# Patient Record
Sex: FEMALE | Race: BLACK OR AFRICAN AMERICAN | NOT HISPANIC OR LATINO | Employment: OTHER | URBAN - METROPOLITAN AREA
[De-identification: names, ages, dates, MRNs, and addresses within clinical notes are randomized per-mention and may not be internally consistent; named-entity substitution may affect disease eponyms.]

---

## 2017-01-11 ENCOUNTER — HISTORICAL (OUTPATIENT)
Dept: INFUSION THERAPY | Facility: HOSPITAL | Age: 77
End: 2017-01-11

## 2017-03-09 ENCOUNTER — HISTORICAL (OUTPATIENT)
Dept: INFUSION THERAPY | Facility: HOSPITAL | Age: 77
End: 2017-03-09

## 2017-05-04 ENCOUNTER — HISTORICAL (OUTPATIENT)
Dept: INFUSION THERAPY | Facility: HOSPITAL | Age: 77
End: 2017-05-04

## 2017-06-29 ENCOUNTER — HISTORICAL (OUTPATIENT)
Dept: INFUSION THERAPY | Facility: HOSPITAL | Age: 77
End: 2017-06-29

## 2017-08-23 ENCOUNTER — HISTORICAL (OUTPATIENT)
Dept: INFUSION THERAPY | Facility: HOSPITAL | Age: 77
End: 2017-08-23

## 2017-10-17 ENCOUNTER — HISTORICAL (OUTPATIENT)
Dept: INFUSION THERAPY | Facility: HOSPITAL | Age: 77
End: 2017-10-17

## 2017-12-13 ENCOUNTER — HISTORICAL (OUTPATIENT)
Dept: INFUSION THERAPY | Facility: HOSPITAL | Age: 77
End: 2017-12-13

## 2018-02-07 ENCOUNTER — HISTORICAL (OUTPATIENT)
Dept: INFUSION THERAPY | Facility: HOSPITAL | Age: 78
End: 2018-02-07

## 2018-03-29 ENCOUNTER — HISTORICAL (OUTPATIENT)
Dept: INFUSION THERAPY | Facility: HOSPITAL | Age: 78
End: 2018-03-29

## 2018-05-24 ENCOUNTER — HISTORICAL (OUTPATIENT)
Dept: INFUSION THERAPY | Facility: HOSPITAL | Age: 78
End: 2018-05-24

## 2018-07-18 ENCOUNTER — HISTORICAL (OUTPATIENT)
Dept: INFUSION THERAPY | Facility: HOSPITAL | Age: 78
End: 2018-07-18

## 2018-09-20 ENCOUNTER — HISTORICAL (OUTPATIENT)
Dept: INFUSION THERAPY | Facility: HOSPITAL | Age: 78
End: 2018-09-20

## 2018-11-20 ENCOUNTER — HISTORICAL (OUTPATIENT)
Dept: INFUSION THERAPY | Facility: HOSPITAL | Age: 78
End: 2018-11-20

## 2019-01-16 ENCOUNTER — HISTORICAL (OUTPATIENT)
Dept: INFUSION THERAPY | Facility: HOSPITAL | Age: 79
End: 2019-01-16

## 2019-03-21 ENCOUNTER — HISTORICAL (OUTPATIENT)
Dept: INFUSION THERAPY | Facility: HOSPITAL | Age: 79
End: 2019-03-21

## 2019-05-15 ENCOUNTER — HISTORICAL (OUTPATIENT)
Dept: LAB | Facility: HOSPITAL | Age: 79
End: 2019-05-15

## 2019-05-15 ENCOUNTER — HISTORICAL (OUTPATIENT)
Dept: INFUSION THERAPY | Facility: HOSPITAL | Age: 79
End: 2019-05-15

## 2019-05-15 LAB
ABS NEUT (OLG): 3.72 X10(3)/MCL (ref 2.1–9.2)
ALBUMIN SERPL-MCNC: 3.5 GM/DL (ref 3.4–5)
ALBUMIN/GLOB SERPL: 0.9 {RATIO}
ALP SERPL-CCNC: 94 UNIT/L (ref 38–126)
ALT SERPL-CCNC: 29 UNIT/L (ref 12–78)
AST SERPL-CCNC: 24 UNIT/L (ref 15–37)
BASOPHILS # BLD AUTO: 0 X10(3)/MCL (ref 0–0.2)
BASOPHILS NFR BLD AUTO: 0 %
BILIRUB SERPL-MCNC: 0.7 MG/DL (ref 0.2–1)
BILIRUBIN DIRECT+TOT PNL SERPL-MCNC: 0.1 MG/DL (ref 0–0.2)
BILIRUBIN DIRECT+TOT PNL SERPL-MCNC: 0.6 MG/DL (ref 0–0.8)
BUN SERPL-MCNC: 15 MG/DL (ref 7–18)
CALCIUM SERPL-MCNC: 8.8 MG/DL (ref 8.5–10.1)
CHLORIDE SERPL-SCNC: 108 MMOL/L (ref 98–107)
CO2 SERPL-SCNC: 27 MMOL/L (ref 21–32)
CREAT SERPL-MCNC: 0.74 MG/DL (ref 0.55–1.02)
DEPRECATED CALCIDIOL+CALCIFEROL SERPL-MC: 13.78 NG/ML (ref 30–80)
EOSINOPHIL # BLD AUTO: 0 X10(3)/MCL (ref 0–0.9)
EOSINOPHIL NFR BLD AUTO: 1 %
ERYTHROCYTE [DISTWIDTH] IN BLOOD BY AUTOMATED COUNT: 13.9 % (ref 11.5–17)
ERYTHROCYTE [SEDIMENTATION RATE] IN BLOOD: 28 MM/HR (ref 0–20)
GLOBULIN SER-MCNC: 3.9 GM/DL (ref 2.4–3.5)
GLUCOSE SERPL-MCNC: 83 MG/DL (ref 74–106)
HCT VFR BLD AUTO: 40.3 % (ref 37–47)
HGB BLD-MCNC: 13.2 GM/DL (ref 12–16)
LYMPHOCYTES # BLD AUTO: 1.5 X10(3)/MCL (ref 0.6–4.6)
LYMPHOCYTES NFR BLD AUTO: 25 %
MCH RBC QN AUTO: 30.7 PG (ref 27–31)
MCHC RBC AUTO-ENTMCNC: 32.8 GM/DL (ref 33–36)
MCV RBC AUTO: 93.7 FL (ref 80–94)
MONOCYTES # BLD AUTO: 0.6 X10(3)/MCL (ref 0.1–1.3)
MONOCYTES NFR BLD AUTO: 10 %
NEUTROPHILS # BLD AUTO: 3.72 X10(3)/MCL (ref 2.1–9.2)
NEUTROPHILS NFR BLD AUTO: 63 %
PLATELET # BLD AUTO: 243 X10(3)/MCL (ref 130–400)
PMV BLD AUTO: 10.3 FL (ref 9.4–12.4)
POTASSIUM SERPL-SCNC: 3.5 MMOL/L (ref 3.5–5.1)
PROT SERPL-MCNC: 7.4 GM/DL (ref 6.4–8.2)
RBC # BLD AUTO: 4.3 X10(6)/MCL (ref 4.2–5.4)
SODIUM SERPL-SCNC: 141 MMOL/L (ref 136–145)
VIT B12 SERPL-MCNC: 276 PG/ML (ref 193–986)
WBC # SPEC AUTO: 5.9 X10(3)/MCL (ref 4.5–11.5)

## 2019-07-19 ENCOUNTER — HISTORICAL (OUTPATIENT)
Dept: INFUSION THERAPY | Facility: HOSPITAL | Age: 79
End: 2019-07-19

## 2019-09-13 ENCOUNTER — HISTORICAL (OUTPATIENT)
Dept: INFUSION THERAPY | Facility: HOSPITAL | Age: 79
End: 2019-09-13

## 2019-10-23 ENCOUNTER — HISTORICAL (OUTPATIENT)
Dept: INFUSION THERAPY | Facility: HOSPITAL | Age: 79
End: 2019-10-23

## 2019-11-13 ENCOUNTER — HISTORICAL (OUTPATIENT)
Dept: INFUSION THERAPY | Facility: HOSPITAL | Age: 79
End: 2019-11-13

## 2019-12-11 ENCOUNTER — HISTORICAL (OUTPATIENT)
Dept: INFUSION THERAPY | Facility: HOSPITAL | Age: 79
End: 2019-12-11

## 2020-02-06 ENCOUNTER — HISTORICAL (OUTPATIENT)
Dept: INFUSION THERAPY | Facility: HOSPITAL | Age: 80
End: 2020-02-06

## 2020-04-02 ENCOUNTER — HISTORICAL (OUTPATIENT)
Dept: INFUSION THERAPY | Facility: HOSPITAL | Age: 80
End: 2020-04-02

## 2020-05-28 ENCOUNTER — HISTORICAL (OUTPATIENT)
Dept: INFUSION THERAPY | Facility: HOSPITAL | Age: 80
End: 2020-05-28

## 2020-07-23 ENCOUNTER — HISTORICAL (OUTPATIENT)
Dept: INFUSION THERAPY | Facility: HOSPITAL | Age: 80
End: 2020-07-23

## 2020-08-05 ENCOUNTER — HISTORICAL (OUTPATIENT)
Dept: ADMINISTRATIVE | Facility: HOSPITAL | Age: 80
End: 2020-08-05

## 2020-08-05 LAB
ABS NEUT (OLG): 4.45 X10(3)/MCL (ref 2.1–9.2)
ALBUMIN SERPL-MCNC: 3.8 GM/DL (ref 3.4–5)
ALBUMIN/GLOB SERPL: 1.1 RATIO (ref 1.1–2)
ALP SERPL-CCNC: 112 UNIT/L (ref 40–150)
ALT SERPL-CCNC: 80 UNIT/L (ref 0–55)
APPEARANCE, UA: CLEAR
AST SERPL-CCNC: 36 UNIT/L (ref 5–34)
BACTERIA SPEC CULT: ABNORMAL /HPF
BASOPHILS # BLD AUTO: 0 X10(3)/MCL (ref 0–0.2)
BASOPHILS NFR BLD AUTO: 0 %
BILIRUB SERPL-MCNC: 0.9 MG/DL
BILIRUB UR QL STRIP: NEGATIVE
BILIRUBIN DIRECT+TOT PNL SERPL-MCNC: 0.3 MG/DL (ref 0–0.5)
BILIRUBIN DIRECT+TOT PNL SERPL-MCNC: 0.6 MG/DL (ref 0–0.8)
BUN SERPL-MCNC: 13.3 MG/DL (ref 9.8–20.1)
CALCIUM SERPL-MCNC: 9 MG/DL (ref 8.4–10.2)
CHLORIDE SERPL-SCNC: 113 MMOL/L (ref 98–107)
CHOLEST SERPL-MCNC: 253 MG/DL
CHOLEST/HDLC SERPL: 5 {RATIO} (ref 0–5)
CK SERPL-CCNC: 54 U/L (ref 29–168)
CO2 SERPL-SCNC: 18 MMOL/L (ref 23–31)
COLOR UR: ABNORMAL
CREAT SERPL-MCNC: 0.85 MG/DL (ref 0.55–1.02)
DEPRECATED CALCIDIOL+CALCIFEROL SERPL-MC: 25.3 NG/ML (ref 6.6–49.9)
EOSINOPHIL # BLD AUTO: 0.1 X10(3)/MCL (ref 0–0.9)
EOSINOPHIL NFR BLD AUTO: 1 %
ERYTHROCYTE [DISTWIDTH] IN BLOOD BY AUTOMATED COUNT: 13.8 % (ref 11.5–17)
EST. AVERAGE GLUCOSE BLD GHB EST-MCNC: 108.3 MG/DL
GLOBULIN SER-MCNC: 3.5 GM/DL (ref 2.4–3.5)
GLUCOSE (UA): NEGATIVE
GLUCOSE SERPL-MCNC: 92 MG/DL (ref 82–115)
HBA1C MFR BLD: 5.4 %
HCT VFR BLD AUTO: 43.2 % (ref 37–47)
HDLC SERPL-MCNC: 51 MG/DL (ref 35–60)
HGB BLD-MCNC: 13.8 GM/DL (ref 12–16)
HGB UR QL STRIP: NEGATIVE
KETONES UR QL STRIP: NEGATIVE
LDLC SERPL CALC-MCNC: 156 MG/DL (ref 50–140)
LEUKOCYTE ESTERASE UR QL STRIP: ABNORMAL
LYMPHOCYTES # BLD AUTO: 1.7 X10(3)/MCL (ref 0.6–4.6)
LYMPHOCYTES NFR BLD AUTO: 25 %
MCH RBC QN AUTO: 31 PG (ref 27–31)
MCHC RBC AUTO-ENTMCNC: 31.9 GM/DL (ref 33–36)
MCV RBC AUTO: 97.1 FL (ref 80–94)
MONOCYTES # BLD AUTO: 0.5 X10(3)/MCL (ref 0.1–1.3)
MONOCYTES NFR BLD AUTO: 8 %
MUCOUS THREADS URNS QL MICRO: ABNORMAL
NEUTROPHILS # BLD AUTO: 4.45 X10(3)/MCL (ref 2.1–9.2)
NEUTROPHILS NFR BLD AUTO: 65 %
NITRITE UR QL STRIP: NEGATIVE
PH UR STRIP: 5.5 [PH] (ref 5–9)
PLATELET # BLD AUTO: 225 X10(3)/MCL (ref 130–400)
PMV BLD AUTO: 10.5 FL (ref 9.4–12.4)
POTASSIUM SERPL-SCNC: 3.8 MMOL/L (ref 3.5–5.1)
PROT SERPL-MCNC: 7.3 GM/DL (ref 5.8–7.6)
PROT UR QL STRIP: ABNORMAL
RBC # BLD AUTO: 4.45 X10(6)/MCL (ref 4.2–5.4)
RBC #/AREA URNS HPF: ABNORMAL /[HPF]
SODIUM SERPL-SCNC: 142 MMOL/L (ref 136–145)
SP GR UR STRIP: 1.02 (ref 1–1.03)
SQUAMOUS EPITHELIAL, UA: ABNORMAL
TRIGL SERPL-MCNC: 229 MG/DL (ref 37–140)
TSH SERPL-ACNC: 0.87 UIU/ML (ref 0.35–4.94)
UROBILINOGEN UR STRIP-ACNC: 0.2
VLDLC SERPL CALC-MCNC: 46 MG/DL
WBC # SPEC AUTO: 6.8 X10(3)/MCL (ref 4.5–11.5)
WBC #/AREA URNS HPF: 11 /HPF (ref 0–3)

## 2020-08-08 LAB — FINAL CULTURE: NORMAL

## 2020-09-23 ENCOUNTER — HISTORICAL (OUTPATIENT)
Dept: INFUSION THERAPY | Facility: HOSPITAL | Age: 80
End: 2020-09-23

## 2020-11-18 ENCOUNTER — HISTORICAL (OUTPATIENT)
Dept: INFUSION THERAPY | Facility: HOSPITAL | Age: 80
End: 2020-11-18

## 2021-01-15 ENCOUNTER — HISTORICAL (OUTPATIENT)
Dept: INFUSION THERAPY | Facility: HOSPITAL | Age: 81
End: 2021-01-15

## 2021-03-12 ENCOUNTER — HISTORICAL (OUTPATIENT)
Dept: INFUSION THERAPY | Facility: HOSPITAL | Age: 81
End: 2021-03-12

## 2021-03-24 ENCOUNTER — HISTORICAL (OUTPATIENT)
Dept: ADMINISTRATIVE | Facility: HOSPITAL | Age: 81
End: 2021-03-24

## 2021-04-29 ENCOUNTER — HISTORICAL (OUTPATIENT)
Dept: ADMINISTRATIVE | Facility: HOSPITAL | Age: 81
End: 2021-04-29

## 2021-05-07 ENCOUNTER — HISTORICAL (OUTPATIENT)
Dept: INFUSION THERAPY | Facility: HOSPITAL | Age: 81
End: 2021-05-07

## 2021-06-17 ENCOUNTER — HISTORICAL (OUTPATIENT)
Dept: INFUSION THERAPY | Facility: HOSPITAL | Age: 81
End: 2021-06-17

## 2021-07-29 ENCOUNTER — HISTORICAL (OUTPATIENT)
Dept: INFUSION THERAPY | Facility: HOSPITAL | Age: 81
End: 2021-07-29

## 2021-09-09 ENCOUNTER — HISTORICAL (OUTPATIENT)
Dept: INFUSION THERAPY | Facility: HOSPITAL | Age: 81
End: 2021-09-09

## 2021-09-29 ENCOUNTER — HISTORICAL (OUTPATIENT)
Dept: ADMINISTRATIVE | Facility: HOSPITAL | Age: 81
End: 2021-09-29

## 2021-09-29 LAB
ABS NEUT (OLG): 4.06 X10(3)/MCL (ref 2.1–9.2)
BASOPHILS # BLD AUTO: 0 X10(3)/MCL (ref 0–0.2)
BASOPHILS NFR BLD AUTO: 0 %
BUN SERPL-MCNC: 14.9 MG/DL (ref 9.8–20.1)
CALCIUM SERPL-MCNC: 10 MG/DL (ref 8.4–10.2)
CHLORIDE SERPL-SCNC: 107 MMOL/L (ref 98–107)
CO2 SERPL-SCNC: 22 MMOL/L (ref 23–31)
CREAT SERPL-MCNC: 0.82 MG/DL (ref 0.55–1.02)
CREAT/UREA NIT SERPL: 18
EOSINOPHIL # BLD AUTO: 0.1 X10(3)/MCL (ref 0–0.9)
EOSINOPHIL NFR BLD AUTO: 2 %
ERYTHROCYTE [DISTWIDTH] IN BLOOD BY AUTOMATED COUNT: 14.1 % (ref 11.5–17)
GLUCOSE SERPL-MCNC: 108 MG/DL (ref 82–115)
HCT VFR BLD AUTO: 39.1 % (ref 37–47)
HGB BLD-MCNC: 12.5 GM/DL (ref 12–16)
LYMPHOCYTES # BLD AUTO: 1.7 X10(3)/MCL (ref 0.6–4.6)
LYMPHOCYTES NFR BLD AUTO: 27 %
MCH RBC QN AUTO: 31.1 PG (ref 27–31)
MCHC RBC AUTO-ENTMCNC: 32 GM/DL (ref 33–36)
MCV RBC AUTO: 97.3 FL (ref 80–94)
MONOCYTES # BLD AUTO: 0.6 X10(3)/MCL (ref 0.1–1.3)
MONOCYTES NFR BLD AUTO: 9 %
NEUTROPHILS # BLD AUTO: 4.06 X10(3)/MCL (ref 2.1–9.2)
NEUTROPHILS NFR BLD AUTO: 62 %
PLATELET # BLD AUTO: 264 X10(3)/MCL (ref 130–400)
PMV BLD AUTO: 10 FL (ref 9.4–12.4)
POTASSIUM SERPL-SCNC: 4.1 MMOL/L (ref 3.5–5.1)
RBC # BLD AUTO: 4.02 X10(6)/MCL (ref 4.2–5.4)
SARS-COV-2 RNA RESP QL NAA+PROBE: NOT DETECTED
SODIUM SERPL-SCNC: 141 MMOL/L (ref 136–145)
WBC # SPEC AUTO: 6.5 X10(3)/MCL (ref 4.5–11.5)

## 2021-10-01 ENCOUNTER — HOSPITAL ENCOUNTER (OUTPATIENT)
Dept: ONCOLOGY | Facility: HOSPITAL | Age: 81
End: 2021-10-03
Attending: NEUROLOGICAL SURGERY | Admitting: NEUROLOGICAL SURGERY

## 2021-10-01 LAB — GROUP & RH: NORMAL

## 2021-11-04 ENCOUNTER — HISTORICAL (OUTPATIENT)
Dept: INFUSION THERAPY | Facility: HOSPITAL | Age: 81
End: 2021-11-04

## 2021-12-15 ENCOUNTER — HISTORICAL (OUTPATIENT)
Dept: INFUSION THERAPY | Facility: HOSPITAL | Age: 81
End: 2021-12-15

## 2022-02-09 ENCOUNTER — HISTORICAL (OUTPATIENT)
Dept: INFUSION THERAPY | Facility: HOSPITAL | Age: 82
End: 2022-02-09

## 2022-04-06 ENCOUNTER — HISTORICAL (OUTPATIENT)
Dept: INFUSION THERAPY | Facility: HOSPITAL | Age: 82
End: 2022-04-06

## 2022-04-11 ENCOUNTER — HISTORICAL (OUTPATIENT)
Dept: ADMINISTRATIVE | Facility: HOSPITAL | Age: 82
End: 2022-04-11
Payer: MEDICARE

## 2022-04-24 VITALS
DIASTOLIC BLOOD PRESSURE: 80 MMHG | OXYGEN SATURATION: 98 % | WEIGHT: 167 LBS | HEIGHT: 68 IN | BODY MASS INDEX: 25.31 KG/M2 | SYSTOLIC BLOOD PRESSURE: 134 MMHG

## 2022-04-30 NOTE — OP NOTE
Patient:   Evelyn Talavera             MRN: [id number]            FIN: [id number]               Age:   80 years     Sex:  Female     :  1940   Associated Diagnoses:   None   Author:   Romario Flores MD       Phacoemulsification of Cataract with Intraocular Implant   Preoperative Diagnosis: Cataract left eye   Postoperative Diagnosis : Cataract left eye  Surgeon: Romario Flores MD  Assistant: NOMI Camarena  Anestheisa: MAC  Complications: None    After the patient underwent topical anesthesia along with IV sedation in the holding area, the patient was brought to the operating suite. The patient prepped and draped in a sterile fashion. A pediatric tegaderm and a lid speculum were used to retract the upper and lower lashes and lids.  A 1.0mm paracentesis was then made at the 5 oclock and 11 oclock position. Intraocular non-preserved 1% Xylocaine (4% diluted down to 1%) was irrigated into the anterior chamber. Trypan blue dye was used to stain the anterior capsule then Endocoat was injected into the eye. A clear corneal incision was made with a 2.4 Keratome blade. A 4.75mm circular capsulotomy was then made with a pre bent 30 gauge needle and BSS was used to hydro dissect the nucleus from the capsule. The nucleus was then phacoemulsified with the Abbott machine for a EFX of (23_). The cortex was then removed with the I/A hand piece and Helon was placed into the posterior bag of the eye. An posterior chamber implant (ZCB00_) of power (21.5_) was placed in the capsular bag. The Helon was then removed from the eye with the I/A hand piece . The anterior chamber was inflated with BSS and the wound was checked for leaks. The lid speculum was removed and a drop of Besivance was placed in the operative eye. The patient was brought to the recovery suite in stable condition.

## 2022-04-30 NOTE — DISCHARGE SUMMARY
Patient:   Evelyn Talavera             MRN: [id number]            FIN: [id number]               Age:   81 years     Sex:  Female     :  1940   Associated Diagnoses:   None   Author:   Parul ARRIAGA, Misty Rehman      Discharge Summary  Admitting physician: Jesse Marin MD  Admit Date: 10/1/2021  Discharge Date: 10/3/2021      Admitting diagnosis:   1.  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  Bilateral L4 hemilaminotomies and bilateral L4-5 medial facetectomies and foraminotomies  2.  Bilateral L5 hemilaminotomies and bilateral L5-S1 medial facetectomies and foraminotomies  3.  Microdissection for spinal procedure    History:   Evelyn Talavera is an 81-year-old woman with intractable lower extremity pain, numbness and weakness with symptoms of neurogenic claudication.  Imaging study showed severe central and lateral recess stenosis at L4-5 more so than L5-S1.. Options were discussed and, after conservative management failed, surgery was elected.  The patient and their family understood and accepted the nature of this surgery as well as its attendant risks. She was admitted for this on 10/1/2021.     Hospital Course:  The patient was taken to the OR on 10/1/2021 for the aforementioned procedure.  She tolerated the procedure well and was admitted to the neuro floor afterwards.  On POD #1 she was experiencing some incisional pain which was controlled with PO medications.  Her leg symptoms were improved.  She was able to work with therapy and increase her activity over the next few days.  She was tolerating PO intake and voiding without issues.  She was able to be discharged home in an improved state with a good prognosis on 10/3/2021.      Discharge Medications:  aspirin (Aspirin Enteric Coated 81 mg oral delayed release tablet)  1 Tablet(s) Oral Daily. do not crush or chew. Refills: 0.  Patient Instructions:_ Ok to resume on 10/7/2021  cholecalciferol (cholecalciferol 50,000 intl units (1250 mcg) oral capsule) 1 Cap Oral every week., Recorded by Shar  cyanocobalamin (Cyanocobalamin Injection -CCA) 1000 Microgram Intramuscular once a month., Recorded by DrFirst  gabapentin (gabapentin 300 mg oral capsule) 1 Cap Oral 3 times a day., Recorded by DrFirst  ondansetron (ondansetron 8 mg oral tablet, disintegrating) 1 Tablet(s) Oral 3 times a day., Recorded by Yenst  simvastatin (simvastatin 20 mg oral tablet) 1 Tablet(s) Oral once a day (in the evening)., Recorded by Yenst  traMADol (traMADol 50 mg oral tablet) 1 Tablet(s) Oral every 4 hours as needed as needed for pain. -Medically necessary for > 7 day Rx; will discharge over weekend. Refills: 0., Recorded by Shar     Discharge Instructions:  Her incision was closed with glue.  She is allowed to shower, getting the incision wet.  She will avoid soaking the incision under water.  She will be discharged home on her regular home diet.  She will follow-up with Dr. Marin at 2 weeks post-op.

## 2022-04-30 NOTE — H&P
Patient:   Evelyn Talavera             MRN: [id number]            FIN: [id number]               Age:   81 years     Sex:  Female     :  1940   Associated Diagnoses:   None   Author:   Parul ARRIAGA, Misty Rehman      Health Status   The H&P was reviewed, the patient was examined, and there are no changes to the patient's condition..

## 2022-04-30 NOTE — OP NOTE
Patient:   Evelyn Talavera             MRN: [id number]            FIN: [id number]               Age:   80 years     Sex:  Female     :  1940   Associated Diagnoses:   None   Author:   Romario Flores MD       Phacoemulsification of Cataract with Intraocular Implant   Preoperative Diagnosis: Cataract right eye   Postoperative Diagnosis : Cataract right eye  Surgeon: Romario Flores MD  Assistant: NOMI Camarena  Anestheisa: MAC  Complications: None  After the patient underwent topical anesthesia along with IV sedation in the holding area, the patient was brought to the operating suite. The patient was prepped and draped in a sterile fashion. A pediatric tegaderm and a lid speculum were used to retract the upper and lower lashes and lids.  A 1.0mm paracentesis was then made at the 11 oclock position. Intraocular non-preserved 1% Xylocaine (4% diluted down to 1%) was irrigated into the anterior chamber. Trypan blue dye was used to stain the anterior capsule then Endocoat was injected into the eye. A clear corneal incision was made with a 2.4 Keratome blade. A 4.75mm circular capsulotomy was then made with a pre bent 30 gauge needle and BSS was used to hydro dissect the nucleus from the capsule. The nucleus was then phacoemulsified with the Abbott machine for a EFX of (48_). The cortex was then removed with the I/A hand piece and Helon was placed into the posterior bag of the eye. An posterior chamber implant (_ZCB00) of power (_21.0) was placed in the capsular bag. The Helon was then removed from the eye with the I/A hand piece . The anterior chamber was inflated with BSS and the wound was checked for leaks. The lid speculum was removed and a drop of Besivance was placed in the operative eye. The patient was brought to the recovery suite in stable condition.

## 2022-04-30 NOTE — OP NOTE
Patient:   Evelyn Talavera             MRN: [id number]            FIN: [id number]               Age:   81 years     Sex:  Female     :  1940   Associated Diagnoses:   None   Author:   Jesse Marin MD      Operative Note   DATE OF OPERATION:  2021    PREOPERATIVE DIAGNOSIS:  1.  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   ASSISTANT: JOE Savage    PROCEDURE:  1.  Bilateral L4 hemilaminotomies and bilateral L4-5 medial facetectomies and foraminotomies  2.  Bilateral L5 hemilaminotomies and bilateral L5-S1 medial facetectomies and foraminotomies  3.  Microdissection for spinal procedure    ANESTHESIA:  General endotracheal    BLOOD LOSS:  50 cc    SPECIMEN(s):  None    DRAINS:  SHANTEL drain over laminotomy defects    COMPLICATIONS:  Small dural tear created by destini on tip suction tube.  Repaired primarily with 5-0 Prolene and covered with dura matrix onlay plus and Adherus dural sealant    HISTORY:  Evelyn Talavera is an 81-year-old woman with intractable lower extremity pain, numbness and weakness with symptoms of neurogenic claudication.  Imaging study showed severe central and lateral recess stenosis at L4-5 more so than L5-S1.. Options were discussed and, after conservative management failed, surgery was elected.  The patient and their family understood and accepted the nature of this surgery as well as its attendant risks.    FINDINGS:  There were no untoward findings.  As expected, there was severe central and lateral recess stenosis on a multifactorial basis.  There was severe facet arthropathy, ligamentous buckling and disc bulging.  There is excellent nerve root and thecal sac decompression at the completion of the procedure.  There was a small dural tear created by a bur on the suction  tube and repaired primarily with 5-0 Prolene, DuraMatrix onlay plus and Adherus dural sealant.    PROCEDURE IN DETAIL:  After endotracheal intubation and induction of general anesthesia, the patient was placed in the prone position on the spinal frame with pressure points appropriately padded.  The frame was cranked up and the back was prepped and draped in the usual fashion.  The patient received intravenous antibiotics prior to the start of the procedure.  The C-arm was used to guide the placement of the initial incision.    A 2 cm incision was made through the skin, subcutaneous tissues and fascia on the appropriate side after infiltrating the skin and muscle with 1/4% Marcaine containing 1/200,000 epinephrine.  Then progressive dilators were inserted down to the lamina and then a 16-18 mm guide tube was put into place and positioned appropriately according to the C-arm.  Then the operating microscope was brought into place.  Muscle was cleared off of the lateral inferior portion of the lamina and then the high speed drill, curette and Kerrison rongeurs were used to create a hemilaminotomy on the ipsilateral side.  The superior articulating facet of the inferior level was then undercut and then the ligamentum flavum was excised.  Once complete decompression of the lateral recess and proximal foramen was achieved, then the guide tube was angled medially, the base of the spinolaminar junction was removed with the high-speed drill and a combination of curettes and Kerrison rongeurs were then used to decompress the contralateral lateral recess and foramen.  A small dural tear at the L5-S1 level was created by a bur on the suction tube.  This was repaired as described above.  Meticulous hemostasis was achieved with a combination of bipolar cautery and hemostatic agent which was removed at completion. The wound was irrigated copiously with antibiotic irrigating solution.  The same procedure was carried out at each  additional level.  A drain was placed over the laminotomy defects and brought out through a separate stab incision.    The guide tube was removed slowly in a rotating fashion with bipolar cautery of the soft tissues and then the fascia was closed with 0 Vicryl and the subcutaneous tissue was closed with 2-0 Vicryl in separate layers.  Dermabond skin glue was used for the skin edges and the patient was returned to the supine position.  The patient was then taken to the post anesthetic care unit in satisfactory condition with correct sponge and needle counts.

## 2022-06-01 ENCOUNTER — INFUSION (OUTPATIENT)
Dept: INFUSION THERAPY | Facility: HOSPITAL | Age: 82
End: 2022-06-01
Attending: INTERNAL MEDICINE
Payer: MEDICARE

## 2022-06-01 VITALS
RESPIRATION RATE: 16 BRPM | SYSTOLIC BLOOD PRESSURE: 136 MMHG | HEIGHT: 63 IN | WEIGHT: 154 LBS | DIASTOLIC BLOOD PRESSURE: 73 MMHG | HEART RATE: 74 BPM | BODY MASS INDEX: 27.29 KG/M2 | TEMPERATURE: 99 F

## 2022-06-01 DIAGNOSIS — K50.819 CROHN'S DISEASE OF SMALL AND LARGE INTESTINES WITH COMPLICATION: Primary | ICD-10-CM

## 2022-06-01 PROBLEM — K50.80 CROHN'S DISEASE OF SMALL AND LARGE INTESTINES: Status: ACTIVE | Noted: 2022-06-01

## 2022-06-01 PROCEDURE — 96365 THER/PROPH/DIAG IV INF INIT: CPT

## 2022-06-01 PROCEDURE — 96375 TX/PRO/DX INJ NEW DRUG ADDON: CPT

## 2022-06-01 PROCEDURE — 63600175 PHARM REV CODE 636 W HCPCS: Performed by: INTERNAL MEDICINE

## 2022-06-01 PROCEDURE — 25000003 PHARM REV CODE 250: Performed by: INTERNAL MEDICINE

## 2022-06-01 RX ORDER — IPRATROPIUM BROMIDE AND ALBUTEROL SULFATE 2.5; .5 MG/3ML; MG/3ML
3 SOLUTION RESPIRATORY (INHALATION)
Status: CANCELLED | OUTPATIENT
Start: 2022-06-01

## 2022-06-01 RX ORDER — ACETAMINOPHEN 325 MG/1
650 TABLET ORAL
Status: ACTIVE | OUTPATIENT
Start: 2022-06-01 | End: 2022-06-01

## 2022-06-01 RX ORDER — IPRATROPIUM BROMIDE AND ALBUTEROL SULFATE 2.5; .5 MG/3ML; MG/3ML
3 SOLUTION RESPIRATORY (INHALATION)
Status: DISCONTINUED | OUTPATIENT
Start: 2022-06-01 | End: 2022-06-01

## 2022-06-01 RX ORDER — DIPHENHYDRAMINE HYDROCHLORIDE 50 MG/ML
25 INJECTION INTRAMUSCULAR; INTRAVENOUS
Status: ACTIVE | OUTPATIENT
Start: 2022-06-01 | End: 2022-06-01

## 2022-06-01 RX ORDER — ACETAMINOPHEN 500 MG
1000 TABLET ORAL ONCE
Status: CANCELLED | OUTPATIENT
Start: 2022-06-01 | End: 2022-06-01

## 2022-06-01 RX ORDER — SODIUM CHLORIDE 0.9 % (FLUSH) 0.9 %
10 SYRINGE (ML) INJECTION
Status: DISCONTINUED | OUTPATIENT
Start: 2022-06-01 | End: 2022-06-01 | Stop reason: HOSPADM

## 2022-06-01 RX ORDER — DIPHENHYDRAMINE HYDROCHLORIDE 50 MG/ML
25 INJECTION INTRAMUSCULAR; INTRAVENOUS
Status: CANCELLED | OUTPATIENT
Start: 2022-06-01

## 2022-06-01 RX ORDER — METHYLPREDNISOLONE SOD SUCC 125 MG
40 VIAL (EA) INJECTION
Status: CANCELLED | OUTPATIENT
Start: 2022-06-01

## 2022-06-01 RX ORDER — ACETAMINOPHEN 325 MG/1
650 TABLET ORAL
Status: CANCELLED | OUTPATIENT
Start: 2022-06-01

## 2022-06-01 RX ORDER — HEPARIN 100 UNIT/ML
500 SYRINGE INTRAVENOUS
Status: CANCELLED | OUTPATIENT
Start: 2022-06-01

## 2022-06-01 RX ORDER — SODIUM CHLORIDE 0.9 % (FLUSH) 0.9 %
10 SYRINGE (ML) INJECTION
Status: CANCELLED | OUTPATIENT
Start: 2022-06-01

## 2022-06-01 RX ORDER — EPINEPHRINE 1 MG/ML
0.3 INJECTION, SOLUTION, CONCENTRATE INTRAVENOUS
Status: CANCELLED | OUTPATIENT
Start: 2022-06-01

## 2022-06-01 RX ORDER — HEPARIN 100 UNIT/ML
500 SYRINGE INTRAVENOUS
Status: DISCONTINUED | OUTPATIENT
Start: 2022-06-01 | End: 2022-06-01 | Stop reason: HOSPADM

## 2022-06-01 RX ORDER — CETIRIZINE HYDROCHLORIDE 10 MG/1
10 TABLET ORAL ONCE
Status: DISCONTINUED | OUTPATIENT
Start: 2022-06-01 | End: 2022-06-01 | Stop reason: HOSPADM

## 2022-06-01 RX ORDER — ACETAMINOPHEN 500 MG
1000 TABLET ORAL ONCE
Status: DISCONTINUED | OUTPATIENT
Start: 2022-06-01 | End: 2022-06-01 | Stop reason: HOSPADM

## 2022-06-01 RX ORDER — EPINEPHRINE 0.3 MG/.3ML
0.3 INJECTION SUBCUTANEOUS
Status: ACTIVE | OUTPATIENT
Start: 2022-06-01 | End: 2022-06-01

## 2022-06-01 RX ORDER — CETIRIZINE HYDROCHLORIDE 10 MG/1
10 TABLET ORAL ONCE
Status: CANCELLED | OUTPATIENT
Start: 2022-06-01 | End: 2022-06-01

## 2022-06-01 RX ADMIN — HYDROCORTISONE SODIUM SUCCINATE 200 MG: 100 INJECTION, POWDER, FOR SOLUTION INTRAMUSCULAR; INTRAVENOUS at 11:06

## 2022-06-01 RX ADMIN — HEPARIN 500 UNITS: 100 SYRINGE at 11:06

## 2022-06-01 RX ADMIN — VEDOLIZUMAB 300 MG: 300 INJECTION, POWDER, LYOPHILIZED, FOR SOLUTION INTRAVENOUS at 11:06

## 2022-06-17 RX ORDER — ACETAMINOPHEN 500 MG
1000 TABLET ORAL ONCE
Status: CANCELLED | OUTPATIENT
Start: 2022-07-13 | End: 2022-07-13

## 2022-06-17 RX ORDER — METHYLPREDNISOLONE SOD SUCC 125 MG
40 VIAL (EA) INJECTION
Status: CANCELLED | OUTPATIENT
Start: 2022-07-13

## 2022-06-17 RX ORDER — HEPARIN 100 UNIT/ML
500 SYRINGE INTRAVENOUS
Status: CANCELLED | OUTPATIENT
Start: 2022-07-13

## 2022-06-17 RX ORDER — CETIRIZINE HYDROCHLORIDE 10 MG/1
10 TABLET ORAL ONCE
Status: CANCELLED | OUTPATIENT
Start: 2022-07-13 | End: 2022-07-13

## 2022-06-17 RX ORDER — EPINEPHRINE 1 MG/ML
0.3 INJECTION, SOLUTION, CONCENTRATE INTRAVENOUS
Status: CANCELLED | OUTPATIENT
Start: 2022-07-13

## 2022-06-17 RX ORDER — SODIUM CHLORIDE 0.9 % (FLUSH) 0.9 %
10 SYRINGE (ML) INJECTION
Status: CANCELLED | OUTPATIENT
Start: 2022-07-13

## 2022-06-17 RX ORDER — DIPHENHYDRAMINE HYDROCHLORIDE 50 MG/ML
25 INJECTION INTRAMUSCULAR; INTRAVENOUS
Status: CANCELLED | OUTPATIENT
Start: 2022-07-13

## 2022-06-17 RX ORDER — ACETAMINOPHEN 325 MG/1
650 TABLET ORAL
Status: CANCELLED | OUTPATIENT
Start: 2022-07-13

## 2022-07-14 ENCOUNTER — TELEPHONE (OUTPATIENT)
Dept: INFUSION THERAPY | Facility: HOSPITAL | Age: 82
End: 2022-07-14
Payer: MEDICARE

## 2022-08-23 ENCOUNTER — TELEPHONE (OUTPATIENT)
Dept: NEUROSURGERY | Facility: CLINIC | Age: 82
End: 2022-08-23
Payer: MEDICARE

## 2022-09-16 NOTE — PROGRESS NOTES
[facility]  Office Visit  Neurosurgery  Evelyn Talavera  [id number]  1940    HPI:  The patient presents today for pre-operative appointment.  Patient is known to Dr. Marin following right carpal tunnel release on 9/6/2019.  She underwent bilateral L4-5, L5-S1 decompression on 10/1/2021 also with Dr. Marin.  There were plans to proceed with left carpal tunnel release once she recovered from her lumbar decompression.  She was last seen in the office in January.  At this time, she was not quite ready to proceed with surgery.  She wanted to recover more from her lumbar surgery and her  had recently undergone surgery for an aneurysm.  She is now ready to proceed with surgery as previously planned.  She presents today for her preoperative appointment.    The patient complains of numbness and tingling in all of the left fingers.  She is been wearing wrist splint at night which helps some with the symptoms.  Throughout the day with activities especially driving, she is very bothered by her left hand symptoms.  She does not feel the left hand is weak.  She reports the symptoms are the same as what she was experiencing in her right hand prior to her carpal tunnel release in 2019.  She reports her right-handed symptoms are resolved postoperatively.  She is doing very well in terms of her back and leg symptoms as well since surgery.  She reports she is able to stand up straighter.  She is ambulating better than she was prior to surgery, but still has to stop and take breaks.  She has had some difficulty getting back into her exercises at the gym due to her left hand complaints.    Review of patient's allergies indicates:  No Known Allergies  Current Outpatient Medications   Medication Sig Dispense Refill    cholecalciferol, vitamin D3, 1,250 mcg (50,000 unit) capsule Take 1,250 mcg by mouth once a week.      simvastatin (ZOCOR) 20 MG tablet once daily.      traMADoL (ULTRAM) 50 mg tablet TAKE 1-2  "TABLETS BY MOUTH THREE TIMES A DAY AS NEEDED FOR PAIN 40 tablet 2     No current facility-administered medications for this visit.     Patient Active Problem List    Diagnosis Date Noted    Carpal tunnel syndrome of left wrist 09/19/2022    Crohn's disease of small and large intestines 06/01/2022     Past Medical History:   Diagnosis Date    Carpal tunnel syndrome, bilateral     Cervical radiculopathy     Heart attack     HLD (hyperlipidemia)     Low back pain     Spinal stenosis of lumbar region with neurogenic claudication      Past Surgical History:   Procedure Laterality Date    bilateral L4-5, L5-S1 decompression, repair L5-S1 dural tear  10/01/2021    Dr. Marin    CARPAL TUNNEL RELEASE Right 09/06/2019    Dr. Marin    CHOLECYSTECTOMY      COLECTOMY  07/2011    partial x3    HYSTERECTOMY      total     Social History     Tobacco Use    Smoking status: Former     Types: Cigarettes    Smokeless tobacco: Never   Substance Use Topics    Alcohol use: Yes     Comment: rarely     Family History   Problem Relation Age of Onset    Diabetes Mother     Hypertension Father     Hyperlipidemia Father     Cancer Father     Hyperlipidemia Sister     Hyperlipidemia Brother     Hyperlipidemia Daughter        Vital Signs  Pulse: 71  Resp: 16  BP: 114/62  BP Location: Other (Comment)  Patient Position: Sitting  Pain Score: 0-No pain  Height and Weight  Height: 5' 2" (157.5 cm)  Weight: 71.7 kg (158 lb)  BSA (Calculated - sq m): 1.77 sq meters  BMI (Calculated): 28.9  Weight in (lb) to have BMI = 25: 136.4]    ROS:  Review of Systems   Neurological:  Positive for numbness (tingling).   All other systems reviewed and are negative.    Vitals within last 24hrs:  Vitals:    09/19/22 1000   BP: 114/62   Pulse: 71   Resp: 16       Physical Exam:  General: well developed, well nourished, no distress.   Head: normocephalic, atraumatic  Respiratory: respiration non-labored; clear to auscultation  Cardiac: RRR, No murmur  GI: abd soft, " non-tender, non-distended  Pulses: 2+ and symmetric radial and dorsalis pedis. No lower extremity edema  Neurologic: Alert and oriented. Thought content appropriate.  GCS: Motor: 6/Verbal: 5/Eyes: 4 GCS Total: 15  Mental Status: Awake, Alert, Oriented x3  Cranial nerves: face symmetric, tongue midline, CN II-XII grossly intact.   Eyes: pupils equal, round, reactive to light with accomodation, EOMI.   Sensory: intact to light touch throughout  Motor Strength:Moves all extremities spontaneously with good tone.  Full strength upper and lower extremities. No abnormal movements seen.     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 Marin. The nature of the procedure, as well as its attendant risks, were discussed in detail with the patient.  All questions were answered.  They are agreement with proceeding with surgery as planned, and are tentatively scheduled for left CTR on 9/29/2022.          PATRIC Savage-JOE

## 2022-09-19 ENCOUNTER — OFFICE VISIT (OUTPATIENT)
Dept: NEUROSURGERY | Facility: CLINIC | Age: 82
End: 2022-09-19
Payer: MEDICARE

## 2022-09-19 VITALS
SYSTOLIC BLOOD PRESSURE: 114 MMHG | WEIGHT: 158 LBS | RESPIRATION RATE: 16 BRPM | DIASTOLIC BLOOD PRESSURE: 62 MMHG | BODY MASS INDEX: 29.08 KG/M2 | HEIGHT: 62 IN | HEART RATE: 71 BPM

## 2022-09-19 DIAGNOSIS — I21.9 MYOCARDIAL INFARCTION, UNSPECIFIED MI TYPE, UNSPECIFIED ARTERY: ICD-10-CM

## 2022-09-19 DIAGNOSIS — G56.02 CARPAL TUNNEL SYNDROME OF LEFT WRIST: Primary | ICD-10-CM

## 2022-09-19 PROCEDURE — 99214 PR OFFICE/OUTPT VISIT, EST, LEVL IV, 30-39 MIN: ICD-10-PCS | Mod: ,,, | Performed by: NURSE PRACTITIONER

## 2022-09-19 PROCEDURE — 99214 OFFICE O/P EST MOD 30 MIN: CPT | Mod: ,,, | Performed by: NURSE PRACTITIONER

## 2022-09-19 RX ORDER — SIMVASTATIN 20 MG/1
20 TABLET, FILM COATED ORAL NIGHTLY
Status: ON HOLD | COMMUNITY
Start: 2022-04-28 | End: 2023-07-25 | Stop reason: HOSPADM

## 2022-09-19 RX ORDER — ASPIRIN 325 MG
1250 TABLET, DELAYED RELEASE (ENTERIC COATED) ORAL WEEKLY
Status: ON HOLD | COMMUNITY
Start: 2021-09-30 | End: 2023-07-25 | Stop reason: HOSPADM

## 2022-09-21 ENCOUNTER — TELEPHONE (OUTPATIENT)
Dept: ADMINISTRATIVE | Facility: HOSPITAL | Age: 82
End: 2022-09-21
Payer: MEDICARE

## 2022-09-22 ENCOUNTER — INFUSION (OUTPATIENT)
Dept: INFUSION THERAPY | Facility: HOSPITAL | Age: 82
End: 2022-09-22
Attending: INTERNAL MEDICINE
Payer: MEDICARE

## 2022-09-22 VITALS
RESPIRATION RATE: 16 BRPM | OXYGEN SATURATION: 98 % | DIASTOLIC BLOOD PRESSURE: 69 MMHG | SYSTOLIC BLOOD PRESSURE: 136 MMHG | TEMPERATURE: 99 F | HEART RATE: 70 BPM

## 2022-09-22 DIAGNOSIS — K50.819 CROHN'S DISEASE OF SMALL AND LARGE INTESTINES WITH COMPLICATION: Primary | ICD-10-CM

## 2022-09-22 PROCEDURE — 96375 TX/PRO/DX INJ NEW DRUG ADDON: CPT

## 2022-09-22 PROCEDURE — 25000003 PHARM REV CODE 250: Performed by: INTERNAL MEDICINE

## 2022-09-22 PROCEDURE — 96365 THER/PROPH/DIAG IV INF INIT: CPT

## 2022-09-22 PROCEDURE — 63600175 PHARM REV CODE 636 W HCPCS: Performed by: INTERNAL MEDICINE

## 2022-09-22 RX ORDER — ACETAMINOPHEN 325 MG/1
650 TABLET ORAL
Status: CANCELLED | OUTPATIENT
Start: 2022-11-11

## 2022-09-22 RX ORDER — DIPHENHYDRAMINE HYDROCHLORIDE 50 MG/ML
25 INJECTION INTRAMUSCULAR; INTRAVENOUS
Status: CANCELLED | OUTPATIENT
Start: 2022-11-11

## 2022-09-22 RX ORDER — HEPARIN 100 UNIT/ML
500 SYRINGE INTRAVENOUS
Status: DISCONTINUED | OUTPATIENT
Start: 2022-09-22 | End: 2022-09-22 | Stop reason: HOSPADM

## 2022-09-22 RX ORDER — METHYLPREDNISOLONE SOD SUCC 125 MG
40 VIAL (EA) INJECTION
Status: CANCELLED | OUTPATIENT
Start: 2022-11-11

## 2022-09-22 RX ORDER — HEPARIN 100 UNIT/ML
500 SYRINGE INTRAVENOUS
Status: CANCELLED | OUTPATIENT
Start: 2022-11-11

## 2022-09-22 RX ORDER — CETIRIZINE HYDROCHLORIDE 10 MG/1
10 TABLET ORAL ONCE
Status: DISCONTINUED | OUTPATIENT
Start: 2022-09-22 | End: 2022-09-22 | Stop reason: HOSPADM

## 2022-09-22 RX ORDER — ACETAMINOPHEN 500 MG
1000 TABLET ORAL ONCE
Status: CANCELLED | OUTPATIENT
Start: 2022-11-11 | End: 2022-11-11

## 2022-09-22 RX ORDER — EPINEPHRINE 1 MG/ML
0.3 INJECTION, SOLUTION, CONCENTRATE INTRAVENOUS
Status: CANCELLED | OUTPATIENT
Start: 2022-11-11

## 2022-09-22 RX ORDER — SODIUM CHLORIDE 0.9 % (FLUSH) 0.9 %
10 SYRINGE (ML) INJECTION
Status: DISCONTINUED | OUTPATIENT
Start: 2022-09-22 | End: 2022-09-22 | Stop reason: HOSPADM

## 2022-09-22 RX ORDER — ACETAMINOPHEN 500 MG
1000 TABLET ORAL ONCE
Status: DISCONTINUED | OUTPATIENT
Start: 2022-09-22 | End: 2022-09-22 | Stop reason: HOSPADM

## 2022-09-22 RX ORDER — SODIUM CHLORIDE 0.9 % (FLUSH) 0.9 %
10 SYRINGE (ML) INJECTION
Status: CANCELLED | OUTPATIENT
Start: 2022-11-11

## 2022-09-22 RX ORDER — CETIRIZINE HYDROCHLORIDE 10 MG/1
10 TABLET ORAL ONCE
Status: CANCELLED | OUTPATIENT
Start: 2022-11-11 | End: 2022-11-11

## 2022-09-22 RX ADMIN — VEDOLIZUMAB 300 MG: 300 INJECTION, POWDER, LYOPHILIZED, FOR SOLUTION INTRAVENOUS at 03:09

## 2022-09-22 RX ADMIN — HEPARIN 500 UNITS: 100 SYRINGE at 04:09

## 2022-09-22 RX ADMIN — HYDROCORTISONE SODIUM SUCCINATE 200 MG: 100 INJECTION, POWDER, FOR SOLUTION INTRAMUSCULAR; INTRAVENOUS at 04:09

## 2022-09-26 ENCOUNTER — TELEPHONE (OUTPATIENT)
Dept: NEUROSURGERY | Facility: CLINIC | Age: 82
End: 2022-09-26
Payer: MEDICARE

## 2022-09-26 NOTE — TELEPHONE ENCOUNTER
Patient is calling to cx sx due to having to care for her spouse and stated she will not be able to care for him with one hand; please CB to r/s.

## 2022-09-26 NOTE — TELEPHONE ENCOUNTER
Spoke with patient. She would like to cancel for now and will call when ready to reschedule after her  is settled.

## 2022-10-05 ENCOUNTER — TELEPHONE (OUTPATIENT)
Dept: NEUROSURGERY | Facility: CLINIC | Age: 82
End: 2022-10-05
Payer: MEDICARE

## 2022-11-15 ENCOUNTER — HOSPITAL ENCOUNTER (OUTPATIENT)
Dept: RADIOLOGY | Facility: HOSPITAL | Age: 82
Discharge: HOME | End: 2022-11-15
Attending: OTOLARYNGOLOGY
Payer: MEDICARE

## 2022-11-15 DIAGNOSIS — Z01.818 OTHER SPECIFIED PRE-OPERATIVE EXAMINATION: Primary | ICD-10-CM

## 2022-11-15 DIAGNOSIS — Z01.818 OTHER SPECIFIED PRE-OPERATIVE EXAMINATION: ICD-10-CM

## 2022-11-15 PROCEDURE — 71046 X-RAY EXAM CHEST 2 VIEWS: CPT | Mod: TC

## 2022-11-16 ENCOUNTER — OFFICE VISIT (OUTPATIENT)
Dept: NEUROSURGERY | Facility: CLINIC | Age: 82
End: 2022-11-16
Payer: MEDICARE

## 2022-11-16 DIAGNOSIS — G56.02 CARPAL TUNNEL SYNDROME OF LEFT WRIST: Primary | ICD-10-CM

## 2022-11-16 PROCEDURE — 99212 OFFICE O/P EST SF 10 MIN: CPT | Mod: ,,, | Performed by: NURSE PRACTITIONER

## 2022-11-16 PROCEDURE — 99212 PR OFFICE/OUTPT VISIT, EST, LEVL II, 10-19 MIN: ICD-10-PCS | Mod: ,,, | Performed by: NURSE PRACTITIONER

## 2022-11-16 RX ORDER — CEFDINIR 300 MG/1
CAPSULE ORAL
Status: ON HOLD | COMMUNITY
Start: 2022-11-14 | End: 2023-04-20 | Stop reason: HOSPADM

## 2022-11-16 NOTE — PROGRESS NOTES
[facility]  Office Visit  Neurosurgery  Evelyn Proctor  [id number]  1940    HPI:  The patient presents today for pre-operative appointment.  Patient is known to Dr. Marin following right carpal tunnel release on 9/6/2019.  She underwent bilateral L4-5, L5-S1 decompression on 10/1/2021 also with Dr. Marin.  There were plans to proceed with left carpal tunnel release once she recovered from her lumbar decompression.  She was last seen in the office in January.  At this time, she was not quite ready to proceed with surgery.  She wanted to recover more from her lumbar surgery and her  had recently undergone surgery for an aneurysm.  She is now ready to proceed with surgery as previously planned.  She presents today for her preoperative appointment.    The patient complains of numbness and tingling in all of the left fingers.  She has been wearing wrist splint at night which helps some with the symptoms.  The tingling in the left hand is worst at night. Throughout the day with activities especially driving, she is very bothered by her left hand symptoms.  She does not feel the left hand is weak.  She reports the symptoms are the same as what she was experiencing in her right hand prior to her carpal tunnel release in 2019.  She reports her right-handed symptoms are resolved postoperatively.      She is doing very well in terms of her back and leg symptoms as well since surgery.  She reports she is able to stand up straighter.  She is ambulating better than she was prior to surgery, but still has to stop and take breaks.  She has had some difficulty getting back into her exercises at the gym due to her left hand complaints.    Review of patient's allergies indicates:  No Known Allergies  Current Outpatient Medications   Medication Sig Dispense Refill    cholecalciferol, vitamin D3, 1,250 mcg (50,000 unit) capsule Take 1,250 mcg by mouth once a week.      simvastatin (ZOCOR) 20 MG tablet once  "daily.      traMADoL (ULTRAM) 50 mg tablet TAKE 1-2 TABLETS BY MOUTH THREE TIMES A DAY AS NEEDED FOR PAIN 40 tablet 2    cefdinir (OMNICEF) 300 MG capsule        No current facility-administered medications for this visit.     Patient Active Problem List    Diagnosis Date Noted    Carpal tunnel syndrome of left wrist 09/19/2022    Crohn's disease of small and large intestines 06/01/2022     Past Medical History:   Diagnosis Date    Arthritis     Carpal tunnel syndrome, bilateral     Cervical cancer     Cervical radiculopathy     Crohn disease     Heart attack     HLD (hyperlipidemia)     Low back pain     Sleep apnea, unspecified     Spinal stenosis of lumbar region with neurogenic claudication      Past Surgical History:   Procedure Laterality Date    bilateral L4-5, L5-S1 decompression, repair L5-S1 dural tear  10/01/2021    Dr. Marin    CARPAL TUNNEL RELEASE Right 09/06/2019    Dr. Marin    CHOLECYSTECTOMY      COLECTOMY  07/2011    partial x3    HYSTERECTOMY      total    REPAIR OF EYELID Bilateral 11/21/2022    Procedure: BILATERAL ECTROPION REPAIR;  Surgeon: Justin Flores MD;  Location: [facility];  Service: ENT;  Laterality: Bilateral;     Social History     Tobacco Use    Smoking status: Former     Types: Cigarettes    Smokeless tobacco: Never   Substance Use Topics    Alcohol use: Yes     Comment: rarely     Family History   Problem Relation Age of Onset    Diabetes Mother     Hypertension Father     Hyperlipidemia Father     Cancer Father     Hyperlipidemia Sister     Hyperlipidemia Brother     Hyperlipidemia Daughter        Vital Signs  Pulse: (P) 70  Resp: (P) 16  BP: (P) 132/72  BP Location: (P) Other (Comment)  Patient Position: (P) Sitting  Pain Score: (P)   2  Height and Weight  Height: (P) 5' 2" (157.5 cm)  Weight: (P) 68.5 kg (151 lb)  BSA (Calculated - sq m): (P) 1.73 sq meters  BMI (Calculated): (P) 27.6  Weight in (lb) to have BMI = 25: (P) 136.4]    ROS:  Review of Systems   Neurological:  " Positive for numbness (tingling).   All other systems reviewed and are negative.    Vitals within last 24hrs:  Vitals:    11/16/22 0956   BP: (P) 132/72   Pulse: (P) 70   Resp: (P) 16       Physical Exam:  General: well developed, well nourished, no distress.   Head: normocephalic, atraumatic  Respiratory: respiration non-labored; clear to auscultation  Cardiac: RRR, No murmur  GI: abd soft, non-tender, non-distended  Pulses: 2+ and symmetric radial and dorsalis pedis. No lower extremity edema  Neurologic: Alert and oriented. Thought content appropriate.  GCS: Motor: 6/Verbal: 5/Eyes: 4 GCS Total: 15  Mental Status: Awake, Alert, Oriented x3  Cranial nerves: face symmetric, tongue midline, CN II-XII grossly intact.   Eyes: pupils equal, round, reactive to light with accomodation, EOMI.   Sensory: intact to light touch throughout  Motor Strength:Moves all extremities spontaneously with good tone.  Full strength upper and lower extremities. No abnormal movements seen.     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 Marin. The nature of the procedure, as well as its attendant risks, were discussed in detail with the patient.  All questions were answered.  They are agreement with proceeding with surgery as planned, and are tentatively scheduled for left CTR on 12/2/2022.          PATRIC Savage-JOE

## 2022-11-16 NOTE — PATIENT INSTRUCTIONS
-Nothing to eat or drink after midnight the night before surgery  -Clean left wrist with hibiclens the night before and morning of surgery.

## 2022-11-30 ENCOUNTER — ANESTHESIA EVENT (OUTPATIENT)
Dept: SURGERY | Facility: HOSPITAL | Age: 82
End: 2022-11-30
Payer: MEDICARE

## 2022-12-01 ENCOUNTER — TELEPHONE (OUTPATIENT)
Dept: NEUROSURGERY | Facility: CLINIC | Age: 82
End: 2022-12-01
Payer: MEDICARE

## 2022-12-01 DIAGNOSIS — G56.02 CARPAL TUNNEL SYNDROME ON LEFT: ICD-10-CM

## 2022-12-01 DIAGNOSIS — G56.02 CARPAL TUNNEL SYNDROME OF LEFT WRIST: Primary | ICD-10-CM

## 2022-12-01 RX ORDER — MUPIROCIN 20 MG/G
1 OINTMENT TOPICAL 2 TIMES DAILY
Status: CANCELLED | OUTPATIENT
Start: 2022-12-01 | End: 2022-12-02

## 2022-12-01 NOTE — TELEPHONE ENCOUNTER
----- Message from Dyan Wood LPN sent at 11/16/2022 11:27 AM CST -----  Regarding: CARDIAC CLEARANCE  Faxed clearance request to Dr. Vidal.

## 2022-12-02 ENCOUNTER — ANESTHESIA (OUTPATIENT)
Dept: SURGERY | Facility: HOSPITAL | Age: 82
End: 2022-12-02
Payer: MEDICARE

## 2022-12-02 ENCOUNTER — HOSPITAL ENCOUNTER (OUTPATIENT)
Facility: HOSPITAL | Age: 82
Discharge: HOME | End: 2022-12-02
Attending: NEUROLOGICAL SURGERY | Admitting: NEUROLOGICAL SURGERY
Payer: MEDICARE

## 2022-12-02 DIAGNOSIS — G56.02 CARPAL TUNNEL SYNDROME OF LEFT WRIST: ICD-10-CM

## 2022-12-02 DIAGNOSIS — G56.02 CARPAL TUNNEL SYNDROME ON LEFT: ICD-10-CM

## 2022-12-02 PROCEDURE — 37000008 HC ANESTHESIA 1ST 15 MINUTES: Performed by: NEUROLOGICAL SURGERY

## 2022-12-02 PROCEDURE — 25000003 PHARM REV CODE 250: Performed by: NEUROLOGICAL SURGERY

## 2022-12-02 PROCEDURE — 37000009 HC ANESTHESIA EA ADD 15 MINS: Performed by: NEUROLOGICAL SURGERY

## 2022-12-02 PROCEDURE — 36000707: Performed by: NEUROLOGICAL SURGERY

## 2022-12-02 PROCEDURE — 25000003 PHARM REV CODE 250: Performed by: NURSE ANESTHETIST, CERTIFIED REGISTERED

## 2022-12-02 PROCEDURE — 71000015 HC POSTOP RECOV 1ST HR: Performed by: NEUROLOGICAL SURGERY

## 2022-12-02 PROCEDURE — 36000706: Performed by: NEUROLOGICAL SURGERY

## 2022-12-02 PROCEDURE — 64721 CARPAL TUNNEL SURGERY: CPT | Mod: LT,,, | Performed by: NEUROLOGICAL SURGERY

## 2022-12-02 PROCEDURE — 63600175 PHARM REV CODE 636 W HCPCS: Performed by: NURSE ANESTHETIST, CERTIFIED REGISTERED

## 2022-12-02 PROCEDURE — 71000016 HC POSTOP RECOV ADDL HR: Performed by: NEUROLOGICAL SURGERY

## 2022-12-02 PROCEDURE — 63600175 PHARM REV CODE 636 W HCPCS: Performed by: NEUROLOGICAL SURGERY

## 2022-12-02 PROCEDURE — 63600175 PHARM REV CODE 636 W HCPCS: Performed by: NURSE PRACTITIONER

## 2022-12-02 PROCEDURE — 64721 PR REVISE MEDIAN N/CARPAL TUNNEL SURG: ICD-10-PCS | Mod: LT,,, | Performed by: NEUROLOGICAL SURGERY

## 2022-12-02 RX ORDER — CEFAZOLIN SODIUM 2 G/50ML
2 SOLUTION INTRAVENOUS
Status: COMPLETED | OUTPATIENT
Start: 2022-12-02 | End: 2022-12-02

## 2022-12-02 RX ORDER — MUPIROCIN 20 MG/G
1 OINTMENT TOPICAL 2 TIMES DAILY
Status: DISCONTINUED | OUTPATIENT
Start: 2022-12-02 | End: 2022-12-02 | Stop reason: HOSPADM

## 2022-12-02 RX ORDER — MUPIROCIN 20 MG/G
OINTMENT TOPICAL
Status: DISCONTINUED
Start: 2022-12-02 | End: 2022-12-02 | Stop reason: HOSPADM

## 2022-12-02 RX ORDER — MEPERIDINE HYDROCHLORIDE 25 MG/ML
12.5 INJECTION INTRAMUSCULAR; INTRAVENOUS; SUBCUTANEOUS ONCE
Status: CANCELLED | OUTPATIENT
Start: 2022-12-02 | End: 2022-12-02

## 2022-12-02 RX ORDER — HYDROCODONE BITARTRATE AND ACETAMINOPHEN 5; 325 MG/1; MG/1
1 TABLET ORAL EVERY 4 HOURS PRN
Status: DISCONTINUED | OUTPATIENT
Start: 2022-12-02 | End: 2022-12-02 | Stop reason: HOSPADM

## 2022-12-02 RX ORDER — SODIUM CHLORIDE, SODIUM GLUCONATE, SODIUM ACETATE, POTASSIUM CHLORIDE AND MAGNESIUM CHLORIDE 30; 37; 368; 526; 502 MG/100ML; MG/100ML; MG/100ML; MG/100ML; MG/100ML
INJECTION, SOLUTION INTRAVENOUS CONTINUOUS
Status: DISCONTINUED | OUTPATIENT
Start: 2022-12-02 | End: 2022-12-02 | Stop reason: HOSPADM

## 2022-12-02 RX ORDER — SODIUM CHLORIDE, SODIUM LACTATE, POTASSIUM CHLORIDE, CALCIUM CHLORIDE 600; 310; 30; 20 MG/100ML; MG/100ML; MG/100ML; MG/100ML
INJECTION, SOLUTION INTRAVENOUS CONTINUOUS
Status: DISCONTINUED | OUTPATIENT
Start: 2022-12-02 | End: 2022-12-02 | Stop reason: HOSPADM

## 2022-12-02 RX ORDER — ONDANSETRON 2 MG/ML
4 INJECTION INTRAMUSCULAR; INTRAVENOUS ONCE
Status: CANCELLED | OUTPATIENT
Start: 2022-12-02 | End: 2022-12-02

## 2022-12-02 RX ORDER — ONDANSETRON 2 MG/ML
INJECTION INTRAMUSCULAR; INTRAVENOUS
Status: DISCONTINUED | OUTPATIENT
Start: 2022-12-02 | End: 2022-12-02

## 2022-12-02 RX ORDER — BUPIVACAINE HYDROCHLORIDE AND EPINEPHRINE 5; 5 MG/ML; UG/ML
INJECTION, SOLUTION EPIDURAL; INTRACAUDAL; PERINEURAL
Status: DISCONTINUED | OUTPATIENT
Start: 2022-12-02 | End: 2022-12-02 | Stop reason: HOSPADM

## 2022-12-02 RX ORDER — BACITRACIN 500 [USP'U]/G
OINTMENT TOPICAL
Status: DISCONTINUED | OUTPATIENT
Start: 2022-12-02 | End: 2022-12-02 | Stop reason: HOSPADM

## 2022-12-02 RX ORDER — SODIUM CHLORIDE 9 MG/ML
INJECTION, SOLUTION INTRAVENOUS CONTINUOUS
Status: DISCONTINUED | OUTPATIENT
Start: 2022-12-02 | End: 2022-12-02 | Stop reason: HOSPADM

## 2022-12-02 RX ORDER — HYDROMORPHONE HYDROCHLORIDE 2 MG/ML
0.4 INJECTION, SOLUTION INTRAMUSCULAR; INTRAVENOUS; SUBCUTANEOUS EVERY 5 MIN PRN
Status: CANCELLED | OUTPATIENT
Start: 2022-12-02

## 2022-12-02 RX ORDER — PROPOFOL 10 MG/ML
INJECTION, EMULSION INTRAVENOUS
Status: DISCONTINUED | OUTPATIENT
Start: 2022-12-02 | End: 2022-12-02

## 2022-12-02 RX ORDER — LIDOCAINE HCL/PF 100 MG/5ML
SYRINGE (ML) INTRAVENOUS
Status: DISCONTINUED | OUTPATIENT
Start: 2022-12-02 | End: 2022-12-02

## 2022-12-02 RX ORDER — MUPIROCIN 20 MG/G
OINTMENT TOPICAL 2 TIMES DAILY
Status: DISCONTINUED | OUTPATIENT
Start: 2022-12-02 | End: 2022-12-02 | Stop reason: HOSPADM

## 2022-12-02 RX ORDER — ONDANSETRON 2 MG/ML
4 INJECTION INTRAMUSCULAR; INTRAVENOUS EVERY 4 HOURS PRN
Status: DISCONTINUED | OUTPATIENT
Start: 2022-12-02 | End: 2022-12-02 | Stop reason: HOSPADM

## 2022-12-02 RX ORDER — MIDAZOLAM HYDROCHLORIDE 1 MG/ML
2 INJECTION INTRAMUSCULAR; INTRAVENOUS ONCE AS NEEDED
Status: DISCONTINUED | OUTPATIENT
Start: 2022-12-02 | End: 2022-12-02 | Stop reason: HOSPADM

## 2022-12-02 RX ORDER — FENTANYL CITRATE 50 UG/ML
INJECTION, SOLUTION INTRAMUSCULAR; INTRAVENOUS
Status: DISCONTINUED | OUTPATIENT
Start: 2022-12-02 | End: 2022-12-02

## 2022-12-02 RX ORDER — HYDROCODONE BITARTRATE AND ACETAMINOPHEN 5; 325 MG/1; MG/1
1 TABLET ORAL EVERY 4 HOURS PRN
Qty: 40 TABLET | Refills: 0 | Status: SHIPPED | OUTPATIENT
Start: 2022-12-02 | End: 2022-12-16

## 2022-12-02 RX ADMIN — FENTANYL CITRATE 50 MCG: 50 INJECTION, SOLUTION INTRAMUSCULAR; INTRAVENOUS at 10:12

## 2022-12-02 RX ADMIN — ONDANSETRON 4 MG: 2 INJECTION INTRAMUSCULAR; INTRAVENOUS at 09:12

## 2022-12-02 RX ADMIN — LIDOCAINE HYDROCHLORIDE 40 MG: 20 INJECTION, SOLUTION INTRAVENOUS at 09:12

## 2022-12-02 RX ADMIN — FENTANYL CITRATE 50 MCG: 50 INJECTION, SOLUTION INTRAMUSCULAR; INTRAVENOUS at 09:12

## 2022-12-02 RX ADMIN — PROPOFOL 20 MG: 10 INJECTION, EMULSION INTRAVENOUS at 09:12

## 2022-12-02 RX ADMIN — CEFAZOLIN SODIUM 2 G: 2 SOLUTION INTRAVENOUS at 09:12

## 2022-12-02 RX ADMIN — SODIUM CHLORIDE, SODIUM GLUCONATE, SODIUM ACETATE, POTASSIUM CHLORIDE AND MAGNESIUM CHLORIDE: 526; 502; 368; 37; 30 INJECTION, SOLUTION INTRAVENOUS at 09:12

## 2022-12-02 NOTE — TRANSFER OF CARE
"Anesthesia Transfer of Care Note    Patient: Evelyn Proctor    Procedure(s) Performed: Procedure(s) (LRB):  RELEASE, CARPAL TUNNEL (Left)    Patient location: OPS    Anesthesia Type: MAC    Transport from OR: Transported from OR on room air with adequate spontaneous ventilation    Post pain: adequate analgesia    Post assessment: no apparent anesthetic complications    Post vital signs: stable    Level of consciousness: awake    Nausea/Vomiting: no nausea/vomiting    Complications: none    Transfer of care protocol was followed      Last vitals:   Visit Vitals  BP (!) 147/74   Pulse 69   Temp 36.9 °C (98.4 °F) (Oral)   Resp 17   Ht 5' 3" (1.6 m)   Wt 66.2 kg (146 lb)   Breastfeeding No   BMI 25.86 kg/m²     "

## 2022-12-02 NOTE — DISCHARGE INSTRUCTIONS
Keep wrist incision dressed until POD #3. Then, OK to remove dressing and begin cleaning daily with mild soap and water. Place band-aids over suture ties and continue with wrist splint at all times x 4 weeks.  Patient to wear wrist splint at all times.  No pushing/pulling with left hand.  Keep LUE elevated with pillow/sling at all times.

## 2022-12-02 NOTE — ANESTHESIA PREPROCEDURE EVALUATION
"                                                                                                             12/02/2022  Evelyn Proctor is a 82 y.o., female   Pre-operative evaluation for Procedure(s) (LRB):  RELEASE, CARPAL TUNNEL (Left)    BP (!) 147/74   Pulse 69   Temp 36.9 °C (98.4 °F) (Oral)   Resp 17   Ht 5' 3" (1.6 m)   Wt 66.2 kg (146 lb)   Breastfeeding No   BMI 25.86 kg/m²     Past Medical History:   Diagnosis Date    Arthritis     Carpal tunnel syndrome, bilateral     Cervical cancer     Cervical radiculopathy     Crohn disease     Heart attack     HLD (hyperlipidemia)     Low back pain     Sleep apnea, unspecified     Spinal stenosis of lumbar region with neurogenic claudication        Patient Active Problem List   Diagnosis    Crohn's disease of small and large intestines    Carpal tunnel syndrome of left wrist       Review of patient's allergies indicates:  No Known Allergies    Current Outpatient Medications   Medication Instructions    cefdinir (OMNICEF) 300 MG capsule For postop eyelid surgery    cholecalciferol (vitamin D3) 1,250 mcg, Oral, Weekly    simvastatin (ZOCOR) 20 mg, Oral, Nightly    traMADoL (ULTRAM) 50 mg tablet TAKE 1-2 TABLETS BY MOUTH THREE TIMES A DAY AS NEEDED FOR PAIN       Past Surgical History:   Procedure Laterality Date    bilateral L4-5, L5-S1 decompression, repair L5-S1 dural tear  10/01/2021    Dr. Marin    CARPAL TUNNEL RELEASE Right 09/06/2019    Dr. Marin    CHOLECYSTECTOMY      COLECTOMY  07/2011    partial x3    HYSTERECTOMY      total    REPAIR OF EYELID Bilateral 11/21/2022    Procedure: BILATERAL ECTROPION REPAIR;  Surgeon: Justin Flores MD;  Location: [facility];  Service: ENT;  Laterality: Bilateral;       Social History     Socioeconomic History    Marital status: Single   Tobacco Use    Smoking status: Former     Types: Cigarettes    Smokeless tobacco: Never   Substance and Sexual Activity    Alcohol use: Yes     Comment: " rarely    Drug use: Never    Sexual activity: Not Currently       Lab Results   Component Value Date    WBC 7.9 11/15/2022    HGB 12.9 11/15/2022    HCT 38.7 11/15/2022    MCV 93.3 11/15/2022     11/15/2022          BMP  Lab Results   Component Value Date     11/15/2022    K 3.3 (L) 11/15/2022    CHLORIDE 111 (H) 11/15/2022    CO2 20 (L) 11/15/2022    GLUCOSE 57 (L) 11/15/2022    BUN 14.2 11/15/2022    CREATININE 1.00 11/15/2022    CALCIUM 10.0 11/15/2022    EGFRNONAA >60 09/29/2021        INR  No results for input(s): PT, INR, PROTIME, APTT in the last 72 hours.        Diagnostic Studies:      EKG:  Results for orders placed or performed in visit on 11/15/22   EKG 12-lead    Collection Time: 11/15/22 10:42 AM    Narrative    Test Reason : Z01.818,    Vent. Rate : 074 BPM     Atrial Rate : 074 BPM     P-R Int : 174 ms          QRS Dur : 092 ms      QT Int : 368 ms       P-R-T Axes : 075 004 050 degrees     QTc Int : 408 ms    Normal sinus rhythm  Normal ECG  No previous ECGs available  Confirmed by Bay Little MD ([id number]) on 11/17/2022 12:29:29 PM    Referred By: DEBRA LOWE           Confirmed By:Bay Little MD       .Heart Cath 2019 Mild Disease w/ Normal EF      Pre-op Assessment    I have reviewed the Patient Summary Reports.    I have reviewed the NPO Status.   I have reviewed the Medications.     Review of Systems  Anesthesia Hx:  No problems with previous Anesthesia  Denies Family Hx of Anesthesia complications.    Cardiovascular:  Cardiovascular Normal  No Cardiac Complaints   Pulmonary:  Pulmonary Normal No Pulmonary Complaints   Hepatic/GI:   No Current GERD Sx       Physical Exam  General: Alert and Oriented    Airway:  Mallampati: III   Mouth Opening: Normal  TM Distance: Normal  Tongue: Normal  Neck ROM: Normal ROM    Dental:  Intact    Chest/Lungs:  Clear to auscultation, Normal Respiratory Rate    Heart:  Rate: Normal  Rhythm: Regular Rhythm        Anesthesia Plan  Type of Anesthesia,  risks & benefits discussed:    Anesthesia Type: MAC  Intra-op Monitoring Plan: Standard ASA Monitors  Post Op Pain Control Plan: multimodal analgesia  Induction:  IV  Airway Plan: Direct  Informed Consent: Informed consent signed with the Patient and all parties understand the risks and agree with anesthesia plan.  All questions answered.   ASA Score: 3  Day of Surgery Review of History & Physical: H&P Update referred to the surgeon/provider.  Anesthesia Plan Notes: Disc possible conversion to LMA/GETA. Questions entertained. Accepted  Supplemental O2 by NC/Face Mask/Super Nova    Ready For Surgery From Anesthesia Perspective.     .

## 2022-12-02 NOTE — PATIENT INSTRUCTIONS
-Keep incision dressed until 12/5/2022; then OK to remove dressing and begin cleaning daily with mild soap and water. Place band-aid over sutures. Do not apply additional ointment once dressing removed.   -Continue wrist splint at all times x 4 weeks   -Keep left arm/hand elevated on pillow/with sling  -Call office with any weakness in hand or any incisional issues.   -No pushing/pulling with left hand

## 2022-12-02 NOTE — OP NOTE
DATE OF OPERATION:   December 2, 2022     PREOPERATIVE DIAGNOSIS:   1. Left carpal tunnel syndrome     POSTOPERATIVE DIAGNOSIS:   1. Left carpal tunnel syndrome     SURGEON:  Jesse Marin M.D.    ASSISTANT: JOE Savage     PROCEDURE:   1. Left carpal tunnel release     ANESTHESIA:   Local MAC    BLOOD LOSS:   minimal     SPECIMEN(s):   none     COMPLICATIONS:   None     HISTORY:   The patient is an 82-year-old woman with intractable hand numbness and forearm discomfort. Electrodiagnostic testing confirmed carpal tunnel syndrome.  In September of 2019 she underwent right carpal tunnel release.  She recently underwent lumbar decompression and did well from that standpoint.  Options were discussed and, after conservative management failed, surgery was elected. The patient understood and accepted the nature of the surgery as well as its attendant risks.     FINDINGS:   As expected there was thickening of the transverse carpal ligament. There was reactive hyperemia of the nerve after decompression. The nerve was well decompressed proximally and distally at the completion of the procedure     PROCEDURE IN DETAIL:   Under monitored anesthesia care patient's left arm was prepped and draped in the usual fashion. Small linear incision was marked out just to the ulnar side of the palmar crease and infiltrated with local anesthetic containing epinephrine. Incision was carried down through the skin and subcutaneous tissues with a knife and then self-retaining retractor was put into place. He is tissue and then the transverse carpal ligament was divided sharply and then scissors were used to complete the decompression proximally and distally. The wound was irrigated copiously with antibiotic irrigation. Bleeding points were controlled bipolar cautery. The wound was closed with 3-0 nylon in an interrupted vertical mattress fashion. The wound was dressed locally. The patient was taken to the postanesthetic care unit in  satisfactory condition with correct sponge and needle counts.

## 2022-12-02 NOTE — ANESTHESIA POSTPROCEDURE EVALUATION
Anesthesia Post Evaluation    Patient: Evelyn Protcor    Procedure(s) Performed: Procedure(s) (LRB):  RELEASE, CARPAL TUNNEL (Left)    Final Anesthesia Type: MAC      Patient location during evaluation: OPS  Patient participation: Yes- Able to Participate  Level of consciousness: awake and alert, awake and oriented  Post-procedure vital signs: reviewed and stable  Pain management: adequate  Airway patency: patent    PONV status at discharge: No PONV  Anesthetic complications: no      Cardiovascular status: blood pressure returned to baseline, hemodynamically stable and stable  Respiratory status: unassisted, spontaneous ventilation and room air  Hydration status: euvolemic  Follow-up not needed.          Vitals Value Taken Time   /80 12/02/22 1240   Temp 98.2 12/02/22 1240   Pulse 80 12/02/22 1240   Resp 17 12/02/22 1240   SpO2 94% 12/02/22 1240         No case tracking events are documented in the log.      Pain/Natalia Score: Natalia Score: 10 (12/2/2022 12:54 PM)  Modified Natalia Score: 20 (12/2/2022 12:54 PM)

## 2022-12-02 NOTE — BRIEF OP NOTE
[facility] - Periop Services  Brief Operative Note    Surgery Date: 12/2/2022     Surgeon(s) and Role:     * Jesse Marin MD - Primary    Assisting Surgeon: None    Pre-op Diagnosis:  Carpal tunnel syndrome on left [G56.02]    Post-op Diagnosis:  Post-Op Diagnosis Codes:     * Carpal tunnel syndrome on left [G56.02]    Procedure(s) (LRB):  RELEASE, CARPAL TUNNEL (Left)    Anesthesia: Monitor Anesthesia Care    Operative Findings: Patient tolerated procedure well and was transferred to PACU.       Estimated Blood Loss: none         Specimens:   Specimen (24h ago, onward)      None

## 2022-12-02 NOTE — DISCHARGE SUMMARY
[facility] Periop Services  Discharge Note  Short Stay    Procedure(s) (LRB):  RELEASE, CARPAL TUNNEL (Left)      OUTCOME: Patient tolerated treatment/procedure well without complication and is now ready for discharge.    DISPOSITION: Home or Self Care    FINAL DIAGNOSIS:  left carpal tunnel syndrome    FOLLOWUP: In clinic    DISCHARGE INSTRUCTIONS:    -Keep incision dressed until 12/5/2022; then OK to remove dressing and begin cleaning daily with mild soap and water. Place band-aid over sutures. Do not apply additional ointment once dressing removed.   -Continue wrist splint at all times x 4 weeks   -Keep left arm/hand elevated on pillow/with sling  -Call office with any weakness in hand or any incisional issues.      Clinical Reference Documents Added to Patient Instructions         Document    CARPAL TUNNEL RELEASE (ENGLISH)            TIME SPENT ON DISCHARGE: 10 minutes

## 2022-12-06 VITALS
RESPIRATION RATE: 17 BRPM | OXYGEN SATURATION: 94 % | HEART RATE: 80 BPM | WEIGHT: 146 LBS | HEIGHT: 63 IN | TEMPERATURE: 98 F | SYSTOLIC BLOOD PRESSURE: 150 MMHG | DIASTOLIC BLOOD PRESSURE: 80 MMHG | BODY MASS INDEX: 25.87 KG/M2

## 2022-12-16 ENCOUNTER — CLINICAL SUPPORT (OUTPATIENT)
Dept: NEUROSURGERY | Facility: CLINIC | Age: 82
End: 2022-12-16
Payer: MEDICARE

## 2022-12-16 DIAGNOSIS — G56.02 CARPAL TUNNEL SYNDROME OF LEFT WRIST: Primary | ICD-10-CM

## 2022-12-16 RX ORDER — TRAMADOL HYDROCHLORIDE 50 MG/1
50-100 TABLET ORAL EVERY 8 HOURS PRN
Qty: 30 TABLET | Refills: 2 | Status: SHIPPED | OUTPATIENT
Start: 2022-12-16 | End: 2022-12-19

## 2022-12-19 ENCOUNTER — TELEPHONE (OUTPATIENT)
Dept: NEUROSURGERY | Facility: CLINIC | Age: 82
End: 2022-12-19
Payer: MEDICARE

## 2022-12-19 NOTE — TELEPHONE ENCOUNTER
Spoke with patient. R/S to 1/4, as nothing was available at the 3 wk blayne. She reports to be doing well

## 2023-01-03 NOTE — PROGRESS NOTES
[facility]  Neurosurgery    CHIEF COMPLAINT:    Post-operative wound check/staple removal    HPI:    Evelyn Proctor is a 82 y.o.-year-old female who presents today for staple removal.  She is s/p left CTR that was done on 12/2/2022.  She denies fevers, chills, night sweats or N/V.      Review of patient's allergies indicates:  No Known Allergies    Current Outpatient Medications   Medication Sig Dispense Refill    cefdinir (OMNICEF) 300 MG capsule For postop eyelid surgery      cholecalciferol, vitamin D3, 1,250 mcg (50,000 unit) capsule Take 1,250 mcg by mouth once a week.      simvastatin (ZOCOR) 20 MG tablet Take 20 mg by mouth every evening.      traMADoL (ULTRAM) 50 mg tablet TAKE ONE TO TWO TABLETS BY MOUTH THREE TIMES A DAY AS NEEDED FOR PAIN 40 tablet 2     No current facility-administered medications for this visit.       Past Medical History:   Diagnosis Date    Arthritis     Carpal tunnel syndrome, bilateral     Cervical cancer     Cervical radiculopathy     Crohn disease     Heart attack     HLD (hyperlipidemia)     Low back pain     Sleep apnea, unspecified     Spinal stenosis of lumbar region with neurogenic claudication      Past Surgical History:   Procedure Laterality Date    bilateral L4-5, L5-S1 decompression, repair L5-S1 dural tear  10/01/2021    Dr. Marin    CARPAL TUNNEL RELEASE Right 09/06/2019    Dr. Marin    CARPAL TUNNEL RELEASE Left 12/2/2022    Procedure: RELEASE, CARPAL TUNNEL;  Surgeon: Jesse Marin MD;  Location: [facility] OR;  Service: Neurosurgery;  Laterality: Left;    CHOLECYSTECTOMY      COLECTOMY  07/2011    partial x3    HYSTERECTOMY      total    REPAIR OF EYELID Bilateral 11/21/2022    Procedure: BILATERAL ECTROPION REPAIR;  Surgeon: Justin Flores MD;  Location: [facility] OR;  Service: ENT;  Laterality: Bilateral;     Family History       Problem Relation (Age of Onset)    Cancer Father    Diabetes Mother    Hyperlipidemia Father, Sister, Brother, Daughter     Hypertension Father          Social History     Socioeconomic History    Marital status: Single   Tobacco Use    Smoking status: Former     Types: Cigarettes    Smokeless tobacco: Never   Substance and Sexual Activity    Alcohol use: Yes     Comment: rarely    Drug use: Never    Sexual activity: Not Currently         Physical Exam:    General: well developed, well nourished, no distress  Neurologic: Alert and oriented. Thought content appropriate.   Cranial nerves: face symmetric, pupils equal, round, reactive to light with accomodation, EOMI.   Motor Strength: moves all extremities with good strength and tone      Left hand  incision:  Wound edges well-approximated  Staples intact, removed without difficulty    Gait:  normal        Assessment/Plan:     -Keep incision open to air   -Can shower and get incision wet, just pat dry and no vigorous scrubbing. Do not submerge incision for another 2 weeks.   -Follow up with Dr. Marin as scheduled on 1/4/2023.  -Encouraged patient to call if they have any questions or concerns prior to next follow up appt      Dyan Wood LPN

## 2023-01-12 RX ORDER — DIPHENHYDRAMINE HYDROCHLORIDE 50 MG/ML
25 INJECTION INTRAMUSCULAR; INTRAVENOUS
Status: CANCELLED | OUTPATIENT
Start: 2023-01-12

## 2023-01-12 RX ORDER — EPINEPHRINE 1 MG/ML
0.3 INJECTION, SOLUTION, CONCENTRATE INTRAVENOUS
Status: CANCELLED | OUTPATIENT
Start: 2023-01-12

## 2023-01-12 RX ORDER — ACETAMINOPHEN 500 MG
1000 TABLET ORAL ONCE
Status: CANCELLED | OUTPATIENT
Start: 2023-01-12 | End: 2023-01-12

## 2023-01-12 RX ORDER — SODIUM CHLORIDE 0.9 % (FLUSH) 0.9 %
10 SYRINGE (ML) INJECTION
Status: CANCELLED | OUTPATIENT
Start: 2023-01-12

## 2023-01-12 RX ORDER — CETIRIZINE HYDROCHLORIDE 10 MG/1
10 TABLET ORAL ONCE
Status: CANCELLED | OUTPATIENT
Start: 2023-01-12 | End: 2023-01-12

## 2023-01-12 RX ORDER — ACETAMINOPHEN 325 MG/1
650 TABLET ORAL
Status: CANCELLED | OUTPATIENT
Start: 2023-01-12

## 2023-01-12 RX ORDER — METHYLPREDNISOLONE SOD SUCC 125 MG
40 VIAL (EA) INJECTION
Status: CANCELLED | OUTPATIENT
Start: 2023-01-12

## 2023-01-12 RX ORDER — HEPARIN 100 UNIT/ML
500 SYRINGE INTRAVENOUS
Status: CANCELLED | OUTPATIENT
Start: 2023-01-12

## 2023-01-18 ENCOUNTER — TELEPHONE (OUTPATIENT)
Dept: INFUSION THERAPY | Facility: HOSPITAL | Age: 83
End: 2023-01-18

## 2023-03-01 ENCOUNTER — INFUSION (OUTPATIENT)
Dept: INFUSION THERAPY | Facility: HOSPITAL | Age: 83
End: 2023-03-01
Attending: INTERNAL MEDICINE
Payer: MEDICARE

## 2023-03-01 VITALS
HEART RATE: 80 BPM | HEIGHT: 63 IN | RESPIRATION RATE: 16 BRPM | TEMPERATURE: 99 F | BODY MASS INDEX: 25.87 KG/M2 | SYSTOLIC BLOOD PRESSURE: 140 MMHG | WEIGHT: 146 LBS | DIASTOLIC BLOOD PRESSURE: 75 MMHG

## 2023-03-01 DIAGNOSIS — K50.819 CROHN'S DISEASE OF SMALL AND LARGE INTESTINES WITH COMPLICATION: Primary | ICD-10-CM

## 2023-03-01 PROCEDURE — 63600175 PHARM REV CODE 636 W HCPCS: Performed by: INTERNAL MEDICINE

## 2023-03-01 PROCEDURE — A4216 STERILE WATER/SALINE, 10 ML: HCPCS | Performed by: INTERNAL MEDICINE

## 2023-03-01 PROCEDURE — 25000003 PHARM REV CODE 250: Performed by: INTERNAL MEDICINE

## 2023-03-01 PROCEDURE — 96365 THER/PROPH/DIAG IV INF INIT: CPT

## 2023-03-01 RX ORDER — ACETAMINOPHEN 500 MG
1000 TABLET ORAL ONCE
Status: DISCONTINUED | OUTPATIENT
Start: 2023-03-01 | End: 2023-03-01 | Stop reason: HOSPADM

## 2023-03-01 RX ORDER — DIPHENHYDRAMINE HYDROCHLORIDE 50 MG/ML
25 INJECTION INTRAMUSCULAR; INTRAVENOUS
Status: ACTIVE | OUTPATIENT
Start: 2023-03-01 | End: 2023-03-01

## 2023-03-01 RX ORDER — EPINEPHRINE 1 MG/ML
0.3 INJECTION, SOLUTION, CONCENTRATE INTRAVENOUS
Status: CANCELLED | OUTPATIENT
Start: 2023-04-05

## 2023-03-01 RX ORDER — METHYLPREDNISOLONE SOD SUCC 125 MG
40 VIAL (EA) INJECTION
Status: CANCELLED | OUTPATIENT
Start: 2023-04-05

## 2023-03-01 RX ORDER — ACETAMINOPHEN 325 MG/1
650 TABLET ORAL
Status: CANCELLED | OUTPATIENT
Start: 2023-04-05

## 2023-03-01 RX ORDER — SODIUM CHLORIDE 0.9 % (FLUSH) 0.9 %
10 SYRINGE (ML) INJECTION
Status: CANCELLED | OUTPATIENT
Start: 2023-04-05

## 2023-03-01 RX ORDER — EPINEPHRINE 1 MG/ML
0.3 INJECTION, SOLUTION, CONCENTRATE INTRAVENOUS
Status: ACTIVE | OUTPATIENT
Start: 2023-03-01 | End: 2023-03-01

## 2023-03-01 RX ORDER — SODIUM CHLORIDE 0.9 % (FLUSH) 0.9 %
10 SYRINGE (ML) INJECTION
Status: DISCONTINUED | OUTPATIENT
Start: 2023-03-01 | End: 2023-03-01 | Stop reason: HOSPADM

## 2023-03-01 RX ORDER — ACETAMINOPHEN 500 MG
1000 TABLET ORAL ONCE
Status: CANCELLED | OUTPATIENT
Start: 2023-04-05 | End: 2023-04-05

## 2023-03-01 RX ORDER — ACETAMINOPHEN 325 MG/1
650 TABLET ORAL
Status: ACTIVE | OUTPATIENT
Start: 2023-03-01 | End: 2023-03-01

## 2023-03-01 RX ORDER — METHYLPREDNISOLONE SOD SUCC 125 MG
40 VIAL (EA) INJECTION
Status: ACTIVE | OUTPATIENT
Start: 2023-03-01 | End: 2023-03-01

## 2023-03-01 RX ORDER — DIPHENHYDRAMINE HYDROCHLORIDE 50 MG/ML
25 INJECTION INTRAMUSCULAR; INTRAVENOUS
Status: CANCELLED | OUTPATIENT
Start: 2023-04-05

## 2023-03-01 RX ORDER — HEPARIN 100 UNIT/ML
500 SYRINGE INTRAVENOUS
Status: DISCONTINUED | OUTPATIENT
Start: 2023-03-01 | End: 2023-03-01 | Stop reason: HOSPADM

## 2023-03-01 RX ORDER — CETIRIZINE HYDROCHLORIDE 10 MG/1
10 TABLET ORAL ONCE
Status: CANCELLED | OUTPATIENT
Start: 2023-04-05 | End: 2023-04-05

## 2023-03-01 RX ORDER — CETIRIZINE HYDROCHLORIDE 10 MG/1
10 TABLET ORAL ONCE
Status: DISCONTINUED | OUTPATIENT
Start: 2023-03-01 | End: 2023-03-01 | Stop reason: HOSPADM

## 2023-03-01 RX ORDER — HEPARIN 100 UNIT/ML
500 SYRINGE INTRAVENOUS
Status: CANCELLED | OUTPATIENT
Start: 2023-04-05

## 2023-03-01 RX ADMIN — Medication 10 ML: at 10:03

## 2023-03-01 RX ADMIN — HYDROCORTISONE SODIUM SUCCINATE 200 MG: 100 INJECTION, POWDER, FOR SOLUTION INTRAMUSCULAR; INTRAVENOUS at 08:03

## 2023-03-01 RX ADMIN — HEPARIN 500 UNITS: 100 SYRINGE at 10:03

## 2023-03-01 RX ADMIN — VEDOLIZUMAB 300 MG: 300 INJECTION, POWDER, LYOPHILIZED, FOR SOLUTION INTRAVENOUS at 09:03

## 2023-03-01 NOTE — PLAN OF CARE
Entyvio given.  Tolerated well.  She will return 04- . She is aware of plan of care.  Discharged to home.

## 2023-04-12 ENCOUNTER — INFUSION (OUTPATIENT)
Dept: INFUSION THERAPY | Facility: HOSPITAL | Age: 83
End: 2023-04-12
Attending: INTERNAL MEDICINE
Payer: MEDICARE

## 2023-04-12 VITALS
TEMPERATURE: 98 F | HEART RATE: 66 BPM | OXYGEN SATURATION: 96 % | DIASTOLIC BLOOD PRESSURE: 64 MMHG | SYSTOLIC BLOOD PRESSURE: 132 MMHG | RESPIRATION RATE: 18 BRPM

## 2023-04-12 DIAGNOSIS — K50.819 CROHN'S DISEASE OF SMALL AND LARGE INTESTINES WITH COMPLICATION: Primary | ICD-10-CM

## 2023-04-12 PROCEDURE — 63600175 PHARM REV CODE 636 W HCPCS: Mod: JZ,JG | Performed by: INTERNAL MEDICINE

## 2023-04-12 PROCEDURE — 25000003 PHARM REV CODE 250: Performed by: INTERNAL MEDICINE

## 2023-04-12 PROCEDURE — 96375 TX/PRO/DX INJ NEW DRUG ADDON: CPT

## 2023-04-12 PROCEDURE — 96365 THER/PROPH/DIAG IV INF INIT: CPT

## 2023-04-12 RX ORDER — SODIUM CHLORIDE 0.9 % (FLUSH) 0.9 %
10 SYRINGE (ML) INJECTION
Status: DISCONTINUED | OUTPATIENT
Start: 2023-04-12 | End: 2023-04-12 | Stop reason: HOSPADM

## 2023-04-12 RX ORDER — ACETAMINOPHEN 325 MG/1
650 TABLET ORAL
Status: CANCELLED | OUTPATIENT
Start: 2023-05-24

## 2023-04-12 RX ORDER — METHYLPREDNISOLONE SOD SUCC 125 MG
40 VIAL (EA) INJECTION
Status: CANCELLED | OUTPATIENT
Start: 2023-05-24

## 2023-04-12 RX ORDER — ACETAMINOPHEN 500 MG
1000 TABLET ORAL ONCE
Status: COMPLETED | OUTPATIENT
Start: 2023-04-12 | End: 2023-04-12

## 2023-04-12 RX ORDER — DIPHENHYDRAMINE HYDROCHLORIDE 50 MG/ML
25 INJECTION INTRAMUSCULAR; INTRAVENOUS
Status: CANCELLED | OUTPATIENT
Start: 2023-05-24

## 2023-04-12 RX ORDER — EPINEPHRINE 1 MG/ML
0.3 INJECTION, SOLUTION, CONCENTRATE INTRAVENOUS
Status: CANCELLED | OUTPATIENT
Start: 2023-05-24

## 2023-04-12 RX ORDER — HEPARIN 100 UNIT/ML
500 SYRINGE INTRAVENOUS
Status: CANCELLED | OUTPATIENT
Start: 2023-05-24

## 2023-04-12 RX ORDER — ACETAMINOPHEN 325 MG/1
650 TABLET ORAL
Status: ACTIVE | OUTPATIENT
Start: 2023-04-12 | End: 2023-04-12

## 2023-04-12 RX ORDER — CETIRIZINE HYDROCHLORIDE 10 MG/1
10 TABLET ORAL ONCE
Status: CANCELLED | OUTPATIENT
Start: 2023-05-24 | End: 2023-05-24

## 2023-04-12 RX ORDER — SODIUM CHLORIDE 0.9 % (FLUSH) 0.9 %
10 SYRINGE (ML) INJECTION
Status: CANCELLED | OUTPATIENT
Start: 2023-05-24

## 2023-04-12 RX ORDER — CETIRIZINE HYDROCHLORIDE 10 MG/1
10 TABLET ORAL ONCE
Status: COMPLETED | OUTPATIENT
Start: 2023-04-12 | End: 2023-04-12

## 2023-04-12 RX ORDER — HEPARIN 100 UNIT/ML
500 SYRINGE INTRAVENOUS
Status: DISCONTINUED | OUTPATIENT
Start: 2023-04-12 | End: 2023-04-12 | Stop reason: HOSPADM

## 2023-04-12 RX ORDER — ACETAMINOPHEN 500 MG
1000 TABLET ORAL ONCE
Status: CANCELLED | OUTPATIENT
Start: 2023-05-24 | End: 2023-05-24

## 2023-04-12 RX ORDER — EPINEPHRINE 1 MG/ML
0.3 INJECTION, SOLUTION, CONCENTRATE INTRAVENOUS
Status: ACTIVE | OUTPATIENT
Start: 2023-04-12 | End: 2023-04-12

## 2023-04-12 RX ORDER — DIPHENHYDRAMINE HYDROCHLORIDE 50 MG/ML
25 INJECTION INTRAMUSCULAR; INTRAVENOUS
Status: ACTIVE | OUTPATIENT
Start: 2023-04-12 | End: 2023-04-12

## 2023-04-12 RX ADMIN — HEPARIN 500 UNITS: 100 SYRINGE at 03:04

## 2023-04-12 RX ADMIN — CETIRIZINE HYDROCHLORIDE 10 MG: 10 TABLET, FILM COATED ORAL at 02:04

## 2023-04-12 RX ADMIN — HYDROCORTISONE SODIUM SUCCINATE 200 MG: 100 INJECTION, POWDER, FOR SOLUTION INTRAMUSCULAR; INTRAVENOUS at 02:04

## 2023-04-12 RX ADMIN — ACETAMINOPHEN 1000 MG: 500 TABLET ORAL at 02:04

## 2023-04-12 RX ADMIN — VEDOLIZUMAB 300 MG: 300 INJECTION, POWDER, LYOPHILIZED, FOR SOLUTION INTRAVENOUS at 02:04

## 2023-04-12 NOTE — PLAN OF CARE
Problem: Adult Inpatient Plan of Care  Goal: Plan of Care Review  Outcome: Met  Flowsheets (Taken 4/12/2023 1520)  Plan of Care Reviewed With: patient  Goal: Absence of Hospital-Acquired Illness or Injury  Outcome: Met  Intervention: Identify and Manage Fall Risk  Flowsheets (Taken 4/12/2023 1520)  Safety Promotion/Fall Prevention:   assistive device/personal item within reach   Fall Risk reviewed with patient/family   in recliner, wheels locked     Pt tolerated Entyvio infusion well. Next appt reviewed; pt denied questions or further needs at the time of discharge.

## 2023-04-16 ENCOUNTER — HOSPITAL ENCOUNTER (INPATIENT)
Facility: HOSPITAL | Age: 83
LOS: 4 days | Discharge: HOME | DRG: 439 | End: 2023-04-20
Attending: EMERGENCY MEDICINE | Admitting: INTERNAL MEDICINE
Payer: MEDICARE

## 2023-04-16 DIAGNOSIS — E83.42 HYPOMAGNESEMIA: ICD-10-CM

## 2023-04-16 DIAGNOSIS — E87.6 HYPOKALEMIA: ICD-10-CM

## 2023-04-16 DIAGNOSIS — R07.9 CHEST PAIN: ICD-10-CM

## 2023-04-16 DIAGNOSIS — R11.2 NAUSEA AND VOMITING, UNSPECIFIED VOMITING TYPE: ICD-10-CM

## 2023-04-16 DIAGNOSIS — K85.90 ACUTE PANCREATITIS, UNSPECIFIED COMPLICATION STATUS, UNSPECIFIED PANCREATITIS TYPE: Primary | ICD-10-CM

## 2023-04-16 DIAGNOSIS — G56.02 CARPAL TUNNEL SYNDROME OF LEFT WRIST: ICD-10-CM

## 2023-04-16 LAB
ALBUMIN SERPL-MCNC: 4.4 G/DL (ref 3.4–4.8)
ALBUMIN/GLOB SERPL: 1.3 RATIO (ref 1.1–2)
ALP SERPL-CCNC: 103 UNIT/L (ref 40–150)
ALT SERPL-CCNC: 20 UNIT/L (ref 0–55)
APPEARANCE UR: CLEAR
AST SERPL-CCNC: 21 UNIT/L (ref 5–34)
BACTERIA #/AREA URNS AUTO: NORMAL /HPF
BASOPHILS # BLD AUTO: 0.01 X10(3)/MCL (ref 0–0.2)
BASOPHILS NFR BLD AUTO: 0.1 %
BILIRUB UR QL STRIP.AUTO: NEGATIVE MG/DL
BILIRUBIN DIRECT+TOT PNL SERPL-MCNC: 0.7 MG/DL
BUN SERPL-MCNC: 10 MG/DL (ref 9.8–20.1)
CALCIUM SERPL-MCNC: 9.7 MG/DL (ref 8.4–10.2)
CHLORIDE SERPL-SCNC: 98 MMOL/L (ref 98–107)
CO2 SERPL-SCNC: 24 MMOL/L (ref 23–31)
COLOR UR AUTO: YELLOW
CREAT SERPL-MCNC: 0.71 MG/DL (ref 0.55–1.02)
EOSINOPHIL # BLD AUTO: 0 X10(3)/MCL (ref 0–0.9)
EOSINOPHIL NFR BLD AUTO: 0 %
ERYTHROCYTE [DISTWIDTH] IN BLOOD BY AUTOMATED COUNT: 13.3 % (ref 11.5–17)
GFR SERPLBLD CREATININE-BSD FMLA CKD-EPI: >60 MLS/MIN/1.73/M2
GLOBULIN SER-MCNC: 3.5 GM/DL (ref 2.4–3.5)
GLUCOSE SERPL-MCNC: 154 MG/DL (ref 82–115)
GLUCOSE UR QL STRIP.AUTO: NEGATIVE MG/DL
HCT VFR BLD AUTO: 39.1 % (ref 37–47)
HGB BLD-MCNC: 13.6 G/DL (ref 12–16)
IMM GRANULOCYTES # BLD AUTO: 0.02 X10(3)/MCL (ref 0–0.04)
IMM GRANULOCYTES NFR BLD AUTO: 0.2 %
KETONES UR QL STRIP.AUTO: NEGATIVE MG/DL
LEUKOCYTE ESTERASE UR QL STRIP.AUTO: ABNORMAL UNIT/L
LIPASE SERPL-CCNC: 316 U/L
LYMPHOCYTES # BLD AUTO: 0.78 X10(3)/MCL (ref 0.6–4.6)
LYMPHOCYTES NFR BLD AUTO: 8.6 %
MAGNESIUM SERPL-MCNC: 1.4 MG/DL (ref 1.6–2.6)
MCH RBC QN AUTO: 31.2 PG (ref 27–31)
MCHC RBC AUTO-ENTMCNC: 34.8 G/DL (ref 33–36)
MCV RBC AUTO: 89.7 FL (ref 80–94)
MONOCYTES # BLD AUTO: 0.32 X10(3)/MCL (ref 0.1–1.3)
MONOCYTES NFR BLD AUTO: 3.5 %
NEUTROPHILS # BLD AUTO: 7.89 X10(3)/MCL (ref 2.1–9.2)
NEUTROPHILS NFR BLD AUTO: 87.6 %
NITRITE UR QL STRIP.AUTO: NEGATIVE
NRBC BLD AUTO-RTO: 0 %
PH UR STRIP.AUTO: 7 [PH]
PLATELET # BLD AUTO: 255 X10(3)/MCL (ref 130–400)
PMV BLD AUTO: 9.5 FL (ref 7.4–10.4)
POTASSIUM SERPL-SCNC: 3.2 MMOL/L (ref 3.5–5.1)
PROT SERPL-MCNC: 7.9 GM/DL (ref 5.8–7.6)
PROT UR QL STRIP.AUTO: ABNORMAL MG/DL
RBC # BLD AUTO: 4.36 X10(6)/MCL (ref 4.2–5.4)
RBC #/AREA URNS AUTO: <5 /HPF
RBC UR QL AUTO: NEGATIVE UNIT/L
SODIUM SERPL-SCNC: 137 MMOL/L (ref 136–145)
SP GR UR STRIP.AUTO: 1.03 (ref 1–1.03)
SQUAMOUS #/AREA URNS AUTO: <5 /HPF
UROBILINOGEN UR STRIP-ACNC: 0.2 MG/DL
WBC # SPEC AUTO: 9 X10(3)/MCL (ref 4.5–11.5)
WBC #/AREA URNS AUTO: <5 /HPF

## 2023-04-16 PROCEDURE — 83735 ASSAY OF MAGNESIUM: CPT | Performed by: NURSE PRACTITIONER

## 2023-04-16 PROCEDURE — 63600175 PHARM REV CODE 636 W HCPCS: Performed by: NURSE PRACTITIONER

## 2023-04-16 PROCEDURE — 63600175 PHARM REV CODE 636 W HCPCS: Performed by: INTERNAL MEDICINE

## 2023-04-16 PROCEDURE — 96372 THER/PROPH/DIAG INJ SC/IM: CPT | Performed by: NURSE PRACTITIONER

## 2023-04-16 PROCEDURE — 11000001 HC ACUTE MED/SURG PRIVATE ROOM

## 2023-04-16 PROCEDURE — 99285 EMERGENCY DEPT VISIT HI MDM: CPT | Mod: 25

## 2023-04-16 PROCEDURE — 96361 HYDRATE IV INFUSION ADD-ON: CPT

## 2023-04-16 PROCEDURE — 63600175 PHARM REV CODE 636 W HCPCS: Performed by: EMERGENCY MEDICINE

## 2023-04-16 PROCEDURE — 83690 ASSAY OF LIPASE: CPT | Performed by: NURSE PRACTITIONER

## 2023-04-16 PROCEDURE — 25000003 PHARM REV CODE 250: Performed by: NURSE PRACTITIONER

## 2023-04-16 PROCEDURE — 81001 URINALYSIS AUTO W/SCOPE: CPT | Performed by: NURSE PRACTITIONER

## 2023-04-16 PROCEDURE — 96375 TX/PRO/DX INJ NEW DRUG ADDON: CPT

## 2023-04-16 PROCEDURE — 85025 COMPLETE CBC W/AUTO DIFF WBC: CPT | Performed by: NURSE PRACTITIONER

## 2023-04-16 PROCEDURE — 25500020 PHARM REV CODE 255: Performed by: EMERGENCY MEDICINE

## 2023-04-16 PROCEDURE — 96374 THER/PROPH/DIAG INJ IV PUSH: CPT

## 2023-04-16 PROCEDURE — 80053 COMPREHEN METABOLIC PANEL: CPT | Performed by: NURSE PRACTITIONER

## 2023-04-16 RX ORDER — ACETAMINOPHEN 325 MG/1
650 TABLET ORAL EVERY 4 HOURS PRN
Status: DISCONTINUED | OUTPATIENT
Start: 2023-04-16 | End: 2023-04-20 | Stop reason: HOSPADM

## 2023-04-16 RX ORDER — SODIUM CHLORIDE, SODIUM LACTATE, POTASSIUM CHLORIDE, CALCIUM CHLORIDE 600; 310; 30; 20 MG/100ML; MG/100ML; MG/100ML; MG/100ML
INJECTION, SOLUTION INTRAVENOUS CONTINUOUS
Status: DISCONTINUED | OUTPATIENT
Start: 2023-04-16 | End: 2023-04-20 | Stop reason: HOSPADM

## 2023-04-16 RX ORDER — SODIUM CHLORIDE 0.9 % (FLUSH) 0.9 %
10 SYRINGE (ML) INJECTION EVERY 12 HOURS PRN
Status: DISCONTINUED | OUTPATIENT
Start: 2023-04-16 | End: 2023-04-20 | Stop reason: HOSPADM

## 2023-04-16 RX ORDER — POTASSIUM CHLORIDE 14.9 MG/ML
20 INJECTION INTRAVENOUS
Status: COMPLETED | OUTPATIENT
Start: 2023-04-16 | End: 2023-04-16

## 2023-04-16 RX ORDER — SIMETHICONE 80 MG
1 TABLET,CHEWABLE ORAL 4 TIMES DAILY PRN
Status: DISCONTINUED | OUTPATIENT
Start: 2023-04-16 | End: 2023-04-20 | Stop reason: HOSPADM

## 2023-04-16 RX ORDER — HYDRALAZINE HYDROCHLORIDE 20 MG/ML
10 INJECTION INTRAMUSCULAR; INTRAVENOUS EVERY 4 HOURS PRN
Status: DISCONTINUED | OUTPATIENT
Start: 2023-04-16 | End: 2023-04-20 | Stop reason: HOSPADM

## 2023-04-16 RX ORDER — MAGNESIUM SULFATE HEPTAHYDRATE 40 MG/ML
2 INJECTION, SOLUTION INTRAVENOUS
Status: COMPLETED | OUTPATIENT
Start: 2023-04-16 | End: 2023-04-16

## 2023-04-16 RX ORDER — IBUPROFEN 200 MG
24 TABLET ORAL
Status: DISCONTINUED | OUTPATIENT
Start: 2023-04-16 | End: 2023-04-20 | Stop reason: HOSPADM

## 2023-04-16 RX ORDER — METOCLOPRAMIDE HYDROCHLORIDE 5 MG/ML
5 INJECTION INTRAMUSCULAR; INTRAVENOUS
Status: COMPLETED | OUTPATIENT
Start: 2023-04-16 | End: 2023-04-16

## 2023-04-16 RX ORDER — MORPHINE SULFATE 4 MG/ML
4 INJECTION, SOLUTION INTRAMUSCULAR; INTRAVENOUS
Status: COMPLETED | OUTPATIENT
Start: 2023-04-16 | End: 2023-04-16

## 2023-04-16 RX ORDER — ENOXAPARIN SODIUM 100 MG/ML
40 INJECTION SUBCUTANEOUS EVERY 24 HOURS
Status: DISCONTINUED | OUTPATIENT
Start: 2023-04-16 | End: 2023-04-20 | Stop reason: HOSPADM

## 2023-04-16 RX ORDER — ONDANSETRON 2 MG/ML
4 INJECTION INTRAMUSCULAR; INTRAVENOUS
Status: COMPLETED | OUTPATIENT
Start: 2023-04-16 | End: 2023-04-16

## 2023-04-16 RX ORDER — GLUCAGON 1 MG
1 KIT INJECTION
Status: DISCONTINUED | OUTPATIENT
Start: 2023-04-16 | End: 2023-04-20 | Stop reason: HOSPADM

## 2023-04-16 RX ORDER — HYDROCODONE BITARTRATE AND ACETAMINOPHEN 5; 325 MG/1; MG/1
1 TABLET ORAL EVERY 6 HOURS PRN
Status: DISCONTINUED | OUTPATIENT
Start: 2023-04-16 | End: 2023-04-16

## 2023-04-16 RX ORDER — NALOXONE HCL 0.4 MG/ML
0.02 VIAL (ML) INJECTION
Status: DISCONTINUED | OUTPATIENT
Start: 2023-04-16 | End: 2023-04-20 | Stop reason: HOSPADM

## 2023-04-16 RX ORDER — DICYCLOMINE HYDROCHLORIDE 10 MG/ML
20 INJECTION INTRAMUSCULAR
Status: COMPLETED | OUTPATIENT
Start: 2023-04-16 | End: 2023-04-16

## 2023-04-16 RX ORDER — TALC
6 POWDER (GRAM) TOPICAL NIGHTLY PRN
Status: DISCONTINUED | OUTPATIENT
Start: 2023-04-16 | End: 2023-04-16

## 2023-04-16 RX ORDER — IBUPROFEN 200 MG
16 TABLET ORAL
Status: DISCONTINUED | OUTPATIENT
Start: 2023-04-16 | End: 2023-04-20 | Stop reason: HOSPADM

## 2023-04-16 RX ORDER — MORPHINE SULFATE 4 MG/ML
2 INJECTION, SOLUTION INTRAMUSCULAR; INTRAVENOUS EVERY 4 HOURS PRN
Status: DISCONTINUED | OUTPATIENT
Start: 2023-04-16 | End: 2023-04-19

## 2023-04-16 RX ORDER — ONDANSETRON 2 MG/ML
4 INJECTION INTRAMUSCULAR; INTRAVENOUS EVERY 6 HOURS PRN
Status: DISCONTINUED | OUTPATIENT
Start: 2023-04-16 | End: 2023-04-17

## 2023-04-16 RX ADMIN — METOCLOPRAMIDE 5 MG: 5 INJECTION, SOLUTION INTRAMUSCULAR; INTRAVENOUS at 10:04

## 2023-04-16 RX ADMIN — MAGNESIUM SULFATE HEPTAHYDRATE 2 G: 40 INJECTION, SOLUTION INTRAVENOUS at 01:04

## 2023-04-16 RX ADMIN — ONDANSETRON 4 MG: 2 INJECTION INTRAMUSCULAR; INTRAVENOUS at 10:04

## 2023-04-16 RX ADMIN — POTASSIUM CHLORIDE 20 MEQ: 14.9 INJECTION, SOLUTION INTRAVENOUS at 03:04

## 2023-04-16 RX ADMIN — SODIUM CHLORIDE, POTASSIUM CHLORIDE, SODIUM LACTATE AND CALCIUM CHLORIDE: 600; 310; 30; 20 INJECTION, SOLUTION INTRAVENOUS at 07:04

## 2023-04-16 RX ADMIN — DICYCLOMINE HYDROCHLORIDE 20 MG: 20 INJECTION, SOLUTION INTRAMUSCULAR at 10:04

## 2023-04-16 RX ADMIN — SODIUM CHLORIDE 1000 ML: 9 INJECTION, SOLUTION INTRAVENOUS at 10:04

## 2023-04-16 RX ADMIN — ONDANSETRON 4 MG: 2 INJECTION INTRAMUSCULAR; INTRAVENOUS at 01:04

## 2023-04-16 RX ADMIN — MORPHINE SULFATE 2 MG: 4 INJECTION INTRAVENOUS at 09:04

## 2023-04-16 RX ADMIN — ENOXAPARIN SODIUM 40 MG: 40 INJECTION SUBCUTANEOUS at 05:04

## 2023-04-16 RX ADMIN — ONDANSETRON 4 MG: 2 INJECTION INTRAMUSCULAR; INTRAVENOUS at 05:04

## 2023-04-16 RX ADMIN — IOPAMIDOL 100 ML: 755 INJECTION, SOLUTION INTRAVENOUS at 11:04

## 2023-04-16 RX ADMIN — ONDANSETRON 4 MG: 2 INJECTION INTRAMUSCULAR; INTRAVENOUS at 11:04

## 2023-04-16 RX ADMIN — MORPHINE SULFATE 4 MG: 4 INJECTION INTRAVENOUS at 10:04

## 2023-04-16 RX ADMIN — MORPHINE SULFATE 4 MG: 4 INJECTION INTRAVENOUS at 01:04

## 2023-04-16 RX ADMIN — MORPHINE SULFATE 2 MG: 4 INJECTION INTRAVENOUS at 05:04

## 2023-04-16 NOTE — ED PROVIDER NOTES
Encounter Date: 4/16/2023    SCRIBE #1 NOTE: I, Agapito Adair, am scribing for, and in the presence of,  Dr. Farris. I have scribed the following portions of the note - Other sections scribed: HPI, ROS, Physical Exam, MDM, Attending.     History     Chief Complaint   Patient presents with    Nausea    Emesis    Abdominal Pain     Patient reports N/v/d since yesterday, along with abdominal pain, hx of crohns, compliant with medications, denies any fevers     81 y/o female with history of HLD, Crohn's and MI presents to ED for nausea, vomiting, diarrhea and generalized abdominal pain onset yesterday.  Pt says it feels like her Crohn's flare ups.  She has not had a flare up in over 11 years per family member.  Pt is on Entyvio infusions and follows with Dr. Pierson.  She denies fever.    The history is provided by the patient and a relative.   Nausea  This is a new problem. The current episode started yesterday. The problem occurs constantly. The problem has not changed since onset.Associated symptoms include abdominal pain.   Emesis   This is a new problem. The current episode started yesterday. The problem has been unchanged. Associated symptoms include abdominal pain and diarrhea. Pertinent negatives include no fever.   Abdominal Pain  The current episode started yesterday. The problem has not changed since onset.The abdominal pain is generalized. The other symptoms of the illness include nausea, vomiting and diarrhea. The other symptoms of the illness do not include fever.   Risk factors for an acute abdominal problem include being elderly and a history of abdominal surgery.   Review of patient's allergies indicates:  No Known Allergies  Past Medical History:   Diagnosis Date    Arthritis     Carpal tunnel syndrome, bilateral     Cervical cancer     Cervical radiculopathy     Crohn disease     Heart attack     HLD (hyperlipidemia)     Low back pain     Sleep apnea, unspecified     Spinal stenosis of lumbar region  with neurogenic claudication      Past Surgical History:   Procedure Laterality Date    bilateral L4-5, L5-S1 decompression, repair L5-S1 dural tear  10/01/2021    Dr. Marin    CARPAL TUNNEL RELEASE Right 09/06/2019    Dr. Marin    CARPAL TUNNEL RELEASE Left 12/2/2022    Procedure: RELEASE, CARPAL TUNNEL;  Surgeon: Jesse Marin MD;  Location: [facility] OR;  Service: Neurosurgery;  Laterality: Left;    CHOLECYSTECTOMY      COLECTOMY  07/2011    partial x3    HYSTERECTOMY      total    REPAIR OF EYELID Bilateral 11/21/2022    Procedure: BILATERAL ECTROPION REPAIR;  Surgeon: Justin Flores MD;  Location: [facility] OR;  Service: ENT;  Laterality: Bilateral;     Family History   Problem Relation Age of Onset    Diabetes Mother     Hypertension Father     Hyperlipidemia Father     Cancer Father     Hyperlipidemia Sister     Hyperlipidemia Brother     Hyperlipidemia Daughter      Social History     Tobacco Use    Smoking status: Former     Types: Cigarettes    Smokeless tobacco: Never   Substance Use Topics    Alcohol use: Yes     Comment: rarely    Drug use: Never     Review of Systems   Constitutional:  Negative for fever.   Gastrointestinal:  Positive for abdominal pain, diarrhea, nausea and vomiting.     Physical Exam     Initial Vitals [04/16/23 0944]   BP Pulse Resp Temp SpO2   (!) 178/118 87 (!) 24 98.4 °F (36.9 °C) 98 %      MAP       --         Physical Exam    Nursing note and vitals reviewed.  Constitutional: She appears well-developed and well-nourished. She is not diaphoretic.   Appears uncomfortable    HENT:   Head: Normocephalic and atraumatic.   Right Ear: External ear normal.   Left Ear: External ear normal.   Nose: Nose normal.   Eyes: Conjunctivae are normal.   Neck: Neck supple.   Cardiovascular:  Normal rate.           Pulmonary/Chest: No respiratory distress.   Abdominal: She exhibits no distension. There is abdominal tenderness (generalized).   Diminished bowel sounds   Musculoskeletal:      Cervical  back: Neck supple.     Neurological: She is alert and oriented to person, place, and time. GCS eye subscore is 4. GCS verbal subscore is 5. GCS motor subscore is 6.   Skin: Skin is warm.   Psychiatric: She has a normal mood and affect.       ED Course   Procedures  Labs Reviewed   CBC WITH DIFFERENTIAL - Abnormal; Notable for the following components:       Result Value    MCH 31.2 (*)     All other components within normal limits   COMPREHENSIVE METABOLIC PANEL - Abnormal; Notable for the following components:    Potassium Level 3.2 (*)     Glucose Level 154 (*)     Protein Total 7.9 (*)     All other components within normal limits   URINALYSIS, REFLEX TO URINE CULTURE - Abnormal; Notable for the following components:    Specific Gravity, UA 1.035 (*)     Protein, UA Trace (*)     Leukocyte Esterase, UA Trace (*)     All other components within normal limits   MAGNESIUM - Abnormal; Notable for the following components:    Magnesium Level 1.40 (*)     All other components within normal limits   LIPASE - Abnormal; Notable for the following components:    Lipase Level 316 (*)     All other components within normal limits   URINALYSIS, MICROSCOPIC - Normal          Imaging Results              CT Abdomen Pelvis With Contrast (Final result)  Result time 04/16/23 11:47:06      Final result by Des Irizarry MD (04/16/23 11:47:06)                   Impression:      1. Unchanged dilatation of the intrahepatic and the extrahepatic ducts post cholecystic.    2. New dilatation of main pancreatic duct raises the possibility of stricturing pathology about the ampullary region.  No dominant pancreatic head mass lesion identified.  This may be the sequela of previous pancreatitis.  Please correlate clinically.  This can be further assessed with MRI abdomen on non emergent basis.    3. Status post right hemicolectomy.      Electronically signed by: Des Irizarry  Date:    04/16/2023  Time:    11:47               Narrative:     EXAMINATION:  CT ABDOMEN PELVIS WITH CONTRAST    CLINICAL HISTORY:  Abdominal pain, acute, nonlocalized;    TECHNIQUE:  Multidetector axial images were obtained of the abdomen and pelvis following the administration of IV contrast. Oral contrast was not administered.    Dose length product of 336 mGycm. Automated exposure control was utilized to minimize radiation dose.    COMPARISON:  CT abdomen pelvis July 5, 2018.    FINDINGS:  Included portion of the lungs are without suspicious nodularity, acute air space infiltrates or fluid within the pleural spaces.    Hepatic volume and attenuation is unremarkable.  Post cholecystectomy, there is unchanged dilatation of the intrahepatic and the extrahepatic ducts.  There is new generalized dilatation of the main pancreatic duct.  This does raise the possibility of stricturing pathology about the ampullary region though no dominant mass lesion about the pancreatic head on this exam identified.  There are no acute peripancreatic phlegmons.  Spleen is unremarkable in size and attenuation.    The adrenal glands appear within normal limits. The kidneys are unremarkable in size and contour. No solid or cystic renal lesion identified. There is no hydronephrosis. No perinephric fluid strandings or collections identified.    There is small hiatal hernia and mild distal esophageal mural thickening.  The distal gastric body and antrum is decompressed accentuating wall thickness though overall similar appearance compared to the previous exam.  There is no abnormal dilatation of loops of small bowel and there is no focal or generalized mural thickening.  Patient is status post right hemicolectomy.  There is mild colonic fecal loading without abnormal dilatation or pericolonic acute strandings.  There is supraumbilical abdominal wall weakness which allows slight outward protrusion of portion of colon.  No bowel obstruction no free fluid.    Urinary bladder appears within normal limits.  There is no pelvic free fluid.    There are degenerative changes of the lumbar spine.  No acute or otherwise osseous abnormality identified.                                       Medications   sodium chloride 0.9% flush 10 mL (has no administration in time range)   naloxone 0.4 mg/mL injection 0.02 mg (has no administration in time range)   glucose chewable tablet 16 g (has no administration in time range)   glucose chewable tablet 24 g (has no administration in time range)   glucagon (human recombinant) injection 1 mg (has no administration in time range)   dextrose 10% bolus 125 mL 125 mL (has no administration in time range)   dextrose 10% bolus 250 mL 250 mL (has no administration in time range)   enoxaparin injection 40 mg (40 mg Subcutaneous Given 4/16/23 1703)   acetaminophen tablet 650 mg (has no administration in time range)   simethicone chewable tablet 80 mg (has no administration in time range)   hydrALAZINE injection 10 mg (has no administration in time range)   morphine injection 2 mg (2 mg Intravenous Given 4/17/23 0420)   lactated ringers infusion ( Intravenous New Bag 4/17/23 0420)   ondansetron injection 4 mg (has no administration in time range)   ondansetron disintegrating tablet 4 mg (has no administration in time range)   sodium chloride 0.9% bolus 1,000 mL 1,000 mL (0 mLs Intravenous Stopped 4/16/23 1136)   ondansetron injection 4 mg (4 mg Intravenous Given 4/16/23 1034)   dicyclomine injection 20 mg (20 mg Intramuscular Given 4/16/23 1017)   morphine injection 4 mg (4 mg Intravenous Given 4/16/23 1048)   metoclopramide HCl injection 5 mg (5 mg Intravenous Given 4/16/23 1048)   iopamidoL (ISOVUE-370) injection 100 mL (100 mLs Intravenous Given 4/16/23 1137)   magnesium sulfate 2g in water 50mL IVPB (premix) (0 g Intravenous Stopped 4/16/23 1509)   potassium chloride 20 mEq in 100 mL IVPB (FOR CENTRAL LINE ADMINISTRATION ONLY) (20 mEq Intravenous New Bag 4/16/23 1508)   morphine injection 4 mg  (4 mg Intravenous Given 4/16/23 1309)   ondansetron injection 4 mg (4 mg Intravenous Given 4/16/23 1309)              Scribe Attestation:   Scribe #1: I performed the above scribed service and the documentation accurately describes the services I performed. I attest to the accuracy of the note.    Attending Attestation:           Physician Attestation for Scribe:  Physician Attestation Statement for Scribe #1: I, reviewed documentation, as scribed by Agapito Adair in my presence, and it is both accurate and complete.         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 use but has not had it since being taken off   Consult: hospitalist for admission       Problems Addressed:  Acute pancreatitis, unspecified complication status, unspecified pancreatitis type: acute illness or injury that poses a threat to life or bodily functions  Hypokalemia: acute illness or injury that poses a threat to life or bodily functions  Hypomagnesemia: acute illness or injury that poses a threat to life or bodily functions  Nausea and vomiting, unspecified vomiting type: acute illness or injury that poses a threat to life or bodily functions    Amount and/or Complexity of Data Reviewed  Labs: ordered. Decision-making details documented in ED Course.  Radiology: ordered. Decision-making details documented in ED Course.    Risk  Prescription drug management.  Parenteral controlled substances.  Decision regarding  hospitalization.                         Clinical Impression:   Final diagnoses:  [K85.90] Acute pancreatitis, unspecified complication status, unspecified pancreatitis type (Primary)  [R11.2] Nausea and vomiting, unspecified vomiting type  [E83.42] Hypomagnesemia  [E87.6] Hypokalemia        ED Disposition Condition    Admit Stable                Ana Farris MD  04/17/23 1035

## 2023-04-16 NOTE — FIRST PROVIDER EVALUATION
Medical screening examination initiated.  I have conducted a focused provider triage encounter, findings are as follows:    Brief history of present illness:  83 y/o female who presents with n/v/d since yesterday. States left lower abdominal pain. Hx crohns.    There were no vitals filed for this visit.    Pertinent physical exam:  alert, appears uncomfortable and holding emesis bag, sitting in wheelchair    Brief workup plan:  labs, urine, meds    Preliminary workup initiated; this workup will be continued and followed by the physician or advanced practice provider that is assigned to the patient when roomed.

## 2023-04-16 NOTE — H&P
[facility] Hospital Medicine History & Physical Examination       Patient Name: Evelyn Proctor  MRN: [id number]  Patient Class: IP- Inpatient   Admission Date: 4/16/2023   Admitting Physician: MEMO Service   Length of Stay: 0  Attending Physician: Dr. Christ Clemens  Primary Care Provider: Dr. Zackery Dale  GI: Dr. Pierson  Face-to-Face encounter date: 04/16/2023  Code Status: Full code  Chief Complaint: Nausea, Emesis, and Abdominal Pain (Patient reports N/v/d since yesterday, along with abdominal pain, hx of crohns, compliant with medications, denies any fevers)        Patient information was obtained from patient, patient's family, past medical records and ER records.     HISTORY OF PRESENT ILLNESS:   Evelyn Proctor is a 82 y.o. female who past medical history includes rheumatoid arthritis, carpal tunnel syndrome, HTN, HLD, CAD/MI, lumbar stenosis with chronic back pain, DAQUAN,  cervical cancer; presents to the ED at [facility] on 4/16/2023 with a primary complaint of nausea, vomiting, and associated abdominal pain with diarrhea. Her daughter Campbell who is a nurse is at the bedside providing some of the history.  Patient reports her symptoms started yesterday which progressively got worse prompting ED visit today.  Patient does admit history of Crohn's disease which she is on Entyvio infusions.  Patient reports compliance with her medications.  She reported similar abdominal pain with nausea and vomiting in the past when she had pancreatitis secondary to methotrexate use in the past for her Crohn's disease, she also has been on [employer] in the past as well. .  Patient denies any chest pain, shortness a breath, fever, chills, cough, congestion, or any sick contact.  Lab work done demonstrated potassium 3.2, glucose 154, magnesium 1.40; lipase 316; other indices unremarkable.  CT of the abdomen and pelvis with contrast demonstrated new dilation of the main pancreatic duct, sequela of previous  pancreatitis, unchanged dilatation of the intrahepatic and extrahepatic ducts, status post right hemicolectomy.  Initial vital signs /118 pulse 87 respirations 24 temperature 98.4° F O2 saturation 98% on room air.  Patient received Reglan, morphine, Zofran, Benadryl, and IV bolus in the ED which her blood pressure did trend down to 182/85 with respiratory rate 16.  Patient is admitted to hospital medicine services for further management.    PAST MEDICAL HISTORY:     Past Medical History:   Diagnosis Date    Arthritis     Carpal tunnel syndrome, bilateral     Cervical cancer     Cervical radiculopathy     Crohn disease     Heart attack     HLD (hyperlipidemia)     Low back pain     Sleep apnea, unspecified     Spinal stenosis of lumbar region with neurogenic claudication        PAST SURGICAL HISTORY:     Past Surgical History:   Procedure Laterality Date    bilateral L4-5, L5-S1 decompression, repair L5-S1 dural tear  10/01/2021    Dr. Marin    CARPAL TUNNEL RELEASE Right 09/06/2019    Dr. Marin    CARPAL TUNNEL RELEASE Left 12/2/2022    Procedure: RELEASE, CARPAL TUNNEL;  Surgeon: Jesse Marin MD;  Location: [facility] OR;  Service: Neurosurgery;  Laterality: Left;    CHOLECYSTECTOMY      COLECTOMY  07/2011    partial x3    HYSTERECTOMY      total    REPAIR OF EYELID Bilateral 11/21/2022    Procedure: BILATERAL ECTROPION REPAIR;  Surgeon: Justin Flores MD;  Location: [facility] OR;  Service: ENT;  Laterality: Bilateral;       ALLERGIES:   Patient has no known allergies.    FAMILY HISTORY:   Reviewed and negative    SOCIAL HISTORY:   Denies any recent tobacco use   Denies any illicit drug use   Denies any alcohol use   Lives with family       HOME MEDICATIONS:   As documented  Prior to Admission medications    Medication Sig Start Date End Date Taking? Authorizing Provider   cefdinir (OMNICEF) 300 MG capsule For postop eyelid surgery 11/14/22   Historical Provider   cholecalciferol, vitamin D3, 1,250 mcg (50,000  unit) capsule Take 1,250 mcg by mouth once a week. 9/30/21   Historical Provider   simvastatin (ZOCOR) 20 MG tablet Take 20 mg by mouth every evening. 4/28/22   Historical Provider   traMADoL (ULTRAM) 50 mg tablet TAKE ONE TO TWO TABLETS BY MOUTH THREE TIMES A DAY AS NEEDED FOR PAIN 12/19/22   Misty Teran, [facility]       REVIEW OF SYSTEMS:   Except as documented, all other systems reviewed and negative     PHYSICAL EXAM:     VITAL SIGNS: 24 HRS MIN & MAX LAST   Temp  Min: 98.4 °F (36.9 °C)  Max: 98.4 °F (36.9 °C) 98.4 °F (36.9 °C)   BP  Min: 177/78  Max: 183/85 (!) 182/85     Pulse  Min: 64  Max: 87  65   Resp  Min: 16  Max: 24 16   SpO2  Min: 98 %  Max: 100 % 100 %       General appearance:   elderly female; nontoxic, fatigued; mild abdominal pain complaints, daughter at bedside   HENT: Atraumatic head. Moist mucous membranes of oral cavity.  Eyes: PERRL  Neck: Supple.   Lungs: Clear to auscultation bilaterally. No wheezing present.   Heart: Regular rate and rhythm. S1 and S2 present , capillary refill brisk, peripheral pulses palpable    Abdomen: Soft, non-distended, generalized tenderness, no rebound tenderness/guarding. Bowel sounds are normal.   Extremities: No cyanosis, clubbing,ROJAS,   Skin: warm and dry  Neuro: AAOx3, no focal deficits  Psych/mental status: Appropriate mood and affect. Responds appropriately to questions.     LABS AND IMAGING:     Recent Labs   Lab 04/16/23  1020   WBC 9.0   RBC 4.36   HGB 13.6   HCT 39.1   MCV 89.7   MCH 31.2*   MCHC 34.8   RDW 13.3      MPV 9.5       Recent Labs   Lab 04/16/23  1020      K 3.2*   CO2 24   BUN 10.0   CREATININE 0.71   CALCIUM 9.7   MG 1.40*   ALBUMIN 4.4   ALKPHOS 103   ALT 20   AST 21   BILITOT 0.7       Microbiology Results (last 7 days)       ** No results found for the last 168 hours. **             CT Abdomen Pelvis With Contrast  Narrative: EXAMINATION:  CT ABDOMEN PELVIS WITH CONTRAST    CLINICAL HISTORY:  Abdominal pain, acute,  nonlocalized;    TECHNIQUE:  Multidetector axial images were obtained of the abdomen and pelvis following the administration of IV contrast. Oral contrast was not administered.    Dose length product of 336 mGycm. Automated exposure control was utilized to minimize radiation dose.    COMPARISON:  CT abdomen pelvis July 5, 2018.    FINDINGS:  Included portion of the lungs are without suspicious nodularity, acute air space infiltrates or fluid within the pleural spaces.    Hepatic volume and attenuation is unremarkable.  Post cholecystectomy, there is unchanged dilatation of the intrahepatic and the extrahepatic ducts.  There is new generalized dilatation of the main pancreatic duct.  This does raise the possibility of stricturing pathology about the ampullary region though no dominant mass lesion about the pancreatic head on this exam identified.  There are no acute peripancreatic phlegmons.  Spleen is unremarkable in size and attenuation.    The adrenal glands appear within normal limits. The kidneys are unremarkable in size and contour. No solid or cystic renal lesion identified. There is no hydronephrosis. No perinephric fluid strandings or collections identified.    There is small hiatal hernia and mild distal esophageal mural thickening.  The distal gastric body and antrum is decompressed accentuating wall thickness though overall similar appearance compared to the previous exam.  There is no abnormal dilatation of loops of small bowel and there is no focal or generalized mural thickening.  Patient is status post right hemicolectomy.  There is mild colonic fecal loading without abnormal dilatation or pericolonic acute strandings.  There is supraumbilical abdominal wall weakness which allows slight outward protrusion of portion of colon.  No bowel obstruction no free fluid.    Urinary bladder appears within normal limits. There is no pelvic free fluid.    There are degenerative changes of the lumbar spine.  No  acute or otherwise osseous abnormality identified.  Impression: 1. Unchanged dilatation of the intrahepatic and the extrahepatic ducts post cholecystic.    2. New dilatation of main pancreatic duct raises the possibility of stricturing pathology about the ampullary region.  No dominant pancreatic head mass lesion identified.  This may be the sequela of previous pancreatitis.  Please correlate clinically.  This can be further assessed with MRI abdomen on non emergent basis.    3. Status post right hemicolectomy.    Electronically signed by: Des Irizarry  Date:    04/16/2023  Time:    11:47        ASSESSMENT & PLAN:   ASSESSMENT:  Acute pancreatitis- per imaging, with elevated lipase, POA  Nausea with vomiting- non-intractable, POA  Abdominal pain - generalized- secondary to pancreatitis, POA  Crohns disease- chronic w/suspected exacerbation 2/2 pancreatitis- POA  HTN- urgency, POA  Electrolyte imbalance- POA  Rheumatoid arthritis- chronic   CAD- s/p MI  Weakness    PLAN:  IV hydration  Bowel rest   Consult Dr. Pierson per patient request, appreciate assistance and recommendations  PRN antiemetic therapy  PRN pain management   Resume home medication history   PRN anti-hypertensive per parameters  Labs in the am  PT OT eval and treat    VTE Prophylaxis: Lovenox for DVT prophylaxis and will be advised to be as mobile as possible and sit in a chair as tolerated    Patient condition:  Stable  __________________________________________________________________________  INPATIENT LIST OF MEDICATIONS     Scheduled Meds:   enoxaparin  40 mg Subcutaneous Daily    magnesium sulfate IVPB  2 g Intravenous ED 1 Time    potassium chloride in water  20 mEq Intravenous ED 1 Time     Continuous Infusions:  PRN Meds:.acetaminophen, dextrose 10%, dextrose 10%, glucagon (human recombinant), glucose, glucose, HYDROcodone-acetaminophen, melatonin, naloxone, ondansetron, simethicone, sodium chloride 0.9%      Cliff SUH FNP have  reviewed and discussed the case with Dr. Christ Clemens. Please see the following addendum for further assessment and plan from there attending MD.  Cliff Lanza, SHEILA   04/16/2023

## 2023-04-17 LAB
ALBUMIN SERPL-MCNC: 3.6 G/DL (ref 3.4–4.8)
ALBUMIN/GLOB SERPL: 1.1 RATIO (ref 1.1–2)
ALP SERPL-CCNC: 87 UNIT/L (ref 40–150)
ALT SERPL-CCNC: 18 UNIT/L (ref 0–55)
AST SERPL-CCNC: 23 UNIT/L (ref 5–34)
BASOPHILS # BLD AUTO: 0.01 X10(3)/MCL (ref 0–0.2)
BASOPHILS NFR BLD AUTO: 0.1 %
BILIRUBIN DIRECT+TOT PNL SERPL-MCNC: 0.8 MG/DL
BUN SERPL-MCNC: 10.8 MG/DL (ref 9.8–20.1)
CALCIUM SERPL-MCNC: 9 MG/DL (ref 8.4–10.2)
CHLORIDE SERPL-SCNC: 104 MMOL/L (ref 98–107)
CO2 SERPL-SCNC: 25 MMOL/L (ref 23–31)
CREAT SERPL-MCNC: 0.87 MG/DL (ref 0.55–1.02)
CRP SERPL-MCNC: 2.7 MG/L
EOSINOPHIL # BLD AUTO: 0.07 X10(3)/MCL (ref 0–0.9)
EOSINOPHIL NFR BLD AUTO: 1 %
ERYTHROCYTE [DISTWIDTH] IN BLOOD BY AUTOMATED COUNT: 13.7 % (ref 11.5–17)
ERYTHROCYTE [SEDIMENTATION RATE] IN BLOOD: 28 MM/HR (ref 0–20)
GFR SERPLBLD CREATININE-BSD FMLA CKD-EPI: >60 MLS/MIN/1.73/M2
GLOBULIN SER-MCNC: 3.3 GM/DL (ref 2.4–3.5)
GLUCOSE SERPL-MCNC: 96 MG/DL (ref 82–115)
HCT VFR BLD AUTO: 35.8 % (ref 37–47)
HGB BLD-MCNC: 12.2 G/DL (ref 12–16)
IMM GRANULOCYTES # BLD AUTO: 0.01 X10(3)/MCL (ref 0–0.04)
IMM GRANULOCYTES NFR BLD AUTO: 0.1 %
LYMPHOCYTES # BLD AUTO: 1.9 X10(3)/MCL (ref 0.6–4.6)
LYMPHOCYTES NFR BLD AUTO: 27.8 %
MCH RBC QN AUTO: 31.2 PG (ref 27–31)
MCHC RBC AUTO-ENTMCNC: 34.1 G/DL (ref 33–36)
MCV RBC AUTO: 91.6 FL (ref 80–94)
MONOCYTES # BLD AUTO: 0.62 X10(3)/MCL (ref 0.1–1.3)
MONOCYTES NFR BLD AUTO: 9.1 %
NEUTROPHILS # BLD AUTO: 4.23 X10(3)/MCL (ref 2.1–9.2)
NEUTROPHILS NFR BLD AUTO: 61.9 %
NRBC BLD AUTO-RTO: 0 %
PLATELET # BLD AUTO: 225 X10(3)/MCL (ref 130–400)
PMV BLD AUTO: 9.5 FL (ref 7.4–10.4)
POTASSIUM SERPL-SCNC: 3.4 MMOL/L (ref 3.5–5.1)
PROT SERPL-MCNC: 6.9 GM/DL (ref 5.8–7.6)
RBC # BLD AUTO: 3.91 X10(6)/MCL (ref 4.2–5.4)
SODIUM SERPL-SCNC: 138 MMOL/L (ref 136–145)
WBC # SPEC AUTO: 6.8 X10(3)/MCL (ref 4.5–11.5)

## 2023-04-17 PROCEDURE — 85025 COMPLETE CBC W/AUTO DIFF WBC: CPT | Performed by: NURSE PRACTITIONER

## 2023-04-17 PROCEDURE — 11000001 HC ACUTE MED/SURG PRIVATE ROOM

## 2023-04-17 PROCEDURE — 87040 BLOOD CULTURE FOR BACTERIA: CPT | Performed by: INTERNAL MEDICINE

## 2023-04-17 PROCEDURE — 83993 ASSAY FOR CALPROTECTIN FECAL: CPT | Mod: 90 | Performed by: PHYSICIAN ASSISTANT

## 2023-04-17 PROCEDURE — 80053 COMPREHEN METABOLIC PANEL: CPT | Performed by: NURSE PRACTITIONER

## 2023-04-17 PROCEDURE — 84478 ASSAY OF TRIGLYCERIDES: CPT | Performed by: PHYSICIAN ASSISTANT

## 2023-04-17 PROCEDURE — 97165 OT EVAL LOW COMPLEX 30 MIN: CPT

## 2023-04-17 PROCEDURE — 82787 IGG 1 2 3 OR 4 EACH: CPT | Mod: 90 | Performed by: PHYSICIAN ASSISTANT

## 2023-04-17 PROCEDURE — 63600175 PHARM REV CODE 636 W HCPCS: Performed by: INTERNAL MEDICINE

## 2023-04-17 PROCEDURE — 63600175 PHARM REV CODE 636 W HCPCS: Performed by: NURSE PRACTITIONER

## 2023-04-17 PROCEDURE — 85651 RBC SED RATE NONAUTOMATED: CPT | Performed by: PHYSICIAN ASSISTANT

## 2023-04-17 PROCEDURE — 97162 PT EVAL MOD COMPLEX 30 MIN: CPT

## 2023-04-17 PROCEDURE — 86140 C-REACTIVE PROTEIN: CPT | Performed by: PHYSICIAN ASSISTANT

## 2023-04-17 RX ORDER — ONDANSETRON 2 MG/ML
4 INJECTION INTRAMUSCULAR; INTRAVENOUS EVERY 4 HOURS PRN
Status: DISCONTINUED | OUTPATIENT
Start: 2023-04-17 | End: 2023-04-19

## 2023-04-17 RX ORDER — ONDANSETRON 4 MG/1
4 TABLET, ORALLY DISINTEGRATING ORAL EVERY 4 HOURS PRN
Status: DISCONTINUED | OUTPATIENT
Start: 2023-04-17 | End: 2023-04-20 | Stop reason: HOSPADM

## 2023-04-17 RX ADMIN — ONDANSETRON 4 MG: 2 INJECTION INTRAMUSCULAR; INTRAVENOUS at 08:04

## 2023-04-17 RX ADMIN — MORPHINE SULFATE 2 MG: 4 INJECTION INTRAVENOUS at 08:04

## 2023-04-17 RX ADMIN — MORPHINE SULFATE 2 MG: 4 INJECTION INTRAVENOUS at 04:04

## 2023-04-17 RX ADMIN — ONDANSETRON 4 MG: 2 INJECTION INTRAMUSCULAR; INTRAVENOUS at 11:04

## 2023-04-17 RX ADMIN — MORPHINE SULFATE 2 MG: 4 INJECTION INTRAVENOUS at 11:04

## 2023-04-17 RX ADMIN — ONDANSETRON 4 MG: 2 INJECTION INTRAMUSCULAR; INTRAVENOUS at 04:04

## 2023-04-17 RX ADMIN — ONDANSETRON 4 MG: 2 INJECTION INTRAMUSCULAR; INTRAVENOUS at 05:04

## 2023-04-17 RX ADMIN — SODIUM CHLORIDE, POTASSIUM CHLORIDE, SODIUM LACTATE AND CALCIUM CHLORIDE: 600; 310; 30; 20 INJECTION, SOLUTION INTRAVENOUS at 04:04

## 2023-04-17 RX ADMIN — ENOXAPARIN SODIUM 40 MG: 40 INJECTION SUBCUTANEOUS at 04:04

## 2023-04-17 NOTE — CONSULTS
Consult Note    Reason for Consult:      We were consulted by Dr. Fairbanks to evaluate this patient for pancreatitis and h/o crohns disease.    HPI:     82-year-old  female known to Dr. Pierson with a PMH of Crohn's colitis/ileitis on Entyvio q.6 weeks, hepatic steatosis, osteopenia, acute pancreatitis in 201, carpal tunnel syndrome, HTN, HLD, CAD/MI, lumbar stenosis with chronic back pain, DAQUAN, cervical cancer.    Patient presented to the ED with complaints of acute onset epigastric pain, nausea, and vomiting.  Symptoms started on Friday.  She had had her Entyvio infusion on Wednesday.  Symptoms progressed.  She was unable to keep down p.o. intake.  Eventually, she came to the ED for further evaluation.  There were no associated changes in bowel habits.  She is chronic diarrhea which she describes as 4-6 liquid bowel movements per day.  On presentation, patient hypertensive 178/118 otherwise afebrile and VSS.  Laboratory notable for lipase 316 in and potassium 3.2, otherwise CBC and CMP unremarkable.  UA negative for infection.  CT abdomen/pelvis with IV contrast: Post cholecystectomy, unchanged dilatation of intrahepatic and extrahepatic ducts, new generalized dilatation of main pancreatic duct raises possibility of stricturing pathology about the ampullary region though no dominant mass lesion about pancreatic head, no acute peripancreatic phlegmons, mild colonic fecal loading without abnormal dilatation or pericolonic acute strandings.  Patient was admitted and GI was consulted.  Today, patient states she is improving some but still requiring morphine and zofran.     Patient was initially diagnosed with Crohn's disease in 1976.  She was on Remicade for over 40 years.  Her Remicade level was low despite increasing the dose to 10 mg per kg and Ab levels were high so patient was changed to Entyvio 9/2019.  Her levels were low on every 8 weeks, and she was switched to every 6 weeks around 9/2020.   Patient has had 3 prior surgical resections with the last being in February 2012 by Dr. Vlad Bowen the ileocolonic anastomosis had fibrostenotic changes.  Most recent colonoscopy was on 02/14/2023 by Dr. Pierson which found ileocolonic anastomosis in the transverse colon, benign appearing intrinsic stenosis in the anal canal that the scope was able to traverse, ulceration at the anastomosis compatible with Crohn's disease, otherwise normal exam with no polyps.  Random biopsies in the neoterminal ileum, transverse, descending, sigmoid, and rectum with no diagnostic abnormality.  Colonic anastomosis biopsies with patchy active inflammation, reparative changes, an ulcer with no dysplasia - Differential diagnosis includes benign trauma/prolapse at the anastomotic site or recrudescence of the patient's underlying Crohn's disease.  MRE was ordered, but patient has not yet have this performed.  Of note, this ulcer was seen on previous colonoscopy as well 7/2021 (bx: active colitis/enteritis with ulcer with ddx including moderately active CD) and colonoscopy 7/2018 (bx: focal acute enteritis and erosion favor trauma prolase changes rather than active CD).  Last ESR/CRP wnl 8/2022.  Last fecal jarrett wnl 4/2022 and at that time entyvio abs neg and entyvio levels high 75.7.  She was last seen 4/10/23 in the clinic and was doing well.     In 2010, pancreatitis was suspected secondary to MTX - LFTs were normal; MRI abdomen with/without contrast extrahepatic biliary ductal dilation 11 mm, minimal intrahepatic ductal dilation, normal pancreatic duct; ERCP was negative for choledocholithasis and sump syndrome was suspected.    Patient states that since starting Entyvio, she has been queasy and nauseated intermittently.  Gini ARRIAGA in our clinic prescribed Zofran and this has helped.  Other home medication notable for simvastatin, however patient states that she only takes it intermittently and feels the prescription about twice a  year.  Her last dose was last week.  No other changes in medications or recent antibiotic use.  Nonsmoker, nondrinker.  She denies a history of alcohol abuse in the past and is a reformed smoker.  No family history of pancreatic issues.  When asked about weight loss, she states that she has noticed that some of the size 12 baggy but she does not quantify actual weight loss.  Noted documented 10 lbs weight loss since last weight in our clinic 9/2022.    Previous records reviewed.     PCP:  Primary Doctor No    Review of patient's allergies indicates:  No Known Allergies     Current Facility-Administered Medications   Medication Dose Route Frequency Provider Last Rate Last Admin    acetaminophen tablet 650 mg  650 mg Oral Q4H PRN Cliff Lanza, FNP        dextrose 10% bolus 125 mL 125 mL  12.5 g Intravenous PRN Cliff Lanza, FNP        dextrose 10% bolus 250 mL 250 mL  25 g Intravenous PRN Cliff Lanza, FNP        enoxaparin injection 40 mg  40 mg Subcutaneous Daily Cliff Lanza, FNP   40 mg at 04/16/23 1703    glucagon (human recombinant) injection 1 mg  1 mg Intramuscular PRN Cliff Lanza, FNP        glucose chewable tablet 16 g  16 g Oral PRN Cliff Lanza, FNP        glucose chewable tablet 24 g  24 g Oral PRN Cliff Lanza, FNP        hydrALAZINE injection 10 mg  10 mg Intravenous Q4H PRN Cliff Lanza, FNP        lactated ringers infusion   Intravenous Continuous Cliff Lanza, FNP 75 mL/hr at 04/17/23 0420 New Bag at 04/17/23 0420    morphine injection 2 mg  2 mg Intravenous Q4H PRN Christ Clemens MD   2 mg at 04/17/23 0420    naloxone 0.4 mg/mL injection 0.02 mg  0.02 mg Intravenous PRN Cliff Lanza, FNP        ondansetron disintegrating tablet 4 mg  4 mg Oral Q4H PRN Sacha Fairbanks MD        ondansetron injection 4 mg  4 mg Intravenous Q4H PRN Christ lCemens MD        simethicone chewable tablet 80 mg  1 tablet Oral QID PRN  SHEILA Arellano        sodium chloride 0.9% flush 10 mL  10 mL Intravenous Q12H PRN SHEILA Arellano         Medications Prior to Admission   Medication Sig Dispense Refill Last Dose    cefdinir (OMNICEF) 300 MG capsule For postop eyelid surgery   Unknown    cholecalciferol, vitamin D3, 1,250 mcg (50,000 unit) capsule Take 1,250 mcg by mouth once a week.   Unknown    simvastatin (ZOCOR) 20 MG tablet Take 20 mg by mouth every evening.   Unknown    traMADoL (ULTRAM) 50 mg tablet TAKE ONE TO TWO TABLETS BY MOUTH THREE TIMES A DAY AS NEEDED FOR PAIN 40 tablet 2 Unknown       Past Medical History:  Past Medical History:   Diagnosis Date    Arthritis     Carpal tunnel syndrome, bilateral     Cervical cancer     Cervical radiculopathy     Crohn disease     Heart attack     HLD (hyperlipidemia)     Low back pain     Sleep apnea, unspecified     Spinal stenosis of lumbar region with neurogenic claudication       Past Surgical History:  Past Surgical History:   Procedure Laterality Date    bilateral L4-5, L5-S1 decompression, repair L5-S1 dural tear  10/01/2021    Dr. Marin    CARPAL TUNNEL RELEASE Right 09/06/2019    Dr. Marin    CARPAL TUNNEL RELEASE Left 12/2/2022    Procedure: RELEASE, CARPAL TUNNEL;  Surgeon: Jesse Marin MD;  Location: [facility] OR;  Service: Neurosurgery;  Laterality: Left;    CHOLECYSTECTOMY      COLECTOMY  07/2011    partial x3    HYSTERECTOMY      total    REPAIR OF EYELID Bilateral 11/21/2022    Procedure: BILATERAL ECTROPION REPAIR;  Surgeon: Justin Flores MD;  Location: [facility] OR;  Service: ENT;  Laterality: Bilateral;      Family History:  Family History   Problem Relation Age of Onset    Diabetes Mother     Hypertension Father     Hyperlipidemia Father     Cancer Father     Hyperlipidemia Sister     Hyperlipidemia Brother     Hyperlipidemia Daughter      Social History:  Social History     Tobacco Use    Smoking status: Former     Types: Cigarettes    Smokeless tobacco:  Never   Substance Use Topics    Alcohol use: Yes     Comment: rarely       Review of Systems:     Review of Systems   Constitutional:  Positive for appetite change and unexpected weight change. Negative for chills, diaphoresis, fatigue and fever.   HENT:  Negative for trouble swallowing.    Respiratory:  Negative for cough, chest tightness and shortness of breath.    Cardiovascular:  Negative for chest pain, palpitations and leg swelling.   Gastrointestinal:  Positive for abdominal pain, diarrhea (chronic, unchanged), nausea and vomiting. Negative for abdominal distention, blood in stool, constipation and rectal pain.   Skin:  Negative for color change and pallor.   Neurological:  Negative for dizziness, weakness and light-headedness.   Psychiatric/Behavioral:  Negative for confusion.      Objective:     VITAL SIGNS: 24 HR MIN & MAX LAST    Temp  Min: 98.1 °F (36.7 °C)  Max: 98.5 °F (36.9 °C)  98.5 °F (36.9 °C)        BP  Min: 109/67  Max: 207/88  (!) 174/71     Pulse  Min: 58  Max: 69  60     Resp  Min: 10  Max: 20  20    SpO2  Min: 93 %  Max: 100 %  96 %        Intake/Output Summary (Last 24 hours) at 4/17/2023 1057  Last data filed at 4/17/2023 0429  Gross per 24 hour   Intake 0 ml   Output --   Net 0 ml       Physical Exam  Constitutional:       General: She is not in acute distress.     Appearance: She is not ill-appearing.   HENT:      Head: Normocephalic and atraumatic.   Eyes:      General: No scleral icterus.     Extraocular Movements: Extraocular movements intact.   Cardiovascular:      Rate and Rhythm: Normal rate and regular rhythm.   Pulmonary:      Effort: Pulmonary effort is normal. No respiratory distress.   Abdominal:      General: Bowel sounds are normal. There is no distension.      Palpations: Abdomen is soft. There is no mass.      Tenderness: There is abdominal tenderness (mild in epgiastric). There is no guarding or rebound.      Comments: Lap scars noted.    Musculoskeletal:         General:  Normal range of motion.      Right lower leg: No edema.      Left lower leg: No edema.   Skin:     General: Skin is warm and dry.   Neurological:      Mental Status: She is alert and oriented to person, place, and time.   Psychiatric:         Mood and Affect: Mood and affect normal.         Recent Results (from the past 48 hour(s))   CBC with Differential    Collection Time: 04/16/23 10:20 AM   Result Value Ref Range    WBC 9.0 4.5 - 11.5 x10(3)/mcL    RBC 4.36 4.20 - 5.40 x10(6)/mcL    Hgb 13.6 12.0 - 16.0 g/dL    Hct 39.1 37.0 - 47.0 %    MCV 89.7 80.0 - 94.0 fL    MCH 31.2 (H) 27.0 - 31.0 pg    MCHC 34.8 33.0 - 36.0 g/dL    RDW 13.3 11.5 - 17.0 %    Platelet 255 130 - 400 x10(3)/mcL    MPV 9.5 7.4 - 10.4 fL    Neut % 87.6 %    Lymph % 8.6 %    Mono % 3.5 %    Eos % 0.0 %    Basophil % 0.1 %    Lymph # 0.78 0.6 - 4.6 x10(3)/mcL    Neut # 7.89 2.1 - 9.2 x10(3)/mcL    Mono # 0.32 0.1 - 1.3 x10(3)/mcL    Eos # 0.00 0 - 0.9 x10(3)/mcL    Baso # 0.01 0 - 0.2 x10(3)/mcL    IG# 0.02 0 - 0.04 x10(3)/mcL    IG% 0.2 %    NRBC% 0.0 %   Comprehensive Metabolic Panel    Collection Time: 04/16/23 10:20 AM   Result Value Ref Range    Sodium Level 137 136 - 145 mmol/L    Potassium Level 3.2 (L) 3.5 - 5.1 mmol/L    Chloride 98 98 - 107 mmol/L    Carbon Dioxide 24 23 - 31 mmol/L    Glucose Level 154 (H) 82 - 115 mg/dL    Blood Urea Nitrogen 10.0 9.8 - 20.1 mg/dL    Creatinine 0.71 0.55 - 1.02 mg/dL    Calcium Level Total 9.7 8.4 - 10.2 mg/dL    Protein Total 7.9 (H) 5.8 - 7.6 gm/dL    Albumin Level 4.4 3.4 - 4.8 g/dL    Globulin 3.5 2.4 - 3.5 gm/dL    Albumin/Globulin Ratio 1.3 1.1 - 2.0 ratio    Bilirubin Total 0.7 <=1.5 mg/dL    Alkaline Phosphatase 103 40 - 150 unit/L    Alanine Aminotransferase 20 0 - 55 unit/L    Aspartate Aminotransferase 21 5 - 34 unit/L    eGFR >60 mls/min/1.73/m2   Magnesium    Collection Time: 04/16/23 10:20 AM   Result Value Ref Range    Magnesium Level 1.40 (L) 1.60 - 2.60 mg/dL   Lipase    Collection  Time: 04/16/23 10:20 AM   Result Value Ref Range    Lipase Level 316 (H) <=60 U/L   Urinalysis, Reflex to Urine Culture    Collection Time: 04/16/23 12:43 PM    Specimen: Urine, Clean Catch   Result Value Ref Range    Color, UA Yellow Yellow, Light-Yellow, Dark Yellow, Misty, Straw    Appearance, UA Clear Clear    Specific Gravity, UA 1.035 (H) 1.005 - 1.030    pH, UA 7.0 5.0 - 8.5    Protein, UA Trace (A) Negative mg/dL    Glucose, UA Negative Negative, Normal mg/dL    Ketones, UA Negative Negative mg/dL    Blood, UA Negative Negative unit/L    Bilirubin, UA Negative Negative mg/dL    Urobilinogen, UA 0.2 0.2, 1.0, Normal mg/dL    Nitrites, UA Negative Negative    Leukocyte Esterase, UA Trace (A) Negative unit/L   Urinalysis, Microscopic    Collection Time: 04/16/23 12:43 PM   Result Value Ref Range    RBC, UA <5 <=5 /HPF    WBC, UA <5 <=5 /HPF    Squamous Epithelial Cells, UA <5 <=5 /HPF    Bacteria, UA None Seen None Seen, Rare, Occasional /HPF   Comprehensive Metabolic Panel (CMP)    Collection Time: 04/17/23  4:20 AM   Result Value Ref Range    Sodium Level 138 136 - 145 mmol/L    Potassium Level 3.4 (L) 3.5 - 5.1 mmol/L    Chloride 104 98 - 107 mmol/L    Carbon Dioxide 25 23 - 31 mmol/L    Glucose Level 96 82 - 115 mg/dL    Blood Urea Nitrogen 10.8 9.8 - 20.1 mg/dL    Creatinine 0.87 0.55 - 1.02 mg/dL    Calcium Level Total 9.0 8.4 - 10.2 mg/dL    Protein Total 6.9 5.8 - 7.6 gm/dL    Albumin Level 3.6 3.4 - 4.8 g/dL    Globulin 3.3 2.4 - 3.5 gm/dL    Albumin/Globulin Ratio 1.1 1.1 - 2.0 ratio    Bilirubin Total 0.8 <=1.5 mg/dL    Alkaline Phosphatase 87 40 - 150 unit/L    Alanine Aminotransferase 18 0 - 55 unit/L    Aspartate Aminotransferase 23 5 - 34 unit/L    eGFR >60 mls/min/1.73/m2   CBC with Differential    Collection Time: 04/17/23  5:24 AM   Result Value Ref Range    WBC 6.8 4.5 - 11.5 x10(3)/mcL    RBC 3.91 (L) 4.20 - 5.40 x10(6)/mcL    Hgb 12.2 12.0 - 16.0 g/dL    Hct 35.8 (L) 37.0 - 47.0 %    MCV  91.6 80.0 - 94.0 fL    MCH 31.2 (H) 27.0 - 31.0 pg    MCHC 34.1 33.0 - 36.0 g/dL    RDW 13.7 11.5 - 17.0 %    Platelet 225 130 - 400 x10(3)/mcL    MPV 9.5 7.4 - 10.4 fL    Neut % 61.9 %    Lymph % 27.8 %    Mono % 9.1 %    Eos % 1.0 %    Basophil % 0.1 %    Lymph # 1.90 0.6 - 4.6 x10(3)/mcL    Neut # 4.23 2.1 - 9.2 x10(3)/mcL    Mono # 0.62 0.1 - 1.3 x10(3)/mcL    Eos # 0.07 0 - 0.9 x10(3)/mcL    Baso # 0.01 0 - 0.2 x10(3)/mcL    IG# 0.01 0 - 0.04 x10(3)/mcL    IG% 0.1 %    NRBC% 0.0 %       CT Abdomen Pelvis With Contrast    Result Date: 4/16/2023  EXAMINATION: CT ABDOMEN PELVIS WITH CONTRAST CLINICAL HISTORY: Abdominal pain, acute, nonlocalized; TECHNIQUE: Multidetector axial images were obtained of the abdomen and pelvis following the administration of IV contrast. Oral contrast was not administered. Dose length product of 336 mGycm. Automated exposure control was utilized to minimize radiation dose. COMPARISON: CT abdomen pelvis July 5, 2018. FINDINGS: Included portion of the lungs are without suspicious nodularity, acute air space infiltrates or fluid within the pleural spaces. Hepatic volume and attenuation is unremarkable.  Post cholecystectomy, there is unchanged dilatation of the intrahepatic and the extrahepatic ducts.  There is new generalized dilatation of the main pancreatic duct.  This does raise the possibility of stricturing pathology about the ampullary region though no dominant mass lesion about the pancreatic head on this exam identified.  There are no acute peripancreatic phlegmons.  Spleen is unremarkable in size and attenuation. The adrenal glands appear within normal limits. The kidneys are unremarkable in size and contour. No solid or cystic renal lesion identified. There is no hydronephrosis. No perinephric fluid strandings or collections identified. There is small hiatal hernia and mild distal esophageal mural thickening.  The distal gastric body and antrum is decompressed accentuating wall  thickness though overall similar appearance compared to the previous exam.  There is no abnormal dilatation of loops of small bowel and there is no focal or generalized mural thickening.  Patient is status post right hemicolectomy.  There is mild colonic fecal loading without abnormal dilatation or pericolonic acute strandings.  There is supraumbilical abdominal wall weakness which allows slight outward protrusion of portion of colon.  No bowel obstruction no free fluid. Urinary bladder appears within normal limits. There is no pelvic free fluid. There are degenerative changes of the lumbar spine.  No acute or otherwise osseous abnormality identified.     1. Unchanged dilatation of the intrahepatic and the extrahepatic ducts post cholecystic. 2. New dilatation of main pancreatic duct raises the possibility of stricturing pathology about the ampullary region.  No dominant pancreatic head mass lesion identified.  This may be the sequela of previous pancreatitis.  Please correlate clinically.  This can be further assessed with MRI abdomen on non emergent basis. 3. Status post right hemicolectomy. Electronically signed by: Des Irizarry Date:    04/16/2023 Time:    11:47    Assessment / Plan:     82-year-old  female known to Dr. Pierson with a PMH of Crohn's colitis/ileitis on Entyvio q.6 weeks, hepatic steatosis, osteopenia, acute pancreatitis in 201, carpal tunnel syndrome, HTN, HLD, CAD/MI, lumbar stenosis with chronic back pain, DAQUAN, cervical cancer.  Here with acute onset epigastric pain, nausea, and vomiting with no associated changes in bowel habits.    Acute pancreatitis   By way of lipase 3xULN and clinical symptoms.  CT without acute pancreatitis changes.   Potential etiology: medication induced from entyvio/simvastatin vs other see #2.  Check Tgs and IgG4.    2. Pancreatic ductal dilation  H/O prior pancreatitis in 2010 suspected from MTX?  LFTs normal.     -keep NPO for MRI/MRCP for further  evaluation.     3. Crohn's disease  Exacerbation not expected.    - Check ESR/CRP/fecal jarrett for completeness.  No steroids now. Will need to recheck entyvio levels and abs prior to next scheduled infusion.  Also needs to do MRE as previous planned outpatient.       Thank you for allowing us to participate in this patient's care.

## 2023-04-17 NOTE — NURSING
Nurses Note -- 4 Eyes      4/17/2023   7:04 AM      Skin assessed during: Admit      [x] No Pressure Injuries Present    []Prevention Measures Documented      [] Yes- Altered Skin Integrity Present or Discovered   [] LDA Added if Not in Epic (Describe Wound)   [] New Altered Skin Integrity was Present on Admit and Documented in LDA   [] Wound Image Taken    Wound Care Consulted? No    Attending Nurse:  Kelsey Altman RN     Second RN/Staff Member:  Jag MIRANDA

## 2023-04-17 NOTE — PT/OT/SLP EVAL
Physical Therapy Evaluation and Discharge Note    Patient Name:  Evelyn Proctor   MRN:  [id number]    Recommendations:     Discharge Recommendations: home  Discharge Equipment Recommendations: none   Barriers to discharge: None    Assessment:     Evelyn Proctor is a 82 y.o. female admitted with a medical diagnosis of acute pancreatitis, nausea and vomiting. Pt is able to ambulate in room safely with no AD, and is safe to discharge home with . At this time, patient is functioning at their prior level of function and does not require further acute PT services.     Recent Surgery: * No surgery found *      Plan:     During this hospitalization, patient does not require further acute PT services.  Please re-consult if situation changes.      Subjective     Chief Complaint: none  Patient/Family Comments/goals: return home  Pain/Comfort:       Patients cultural, spiritual, Church conflicts given the current situation:      Living Environment:  Home with , SL home, no steps to enter.   Prior to admission, patients level of function was independent.  Equipment used at home: none.  DME owned (not currently used): rolling walker.  Upon discharge, patient will have assistance from .    Objective:     Communicated with NSG prior to session.  Patient found supine with   upon PT entry to room.    General Precautions: Standard,      Orthopedic Precautions:N/A   Braces: N/A  Respiratory Status: Room air    Exams:  RLE ROM: WFL  RLE Strength: WFL  LLE ROM: WFL  LLE Strength: WFL    Functional Mobility:  Bed Mobility:     Scooting: modified independence  Supine to Sit: modified independence  Sit to Supine: modified independence  Transfers:     Sit to Stand:  modified independence with no AD  Gait: pt ambulates x 30 feet, mod I and no AD.      Patient left supine with all lines intact, call button in reach, and NSG notified.    GOALS:   Multidisciplinary Problems       Physical Therapy Goals       Not on file                     History:     Past Medical History:   Diagnosis Date    Arthritis     Carpal tunnel syndrome, bilateral     Cervical cancer     Cervical radiculopathy     Crohn disease     Heart attack     HLD (hyperlipidemia)     Low back pain     Sleep apnea, unspecified     Spinal stenosis of lumbar region with neurogenic claudication        Past Surgical History:   Procedure Laterality Date    bilateral L4-5, L5-S1 decompression, repair L5-S1 dural tear  10/01/2021    Dr. Marin    CARPAL TUNNEL RELEASE Right 09/06/2019    Dr. Marin    CARPAL TUNNEL RELEASE Left 12/2/2022    Procedure: RELEASE, CARPAL TUNNEL;  Surgeon: Jesse Marin MD;  Location: [facility];  Service: Neurosurgery;  Laterality: Left;    CHOLECYSTECTOMY      COLECTOMY  07/2011    partial x3    HYSTERECTOMY      total    REPAIR OF EYELID Bilateral 11/21/2022    Procedure: BILATERAL ECTROPION REPAIR;  Surgeon: Justin Flores MD;  Location: [facility];  Service: ENT;  Laterality: Bilateral;       Time Tracking:     PT Received On: 04/17/23  PT Start Time: 1005     PT Stop Time: 1015  PT Total Time (min): 10 min     Billable Minutes: Evaluation 10      04/17/2023

## 2023-04-17 NOTE — PT/OT/SLP EVAL
"Occupational Therapy   Evaluation and Discharge Note    Name: Evelyn Proctor  MRN: [id number]  Admitting Diagnosis: <principal problem not specified>  Recent Surgery: * No surgery found *      Recommendations:     Discharge Recommendations: home  Discharge Equipment Recommendations: none  Barriers to discharge:  None    Assessment:     Evelyn Proctor is a 82 y.o. female with a medical diagnosis of hypokalemia, acute pancreatitis.  At this time, patient is functioning at their prior level of function and does not require further acute OT services.     Plan:     During this hospitalization, patient does not require further acute OT services.  Please re-consult if situation changes.    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  Patient found supine with peripheral IV upon OT entry to room.    General Precautions: Standard,    Orthopedic Precautions:    Braces:    Respiratory Status: Room air   Vital Signs:      Occupational Performance:    Bed Mobility:    Patient completed Supine to Sit with independence    Functional Mobility/Transfers:  Patient completed Sit <> Stand Transfer with independence  with  no assistive device   Functional Mobility: ambulated to bathroom and back with mod I, no AD    Activities of Daily Living:  Stood to brush teeth and hair at sink with independence  Performed toilet transfer and toileting " with mod I, no AD    Cognitive/Visual Perceptual:  intact    Physical Exam:  Wfl BUE's      AMPAC 6 Click ADL:  AMPAC Total Score:      Additional Treatment:  As above     Patient Education:  Patient provided with verbal education regarding tx plan and importance of continuing with OOB and mobility.  Understanding was verbalized.     Patient left up in chair with call button in reach and nsg notified    GOALS:   Multidisciplinary Problems       Occupational Therapy Goals       Not on file                    History:     Past Medical History:   Diagnosis Date    Arthritis     Carpal tunnel syndrome, bilateral     Cervical cancer     Cervical radiculopathy     Crohn disease     Heart attack     HLD (hyperlipidemia)     Low back pain     Sleep apnea, unspecified     Spinal stenosis of lumbar region with neurogenic claudication          Past Surgical History:   Procedure Laterality Date    bilateral L4-5, L5-S1 decompression, repair L5-S1 dural tear  10/01/2021    Dr. Marin    CARPAL TUNNEL RELEASE Right 09/06/2019    Dr. Marin    CARPAL TUNNEL RELEASE Left 12/2/2022    Procedure: RELEASE, CARPAL TUNNEL;  Surgeon: Jesse Marin MD;  Location: [facility] OR;  Service: Neurosurgery;  Laterality: Left;    CHOLECYSTECTOMY      COLECTOMY  07/2011    partial x3    HYSTERECTOMY      total    REPAIR OF EYELID Bilateral 11/21/2022    Procedure: BILATERAL ECTROPION REPAIR;  Surgeon: Justin Flores MD;  Location: [facility] OR;  Service: ENT;  Laterality: Bilateral;       Time Tracking:     OT Date of Treatment: 04/17/23  OT Start Time: 1007  OT Stop Time: 1019  OT Total Time (min): 12 min    Billable Minutes:Evaluation low complexity 12 min    4/17/2023

## 2023-04-17 NOTE — PLAN OF CARE
04/17/23 1216   Discharge Assessment   Assessment Type Discharge Planning Assessment   Confirmed/corrected address, phone number and insurance Yes   Confirmed Demographics Correct on Facesheet   Source of Information patient   When was your last doctors appointment?   (Pt states she does not have a PCP)   Reason For Admission hypokalemia   People in Home spouse   Do you expect to return to your current living situation? Yes   Do you have help at home or someone to help you manage your care at home? No   Current cognitive status: Alert/Oriented;No Deficits   Do you currently have service(s) that help you manage your care at home? No   Who is going to help you get home at discharge? Pt states daughter or granddaughter will drive pt home   How do you get to doctors appointments? car, drives self   Are you on dialysis? No   Do you take coumadin? No   Discharge Plan A Home with family   Discharge Plan B Home with family   Discharge Plan discussed with: Patient   Discharge Barriers Identified None     Pt states she lives with  Roberta in a single level home with 1 step to enter. She states her daughter is her contact Campbell [phone]. Pt states she is independent with ADL's prior to admission. She is able to drive. She does not have DME and is not active with a home health agency. Will follow for discharge needs.

## 2023-04-17 NOTE — PROGRESS NOTES
[facility] MEDICINE  PROGRESS NOTE        CHIEF COMPLAINT   Hospital follow up    HOSPITAL COURSE   Evelyn Proctor is a 82 y.o. female who past medical history includes rheumatoid arthritis, carpal tunnel syndrome, HTN, HLD, CAD/MI, lumbar stenosis with chronic back pain, DAQUAN,  cervical cancer; presents to the ED at [facility] on 4/16/2023 with a primary complaint of nausea, vomiting, and associated abdominal pain with diarrhea. Her daughter Campbell who is a nurse is at the bedside providing some of the history.  Patient reports her symptoms started yesterday which progressively got worse prompting ED visit today.  Patient does admit history of Crohn's disease which she is on Entyvio infusions.  Patient reports compliance with her medications.  She reported similar abdominal pain with nausea and vomiting in the past when she had pancreatitis secondary to methotrexate use in the past for her Crohn's disease, she also has been on Remicaid in the past as well. .  Patient denies any chest pain, shortness a breath, fever, chills, cough, congestion, or any sick contact.  Lab work done demonstrated potassium 3.2, glucose 154, magnesium 1.40; lipase 316; other indices unremarkable.  CT of the abdomen and pelvis with contrast demonstrated new dilation of the main pancreatic duct, sequela of previous pancreatitis, unchanged dilatation of the intrahepatic and extrahepatic ducts, status post right hemicolectomy.  Initial vital signs /118 pulse 87 respirations 24 temperature 98.4° F O2 saturation 98% on room air.  Patient received Reglan, morphine, Zofran, Benadryl, and IV bolus in the ED which her blood pressure did trend down to 182/85 with respiratory rate 16.  Patient is admitted to hospital medicine services for further management.    Today  Seen examined this morning.  Abdominal pain is improved.  Little bit of nausea but no vomiting or diarrhea.  Has been NPO.  Pancreatic ducts  dilated on the CT may be recurrent pancreatitis related verses ampullary stricture.        OBJECTIVE/PHYSICAL EXAM     VITAL SIGNS (MOST RECENT):  Temp: 98.5 °F (36.9 °C) (04/17/23 0739)  Pulse: 60 (04/17/23 0739)  Resp: 20 (04/17/23 0739)  BP: (!) 174/71 (04/17/23 0739)  SpO2: 96 % (04/17/23 0739)   VITAL SIGNS (24 HOUR RANGE):  Temp:  [98.1 °F (36.7 °C)-98.5 °F (36.9 °C)] 98.5 °F (36.9 °C)  Pulse:  [58-87] 60  Resp:  [10-24] 20  SpO2:  [93 %-100 %] 96 %  BP: (109-207)/() 174/71   GENERAL: In no acute distress, afebrile  HEENT:  CHEST: Clear to auscultation bilaterally  HEART: S1, S2, no appreciable murmur  ABDOMEN: Soft, mild epigastric tenderness, BS +  MSK: Warm, no lower extremity edema, no clubbing or cyanosis  NEUROLOGIC: Alert and oriented x4, moving all extremities with good strength   INTEGUMENTARY:  PSYCHIATRY:        ASSESSMENT/PLAN   Acute pancreatitis  Nausea vomiting diarrhea   Dilated pancreatic ducts    History of:  Crohn's disease, currently on Entyvio, hypertension, rheumatoid arthritis, CAD      GI consulted.  Pending further recommendations regarding the dilated pancreatic ducts, and questionable Crohn's exacerbation?  Continue with IV fluids for today, antiemetics, pain control.  Can start her on clears and advance if no plans for procedure or imaging.  She    DVT prophylaxis:  Lovenox 40    Anticipated discharge and disposition:   __________________________________________________________________________    LABS/MICRO/MEDS/DIAGNOSTICS       LABS  Recent Labs     04/17/23  0420      K 3.4*   CHLORIDE 104   CO2 25   BUN 10.8   CREATININE 0.87   GLUCOSE 96   CALCIUM 9.0   ALKPHOS 87   AST 23   ALT 18   ALBUMIN 3.6     Recent Labs     04/17/23  0524   WBC 6.8   RBC 3.91*   HCT 35.8*   MCV 91.6          MICROBIOLOGY  Microbiology Results (last 7 days)       ** No results found for the last 168 hours. **               MEDICATIONS   enoxaparin  40 mg Subcutaneous Daily          INFUSIONS   lactated ringers 75 mL/hr at 04/17/23 0420          DIAGNOSTIC TESTS  CT Abdomen Pelvis With Contrast   Final Result      1. Unchanged dilatation of the intrahepatic and the extrahepatic ducts post cholecystic.      2. New dilatation of main pancreatic duct raises the possibility of stricturing pathology about the ampullary region.  No dominant pancreatic head mass lesion identified.  This may be the sequela of previous pancreatitis.  Please correlate clinically.  This can be further assessed with MRI abdomen on non emergent basis.      3. Status post right hemicolectomy.         Electronically signed by: Des Irizarry   Date:    04/16/2023   Time:    11:47           No results found for: EF           Case related differential diagnoses have been reviewed; assessment and plan has been documented. I have personally reviewed the labs and test results that are currently available; I have reviewed the patients medication list. I have reviewed the consulting providers recommendations. I have reviewed or attempted to review medical records based upon their availability.  All of the patient's and/or family's questions have been addressed and answered to the best of my ability.  I will continue to monitor closely and make adjustments to medical management as needed.  This document was created using M*Modal Fluency Direct.  Transcription errors may have been made.  Please contact me if any questions may rise regarding documentation to clarify transcription.        Sacha Fairbanks MD   04/17/2023   Internal Medicine

## 2023-04-18 LAB
IGG4 SER-MCNC: 37.3 MG/DL (ref 2.4–121)
TRIGL SERPL-MCNC: 118 MG/DL (ref 37–140)

## 2023-04-18 PROCEDURE — A9577 INJ MULTIHANCE: HCPCS | Performed by: INTERNAL MEDICINE

## 2023-04-18 PROCEDURE — 11000001 HC ACUTE MED/SURG PRIVATE ROOM

## 2023-04-18 PROCEDURE — 63600175 PHARM REV CODE 636 W HCPCS: Performed by: NURSE PRACTITIONER

## 2023-04-18 PROCEDURE — 25500020 PHARM REV CODE 255: Performed by: INTERNAL MEDICINE

## 2023-04-18 PROCEDURE — 63600175 PHARM REV CODE 636 W HCPCS: Performed by: INTERNAL MEDICINE

## 2023-04-18 RX ADMIN — ONDANSETRON 4 MG: 2 INJECTION INTRAMUSCULAR; INTRAVENOUS at 05:04

## 2023-04-18 RX ADMIN — SODIUM CHLORIDE, POTASSIUM CHLORIDE, SODIUM LACTATE AND CALCIUM CHLORIDE: 600; 310; 30; 20 INJECTION, SOLUTION INTRAVENOUS at 11:04

## 2023-04-18 RX ADMIN — GADOBENATE DIMEGLUMINE 15 ML: 529 INJECTION, SOLUTION INTRAVENOUS at 06:04

## 2023-04-18 RX ADMIN — ONDANSETRON 4 MG: 2 INJECTION INTRAMUSCULAR; INTRAVENOUS at 10:04

## 2023-04-18 RX ADMIN — ENOXAPARIN SODIUM 40 MG: 40 INJECTION SUBCUTANEOUS at 08:04

## 2023-04-18 RX ADMIN — MORPHINE SULFATE 2 MG: 4 INJECTION INTRAVENOUS at 08:04

## 2023-04-18 RX ADMIN — MORPHINE SULFATE 2 MG: 4 INJECTION INTRAVENOUS at 05:04

## 2023-04-18 RX ADMIN — MORPHINE SULFATE 2 MG: 4 INJECTION INTRAVENOUS at 10:04

## 2023-04-18 RX ADMIN — SODIUM CHLORIDE, POTASSIUM CHLORIDE, SODIUM LACTATE AND CALCIUM CHLORIDE: 600; 310; 30; 20 INJECTION, SOLUTION INTRAVENOUS at 08:04

## 2023-04-18 NOTE — PROGRESS NOTES
[facility] MEDICINE  PROGRESS NOTE        CHIEF COMPLAINT   Hospital follow up    HOSPITAL COURSE   Evelyn Proctor is a 82 y.o. female who past medical history includes rheumatoid arthritis, carpal tunnel syndrome, HTN, HLD, CAD/MI, lumbar stenosis with chronic back pain, DAQUAN,  cervical cancer; presents to the ED at [facility] on 4/16/2023 with a primary complaint of nausea, vomiting, and associated abdominal pain with diarrhea. Her daughter Campbell who is a nurse is at the bedside providing some of the history.  Patient reports her symptoms started yesterday which progressively got worse prompting ED visit today.  Patient does admit history of Crohn's disease which she is on Entyvio infusions.  Patient reports compliance with her medications.  She reported similar abdominal pain with nausea and vomiting in the past when she had pancreatitis secondary to methotrexate use in the past for her Crohn's disease, she also has been on Remicaid in the past as well. .  Patient denies any chest pain, shortness a breath, fever, chills, cough, congestion, or any sick contact.  Lab work done demonstrated potassium 3.2, glucose 154, magnesium 1.40; lipase 316; other indices unremarkable.  CT of the abdomen and pelvis with contrast demonstrated new dilation of the main pancreatic duct, sequela of previous pancreatitis, unchanged dilatation of the intrahepatic and extrahepatic ducts, status post right hemicolectomy.  Initial vital signs /118 pulse 87 respirations 24 temperature 98.4° F O2 saturation 98% on room air.  Patient received Reglan, morphine, Zofran, Benadryl, and IV bolus in the ED which her blood pressure did trend down to 182/85 with respiratory rate 16.  Patient is admitted to hospital medicine services for further management.    Today  Still having some nausea and epigastric pain.  No vomiting.  Had 2 bowel movements yesterday.  She is currently NPO for MRI  abdomen.        OBJECTIVE/PHYSICAL EXAM     VITAL SIGNS (MOST RECENT):  Temp: 98.5 °F (36.9 °C) (04/18/23 0750)  Pulse: 67 (04/18/23 0750)  Resp: 20 (04/18/23 0750)  BP: 128/68 (04/18/23 0750)  SpO2: 98 % (04/18/23 0750)   VITAL SIGNS (24 HOUR RANGE):  Temp:  [98.5 °F (36.9 °C)-99.3 °F (37.4 °C)] 98.5 °F (36.9 °C)  Pulse:  [61-73] 67  Resp:  [16-20] 20  SpO2:  [94 %-98 %] 98 %  BP: (128-154)/(66-73) 128/68   GENERAL: In no acute distress, afebrile  HEENT:  CHEST: Clear to auscultation bilaterally  HEART: S1, S2, no appreciable murmur  ABDOMEN: Soft, mild epigastric tenderness, BS +  MSK: Warm, no lower extremity edema, no clubbing or cyanosis  NEUROLOGIC: Alert and oriented x4, moving all extremities with good strength   INTEGUMENTARY:  PSYCHIATRY:        ASSESSMENT/PLAN   Acute pancreatitis  Nausea vomiting diarrhea   Dilated pancreatic ducts    History of:  Crohn's disease, currently on Entyvio, hypertension, rheumatoid arthritis, CAD      GI following.  MRI abdomen ordered  Continue with IV fluids for today, antiemetics, pain control.  Can start her on clears and advance if no plans for procedure depending on imaging.      DVT prophylaxis:  Lovenox 40    Anticipated discharge and disposition:   __________________________________________________________________________    LABS/MICRO/MEDS/DIAGNOSTICS       LABS  Recent Labs     04/17/23  0420      K 3.4*   CHLORIDE 104   CO2 25   BUN 10.8   CREATININE 0.87   GLUCOSE 96   CALCIUM 9.0   ALKPHOS 87   AST 23   ALT 18   ALBUMIN 3.6       Recent Labs     04/17/23  0524   WBC 6.8   RBC 3.91*   HCT 35.8*   MCV 91.6            MICROBIOLOGY  Microbiology Results (last 7 days)       Procedure Component Value Units Date/Time    Blood Culture [[id number]] Collected: 04/17/23 1315    Order Status: Resulted Specimen: Blood from Hand, Left Updated: 04/17/23 1332    Blood Culture [[id number]] Collected: 04/17/23 1315    Order Status: Resulted Specimen: Blood from Hand,  Right Updated: 04/17/23 1332               MEDICATIONS   enoxaparin  40 mg Subcutaneous Daily         INFUSIONS   lactated ringers 75 mL/hr at 04/17/23 0420          DIAGNOSTIC TESTS  X-Ray Chest 1 View   Final Result      No acute pulmonary process identified.         Electronically signed by: Seth Wright   Date:    04/17/2023   Time:    17:29      CT Abdomen Pelvis With Contrast   Final Result      1. Unchanged dilatation of the intrahepatic and the extrahepatic ducts post cholecystic.      2. New dilatation of main pancreatic duct raises the possibility of stricturing pathology about the ampullary region.  No dominant pancreatic head mass lesion identified.  This may be the sequela of previous pancreatitis.  Please correlate clinically.  This can be further assessed with MRI abdomen on non emergent basis.      3. Status post right hemicolectomy.         Electronically signed by: Des Irizarry   Date:    04/16/2023   Time:    11:47      MRI Abdomen W WO Contrast_INC MRCP (XPD)    (Results Pending)        No results found for: EF           Case related differential diagnoses have been reviewed; assessment and plan has been documented. I have personally reviewed the labs and test results that are currently available; I have reviewed the patients medication list. I have reviewed the consulting providers recommendations. I have reviewed or attempted to review medical records based upon their availability.  All of the patient's and/or family's questions have been addressed and answered to the best of my ability.  I will continue to monitor closely and make adjustments to medical management as needed.  This document was created using M*Modal Fluency Direct.  Transcription errors may have been made.  Please contact me if any questions may rise regarding documentation to clarify transcription.        Sacha Fairbanks MD   04/18/2023   Internal Medicine

## 2023-04-18 NOTE — PROGRESS NOTES
"Gastroenterology Progress Note    Subjective/Interval History:  Awaiting MRI/MRCP given IV access so she is frustrated.  Nausea and epigastric pain comes and goes.  Still needs the zofran and morphine every 4-9 hours.  No vomiting.  Having BMs.     Vital Signs:  /68   Pulse 67   Temp 98.5 °F (36.9 °C) (Oral)   Resp 20   Ht 5' 3" (1.6 m)   Wt 65.8 kg (145 lb)   SpO2 98%   BMI 25.69 kg/m²   Body mass index is 25.69 kg/m².    Physical Exam:  Physical Exam  Constitutional:       General: She is not in acute distress.     Appearance: She is not ill-appearing.   HENT:      Head: Normocephalic and atraumatic.   Eyes:      General: No scleral icterus.     Extraocular Movements: Extraocular movements intact.   Cardiovascular:      Rate and Rhythm: Normal rate and regular rhythm.   Pulmonary:      Effort: Pulmonary effort is normal. No respiratory distress.   Abdominal:      General: Bowel sounds are normal. There is no distension.      Palpations: Abdomen is soft. There is no mass.      Tenderness: There is abdominal tenderness (mild in epgiastric). There is no guarding or rebound.      Comments: Lap scars noted.    Musculoskeletal:         General: Normal range of motion.      Right lower leg: No edema.      Left lower leg: No edema.   Skin:     General: Skin is warm and dry.   Neurological:      Mental Status: She is alert and oriented to person, place, and time.   Psychiatric:         Mood and Affect: Mood and affect normal.      Labs:  Recent Results (from the past 48 hour(s))   CBC with Differential    Collection Time: 04/16/23 10:20 AM   Result Value Ref Range    WBC 9.0 4.5 - 11.5 x10(3)/mcL    RBC 4.36 4.20 - 5.40 x10(6)/mcL    Hgb 13.6 12.0 - 16.0 g/dL    Hct 39.1 37.0 - 47.0 %    MCV 89.7 80.0 - 94.0 fL    MCH 31.2 (H) 27.0 - 31.0 pg    MCHC 34.8 33.0 - 36.0 g/dL    RDW 13.3 11.5 - 17.0 %    Platelet 255 130 - 400 x10(3)/mcL    MPV 9.5 7.4 - 10.4 fL    Neut % 87.6 %    Lymph % 8.6 %    Mono % 3.5 %    " Eos % 0.0 %    Basophil % 0.1 %    Lymph # 0.78 0.6 - 4.6 x10(3)/mcL    Neut # 7.89 2.1 - 9.2 x10(3)/mcL    Mono # 0.32 0.1 - 1.3 x10(3)/mcL    Eos # 0.00 0 - 0.9 x10(3)/mcL    Baso # 0.01 0 - 0.2 x10(3)/mcL    IG# 0.02 0 - 0.04 x10(3)/mcL    IG% 0.2 %    NRBC% 0.0 %   Comprehensive Metabolic Panel    Collection Time: 04/16/23 10:20 AM   Result Value Ref Range    Sodium Level 137 136 - 145 mmol/L    Potassium Level 3.2 (L) 3.5 - 5.1 mmol/L    Chloride 98 98 - 107 mmol/L    Carbon Dioxide 24 23 - 31 mmol/L    Glucose Level 154 (H) 82 - 115 mg/dL    Blood Urea Nitrogen 10.0 9.8 - 20.1 mg/dL    Creatinine 0.71 0.55 - 1.02 mg/dL    Calcium Level Total 9.7 8.4 - 10.2 mg/dL    Protein Total 7.9 (H) 5.8 - 7.6 gm/dL    Albumin Level 4.4 3.4 - 4.8 g/dL    Globulin 3.5 2.4 - 3.5 gm/dL    Albumin/Globulin Ratio 1.3 1.1 - 2.0 ratio    Bilirubin Total 0.7 <=1.5 mg/dL    Alkaline Phosphatase 103 40 - 150 unit/L    Alanine Aminotransferase 20 0 - 55 unit/L    Aspartate Aminotransferase 21 5 - 34 unit/L    eGFR >60 mls/min/1.73/m2   Magnesium    Collection Time: 04/16/23 10:20 AM   Result Value Ref Range    Magnesium Level 1.40 (L) 1.60 - 2.60 mg/dL   Lipase    Collection Time: 04/16/23 10:20 AM   Result Value Ref Range    Lipase Level 316 (H) <=60 U/L   Urinalysis, Reflex to Urine Culture    Collection Time: 04/16/23 12:43 PM    Specimen: Urine, Clean Catch   Result Value Ref Range    Color, UA Yellow Yellow, Light-Yellow, Dark Yellow, Misty, Straw    Appearance, UA Clear Clear    Specific Gravity, UA 1.035 (H) 1.005 - 1.030    pH, UA 7.0 5.0 - 8.5    Protein, UA Trace (A) Negative mg/dL    Glucose, UA Negative Negative, Normal mg/dL    Ketones, UA Negative Negative mg/dL    Blood, UA Negative Negative unit/L    Bilirubin, UA Negative Negative mg/dL    Urobilinogen, UA 0.2 0.2, 1.0, Normal mg/dL    Nitrites, UA Negative Negative    Leukocyte Esterase, UA Trace (A) Negative unit/L   Urinalysis, Microscopic    Collection Time:  04/16/23 12:43 PM   Result Value Ref Range    RBC, UA <5 <=5 /HPF    WBC, UA <5 <=5 /HPF    Squamous Epithelial Cells, UA <5 <=5 /HPF    Bacteria, UA None Seen None Seen, Rare, Occasional /HPF   Comprehensive Metabolic Panel (CMP)    Collection Time: 04/17/23  4:20 AM   Result Value Ref Range    Sodium Level 138 136 - 145 mmol/L    Potassium Level 3.4 (L) 3.5 - 5.1 mmol/L    Chloride 104 98 - 107 mmol/L    Carbon Dioxide 25 23 - 31 mmol/L    Glucose Level 96 82 - 115 mg/dL    Blood Urea Nitrogen 10.8 9.8 - 20.1 mg/dL    Creatinine 0.87 0.55 - 1.02 mg/dL    Calcium Level Total 9.0 8.4 - 10.2 mg/dL    Protein Total 6.9 5.8 - 7.6 gm/dL    Albumin Level 3.6 3.4 - 4.8 g/dL    Globulin 3.3 2.4 - 3.5 gm/dL    Albumin/Globulin Ratio 1.1 1.1 - 2.0 ratio    Bilirubin Total 0.8 <=1.5 mg/dL    Alkaline Phosphatase 87 40 - 150 unit/L    Alanine Aminotransferase 18 0 - 55 unit/L    Aspartate Aminotransferase 23 5 - 34 unit/L    eGFR >60 mls/min/1.73/m2   C-Reactive Protein    Collection Time: 04/17/23  4:20 AM   Result Value Ref Range    C-Reactive Protein 2.70 <5.00 mg/L   Triglycerides    Collection Time: 04/17/23  4:20 AM   Result Value Ref Range    Triglyceride 118 37 - 140 mg/dL   CBC with Differential    Collection Time: 04/17/23  5:24 AM   Result Value Ref Range    WBC 6.8 4.5 - 11.5 x10(3)/mcL    RBC 3.91 (L) 4.20 - 5.40 x10(6)/mcL    Hgb 12.2 12.0 - 16.0 g/dL    Hct 35.8 (L) 37.0 - 47.0 %    MCV 91.6 80.0 - 94.0 fL    MCH 31.2 (H) 27.0 - 31.0 pg    MCHC 34.1 33.0 - 36.0 g/dL    RDW 13.7 11.5 - 17.0 %    Platelet 225 130 - 400 x10(3)/mcL    MPV 9.5 7.4 - 10.4 fL    Neut % 61.9 %    Lymph % 27.8 %    Mono % 9.1 %    Eos % 1.0 %    Basophil % 0.1 %    Lymph # 1.90 0.6 - 4.6 x10(3)/mcL    Neut # 4.23 2.1 - 9.2 x10(3)/mcL    Mono # 0.62 0.1 - 1.3 x10(3)/mcL    Eos # 0.07 0 - 0.9 x10(3)/mcL    Baso # 0.01 0 - 0.2 x10(3)/mcL    IG# 0.01 0 - 0.04 x10(3)/mcL    IG% 0.1 %    NRBC% 0.0 %   Sedimentation rate    Collection Time:  04/17/23  1:15 PM   Result Value Ref Range    Sed Rate 28 (H) 0 - 20 mm/hr         Assessment/Plan:  82-year-old  female known to Dr. Pierson with a PMH of Crohn's colitis/ileitis on Entyvio q.6 weeks, hepatic steatosis, osteopenia, acute pancreatitis in 201, carpal tunnel syndrome, HTN, HLD, CAD/MI, lumbar stenosis with chronic back pain, DAQUAN, cervical cancer.  Here with acute onset epigastric pain, nausea, and vomiting with no associated changes in bowel habits.     Acute pancreatitis   By way of lipase 3xULN and clinical symptoms.  CT without acute pancreatitis changes.   Potential etiology: medication induced from entyvio/simvastatin vs other see #2.  Tgs wnl.  - F/U IgG4.     2. Pancreatic ductal dilation  H/O prior pancreatitis in 2010 suspected from MTX?  LFTs normal.   - keep NPO for MRI/MRCP for further evaluation.  Pending results, may try clear liquid diet.      3. Crohn's disease  Exacerbation not expected.  CRP normal.   - Will need to recheck trough entyvio levels and Abs prior to next scheduled infusion.  Also needs to do MRE as previously planned outpatient.        Juliette Beavers PA-C

## 2023-04-19 LAB
ALBUMIN SERPL-MCNC: 3 G/DL (ref 3.4–4.8)
ALBUMIN/GLOB SERPL: 1 RATIO (ref 1.1–2)
ALP SERPL-CCNC: 79 UNIT/L (ref 40–150)
ALT SERPL-CCNC: 15 UNIT/L (ref 0–55)
AST SERPL-CCNC: 18 UNIT/L (ref 5–34)
BILIRUBIN DIRECT+TOT PNL SERPL-MCNC: 0.7 MG/DL
BUN SERPL-MCNC: 4.8 MG/DL (ref 9.8–20.1)
CALCIUM SERPL-MCNC: 8.7 MG/DL (ref 8.4–10.2)
CANCER AG19-9 SERPL-ACNC: <2.06 UNIT/ML (ref 0–37)
CHLORIDE SERPL-SCNC: 104 MMOL/L (ref 98–107)
CO2 SERPL-SCNC: 27 MMOL/L (ref 23–31)
CREAT SERPL-MCNC: 0.7 MG/DL (ref 0.55–1.02)
GFR SERPLBLD CREATININE-BSD FMLA CKD-EPI: >60 MLS/MIN/1.73/M2
GLOBULIN SER-MCNC: 2.9 GM/DL (ref 2.4–3.5)
GLUCOSE SERPL-MCNC: 87 MG/DL (ref 82–115)
POTASSIUM SERPL-SCNC: 3.1 MMOL/L (ref 3.5–5.1)
PROT SERPL-MCNC: 5.9 GM/DL (ref 5.8–7.6)
SODIUM SERPL-SCNC: 138 MMOL/L (ref 136–145)

## 2023-04-19 PROCEDURE — 25000003 PHARM REV CODE 250: Performed by: INTERNAL MEDICINE

## 2023-04-19 PROCEDURE — 86301 IMMUNOASSAY TUMOR CA 19-9: CPT | Performed by: PHYSICIAN ASSISTANT

## 2023-04-19 PROCEDURE — 63600175 PHARM REV CODE 636 W HCPCS: Performed by: INTERNAL MEDICINE

## 2023-04-19 PROCEDURE — 63600175 PHARM REV CODE 636 W HCPCS: Performed by: NURSE PRACTITIONER

## 2023-04-19 PROCEDURE — 11000001 HC ACUTE MED/SURG PRIVATE ROOM

## 2023-04-19 PROCEDURE — 80053 COMPREHEN METABOLIC PANEL: CPT | Performed by: INTERNAL MEDICINE

## 2023-04-19 RX ORDER — EPINEPHRINE 1 MG/ML
0.3 INJECTION, SOLUTION, CONCENTRATE INTRAVENOUS
Status: CANCELLED | OUTPATIENT
Start: 2023-06-07

## 2023-04-19 RX ORDER — HEPARIN 100 UNIT/ML
500 SYRINGE INTRAVENOUS
Status: CANCELLED | OUTPATIENT
Start: 2023-06-07

## 2023-04-19 RX ORDER — HYDROCODONE BITARTRATE AND ACETAMINOPHEN 5; 325 MG/1; MG/1
1 TABLET ORAL EVERY 6 HOURS PRN
Status: DISCONTINUED | OUTPATIENT
Start: 2023-04-19 | End: 2023-04-19

## 2023-04-19 RX ORDER — POTASSIUM CHLORIDE 20 MEQ/1
40 TABLET, EXTENDED RELEASE ORAL
Status: COMPLETED | OUTPATIENT
Start: 2023-04-19 | End: 2023-04-19

## 2023-04-19 RX ORDER — ACETAMINOPHEN 325 MG/1
650 TABLET ORAL
Status: CANCELLED | OUTPATIENT
Start: 2023-06-07

## 2023-04-19 RX ORDER — METHYLPREDNISOLONE SOD SUCC 125 MG
40 VIAL (EA) INJECTION
Status: CANCELLED | OUTPATIENT
Start: 2023-06-07

## 2023-04-19 RX ORDER — ACETAMINOPHEN 500 MG
1000 TABLET ORAL ONCE
Status: CANCELLED | OUTPATIENT
Start: 2023-06-07 | End: 2023-06-07

## 2023-04-19 RX ORDER — SODIUM CHLORIDE 0.9 % (FLUSH) 0.9 %
10 SYRINGE (ML) INJECTION
Status: CANCELLED | OUTPATIENT
Start: 2023-06-07

## 2023-04-19 RX ORDER — DIPHENHYDRAMINE HYDROCHLORIDE 50 MG/ML
25 INJECTION INTRAMUSCULAR; INTRAVENOUS
Status: CANCELLED | OUTPATIENT
Start: 2023-06-07

## 2023-04-19 RX ORDER — TRAMADOL HYDROCHLORIDE 50 MG/1
50 TABLET ORAL EVERY 6 HOURS PRN
Status: DISCONTINUED | OUTPATIENT
Start: 2023-04-19 | End: 2023-04-20 | Stop reason: HOSPADM

## 2023-04-19 RX ORDER — CETIRIZINE HYDROCHLORIDE 10 MG/1
10 TABLET ORAL ONCE
Status: CANCELLED | OUTPATIENT
Start: 2023-06-07 | End: 2023-06-07

## 2023-04-19 RX ADMIN — ONDANSETRON 4 MG: 4 TABLET, ORALLY DISINTEGRATING ORAL at 05:04

## 2023-04-19 RX ADMIN — TRAMADOL HYDROCHLORIDE 50 MG: 50 TABLET, COATED ORAL at 08:04

## 2023-04-19 RX ADMIN — POTASSIUM CHLORIDE 40 MEQ: 1500 TABLET, EXTENDED RELEASE ORAL at 10:04

## 2023-04-19 RX ADMIN — MORPHINE SULFATE 2 MG: 4 INJECTION INTRAVENOUS at 12:04

## 2023-04-19 RX ADMIN — TRAMADOL HYDROCHLORIDE 50 MG: 50 TABLET, COATED ORAL at 01:04

## 2023-04-19 RX ADMIN — SODIUM CHLORIDE, POTASSIUM CHLORIDE, SODIUM LACTATE AND CALCIUM CHLORIDE: 600; 310; 30; 20 INJECTION, SOLUTION INTRAVENOUS at 01:04

## 2023-04-19 RX ADMIN — POTASSIUM CHLORIDE 40 MEQ: 1500 TABLET, EXTENDED RELEASE ORAL at 05:04

## 2023-04-19 RX ADMIN — ENOXAPARIN SODIUM 40 MG: 40 INJECTION SUBCUTANEOUS at 05:04

## 2023-04-19 NOTE — PROGRESS NOTES
"Gastroenterology Progress Note    Subjective/Interval History:  Abdominal pain and nausea continue to slowly improve.  Tolerating clear liquids without worsening symptoms.  Wants to eat regular diet.     Vital Signs:  BP (!) 144/61   Pulse 63   Temp 98.4 °F (36.9 °C) (Oral)   Resp 18   Ht 5' 3" (1.6 m)   Wt 65.8 kg (145 lb)   SpO2 99%   BMI 25.69 kg/m²   Body mass index is 25.69 kg/m².    Physical Exam:  Physical Exam  Constitutional:       General: She is not in acute distress.     Appearance: She is not ill-appearing.   HENT:      Head: Normocephalic and atraumatic.   Eyes:      General: No scleral icterus.     Extraocular Movements: Extraocular movements intact.   Cardiovascular:      Rate and Rhythm: Normal rate and regular rhythm.   Pulmonary:      Effort: Pulmonary effort is normal. No respiratory distress.   Abdominal:      General: Bowel sounds are normal. There is no distension.      Palpations: Abdomen is soft. There is no mass.      Tenderness: There is no abdominal tenderness. There is no guarding or rebound.      Comments: Lap scars noted.    Musculoskeletal:         General: Normal range of motion.      Right lower leg: No edema.      Left lower leg: No edema.   Skin:     General: Skin is warm and dry.   Neurological:      Mental Status: She is alert and oriented to person, place, and time.   Psychiatric:         Mood and Affect: Mood and affect normal.      Labs:  Recent Results (from the past 48 hour(s))   Blood Culture    Collection Time: 04/17/23  1:15 PM    Specimen: Hand, Left; Blood   Result Value Ref Range    CULTURE, BLOOD ([facility]) No Growth At 24 Hours    Blood Culture    Collection Time: 04/17/23  1:15 PM    Specimen: Hand, Right; Blood   Result Value Ref Range    CULTURE, BLOOD ([facility]) No Growth At 24 Hours    Immunoglobulin G Subclass 4    Collection Time: 04/17/23  1:15 PM   Result Value Ref Range    Immunoglobulin Subclass IgG4 37.3 2.4 - 121.0 mg/dL   Sedimentation rate    Collection " Time: 04/17/23  1:15 PM   Result Value Ref Range    Sed Rate 28 (H) 0 - 20 mm/hr   Comprehensive Metabolic Panel    Collection Time: 04/19/23  3:53 AM   Result Value Ref Range    Sodium Level 138 136 - 145 mmol/L    Potassium Level 3.1 (L) 3.5 - 5.1 mmol/L    Chloride 104 98 - 107 mmol/L    Carbon Dioxide 27 23 - 31 mmol/L    Glucose Level 87 82 - 115 mg/dL    Blood Urea Nitrogen 4.8 (L) 9.8 - 20.1 mg/dL    Creatinine 0.70 0.55 - 1.02 mg/dL    Calcium Level Total 8.7 8.4 - 10.2 mg/dL    Protein Total 5.9 5.8 - 7.6 gm/dL    Albumin Level 3.0 (L) 3.4 - 4.8 g/dL    Globulin 2.9 2.4 - 3.5 gm/dL    Albumin/Globulin Ratio 1.0 (L) 1.1 - 2.0 ratio    Bilirubin Total 0.7 <=1.5 mg/dL    Alkaline Phosphatase 79 40 - 150 unit/L    Alanine Aminotransferase 15 0 - 55 unit/L    Aspartate Aminotransferase 18 5 - 34 unit/L    eGFR >60 mls/min/1.73/m2         Assessment/Plan:  82-year-old  female known to Dr. Pierson with a PMH of Crohn's colitis/ileitis on Entyvio q.6 weeks, hepatic steatosis, osteopenia, acute pancreatitis in 201, carpal tunnel syndrome, HTN, HLD, CAD/MI, lumbar stenosis with chronic back pain, DAQUAN, cervical cancer.  Here with acute onset epigastric pain, nausea, and vomiting with no associated changes in bowel habits.     Acute pancreatitis   By way of lipase 3xULN and clinical symptoms.  CT without acute pancreatitis changes.   Potential etiology: medication induced from entyvio/simvastatin vs other see #2.    Tgs and IgG4 wnl.     2. Pancreatic ductal dilation  H/O prior pancreatitis in 2010 suspected from MTX?  LFTs normal.   MRIw/wo/MRCP 4/18/23: extensive pancreatic duct dilatation extending from the proximal body through tail; a focal segment of nondilated duct is appreciated at pancreatic head but no definite surrounding mass-like lesion identified however post-contrast sequences are severely limited secondary to patient movement.      3. Crohn's disease  Exacerbation not expected.  CRP  normal.     - next entyvio dose is due in about 6 weeks.  Will hold this for now and let her primary GI MD know, may consider dose reduction.  Will need to recheck trough entyvio levels and Abs prior to next scheduled infusion.    - arrange outpatient EUS for further evaluation.   - check ca 19-9 although could be inaccurate in acute pancreatitis.  - Also needs to do MRE as previously planned outpatient.   - advance diet. If tolerates ok to d/c with above recs that we will arrange.        Juliette Beavers PA-C

## 2023-04-19 NOTE — PROGRESS NOTES
[facility] MEDICINE  PROGRESS NOTE        CHIEF COMPLAINT   Hospital follow up    HOSPITAL COURSE   Evelyn Proctor is a 82 y.o. female who past medical history includes rheumatoid arthritis, carpal tunnel syndrome, HTN, HLD, CAD/MI, lumbar stenosis with chronic back pain, DAQUAN,  cervical cancer; presents to the ED at [facility] on 4/16/2023 with a primary complaint of nausea, vomiting, and associated abdominal pain with diarrhea. Her daughter Campbell who is a nurse is at the bedside providing some of the history.  Patient reports her symptoms started yesterday which progressively got worse prompting ED visit today.  Patient does admit history of Crohn's disease which she is on Entyvio infusions.  Patient reports compliance with her medications.  She reported similar abdominal pain with nausea and vomiting in the past when she had pancreatitis secondary to methotrexate use in the past for her Crohn's disease, she also has been on [employer] in the past as well. .  Patient denies any chest pain, shortness a breath, fever, chills, cough, congestion, or any sick contact.  Lab work done demonstrated potassium 3.2, glucose 154, magnesium 1.40; lipase 316; other indices unremarkable.  CT of the abdomen and pelvis with contrast demonstrated new dilation of the main pancreatic duct, sequela of previous pancreatitis, unchanged dilatation of the intrahepatic and extrahepatic ducts, status post right hemicolectomy.  Initial vital signs /118 pulse 87 respirations 24 temperature 98.4° F O2 saturation 98% on room air.  Patient received Reglan, morphine, Zofran, Benadryl, and IV bolus in the ED which her blood pressure did trend down to 182/85 with respiratory rate 16.  Patient is admitted to hospital medicine services for further management.    Essentially admitted for epigastric pain nausea vomiting diarrhea.  She is a history of Crohn's currently on Entyvio.  Gastroenterology was consulted  for further evaluation.  Her CT abdomen and pelvis noted dilated main pancreatic ducts.  MRI abdomen redemonstrated extensive pancreatic duct dilation and etiology remains indeterminate.  On lab work she was found to have a lipase of 316 and clinically she was diagnosed with acute pancreatitis and treated as such.    Today  MRI yesterday but information is pretty much the same as what we had before.  She is tolerating her clears, I will put her on a cardiac diet and see how she does.  No nausea or vomiting no diarrhea.  Waiting to see what GI plans to do about the dilated ducts as well.        OBJECTIVE/PHYSICAL EXAM     VITAL SIGNS (MOST RECENT):  Temp: 98.4 °F (36.9 °C) (04/19/23 0747)  Pulse: 63 (04/19/23 0747)  Resp: 18 (04/19/23 0747)  BP: (!) 144/61 (04/19/23 0747)  SpO2: 99 % (04/19/23 0747)   VITAL SIGNS (24 HOUR RANGE):  Temp:  [98.1 °F (36.7 °C)-98.9 °F (37.2 °C)] 98.4 °F (36.9 °C)  Pulse:  [63-74] 63  Resp:  [16-20] 18  SpO2:  [95 %-99 %] 99 %  BP: (126-177)/(61-80) 144/61   GENERAL: In no acute distress, afebrile  HEENT:  CHEST: Clear to auscultation bilaterally  HEART: S1, S2, no appreciable murmur  ABDOMEN: Soft, mild epigastric tenderness, BS +  MSK: Warm, no lower extremity edema, no clubbing or cyanosis  NEUROLOGIC: Alert and oriented x4, moving all extremities with good strength   INTEGUMENTARY:  PSYCHIATRY:        ASSESSMENT/PLAN   Acute pancreatitis  Nausea vomiting diarrhea   Dilated pancreatic ducts    History of:  Crohn's disease, currently on Entyvio, hypertension, rheumatoid arthritis, CAD      GI following.  Pending further plans regarding dilated ducts.  Suspecting pancreatitis to be drug-induced from Entyvio or simvastatin.  IgG4 levels ordered.  Continue with IV fluids for today, antiemetics, pain control.  Start cardiac diet today and see how she does.  Discontinue IV antiemetic and morphine IV.      DVT prophylaxis:  Lovenox 40    Anticipated discharge and disposition:    __________________________________________________________________________    LABS/MICRO/MEDS/DIAGNOSTICS       LABS  Recent Labs     04/19/23  0353      K 3.1*   CHLORIDE 104   CO2 27   BUN 4.8*   CREATININE 0.70   GLUCOSE 87   CALCIUM 8.7   ALKPHOS 79   AST 18   ALT 15   ALBUMIN 3.0*       Recent Labs     04/17/23  0524   WBC 6.8   RBC 3.91*   HCT 35.8*   MCV 91.6            MICROBIOLOGY  Microbiology Results (last 7 days)       Procedure Component Value Units Date/Time    Blood Culture [[id number]]  (Normal) Collected: 04/17/23 1315    Order Status: Completed Specimen: Blood from Hand, Left Updated: 04/18/23 1400     CULTURE, BLOOD ([facility]) No Growth At 24 Hours    Blood Culture [[id number]]  (Normal) Collected: 04/17/23 1315    Order Status: Completed Specimen: Blood from Hand, Right Updated: 04/18/23 1400     CULTURE, BLOOD ([facility]) No Growth At 24 Hours               MEDICATIONS   enoxaparin  40 mg Subcutaneous Daily         INFUSIONS   lactated ringers 75 mL/hr at 04/18/23 2324          DIAGNOSTIC TESTS  MRI Abdomen W WO Contrast_INC MRCP (XPD)   Final Result      X-Ray Chest 1 View   Final Result      No acute pulmonary process identified.         Electronically signed by: Seth Wright   Date:    04/17/2023   Time:    17:29      CT Abdomen Pelvis With Contrast   Final Result      1. Unchanged dilatation of the intrahepatic and the extrahepatic ducts post cholecystic.      2. New dilatation of main pancreatic duct raises the possibility of stricturing pathology about the ampullary region.  No dominant pancreatic head mass lesion identified.  This may be the sequela of previous pancreatitis.  Please correlate clinically.  This can be further assessed with MRI abdomen on non emergent basis.      3. Status post right hemicolectomy.         Electronically signed by: Des Irizarry   Date:    04/16/2023   Time:    11:47           No results found for: EF           Case related differential diagnoses have  been reviewed; assessment and plan has been documented. I have personally reviewed the labs and test results that are currently available; I have reviewed the patients medication list. I have reviewed the consulting providers recommendations. I have reviewed or attempted to review medical records based upon their availability.  All of the patient's and/or family's questions have been addressed and answered to the best of my ability.  I will continue to monitor closely and make adjustments to medical management as needed.  This document was created using M*Modal Fluency Direct.  Transcription errors may have been made.  Please contact me if any questions may rise regarding documentation to clarify transcription.        Sacha Fairbanks MD   04/19/2023   Internal Medicine

## 2023-04-20 VITALS
BODY MASS INDEX: 25.85 KG/M2 | TEMPERATURE: 99 F | HEIGHT: 63 IN | OXYGEN SATURATION: 97 % | SYSTOLIC BLOOD PRESSURE: 159 MMHG | DIASTOLIC BLOOD PRESSURE: 75 MMHG | HEART RATE: 63 BPM | WEIGHT: 145.88 LBS | RESPIRATION RATE: 20 BRPM

## 2023-04-20 PROBLEM — K85.90 ACUTE PANCREATITIS: Status: ACTIVE | Noted: 2023-04-20

## 2023-04-20 LAB — CALPROTECTIN STL-MCNT: <50 MCG/G

## 2023-04-20 PROCEDURE — 63600175 PHARM REV CODE 636 W HCPCS: Performed by: NURSE PRACTITIONER

## 2023-04-20 PROCEDURE — 63600175 PHARM REV CODE 636 W HCPCS: Performed by: INTERNAL MEDICINE

## 2023-04-20 RX ORDER — HEPARIN SODIUM 5000 [USP'U]/ML
5000 INJECTION, SOLUTION INTRAVENOUS; SUBCUTANEOUS ONCE
Status: COMPLETED | OUTPATIENT
Start: 2023-04-20 | End: 2023-04-20

## 2023-04-20 RX ORDER — ONDANSETRON 4 MG/1
4 TABLET, ORALLY DISINTEGRATING ORAL EVERY 6 HOURS PRN
Qty: 20 TABLET | Refills: 0 | Status: ON HOLD | OUTPATIENT
Start: 2023-04-20 | End: 2023-06-04 | Stop reason: SDUPTHER

## 2023-04-20 RX ORDER — TRAMADOL HYDROCHLORIDE 50 MG/1
50 TABLET ORAL EVERY 6 HOURS PRN
Qty: 15 TABLET | Refills: 0 | Status: ON HOLD | OUTPATIENT
Start: 2023-04-20 | End: 2023-06-04 | Stop reason: HOSPADM

## 2023-04-20 RX ADMIN — HEPARIN SODIUM 5000 UNITS: 5000 INJECTION, SOLUTION INTRAVENOUS; SUBCUTANEOUS at 07:04

## 2023-04-20 RX ADMIN — ENOXAPARIN SODIUM 40 MG: 40 INJECTION SUBCUTANEOUS at 04:04

## 2023-04-20 NOTE — PHYSICIAN QUERY
PT Name: Evelyn Proctor  MR #: [id number]    DOCUMENTATION CLARIFICATION     CDS/: Nahomy Guerrero               Contact information: [email or web address]    This form is a permanent document in the medical record.     Query Date: 2023    By submitting this query, we are merely seeking further clarification of documentation.. Please utilize your independent clinical judgment when addressing the question(s) below.    The medical record contains the following:   Indicators  Supporting Clinical Findings Location in Medical Record   x Energy Intake Positive for appetite change      Energy Intake: </= 50% of estimated energy requirement for >/= 5 days    Appetite/Oral Intake: good/% of meals (today)  23: Reports improved appetite starting today; ate 100% of broccoli and chicken at lunch. Had recurrent nausea and vomiting since  through . Was eating only a few bites or taking small sips and nothing was staying down. Was NPO or on clear liquids for -. Takes supplements at home MVI, zinc, vitamin D.   GI Consult       Dietitian PN      x Weight Loss When asked about weight loss, she states that she has noticed that some of the size 12 baggy but she does not quantify actual weight loss.  Noted documented 10 lbs weight loss since last weight in our clinic 2022.  Positive for unexpected weight change.      Interpretation of Weight Loss: >2% in 1 week  Reports weighing 155 lbs on home scale last week; unintentional weight loss.   Usual Body Weight (UBW), k.3 kg  5.86% wt loss in 1 week  Usual Weight Provided By: patient   GI Consult           Dietitian PN          x Fat Loss Body Fat:does not meet criteria  Area of Body Fat Loss: does not meet criteria   Dietitian PN      x Muscle Loss Muscle Mass Loss: moderate depletion  Area of Muscle Mass Loss: temple region - temporalis muscle and clavicle bone region - pectoralis major, deltoid,  "trapezius muscles   Dietitian PN 04/19     x Edema/Fluid Accumulation Fluid Accumulation: not applicable  Edema: no edema present    Dietitian PN 04/19     x Reduced  Strength (by dynamometer) Reduced  Strength: unable to obtain   Dietitian PN 04/19     x Weight, BMI, Usual Body Weight Height: 5' 3" (160 cm) Height Method: Stated  Last Weight: 66.2 kg (145 lb 14.4 oz) (04/19/23 1612) Weight Method: Standard Scale   BMI (Calculated): 25.9    Wt Readings from Last 5 Encounters:   04/19/23 66.2 kg (145 lb 14.4 oz)   03/01/23 66.2 kg (146 lb)   11/25/22 66.2 kg (146 lb)   11/21/22 70.3 kg (155 lb)   11/16/22 (P) 68.5 kg (151 lb)      Dietitian PN 04/19      Delayed Wound Healing     x Registered Dietician Diagnosis Malnutrition in the context of acute illness or injury  Degree of Malnutrition: severe malnutrition   Dietitian PN 04/19     x Acute or Chronic Illness 81 y/o female with history of HLD, Crohn's and MI presents to ED for nausea, vomiting, diarrhea and generalized abdominal pain onset yesterday  has not had a flare up in over 11 years   She appears well-developed and well-nourished.       Symptoms started yesterday which progressively got worse prompting ED visit today.  Acute pancreatitis   Nausea with vomiting- non-intractable   Abdominal pain - generalized- secondary to pancreatitis   Crohns disease   HTN   Electrolyte imbalance      Acute onset epigastric pain, nausea, and vomiting.  Symptoms started on Friday  No associated changes in bowel habits     ED Provider Note 04/16            Hospital Medicine H&P 04/16                  GI Consult 04/17      Social or Environmental Circumstances      Treatment      Other       Academy of Nutrition and Dietetics (Academy) and the American Society for Parenteral and Enteral Nutrition (A.S.P.E.N.) Clinical Characteristics to support Malnutrition   Malnutrition in the Context of Acute Illness or Injury Malnutrition in the Context of Chronic Illness or Injury " Malnutrition in the Context of Social or Environmental Circumstances   Malnutrition Level Moderate Severe Moderate Severe   Moderate   Severe   Energy Intake <75%                   >7 days <50%                 >5 days <75%           >1 month <75%                      >1 month   <75% for >3 months   <50% for >1 month   Weight Loss   1-2% in 1 week >2% in 1 week 5% in 1 month >5% in 1 month 5% in 1 month >5% in 1 month    5% in 1 month >5% in 1 month 7.5% in 3 months >7.5% in 3 months 7.5% in 3 months >7.5% in 3 months    7.5% in 3 months >7.5% in 3 months 10% in 6 months >10% in 6 months 10% in 6 months >10% in 6 months        20% in 1 year                    >20% in 1 year                                                                  20% in 1 year                            >20% in 1 year                                                  Subcutaneous Fat Loss Mild  Moderate  Mild  Severe    Mild   Severe   Muscle Loss Mild  Moderate  Mild  Severe    Mild   Severe   Edema/Fluid Accumulation Mild Moderate to severe  Mild  Severe   Mild   Severe   Reduced  Strength         (based on standards supplied by  of dynamometer) N/A Measurably reduced N/A Measurably reduced N/A Measurably reduced     Criteria for mild malnutrition is defined as 1 characteristic outlined above within the established moderate or severe parameters.  A minimum of 2 out of the 6 characteristics noted above are recommended for a diagnosis of moderate or severe malnutrition.  Chronic illness/injury is a disease/condition lasting 3 months or longer.    The noted clinical guidelines are only system guidelines and do not replace the providers clinical judgment.        Provider, please specify diagnosis or diagnoses associated with above clinical findings.    [  ] Mild Malnutrition - 1 characteristic outlined above within the established moderate or severe parameters   [  ] Moderate Malnutrition - a minimum of 2 of the 6 moderate  malnutrition characteristics noted above    [  ] Severe Malnutrition - a minimum of 2 of the 6 severe malnutrition characteristics noted above    [  ] Malnutrition, Unspecified degree   [  ] Other Nutritional Diagnosis (please specify): _______   [ x ] Malnutrition ruled out   [  ] Clinically Undetermined       Please document in your progress notes daily for the duration of treatment until resolved and  include in your discharge summary.      References:    ELLE Leos, & MAIKEL Lance (2022, April). Assessment and management of anorexia and cachexia in palliative care. Retrieved May 23, 2022, from https://www.[employer]/contents/assessment-and-management-of-anorexia-and-cachexia-in-palliative-care?[email or web address]&source=see_link     CHALO Muro, PhD, RD, Cinthia GUY P., PhD, RN, JAYDA Spears MD, PhD, Seng DUMONT A., MS, RD, [facility], KRISTINE Neff, MS, RD, The Academy Malnutrition Work Group, The A.S.P.E.N. Board of Directors. (2012). Consensus Statement: Academy of Nutrition and Dietetics and American Society for Parenteral and Enteral Nutrition: Characteristics Recommended for the Identification and Documentation of Adult Malnutrition (Undernutrition). Journal of Parenteral and Enteral Nutrition, 36(3), 275-283. doi:10.1177/2001583227960984     Form No. 29174

## 2023-04-20 NOTE — DISCHARGE SUMMARY
[facility]  HOSPITAL MEDICINE   DISCHARGE SUMMARY    Patient Name: Evelyn Proctor  MRN: [id number]  Admission Date: 4/16/2023  Hospital Length of Stay: 4 days  Discharge Date and Time: 04/20/2023  Discharge Provider: Sacha Fairbanks  Primary Care Provider: Primary Doctor No      HOSPITAL COURSE   Evelyn Proctor is a 82 y.o. female who past medical history includes rheumatoid arthritis, carpal tunnel syndrome, HTN, HLD, CAD/MI, lumbar stenosis with chronic back pain, DAQUAN,  cervical cancer; presents to the ED at [facility] on 4/16/2023 with a primary complaint of nausea, vomiting, and associated abdominal pain with diarrhea. Her daughter Campbell who is a nurse is at the bedside providing some of the history.  Patient reports her symptoms started yesterday which progressively got worse prompting ED visit today.  Patient does admit history of Crohn's disease which she is on Entyvio infusions.  Patient reports compliance with her medications.  She reported similar abdominal pain with nausea and vomiting in the past when she had pancreatitis secondary to methotrexate use in the past for her Crohn's disease, she also has been on [employer] in the past as well. .  Patient denies any chest pain, shortness a breath, fever, chills, cough, congestion, or any sick contact.  Lab work done demonstrated potassium 3.2, glucose 154, magnesium 1.40; lipase 316; other indices unremarkable.  CT of the abdomen and pelvis with contrast demonstrated new dilation of the main pancreatic duct, sequela of previous pancreatitis, unchanged dilatation of the intrahepatic and extrahepatic ducts, status post right hemicolectomy.  Initial vital signs /118 pulse 87 respirations 24 temperature 98.4° F O2 saturation 98% on room air.  Patient received Reglan, morphine, Zofran, Benadryl, and IV bolus in the ED which her blood pressure did trend down to 182/85 with respiratory rate 16.  Patient is admitted to hospital medicine  services for further management.     Essentially admitted for epigastric pain nausea vomiting diarrhea.  She is a history of Crohn's currently on Entyvio.  Gastroenterology was consulted for further evaluation.  Her CT abdomen and pelvis noted dilated main pancreatic ducts.  MRI abdomen redemonstrated extensive pancreatic duct dilation and etiology remains indeterminate.  On lab work she was found to have a lipase of 316 and clinically she was diagnosed with acute pancreatitis and treated as such.  Episode of pancreatitis thought to be possibly medication induced from Entyvio versus simvastatin.  Regarding the dilated pancreatic duct GI plans on doing outpatient EUS which they will arrange for her.  At this time since she is tolerating a diet GI recommends okay to discharge from their standpoint and they will follow-up with her as outpatient.  I will prescribe her tramadol for pain and Zofran for nausea which she has used over the last 24 hours.  Otherwise she is tolerating her diet no other new issues at this time.        PHYSICAL EXAM     Most Recent Vital Signs:  Temp: 98.6 °F (37 °C) (04/20/23 1100)  Pulse: 64 (04/20/23 1100)  Resp: 20 (04/20/23 1100)  BP: (!) 150/77 (04/20/23 1100)  SpO2: 98 % (04/20/23 1100)     GENERAL: In no acute distress, afebrile  HEENT:  CHEST: Clear to auscultation bilaterally  HEART: S1, S2, no appreciable murmur  ABDOMEN: Soft, nontender, BS +  MSK: Warm, no lower extremity edema, no clubbing or cyanosis  NEUROLOGIC: Alert and oriented x4, moving all extremities with good strength   INTEGUMENTARY:  PSYCHIATRY:          DISCHARGE DIAGNOSIS   Acute pancreatitis  Nausea vomiting diarrhea   Dilated pancreatic ducts     History of:  Crohn's disease, currently on Entyvio, hypertension, rheumatoid arthritis, CAD    _____________________________________________________________________________      DISCHARGE MED REC     Current Discharge Medication List        START taking these medications     Details   ondansetron (ZOFRAN-ODT) 4 MG TbDL Take 1 tablet (4 mg total) by mouth every 6 (six) hours as needed (Nausea/Vomiting).  Qty: 20 tablet, Refills: 0           CONTINUE these medications which have CHANGED    Details   traMADoL (ULTRAM) 50 mg tablet Take 1 tablet (50 mg total) by mouth every 6 (six) hours as needed for Pain.  Qty: 15 tablet, Refills: 0    Comments: Quantity prescribed more than 7 day supply? No  Associated Diagnoses: Carpal tunnel syndrome of left wrist           CONTINUE these medications which have NOT CHANGED    Details   cholecalciferol, vitamin D3, 1,250 mcg (50,000 unit) capsule Take 1,250 mcg by mouth once a week.      simvastatin (ZOCOR) 20 MG tablet Take 20 mg by mouth every evening.           STOP taking these medications       cefdinir (OMNICEF) 300 MG capsule Comments:   Reason for Stopping:                  CONSULTS     Consults (From admission, onward)          Status Ordering Provider     Inpatient consult to Gastroenterology  Once        Provider:  Flaco Pierson MD    Completed LUCIUS CAMACHO              FOLLOW UP      Follow-up Information       Primary Doctor No Follow up in 1 week(s).               Flaco Pierson MD Follow up in 2 week(s).    Specialty: Gastroenterology  Why: Office will contact patient with appointnment informtion.  Needs EUS  Contact information:  [address] [place] [address]  [phone]                                 DISCHARGE INSTRUCTIONS     Explained in detail to the patient about the discharge plan, medications, and follow-up visits. Pt understands and agrees with the treatment plan.  Discharged Condition: stable  Diet as tolerated  Activities as tolerated  Discharge to:  Home     TIME SPENT ON DISCHARGE   35 minutes        Sacha Carmichael M.D, on 4/20/2023  Internal Medicine  Department of [facility] Medicine    This document was created using electronic dictation services.  Please excuse any errors that may have  been made.  Contact me if any questions regarding documentation to clarify verbiage.

## 2023-04-21 ENCOUNTER — PATIENT OUTREACH (OUTPATIENT)
Dept: ADMINISTRATIVE | Facility: CLINIC | Age: 83
End: 2023-04-21
Payer: MEDICARE

## 2023-04-21 NOTE — PROGRESS NOTES
C3 nurse spoke with Evelyn Proctor  for a TCC post hospital discharge follow up call. The patient does not have a scheduled HOSFU appointment with Primary Doctor No  within 5-7 days post hospital discharge date 4/20/23. C3 nurse was unable to schedule HOSFU appointment in [facility].

## 2023-04-22 LAB
BACTERIA BLD CULT: NORMAL
BACTERIA BLD CULT: NORMAL

## 2023-05-18 DIAGNOSIS — G56.02 CARPAL TUNNEL SYNDROME OF LEFT WRIST: ICD-10-CM

## 2023-05-19 ENCOUNTER — TELEPHONE (OUTPATIENT)
Dept: NEUROSURGERY | Facility: CLINIC | Age: 83
End: 2023-05-19
Payer: MEDICARE

## 2023-05-19 RX ORDER — TRAMADOL HYDROCHLORIDE 50 MG/1
TABLET ORAL
Qty: 40 TABLET | Refills: 2 | OUTPATIENT
Start: 2023-05-19

## 2023-05-19 NOTE — TELEPHONE ENCOUNTER
I spoke with patient. We had initially denied her electronic refill request due to her now being prn. I discussed with her that we do not manage pain long term. She voiced understanding. She will call her PCP to obtain medication.

## 2023-05-24 ENCOUNTER — INFUSION (OUTPATIENT)
Dept: INFUSION THERAPY | Facility: HOSPITAL | Age: 83
End: 2023-05-24
Attending: INTERNAL MEDICINE
Payer: MEDICARE

## 2023-05-24 VITALS
HEART RATE: 73 BPM | HEIGHT: 63 IN | OXYGEN SATURATION: 97 % | SYSTOLIC BLOOD PRESSURE: 156 MMHG | TEMPERATURE: 98 F | WEIGHT: 144 LBS | DIASTOLIC BLOOD PRESSURE: 72 MMHG | BODY MASS INDEX: 25.52 KG/M2

## 2023-05-24 DIAGNOSIS — K50.819 CROHN'S DISEASE OF SMALL AND LARGE INTESTINES WITH COMPLICATION: Primary | ICD-10-CM

## 2023-05-24 PROCEDURE — 25000003 PHARM REV CODE 250: Performed by: INTERNAL MEDICINE

## 2023-05-24 PROCEDURE — 96375 TX/PRO/DX INJ NEW DRUG ADDON: CPT

## 2023-05-24 PROCEDURE — 63600175 PHARM REV CODE 636 W HCPCS: Mod: JZ,JG | Performed by: INTERNAL MEDICINE

## 2023-05-24 PROCEDURE — A4216 STERILE WATER/SALINE, 10 ML: HCPCS | Performed by: INTERNAL MEDICINE

## 2023-05-24 PROCEDURE — 96365 THER/PROPH/DIAG IV INF INIT: CPT

## 2023-05-24 RX ORDER — CETIRIZINE HYDROCHLORIDE 10 MG/1
10 TABLET ORAL ONCE
OUTPATIENT
Start: 2023-07-05 | End: 2023-07-05

## 2023-05-24 RX ORDER — SODIUM CHLORIDE 0.9 % (FLUSH) 0.9 %
10 SYRINGE (ML) INJECTION
Status: DISCONTINUED | OUTPATIENT
Start: 2023-05-24 | End: 2023-05-24

## 2023-05-24 RX ORDER — ACETAMINOPHEN 500 MG
1000 TABLET ORAL ONCE
Status: DISCONTINUED | OUTPATIENT
Start: 2023-05-24 | End: 2023-05-24

## 2023-05-24 RX ORDER — HEPARIN 100 UNIT/ML
500 SYRINGE INTRAVENOUS
Status: DISCONTINUED | OUTPATIENT
Start: 2023-05-24 | End: 2023-05-24

## 2023-05-24 RX ORDER — CETIRIZINE HYDROCHLORIDE 10 MG/1
10 TABLET ORAL ONCE
Status: CANCELLED | OUTPATIENT
Start: 2023-06-07 | End: 2023-06-07

## 2023-05-24 RX ORDER — METHYLPREDNISOLONE SOD SUCC 125 MG
40 VIAL (EA) INJECTION
Status: CANCELLED | OUTPATIENT
Start: 2023-05-24

## 2023-05-24 RX ORDER — DIPHENHYDRAMINE HYDROCHLORIDE 50 MG/ML
25 INJECTION INTRAMUSCULAR; INTRAVENOUS
Status: DISCONTINUED | OUTPATIENT
Start: 2023-05-24 | End: 2023-05-24

## 2023-05-24 RX ORDER — SODIUM CHLORIDE 0.9 % (FLUSH) 0.9 %
10 SYRINGE (ML) INJECTION
Status: CANCELLED | OUTPATIENT
Start: 2023-06-07

## 2023-05-24 RX ORDER — HEPARIN 100 UNIT/ML
500 SYRINGE INTRAVENOUS
OUTPATIENT
Start: 2023-07-05

## 2023-05-24 RX ORDER — ACETAMINOPHEN 500 MG
1000 TABLET ORAL
OUTPATIENT
Start: 2023-07-05 | End: 2023-07-05

## 2023-05-24 RX ORDER — ACETAMINOPHEN 325 MG/1
650 TABLET ORAL
Status: CANCELLED | OUTPATIENT
Start: 2023-07-05

## 2023-05-24 RX ORDER — SODIUM CHLORIDE 0.9 % (FLUSH) 0.9 %
10 SYRINGE (ML) INJECTION
Status: DISCONTINUED | OUTPATIENT
Start: 2023-05-24 | End: 2023-05-24 | Stop reason: HOSPADM

## 2023-05-24 RX ORDER — DIPHENHYDRAMINE HYDROCHLORIDE 50 MG/ML
25 INJECTION INTRAMUSCULAR; INTRAVENOUS
Status: DISCONTINUED | OUTPATIENT
Start: 2023-05-24 | End: 2023-05-24 | Stop reason: HOSPADM

## 2023-05-24 RX ORDER — CETIRIZINE HYDROCHLORIDE 10 MG/1
10 TABLET ORAL ONCE
Status: COMPLETED | OUTPATIENT
Start: 2023-05-24 | End: 2023-05-24

## 2023-05-24 RX ORDER — EPINEPHRINE 1 MG/ML
0.3 INJECTION, SOLUTION, CONCENTRATE INTRAVENOUS
OUTPATIENT
Start: 2023-07-05

## 2023-05-24 RX ORDER — ACETAMINOPHEN 325 MG/1
650 TABLET ORAL
Status: DISCONTINUED | OUTPATIENT
Start: 2023-05-24 | End: 2023-05-24

## 2023-05-24 RX ORDER — SODIUM CHLORIDE 0.9 % (FLUSH) 0.9 %
10 SYRINGE (ML) INJECTION
Status: CANCELLED | OUTPATIENT
Start: 2023-05-24

## 2023-05-24 RX ORDER — HEPARIN 100 UNIT/ML
500 SYRINGE INTRAVENOUS
Status: CANCELLED | OUTPATIENT
Start: 2023-07-05

## 2023-05-24 RX ORDER — EPINEPHRINE 1 MG/ML
0.3 INJECTION, SOLUTION, CONCENTRATE INTRAVENOUS
Status: CANCELLED | OUTPATIENT
Start: 2023-06-07

## 2023-05-24 RX ORDER — DIPHENHYDRAMINE HYDROCHLORIDE 50 MG/ML
25 INJECTION INTRAMUSCULAR; INTRAVENOUS
OUTPATIENT
Start: 2023-07-05

## 2023-05-24 RX ORDER — EPINEPHRINE 1 MG/ML
0.3 INJECTION, SOLUTION, CONCENTRATE INTRAVENOUS
Status: CANCELLED | OUTPATIENT
Start: 2023-07-05

## 2023-05-24 RX ORDER — HEPARIN 100 UNIT/ML
500 SYRINGE INTRAVENOUS
Status: CANCELLED | OUTPATIENT
Start: 2023-06-07

## 2023-05-24 RX ORDER — METHYLPREDNISOLONE SOD SUCC 125 MG
40 VIAL (EA) INJECTION
Status: CANCELLED | OUTPATIENT
Start: 2023-06-07

## 2023-05-24 RX ORDER — ACETAMINOPHEN 500 MG
1000 TABLET ORAL ONCE
Status: CANCELLED | OUTPATIENT
Start: 2023-06-07 | End: 2023-06-07

## 2023-05-24 RX ORDER — HEPARIN 100 UNIT/ML
500 SYRINGE INTRAVENOUS
Status: DISCONTINUED | OUTPATIENT
Start: 2023-05-24 | End: 2023-05-24 | Stop reason: HOSPADM

## 2023-05-24 RX ORDER — METHYLPREDNISOLONE SOD SUCC 125 MG
40 VIAL (EA) INJECTION
OUTPATIENT
Start: 2023-07-05

## 2023-05-24 RX ORDER — ACETAMINOPHEN 325 MG/1
650 TABLET ORAL
Status: CANCELLED | OUTPATIENT
Start: 2023-05-24

## 2023-05-24 RX ORDER — SODIUM CHLORIDE 0.9 % (FLUSH) 0.9 %
10 SYRINGE (ML) INJECTION
Status: CANCELLED | OUTPATIENT
Start: 2023-07-05

## 2023-05-24 RX ORDER — SODIUM CHLORIDE 0.9 % (FLUSH) 0.9 %
10 SYRINGE (ML) INJECTION
OUTPATIENT
Start: 2023-07-05

## 2023-05-24 RX ORDER — DIPHENHYDRAMINE HYDROCHLORIDE 50 MG/ML
25 INJECTION INTRAMUSCULAR; INTRAVENOUS
Status: CANCELLED | OUTPATIENT
Start: 2023-07-05

## 2023-05-24 RX ORDER — METHYLPREDNISOLONE SOD SUCC 125 MG
40 VIAL (EA) INJECTION
Status: CANCELLED | OUTPATIENT
Start: 2023-07-05

## 2023-05-24 RX ORDER — HEPARIN 100 UNIT/ML
500 SYRINGE INTRAVENOUS
Status: CANCELLED | OUTPATIENT
Start: 2023-05-24

## 2023-05-24 RX ORDER — EPINEPHRINE 1 MG/ML
0.3 INJECTION, SOLUTION, CONCENTRATE INTRAVENOUS
Status: CANCELLED | OUTPATIENT
Start: 2023-05-24

## 2023-05-24 RX ORDER — CETIRIZINE HYDROCHLORIDE 10 MG/1
10 TABLET ORAL ONCE
Status: CANCELLED | OUTPATIENT
Start: 2023-07-05 | End: 2023-07-05

## 2023-05-24 RX ORDER — EPINEPHRINE 1 MG/ML
0.3 INJECTION, SOLUTION, CONCENTRATE INTRAVENOUS
Status: DISCONTINUED | OUTPATIENT
Start: 2023-05-24 | End: 2023-05-24 | Stop reason: HOSPADM

## 2023-05-24 RX ORDER — ACETAMINOPHEN 325 MG/1
650 TABLET ORAL
OUTPATIENT
Start: 2023-07-05

## 2023-05-24 RX ORDER — ACETAMINOPHEN 500 MG
1000 TABLET ORAL
Status: COMPLETED | OUTPATIENT
Start: 2023-05-24 | End: 2023-05-24

## 2023-05-24 RX ORDER — CETIRIZINE HYDROCHLORIDE 10 MG/1
10 TABLET ORAL ONCE
Status: CANCELLED | OUTPATIENT
Start: 2023-05-24 | End: 2023-05-24

## 2023-05-24 RX ORDER — DIPHENHYDRAMINE HYDROCHLORIDE 50 MG/ML
25 INJECTION INTRAMUSCULAR; INTRAVENOUS
Status: CANCELLED | OUTPATIENT
Start: 2023-06-07

## 2023-05-24 RX ORDER — DIPHENHYDRAMINE HYDROCHLORIDE 50 MG/ML
25 INJECTION INTRAMUSCULAR; INTRAVENOUS
Status: CANCELLED | OUTPATIENT
Start: 2023-05-24

## 2023-05-24 RX ORDER — ACETAMINOPHEN 500 MG
1000 TABLET ORAL ONCE
Status: CANCELLED | OUTPATIENT
Start: 2023-07-05 | End: 2023-07-05

## 2023-05-24 RX ORDER — CETIRIZINE HYDROCHLORIDE 10 MG/1
10 TABLET ORAL ONCE
Status: DISCONTINUED | OUTPATIENT
Start: 2023-05-24 | End: 2023-05-24

## 2023-05-24 RX ORDER — ACETAMINOPHEN 500 MG
1000 TABLET ORAL
Status: CANCELLED | OUTPATIENT
Start: 2023-05-24 | End: 2023-05-24

## 2023-05-24 RX ORDER — ACETAMINOPHEN 325 MG/1
650 TABLET ORAL
Status: DISCONTINUED | OUTPATIENT
Start: 2023-05-24 | End: 2023-05-24 | Stop reason: HOSPADM

## 2023-05-24 RX ORDER — METHYLPREDNISOLONE SOD SUCC 125 MG
40 VIAL (EA) INJECTION
Status: DISCONTINUED | OUTPATIENT
Start: 2023-05-24 | End: 2023-05-24

## 2023-05-24 RX ORDER — EPINEPHRINE 1 MG/ML
0.3 INJECTION, SOLUTION, CONCENTRATE INTRAVENOUS
Status: DISCONTINUED | OUTPATIENT
Start: 2023-05-24 | End: 2023-05-24

## 2023-05-24 RX ORDER — ACETAMINOPHEN 325 MG/1
650 TABLET ORAL
Status: CANCELLED | OUTPATIENT
Start: 2023-06-07

## 2023-05-24 RX ADMIN — VEDOLIZUMAB 300 MG: 300 INJECTION, POWDER, LYOPHILIZED, FOR SOLUTION INTRAVENOUS at 02:05

## 2023-05-24 RX ADMIN — CETIRIZINE HYDROCHLORIDE 10 MG: 10 TABLET, FILM COATED ORAL at 02:05

## 2023-05-24 RX ADMIN — ACETAMINOPHEN 1000 MG: 500 TABLET ORAL at 02:05

## 2023-05-24 RX ADMIN — Medication 10 ML: at 03:05

## 2023-05-24 RX ADMIN — Medication 500 UNITS: at 03:05

## 2023-05-24 RX ADMIN — HYDROCORTISONE SODIUM SUCCINATE 200 MG: 100 INJECTION, POWDER, FOR SOLUTION INTRAMUSCULAR; INTRAVENOUS at 02:05

## 2023-05-24 NOTE — PLAN OF CARE
Entyvio given.  She will now go to every 8 weeks as per orders.  Tolerated well.  Discharged to home.

## 2023-06-02 ENCOUNTER — HOSPITAL ENCOUNTER (INPATIENT)
Facility: HOSPITAL | Age: 83
LOS: 2 days | Discharge: HOME | DRG: 439 | End: 2023-06-04
Attending: EMERGENCY MEDICINE | Admitting: INTERNAL MEDICINE
Payer: MEDICARE

## 2023-06-02 DIAGNOSIS — K85.00 IDIOPATHIC ACUTE PANCREATITIS WITHOUT INFECTION OR NECROSIS: Primary | ICD-10-CM

## 2023-06-02 DIAGNOSIS — R10.10 UPPER ABDOMINAL PAIN: ICD-10-CM

## 2023-06-02 DIAGNOSIS — E83.42 HYPOMAGNESEMIA: ICD-10-CM

## 2023-06-02 DIAGNOSIS — E87.6 HYPOKALEMIA: ICD-10-CM

## 2023-06-02 LAB
ALBUMIN SERPL-MCNC: 4.4 G/DL (ref 3.4–4.8)
ALBUMIN/GLOB SERPL: 1.1 RATIO (ref 1.1–2)
ALP SERPL-CCNC: 106 UNIT/L (ref 40–150)
ALT SERPL-CCNC: 21 UNIT/L (ref 0–55)
ANION GAP SERPL CALC-SCNC: 9 MEQ/L
AST SERPL-CCNC: 25 UNIT/L (ref 5–34)
BASOPHILS # BLD AUTO: 0.02 X10(3)/MCL
BASOPHILS NFR BLD AUTO: 0.3 %
BILIRUBIN DIRECT+TOT PNL SERPL-MCNC: 0.7 MG/DL
BUN SERPL-MCNC: 10.1 MG/DL (ref 9.8–20.1)
BUN SERPL-MCNC: 7.9 MG/DL (ref 9.8–20.1)
CALCIUM SERPL-MCNC: 9 MG/DL (ref 8.4–10.2)
CALCIUM SERPL-MCNC: 9.7 MG/DL (ref 8.4–10.2)
CHLORIDE SERPL-SCNC: 102 MMOL/L (ref 98–107)
CHLORIDE SERPL-SCNC: 104 MMOL/L (ref 98–107)
CHOLEST SERPL-MCNC: 186 MG/DL
CHOLEST/HDLC SERPL: 3 {RATIO} (ref 0–5)
CO2 SERPL-SCNC: 24 MMOL/L (ref 23–31)
CO2 SERPL-SCNC: 24 MMOL/L (ref 23–31)
CREAT SERPL-MCNC: 0.75 MG/DL (ref 0.55–1.02)
CREAT SERPL-MCNC: 0.78 MG/DL (ref 0.55–1.02)
CREAT/UREA NIT SERPL: 13
EOSINOPHIL # BLD AUTO: 0 X10(3)/MCL (ref 0–0.9)
EOSINOPHIL NFR BLD AUTO: 0 %
ERYTHROCYTE [DISTWIDTH] IN BLOOD BY AUTOMATED COUNT: 14 % (ref 11.5–17)
GFR SERPLBLD CREATININE-BSD FMLA CKD-EPI: >60 MLS/MIN/1.73/M2
GFR SERPLBLD CREATININE-BSD FMLA CKD-EPI: >60 MLS/MIN/1.73/M2
GLOBULIN SER-MCNC: 4 GM/DL (ref 2.4–3.5)
GLUCOSE SERPL-MCNC: 109 MG/DL (ref 82–115)
GLUCOSE SERPL-MCNC: 142 MG/DL (ref 82–115)
HCT VFR BLD AUTO: 39.7 % (ref 37–47)
HDLC SERPL-MCNC: 60 MG/DL (ref 35–60)
HGB BLD-MCNC: 13.8 G/DL (ref 12–16)
IMM GRANULOCYTES # BLD AUTO: 0.03 X10(3)/MCL (ref 0–0.04)
IMM GRANULOCYTES NFR BLD AUTO: 0.4 %
LDLC SERPL CALC-MCNC: 110 MG/DL (ref 50–140)
LIPASE SERPL-CCNC: 187 U/L
LYMPHOCYTES # BLD AUTO: 0.77 X10(3)/MCL (ref 0.6–4.6)
LYMPHOCYTES NFR BLD AUTO: 9.7 %
MAGNESIUM SERPL-MCNC: 1.5 MG/DL (ref 1.6–2.6)
MCH RBC QN AUTO: 31.3 PG (ref 27–31)
MCHC RBC AUTO-ENTMCNC: 34.8 G/DL (ref 33–36)
MCV RBC AUTO: 90 FL (ref 80–94)
MONOCYTES # BLD AUTO: 0.3 X10(3)/MCL (ref 0.1–1.3)
MONOCYTES NFR BLD AUTO: 3.8 %
NEUTROPHILS # BLD AUTO: 6.85 X10(3)/MCL (ref 2.1–9.2)
NEUTROPHILS NFR BLD AUTO: 85.8 %
NRBC BLD AUTO-RTO: 0 %
PLATELET # BLD AUTO: 311 X10(3)/MCL (ref 130–400)
PMV BLD AUTO: 9.1 FL (ref 7.4–10.4)
POTASSIUM SERPL-SCNC: 2.8 MMOL/L (ref 3.5–5.1)
POTASSIUM SERPL-SCNC: 3.5 MMOL/L (ref 3.5–5.1)
PROT SERPL-MCNC: 8.4 GM/DL (ref 5.8–7.6)
RBC # BLD AUTO: 4.41 X10(6)/MCL (ref 4.2–5.4)
SODIUM SERPL-SCNC: 137 MMOL/L (ref 136–145)
SODIUM SERPL-SCNC: 138 MMOL/L (ref 136–145)
TRIGL SERPL-MCNC: 79 MG/DL (ref 37–140)
TROPONIN I SERPL-MCNC: 0.02 NG/ML (ref 0–0.04)
VLDLC SERPL CALC-MCNC: 16 MG/DL
WBC # SPEC AUTO: 7.97 X10(3)/MCL (ref 4.5–11.5)

## 2023-06-02 PROCEDURE — 93005 ELECTROCARDIOGRAM TRACING: CPT

## 2023-06-02 PROCEDURE — 99285 EMERGENCY DEPT VISIT HI MDM: CPT | Mod: 25

## 2023-06-02 PROCEDURE — 63600175 PHARM REV CODE 636 W HCPCS: Performed by: INTERNAL MEDICINE

## 2023-06-02 PROCEDURE — 93010 EKG 12-LEAD: ICD-10-PCS | Mod: ,,, | Performed by: INTERNAL MEDICINE

## 2023-06-02 PROCEDURE — 96365 THER/PROPH/DIAG IV INF INIT: CPT

## 2023-06-02 PROCEDURE — 25000003 PHARM REV CODE 250: Performed by: PHYSICIAN ASSISTANT

## 2023-06-02 PROCEDURE — 96361 HYDRATE IV INFUSION ADD-ON: CPT

## 2023-06-02 PROCEDURE — 63600175 PHARM REV CODE 636 W HCPCS: Performed by: NURSE PRACTITIONER

## 2023-06-02 PROCEDURE — 93010 ELECTROCARDIOGRAM REPORT: CPT | Mod: ,,, | Performed by: INTERNAL MEDICINE

## 2023-06-02 PROCEDURE — 96375 TX/PRO/DX INJ NEW DRUG ADDON: CPT

## 2023-06-02 PROCEDURE — 86140 C-REACTIVE PROTEIN: CPT | Performed by: INTERNAL MEDICINE

## 2023-06-02 PROCEDURE — 84484 ASSAY OF TROPONIN QUANT: CPT | Performed by: NURSE PRACTITIONER

## 2023-06-02 PROCEDURE — 96374 THER/PROPH/DIAG INJ IV PUSH: CPT | Mod: 59

## 2023-06-02 PROCEDURE — 11000001 HC ACUTE MED/SURG PRIVATE ROOM

## 2023-06-02 PROCEDURE — 83690 ASSAY OF LIPASE: CPT | Performed by: NURSE PRACTITIONER

## 2023-06-02 PROCEDURE — 85025 COMPLETE CBC W/AUTO DIFF WBC: CPT | Performed by: NURSE PRACTITIONER

## 2023-06-02 PROCEDURE — 83735 ASSAY OF MAGNESIUM: CPT | Performed by: EMERGENCY MEDICINE

## 2023-06-02 PROCEDURE — 80061 LIPID PANEL: CPT | Performed by: INTERNAL MEDICINE

## 2023-06-02 PROCEDURE — 25000003 PHARM REV CODE 250: Performed by: INTERNAL MEDICINE

## 2023-06-02 PROCEDURE — 80053 COMPREHEN METABOLIC PANEL: CPT | Performed by: NURSE PRACTITIONER

## 2023-06-02 PROCEDURE — 63600175 PHARM REV CODE 636 W HCPCS: Performed by: EMERGENCY MEDICINE

## 2023-06-02 RX ORDER — HYDROMORPHONE HYDROCHLORIDE 2 MG/ML
1 INJECTION, SOLUTION INTRAMUSCULAR; INTRAVENOUS; SUBCUTANEOUS
Status: COMPLETED | OUTPATIENT
Start: 2023-06-02 | End: 2023-06-02

## 2023-06-02 RX ORDER — ACETAMINOPHEN 325 MG/1
650 TABLET ORAL EVERY 6 HOURS PRN
Status: DISCONTINUED | OUTPATIENT
Start: 2023-06-02 | End: 2023-06-04 | Stop reason: HOSPADM

## 2023-06-02 RX ORDER — MAGNESIUM SULFATE HEPTAHYDRATE 40 MG/ML
2 INJECTION, SOLUTION INTRAVENOUS ONCE
Status: COMPLETED | OUTPATIENT
Start: 2023-06-02 | End: 2023-06-02

## 2023-06-02 RX ORDER — ONDANSETRON 2 MG/ML
4 INJECTION INTRAMUSCULAR; INTRAVENOUS EVERY 8 HOURS PRN
Status: DISCONTINUED | OUTPATIENT
Start: 2023-06-02 | End: 2023-06-02

## 2023-06-02 RX ORDER — HYDROMORPHONE HYDROCHLORIDE 2 MG/ML
0.5 INJECTION, SOLUTION INTRAMUSCULAR; INTRAVENOUS; SUBCUTANEOUS EVERY 4 HOURS PRN
Status: DISCONTINUED | OUTPATIENT
Start: 2023-06-02 | End: 2023-06-04 | Stop reason: HOSPADM

## 2023-06-02 RX ORDER — OXYCODONE HYDROCHLORIDE 10 MG/1
10 TABLET ORAL EVERY 6 HOURS PRN
Status: DISCONTINUED | OUTPATIENT
Start: 2023-06-02 | End: 2023-06-04 | Stop reason: HOSPADM

## 2023-06-02 RX ORDER — ONDANSETRON 2 MG/ML
4 INJECTION INTRAMUSCULAR; INTRAVENOUS
Status: COMPLETED | OUTPATIENT
Start: 2023-06-02 | End: 2023-06-02

## 2023-06-02 RX ORDER — POTASSIUM CHLORIDE 14.9 MG/ML
40 INJECTION INTRAVENOUS
Status: COMPLETED | OUTPATIENT
Start: 2023-06-02 | End: 2023-06-02

## 2023-06-02 RX ORDER — HYDROMORPHONE HYDROCHLORIDE 2 MG/ML
1 INJECTION, SOLUTION INTRAMUSCULAR; INTRAVENOUS; SUBCUTANEOUS ONCE
Status: COMPLETED | OUTPATIENT
Start: 2023-06-02 | End: 2023-06-02

## 2023-06-02 RX ORDER — PROCHLORPERAZINE EDISYLATE 5 MG/ML
2.5 INJECTION INTRAMUSCULAR; INTRAVENOUS EVERY 6 HOURS PRN
Status: DISCONTINUED | OUTPATIENT
Start: 2023-06-02 | End: 2023-06-04 | Stop reason: HOSPADM

## 2023-06-02 RX ORDER — FAMOTIDINE 20 MG/1
20 TABLET, FILM COATED ORAL 2 TIMES DAILY
Status: DISCONTINUED | OUTPATIENT
Start: 2023-06-02 | End: 2023-06-04 | Stop reason: HOSPADM

## 2023-06-02 RX ORDER — ACETAMINOPHEN 325 MG/1
650 TABLET ORAL EVERY 4 HOURS PRN
Status: DISCONTINUED | OUTPATIENT
Start: 2023-06-02 | End: 2023-06-02

## 2023-06-02 RX ORDER — ONDANSETRON 2 MG/ML
4 INJECTION INTRAMUSCULAR; INTRAVENOUS EVERY 4 HOURS PRN
Status: DISCONTINUED | OUTPATIENT
Start: 2023-06-02 | End: 2023-06-04 | Stop reason: HOSPADM

## 2023-06-02 RX ORDER — ACETAMINOPHEN 325 MG/1
650 TABLET ORAL EVERY 8 HOURS PRN
Status: DISCONTINUED | OUTPATIENT
Start: 2023-06-02 | End: 2023-06-02

## 2023-06-02 RX ORDER — HYDRALAZINE HYDROCHLORIDE 20 MG/ML
10 INJECTION INTRAMUSCULAR; INTRAVENOUS EVERY 8 HOURS PRN
Status: DISCONTINUED | OUTPATIENT
Start: 2023-06-03 | End: 2023-06-04 | Stop reason: HOSPADM

## 2023-06-02 RX ORDER — SODIUM CHLORIDE 9 MG/ML
INJECTION, SOLUTION INTRAVENOUS CONTINUOUS
Status: DISCONTINUED | OUTPATIENT
Start: 2023-06-02 | End: 2023-06-04 | Stop reason: HOSPADM

## 2023-06-02 RX ORDER — GLUCAGON 1 MG
1 KIT INJECTION
Status: DISCONTINUED | OUTPATIENT
Start: 2023-06-02 | End: 2023-06-04 | Stop reason: HOSPADM

## 2023-06-02 RX ORDER — MUPIROCIN 20 MG/G
OINTMENT TOPICAL 2 TIMES DAILY
Status: DISCONTINUED | OUTPATIENT
Start: 2023-06-03 | End: 2023-06-04 | Stop reason: HOSPADM

## 2023-06-02 RX ORDER — IBUPROFEN 200 MG
24 TABLET ORAL
Status: DISCONTINUED | OUTPATIENT
Start: 2023-06-02 | End: 2023-06-04 | Stop reason: HOSPADM

## 2023-06-02 RX ORDER — HYDROMORPHONE HYDROCHLORIDE 2 MG/ML
0.5 INJECTION, SOLUTION INTRAMUSCULAR; INTRAVENOUS; SUBCUTANEOUS EVERY 6 HOURS PRN
Status: DISCONTINUED | OUTPATIENT
Start: 2023-06-02 | End: 2023-06-02

## 2023-06-02 RX ORDER — IBUPROFEN 200 MG
16 TABLET ORAL
Status: DISCONTINUED | OUTPATIENT
Start: 2023-06-02 | End: 2023-06-04 | Stop reason: HOSPADM

## 2023-06-02 RX ADMIN — MAGNESIUM SULFATE HEPTAHYDRATE 2 G: 40 INJECTION, SOLUTION INTRAVENOUS at 12:06

## 2023-06-02 RX ADMIN — HYDROMORPHONE HYDROCHLORIDE 1 MG: 2 INJECTION INTRAMUSCULAR; INTRAVENOUS; SUBCUTANEOUS at 11:06

## 2023-06-02 RX ADMIN — ONDANSETRON 4 MG: 2 INJECTION INTRAMUSCULAR; INTRAVENOUS at 11:06

## 2023-06-02 RX ADMIN — HYDROMORPHONE HYDROCHLORIDE 1 MG: 2 INJECTION INTRAMUSCULAR; INTRAVENOUS; SUBCUTANEOUS at 05:06

## 2023-06-02 RX ADMIN — FAMOTIDINE 20 MG: 20 TABLET, FILM COATED ORAL at 08:06

## 2023-06-02 RX ADMIN — HYDROMORPHONE HYDROCHLORIDE 1 MG: 2 INJECTION INTRAMUSCULAR; INTRAVENOUS; SUBCUTANEOUS at 02:06

## 2023-06-02 RX ADMIN — POTASSIUM CHLORIDE 40 MEQ: 14.9 INJECTION, SOLUTION INTRAVENOUS at 12:06

## 2023-06-02 RX ADMIN — SODIUM CHLORIDE: 9 INJECTION, SOLUTION INTRAVENOUS at 05:06

## 2023-06-02 RX ADMIN — SODIUM CHLORIDE, POTASSIUM CHLORIDE, SODIUM LACTATE AND CALCIUM CHLORIDE 1000 ML: 600; 310; 30; 20 INJECTION, SOLUTION INTRAVENOUS at 11:06

## 2023-06-02 RX ADMIN — ONDANSETRON 4 MG: 2 INJECTION INTRAMUSCULAR; INTRAVENOUS at 05:06

## 2023-06-02 NOTE — NURSING
Nurses Note -- 4 Eyes      6/2/2023   4:39 PM      Skin assessed during: Admit      [x] No Altered Skin Integrity Present    []Prevention Measures Documented      [] Yes- Altered Skin Integrity Present or Discovered   [] LDA Added if Not in Epic (Describe Wound)   [] New Altered Skin Integrity was Present on Admit and Documented in LDA   [] Wound Image Taken    Wound Care Consulted? Yes    Attending Nurse:  Ailyn Longo RN     Second RN/Staff Member: Brittnee Ramirez Rn

## 2023-06-02 NOTE — ED PROVIDER NOTES
Encounter Date: 6/2/2023    SCRIBE #1 NOTE: I, Agapito Adair, am scribing for, and in the presence of,  Dr. Kingston. I have scribed the following portions of the note - Other sections scribed: HPI, ROS, Physical Exam, MDM, Attending.     History     Chief Complaint   Patient presents with    Abdominal Pain     Upper abd pain with n/v since last night. Pt reports several episodes since April. Feels weak.      83 y/o female with history of HLD, MI, Crohn's and recurrent pancreatitis presents to ED for upper abdominal pain, nausea and vomiting onset last night.  Pt also complains of diarrhea and generalized weakness.  She has had several similar episodes in the past month or so.  She has scope scheduled on 6/8 with Dr. Max.  Pt had last BM yesterday.  She has been on Entyvio for about 30 years.  Pt denies hematemesis, chest pain, SOB or fever.  Her GI doctor is Dr. Pierson.    The history is provided by the patient.   Abdominal Pain  Illness onset: last night. The problem has not changed since onset.The other symptoms of the illness include nausea, vomiting and diarrhea. The other symptoms of the illness do not include fever, shortness of breath or hematemesis.   The diarrhea is loose.   Risk factors for an acute abdominal problem include immunosuppression and a history of abdominal surgery.   Review of patient's allergies indicates:  No Known Allergies  Past Medical History:   Diagnosis Date    Acute pancreatitis, unspecified complication status, unspecified pancreatitis type     Arthritis     Carpal tunnel syndrome, bilateral     Cervical cancer     Cervical radiculopathy     Crohn disease     Heart attack     HLD (hyperlipidemia)     Low back pain     Sleep apnea, unspecified     Spinal stenosis of lumbar region with neurogenic claudication      Past Surgical History:   Procedure Laterality Date    bilateral L4-5, L5-S1 decompression, repair L5-S1 dural tear  10/01/2021    Dr. Marin    CARPAL TUNNEL RELEASE  Right 09/06/2019    Dr. Marin    CARPAL TUNNEL RELEASE Left 12/2/2022    Procedure: RELEASE, CARPAL TUNNEL;  Surgeon: Jesse Marin MD;  Location: [facility] OR;  Service: Neurosurgery;  Laterality: Left;    CHOLECYSTECTOMY      COLECTOMY  07/2011    partial x3    HYSTERECTOMY      total    REPAIR OF EYELID Bilateral 11/21/2022    Procedure: BILATERAL ECTROPION REPAIR;  Surgeon: Justin Flores MD;  Location: [facility] OR;  Service: ENT;  Laterality: Bilateral;     Family History   Problem Relation Age of Onset    Diabetes Mother     Hypertension Father     Hyperlipidemia Father     Cancer Father     Hyperlipidemia Sister     Hyperlipidemia Brother     Hyperlipidemia Daughter      Social History     Tobacco Use    Smoking status: Former     Types: Cigarettes    Smokeless tobacco: Never   Substance Use Topics    Alcohol use: Yes     Comment: rarely    Drug use: Never     Review of Systems   Constitutional:  Negative for fever.   Respiratory:  Negative for shortness of breath.    Cardiovascular:  Negative for chest pain.   Gastrointestinal:  Positive for abdominal pain, diarrhea, nausea and vomiting. Negative for hematemesis.     Physical Exam     Initial Vitals [06/02/23 1018]   BP Pulse Resp Temp SpO2   (!) 190/118 102 (!) 24 98.9 °F (37.2 °C) 99 %      MAP       --         Physical Exam    Nursing note and vitals reviewed.  Constitutional: She appears well-developed and well-nourished. She is not diaphoretic. No distress.   Appears uncomfortable    HENT:   Head: Normocephalic and atraumatic.   Nose: Nose normal.   Mouth/Throat: Oropharynx is clear and moist.   Eyes: Conjunctivae and EOM are normal. Pupils are equal, round, and reactive to light.   Neck: Trachea normal. Neck supple.   Normal range of motion.  Cardiovascular:  Normal rate, regular rhythm, normal heart sounds and intact distal pulses.           No murmur heard.  Pulmonary/Chest: Breath sounds normal. No respiratory distress. She has no wheezes. She has no  rhonchi. She has no rales. She exhibits no tenderness.   Abdominal: Abdomen is soft. Bowel sounds are normal. She exhibits no distension and no mass. There is abdominal tenderness (epigastric). There is guarding (voluntary). There is no rebound.   Musculoskeletal:         General: No tenderness or edema. Normal range of motion.      Cervical back: Normal range of motion and neck supple.      Lumbar back: Normal. Normal range of motion.     Neurological: She is alert and oriented to person, place, and time. She has normal strength. No cranial nerve deficit or sensory deficit.   Skin: Skin is warm and dry. Capillary refill takes less than 2 seconds. No abscess noted. No erythema. No pallor.   Psychiatric: She has a normal mood and affect. Her behavior is normal. Judgment and thought content normal.       ED Course   Procedures  Labs Reviewed   CBC WITH DIFFERENTIAL - Abnormal; Notable for the following components:       Result Value    MCH 31.3 (*)     All other components within normal limits   COMPREHENSIVE METABOLIC PANEL - Abnormal; Notable for the following components:    Potassium Level 2.8 (*)     Glucose Level 142 (*)     Blood Urea Nitrogen 7.9 (*)     Protein Total 8.4 (*)     Globulin 4.0 (*)     All other components within normal limits   LIPASE - Abnormal; Notable for the following components:    Lipase Level 187 (*)     All other components within normal limits   MAGNESIUM - Abnormal; Notable for the following components:    Magnesium Level 1.50 (*)     All other components within normal limits   TROPONIN I - Normal   URINALYSIS, REFLEX TO URINE CULTURE          Imaging Results    None          Medications   HYDROmorphone (PF) injection 1 mg (has no administration in time range)   magnesium sulfate 2g in water 50mL IVPB (premix) (2 g Intravenous New Bag 6/2/23 1223)   ondansetron injection 4 mg (4 mg Intravenous Given 6/2/23 1101)   lactated ringers bolus 1,000 mL (0 mLs Intravenous Stopped 6/2/23 1201)    HYDROmorphone (PF) injection 1 mg (1 mg Intravenous Given 6/2/23 1101)   potassium chloride 20 mEq in 100 mL IVPB (FOR CENTRAL LINE ADMINISTRATION ONLY) (40 mEq Intravenous New Bag 6/2/23 1205)     Medical Decision Making:   History:   Old Medical Records: I decided to obtain old medical records.  Old Records Summarized: records from another hospital.  Initial Assessment:   See  HPI  Independently Interpreted Test(s):   I have ordered and independently interpreted EKG Reading(s) - see prior notes  Clinical Tests:   Lab Tests: Ordered and Reviewed  Medical Tests: Ordered and Reviewed  Other:   I have discussed this case with another health care provider.        Scribe Attestation:   Scribe #1: I performed the above scribed service and the documentation accurately describes the services I performed. I attest to the accuracy of the note.  Comments: Attending:   Physician Attestation Statement for Scribe #1: I, Nereida Kingston MD, personally performed the services described in this documentation. All medical record entries made by the scribe were at my direction and in my presence.  I have reviewed the chart and agree that the record reflects my personal performance and is accurate and complete.        Attending Attestation:           Physician Attestation for Scribe:  Physician Attestation Statement for Scribe #1: I, reviewed documentation, as scribed by Agapito Adair in my presence, and it is both accurate and complete.         Medical Decision Making  Differential diagnoses include, but are not limited to: pancreatitis, hepatitis, gastritis   CBC, CMP, troponin, lipase ordered and reviewed with elevated lipase noted.  Magnesium also ordered an low along with hypokalemia likely due to nausea and vomiting as this has occurred multiple times with her previous episodes of pancreatitis.  Pain not controlled will be admitted for pain control and continued fluids    Problems Addressed:  Hypokalemia: acute illness or  injury that poses a threat to life or bodily functions  Hypomagnesemia: acute illness or injury that poses a threat to life or bodily functions  Idiopathic acute pancreatitis without infection or necrosis: acute illness or injury that poses a threat to life or bodily functions  Upper abdominal pain: acute illness or injury    Amount and/or Complexity of Data Reviewed  External Data Reviewed: labs and notes.  Labs: ordered.  ECG/medicine tests: ordered and independent interpretation performed. Decision-making details documented in ED Course.    Risk  OTC drugs.  Prescription drug management.  Parenteral controlled substances.  Decision regarding hospitalization.            ED Course as of 06/02/23 1407   Fri Jun 02, 2023   1128 Pain imrpoved [BS]   1254 EKG performed at 10:29 a.m. rate of 96 normal sinus rhythm with LVH and repolarization abnormality [BS]      ED Course User Index  [BS] Nereida Kingston MD                 Clinical Impression:   Final diagnoses:  [R10.10] Upper abdominal pain  [K85.00] Idiopathic acute pancreatitis without infection or necrosis (Primary)  [E87.6] Hypokalemia  [E83.42] Hypomagnesemia        ED Disposition Condition    Admit Stable                Nereida Kingston MD  06/02/23 1407

## 2023-06-02 NOTE — FIRST PROVIDER EVALUATION
Medical screening examination initiated.  I have conducted a focused provider triage encounter, findings are as follows:    Brief history of present illness:  81 y/o female who presents with abdominal pain, n/v. Elevated blood pressure.     There were no vitals filed for this visit.    Pertinent physical exam:  alert, nonlabored, appears uncomfortable    Brief workup plan:  ekg, labs, urine    Preliminary workup initiated; this workup will be continued and followed by the physician or advanced practice provider that is assigned to the patient when roomed.

## 2023-06-02 NOTE — Clinical Note
Diagnosis: Upper abdominal pain [708602]   Admitting Provider:: MONY CARRILLO [[id number]]   Future Attending Provider: MONY CARRILLO [[id number]]   Reason for IP Medical Treatment  (Clinical interventions that can only be accomplished in the IP setting? ) :: recurrent pancreatitis   I certify that Inpatient services for greater than or equal to 2 midnights are medically necessary:: Yes   Plans for Post-Acute care--if anticipated (pick the single best option):: A. No post acute care anticipated at this time

## 2023-06-02 NOTE — H&P
[facility] Hospital Medicine History & Physical Examination       Patient Name: Evelyn Proctor  MRN: [id number]  Patient Class: IP- Inpatient   Admission Date: 6/2/2023   Admitting Physician: Brigitte Fong MD  Length of Stay: 0  Attending Physician: Brigitte Fong MD  Primary Care Provider: Primary Doctor No  Face-to-Face encounter date: 06/02/2023  Code Status:Full code   Chief Complaint: Abdominal Pain (Upper abd pain with n/v since last night. Pt reports several episodes since April. Feels weak. )        Patient information was obtained from patient, patient's family, past medical records and ER records.     MD addendum-  81 yo female with PMHx HTN, HLD, RA, CAD/MI, DAQUAN, Cervical cancer , Crohn's colitis/ileitis on Entyvio q.6 weeks, s/p Rt hemicolectomy, hepatic steatosis, osteopenia, former smoker, and recurrent acute pancreatitis in the recent past with last admission in 4/2023 presented to the ED for evaluation of acute onset of abdominal pain since last night associated with constant nausea , multiple episodes of vomiting at home and diarrhea. Abd pain is generalized but worse in the epigastric and LUQ area . Denies radiation to the back. Suffers from chronic diarrhea and is not worse from baseline. 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 She was evaluated by primary Gastroenterologist Dr. Pierson  in that regard and scheduled an outpatient appointment with Dr. Max on 6/8/23 for possible EUS/ERCP.     Labs today WBC 7.9, K 2.8, Mg 1.5, Cr 0.7,normal LFTs, Lipase marginally elevated  at 187, TG 79, normal troponin .     P/E- NAD, Lungs- clear, Heart, S1/S2, Abd- non distended , soft, BS+, tender to palpation epigastric and LUQ area.     A/P-  Severe abd pain associated with nausea and vomiting in the setting of known pancreatic duct stricture   H/O recurrent pancreatitis   H/O Crohn's  colitis/ileitis  Hypokalemia , hypomagnesemia   Chronic diarrhea   Accelerated HTN on presentation    Plan-  IV hydration   Pain control   Anti emetics   Clear liquid   Replete electrolytes   Consult GI for further input.        HISTORY OF PRESENT ILLNESS:   Evelyn Proctor is a 82 y.o. female with a past medical history of essential hypertension, hyperlipidemia, Crohn's disease, recurrent pancreatitis, CAD, MI, rheumatoid arthritis, carpal tunnel syndrome, lumbar stenosis with chronic back pain, cervical cancer, and DAQUAN presented to [facility] on 6/2/2023 for abdominal pain, nausea, vomiting, diarrhea, and weakness.  Patient reports symptoms worsened last night.  Patient reports intermittent symptoms for the past 2 months.  Patient had CT abdomen and pelvis with contrast on 05/20 at [facility] which revealed dilated pancreatic duct with recommendations of correlation with EUS/ERCP. Patient reports she has outpatient appointment scheduled with Dr. Max on 6/8.  Patient denies fever, chills, dysuria, hematuria, chest pain, and shortness of breath.  Initial vital signs in ED were /118, pulse 102, respirations 24, temperature 37.2° C, and SpO2 99% on room air.  Labs revealed WBC 7.97, potassium 2.8, BUN 7.9, glucose 142, magnesium 1.50, lipase 187, and troponin 0.022.  EKG revealed normal sinus rhythm, LVH, and heart rate of 96 beats per minute.  Patient was given IV potassium chloride 40 mEq, IV magnesium sulfate 2 g, LR bolus, and IV 1 mg hydromorphone in ED. Patient was admitted to hospital medicine service for further medical management.     PAST MEDICAL HISTORY:   Essential hypertension  Hyperlipidemia  Rheumatoid arthritis   Carpal tunnel syndrome   CAD  MI   Lumbar stenosis with chronic back pain   DAQUAN   Cervical cancer    PAST SURGICAL HISTORY:     Past Surgical History:   Procedure Laterality Date    bilateral L4-5, L5-S1 decompression, repair L5-S1 dural tear  10/01/2021    Dr. Marin    CARPAL TUNNEL RELEASE  Right 09/06/2019    Dr. Marin    CARPAL TUNNEL RELEASE Left 12/2/2022    Procedure: RELEASE, CARPAL TUNNEL;  Surgeon: Jesse Marin MD;  Location: [facility] OR;  Service: Neurosurgery;  Laterality: Left;    CHOLECYSTECTOMY      COLECTOMY  07/2011    partial x3    HYSTERECTOMY      total    REPAIR OF EYELID Bilateral 11/21/2022    Procedure: BILATERAL ECTROPION REPAIR;  Surgeon: Justin Flores MD;  Location: [facility] OR;  Service: ENT;  Laterality: Bilateral;       ALLERGIES:   Patient has no known allergies.    FAMILY HISTORY:   Reviewed and negative    SOCIAL HISTORY:   Denies tobacco, drug, and alcohol use    HOME MEDICATIONS:     Prior to Admission medications    Medication Sig Start Date End Date Taking? Authorizing Provider   cholecalciferol, vitamin D3, 1,250 mcg (50,000 unit) capsule Take 1,250 mcg by mouth once a week. 9/30/21   Historical Provider   ondansetron (ZOFRAN-ODT) 4 MG TbDL Take 1 tablet (4 mg total) by mouth every 6 (six) hours as needed (Nausea/Vomiting).  Patient not taking: Reported on 4/21/2023 4/20/23   Sacha Fairbanks MD   simvastatin (ZOCOR) 20 MG tablet Take 20 mg by mouth every evening. 4/28/22   Historical Provider   traMADoL (ULTRAM) 50 mg tablet Take 1 tablet (50 mg total) by mouth every 6 (six) hours as needed for Pain.  Patient not taking: Reported on 4/21/2023 4/20/23   Sacha Fairbanks MD       REVIEW OF SYSTEMS:   Except as documented, all other systems reviewed and negative     PHYSICAL EXAM:     VITAL SIGNS: 24 HRS MIN & MAX LAST   Temp  Min: 98.9 °F (37.2 °C)  Max: 98.9 °F (37.2 °C) 98.9 °F (37.2 °C)   BP  Min: 123/71  Max: 190/118 131/69   Pulse  Min: 65  Max: 102  68   Resp  Min: 11  Max: 24 16   SpO2  Min: 96 %  Max: 100 % 96 %       General appearance:  Elderly female in no apparent distress.  HEENNT: Atraumatic head.   Lungs: Clear to auscultation bilaterally.   Heart: Regular rate and rhythm.    Abdomen: Soft, tenderness to palpation of left upper quadrant. Bowel sounds are normal.    Extremities: No cyanosis, clubbing, or edema. No deformities.   Skin: No Rash. Warm and dry.   Neuro: Awake, alert, and oriented.   Psych/mental status: Appropriate mood and affect.     LABS AND IMAGING:     Recent Labs   Lab 06/02/23  1041   WBC 7.97   RBC 4.41   HGB 13.8   HCT 39.7   MCV 90.0   MCH 31.3*   MCHC 34.8   RDW 14.0      MPV 9.1       Recent Labs   Lab 06/02/23  1041      K 2.8*   CO2 24   BUN 7.9*   CREATININE 0.78   CALCIUM 9.7   MG 1.50*   ALBUMIN 4.4   ALKPHOS 106   ALT 21   AST 25   BILITOT 0.7       Microbiology Results (last 7 days)       ** No results found for the last 168 hours. **             MRI Pelvis Without Contrast  Narrative: EXAMINATION:  MRI ABDOMEN AND PELVIS WITHOUT CONTRAST    CLINICAL HISTORY:  Crohn's disease of both small and large intestine without complications    TECHNIQUE:  Haste, T2 fat sat, T1 fat sat, diffusion, and cine sequences of the abdomen and pelvis. No contrast administered.  The cine sequences did not properly load to PACS due to a recent change in MRI software.    COMPARISON:  CT April 16, 2023    FINDINGS:  No liver lesion is identified. The gallbladder is absent. No intra or extrahepatic bile duct dilation is observed.    The spleen is not enlarged.    There is normal fat suppressed precontrast T1 parenchymal signal in the pancreatic head. At the pancreatic neck, there is a discrete change in pancreatic duct caliber, with upstream pancreatic duct dilation to reach a maximum diameter of 10 mm (series 4, image 11). There is no obvious underlying pancreatic mass. There are associated small dilated side branch radicles in the pancreatic body.    The adrenal glands are unremarkable.    There is no solid renal mass or hydronephrosis. A few small left renal upper pole cysts are present, measuring up to 1 cm in diameter.    The bladder shows no gross wall thickening or intraluminal abnormality. The uterus is absent. No adnexal masses identified.  Disc  disease is advanced in the lower lumbar spine.    There are no dilated loops of small bowel. No small bowel wall thickening or edema is appreciated. Limited evaluation of the colon shows right hemicolectomy changes, but no additional significant abnormality.  Impression: No noncontrast MRI evidence of active inflammatory bowel disease.    Cholecystectomy.  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 No definite underlying pancreatic mass identified.    Left renal cyst.    Hysterectomy. Lower lumbar spine disc disease.    Right hemicolectomy.    No noncontrast MRI of inflammatory changes in the bowel.    Electronically signed by: Ladarius Cueto  Date:    05/04/2023  Time:    14:51  MRI Abdomen Without Contrast  Narrative: EXAMINATION:  MRI ABDOMEN AND PELVIS WITHOUT CONTRAST    CLINICAL HISTORY:  Crohn's disease of both small and large intestine without complications    TECHNIQUE:  Haste, T2 fat sat, T1 fat sat, diffusion, and cine sequences of the abdomen and pelvis.  No contrast administered.  The cine sequences did not properly load to PACS due to a recent change in MRI software.    COMPARISON:  CT April 16, 2023    FINDINGS:  No liver lesion is identified. The gallbladder is absent. No intra or extrahepatic bile duct dilation is observed.    The spleen is not enlarged.    There is normal fat suppressed precontrast T1 parenchymal signal in the pancreatic head.  At the pancreatic neck, there is a discrete change in pancreatic duct caliber, with upstream pancreatic duct dilation to reach a maximum diameter of 10 mm (series 4, image 11).  There is no obvious underlying pancreatic mass.  There are associated small dilated side branch radicles in the pancreatic body.    The adrenal glands are unremarkable.    There is no solid renal mass or hydronephrosis.  A few small left renal upper pole cysts are present, measuring up to 1 cm  in diameter.    The bladder shows no gross wall thickening or intraluminal abnormality.  The uterus is absent.  No adnexal masses identified.  Disc disease is advanced in the lower lumbar spine.    There are no dilated loops of small bowel.  No small bowel wall thickening or edema is appreciated.  Limited evaluation of the colon shows right hemicolectomy changes, but no additional significant abnormality.  Impression: No noncontrast MRI evidence of active inflammatory bowel disease.    Cholecystectomy.  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  No definite underlying pancreatic mass identified.    Left renal cyst.    Hysterectomy.  Lower lumbar spine disc disease.    Right hemicolectomy.    No noncontrast MRI of inflammatory changes in the bowel.    Electronically signed by: Ladarius Cueto  Date:    05/04/2023  Time:    14:51        ASSESSMENT & PLAN:   Assessment:  Recurrent pancreatitis   Epigastric pain, likely secondary to above   Gastroenteritis  Hypokalemia   Hypomagnesemia  essential hypertension, hyperlipidemia, Crohn's disease, CAD, MI, rheumatoid arthritis, carpal tunnel syndrome, lumbar stenosis with chronic back pain, cervical cancer, and DAQUAN    Plan:  IVF   PRN antiemetics  Stool culture/Fecal leukocytes/C diff/ova, cyst, parasites ordered, follow up results  GI consulted, appreciate recommendations  Electrolytes replaced in ED, continue to monitor and replace as necessary  Continue appropriate home medications once med rec updated   Labs in a.m.    VTE Prophylaxis: SCDs    __________________________________________________________________________  INPATIENT LIST OF MEDICATIONS     Scheduled Meds:  Continuous Infusions:  PRN Meds:.      Jeff SUH PA-C, have reviewed and discussed the case with Dr. Brigitte Fong MD  Please see the following addendum for further assessment and plan from there attending  MD.    06/02/2023    ________________________________________________________________________________    MD Addendum:  I, Dr.Shameem Hetal MD   assumed care of this patient today   For the patient encounter, I performed the substantive portion of the visit, I reviewed the PA documentation, treatment plan, and medical decision making.  I had face to face time with this patient         Discharge Planning and Disposition: No mobility needs. Ambulating well. Good social support system.   Anticipated discharge    All diagnosis and differential diagnosis have been reviewed; assessment and plan has been documented; I have personally reviewed the labs and test results that are presently available; I have reviewed the patients medication list; I have reviewed the consulting providers response and recommendations. I have reviewed or attempted to review medical records based upon their availability.    All of the patient and family questions have been addressed and answered. Patient's is agreeable to the above stated plan. I will continue to monitor closely and make adjustments to medical management as needed.      06/02/2023

## 2023-06-03 LAB
ALBUMIN SERPL-MCNC: 3.4 G/DL (ref 3.4–4.8)
ALBUMIN/GLOB SERPL: 1.1 RATIO (ref 1.1–2)
ALP SERPL-CCNC: 80 UNIT/L (ref 40–150)
ALT SERPL-CCNC: 18 UNIT/L (ref 0–55)
AST SERPL-CCNC: 24 UNIT/L (ref 5–34)
BASOPHILS # BLD AUTO: 0.01 X10(3)/MCL
BASOPHILS NFR BLD AUTO: 0.1 %
BILIRUBIN DIRECT+TOT PNL SERPL-MCNC: 0.8 MG/DL
BUN SERPL-MCNC: 12.8 MG/DL (ref 9.8–20.1)
CALCIUM SERPL-MCNC: 8.5 MG/DL (ref 8.4–10.2)
CHLORIDE SERPL-SCNC: 107 MMOL/L (ref 98–107)
CO2 SERPL-SCNC: 23 MMOL/L (ref 23–31)
CREAT SERPL-MCNC: 0.82 MG/DL (ref 0.55–1.02)
CRP SERPL-MCNC: 1.2 MG/L
EOSINOPHIL # BLD AUTO: 0.05 X10(3)/MCL (ref 0–0.9)
EOSINOPHIL NFR BLD AUTO: 0.7 %
ERYTHROCYTE [DISTWIDTH] IN BLOOD BY AUTOMATED COUNT: 14.5 % (ref 11.5–17)
GFR SERPLBLD CREATININE-BSD FMLA CKD-EPI: >60 MLS/MIN/1.73/M2
GLOBULIN SER-MCNC: 3.2 GM/DL (ref 2.4–3.5)
GLUCOSE SERPL-MCNC: 100 MG/DL (ref 82–115)
HCT VFR BLD AUTO: 33.6 % (ref 37–47)
HGB BLD-MCNC: 11.3 G/DL (ref 12–16)
IMM GRANULOCYTES # BLD AUTO: 0.03 X10(3)/MCL (ref 0–0.04)
IMM GRANULOCYTES NFR BLD AUTO: 0.4 %
LIPASE SERPL-CCNC: 22 U/L
LYMPHOCYTES # BLD AUTO: 1.65 X10(3)/MCL (ref 0.6–4.6)
LYMPHOCYTES NFR BLD AUTO: 24.6 %
MAGNESIUM SERPL-MCNC: 2.1 MG/DL (ref 1.6–2.6)
MCH RBC QN AUTO: 31 PG (ref 27–31)
MCHC RBC AUTO-ENTMCNC: 33.6 G/DL (ref 33–36)
MCV RBC AUTO: 92.3 FL (ref 80–94)
MONOCYTES # BLD AUTO: 0.52 X10(3)/MCL (ref 0.1–1.3)
MONOCYTES NFR BLD AUTO: 7.8 %
NEUTROPHILS # BLD AUTO: 4.44 X10(3)/MCL (ref 2.1–9.2)
NEUTROPHILS NFR BLD AUTO: 66.4 %
NRBC BLD AUTO-RTO: 0 %
PHOSPHATE SERPL-MCNC: 3.6 MG/DL (ref 2.3–4.7)
PLATELET # BLD AUTO: 241 X10(3)/MCL (ref 130–400)
PMV BLD AUTO: 9.5 FL (ref 7.4–10.4)
POTASSIUM SERPL-SCNC: 3.3 MMOL/L (ref 3.5–5.1)
PROT SERPL-MCNC: 6.6 GM/DL (ref 5.8–7.6)
RBC # BLD AUTO: 3.64 X10(6)/MCL (ref 4.2–5.4)
SODIUM SERPL-SCNC: 137 MMOL/L (ref 136–145)
WBC # SPEC AUTO: 6.7 X10(3)/MCL (ref 4.5–11.5)

## 2023-06-03 PROCEDURE — 11000001 HC ACUTE MED/SURG PRIVATE ROOM

## 2023-06-03 PROCEDURE — 80053 COMPREHEN METABOLIC PANEL: CPT | Performed by: PHYSICIAN ASSISTANT

## 2023-06-03 PROCEDURE — 83735 ASSAY OF MAGNESIUM: CPT | Performed by: INTERNAL MEDICINE

## 2023-06-03 PROCEDURE — 83690 ASSAY OF LIPASE: CPT | Performed by: INTERNAL MEDICINE

## 2023-06-03 PROCEDURE — 25000003 PHARM REV CODE 250: Performed by: INTERNAL MEDICINE

## 2023-06-03 PROCEDURE — 85025 COMPLETE CBC W/AUTO DIFF WBC: CPT | Performed by: PHYSICIAN ASSISTANT

## 2023-06-03 PROCEDURE — 63600175 PHARM REV CODE 636 W HCPCS: Performed by: INTERNAL MEDICINE

## 2023-06-03 PROCEDURE — 25000003 PHARM REV CODE 250: Performed by: PHYSICIAN ASSISTANT

## 2023-06-03 PROCEDURE — 84100 ASSAY OF PHOSPHORUS: CPT | Performed by: INTERNAL MEDICINE

## 2023-06-03 RX ORDER — POTASSIUM CHLORIDE 14.9 MG/ML
40 INJECTION INTRAVENOUS ONCE
Status: COMPLETED | OUTPATIENT
Start: 2023-06-03 | End: 2023-06-03

## 2023-06-03 RX ORDER — ENOXAPARIN SODIUM 100 MG/ML
40 INJECTION SUBCUTANEOUS EVERY 24 HOURS
Status: DISCONTINUED | OUTPATIENT
Start: 2023-06-03 | End: 2023-06-04 | Stop reason: HOSPADM

## 2023-06-03 RX ADMIN — ENOXAPARIN SODIUM 40 MG: 40 INJECTION SUBCUTANEOUS at 04:06

## 2023-06-03 RX ADMIN — HYDROMORPHONE HYDROCHLORIDE 0.5 MG: 2 INJECTION INTRAMUSCULAR; INTRAVENOUS; SUBCUTANEOUS at 11:06

## 2023-06-03 RX ADMIN — ONDANSETRON 4 MG: 2 INJECTION INTRAMUSCULAR; INTRAVENOUS at 11:06

## 2023-06-03 RX ADMIN — POTASSIUM CHLORIDE 40 MEQ: 14.9 INJECTION, SOLUTION INTRAVENOUS at 10:06

## 2023-06-03 RX ADMIN — HYDROMORPHONE HYDROCHLORIDE 0.5 MG: 2 INJECTION INTRAMUSCULAR; INTRAVENOUS; SUBCUTANEOUS at 05:06

## 2023-06-03 RX ADMIN — HYDROMORPHONE HYDROCHLORIDE 0.5 MG: 2 INJECTION INTRAMUSCULAR; INTRAVENOUS; SUBCUTANEOUS at 06:06

## 2023-06-03 RX ADMIN — HYDROMORPHONE HYDROCHLORIDE 0.5 MG: 2 INJECTION INTRAMUSCULAR; INTRAVENOUS; SUBCUTANEOUS at 10:06

## 2023-06-03 RX ADMIN — SODIUM CHLORIDE: 9 INJECTION, SOLUTION INTRAVENOUS at 04:06

## 2023-06-03 RX ADMIN — FAMOTIDINE 20 MG: 20 TABLET, FILM COATED ORAL at 08:06

## 2023-06-03 RX ADMIN — MUPIROCIN: 20 OINTMENT TOPICAL at 08:06

## 2023-06-03 RX ADMIN — ONDANSETRON 4 MG: 2 INJECTION INTRAMUSCULAR; INTRAVENOUS at 04:06

## 2023-06-03 RX ADMIN — MUPIROCIN: 20 OINTMENT TOPICAL at 01:06

## 2023-06-03 NOTE — CONSULTS
GI Consult Note    Reason for Consult:      We were consulted by Dr. Fong to evaluate this patient for pancreatic ductal dilation. .     HPI:     82-year-old  female known to Dr. Pierson with a PMH of Crohn's colitis/ileitis on Entyvio q.6 weeks, hepatic steatosis, osteopenia, acute pancreatitis in 201, carpal tunnel syndrome, HTN, HLD, CAD/MI, lumbar stenosis with chronic back pain, DAQUAN, cervical cancer.     Patient was initially diagnosed with Crohn's disease in 1976.  She was on Remicade for over 40 years.  Her Remicade level was low despite increasing the dose to 10 mg per kg and Ab levels were high so patient was changed to Entyvio 9/2019.  Her levels were low on every 8 weeks, and she was switched to every 6 weeks around 9/2020.  Patient has had 3 prior surgical resections with the last being in February 2012 by Dr. Vlad Bowen the ileocolonic anastomosis had fibrostenotic changes.  Most recent colonoscopy was on 02/14/2023 by Dr. Pierson which found ileocolonic anastomosis in the transverse colon, benign appearing intrinsic stenosis in the anal canal that the scope was able to traverse, ulceration at the anastomosis compatible with Crohn's disease, otherwise normal exam with no polyps.  Random biopsies in the neoterminal ileum, transverse, descending, sigmoid, and rectum with no diagnostic abnormality.  Colonic anastomosis biopsies with patchy active inflammation, reparative changes, an ulcer with no dysplasia - Differential diagnosis includes benign trauma/prolapse at the anastomotic site or recrudescence of the patient's underlying Crohn's disease.  MRE was ordered, but patient has not yet have this performed.  Of note, this ulcer was seen on previous colonoscopy as well 7/2021 (bx: active colitis/enteritis with ulcer with ddx including moderately active CD) and colonoscopy 7/2018 (bx: focal acute enteritis and erosion favor trauma prolase changes rather than active CD).  Last ESR/CRP wnl  8/2022.  Last fecal jarrett wnl 4/2022 and at that time entyvio abs neg and entyvio levels high 75.7.  Seen 4/10/23 in the clinic and was doing well.      In 2010, pancreatitis was suspected secondary to MTX - LFTs were normal; MRI abdomen with/without contrast extrahepatic biliary ductal dilation 11 mm, minimal intrahepatic ductal dilation, normal pancreatic duct; ERCP was negative for choledocholithasis and sump syndrome was suspected.    She was then seen here during admit 4/17/2023 where she presented with complaints of acute onset epigastric pain, nausea, and vomiting. She had had her Entyvio infusion on the Wednesday and Nausea started that Friday.  Symptoms progressed.  She was unable to keep down p.o. intake.  Eventually, she came to the ED for further evaluation.  There were no associated changes in bowel habits.  She is chronic diarrhea which she describes as 4-6 liquid bowel movements per day.  On presentation, patient hypertensive 178/118 otherwise afebrile and VSS.  Laboratory notable for lipase 316 in and potassium 3.2, otherwise CBC and CMP unremarkable.  UA negative for infection.  CT abdomen/pelvis with IV contrast: Post cholecystectomy, unchanged dilatation of intrahepatic and extrahepatic ducts, new generalized dilatation of main pancreatic duct raises possibility of stricturing pathology about the ampullary region though no dominant mass lesion about pancreatic head, no acute peripancreatic phlegmons, mild colonic fecal loading without abnormal dilatation or pericolonic acute strandings.  Patient was admitted and GI was consulted.          MRCP/ MRI 4/18/2023 was done and redemonstrated extensive pancreatic duct dilatation extending from the proximal body through tail, which is new dating to partial visualization on 2 October 2021 lumbar spine CT.  Etiology remains indeterminate and could be secondary to focal stricture at the pancreatic head.  No definite mass is identified, although small  pancreatic head mass cannot be excluded due to extensive motion artifact on post-contrast images.  2. Bilateral simple appearing renal cortex cysts are otherwise too small for detailed characterization but statistically represent benign etiology and no specific imaging follow-up recommended.  3. Trace layering bilateral pleural effusions.  4. Incidental bilateral femoral head changes consistent with early stage avascular necrosis.  5. Additional secondary details discussed above.    Next entyvio dose was due in about 6 weeks so plan was to hold this for now and let her primary GI MD know, may consider dose reduction.  Will need to recheck trough entyvio levels and Abs prior to next scheduled infusion.      MRI on 5/4/2023  No noncontrast MRI evidence of active inflammatory bowel disease. Cholecystectomy. 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  No definite underlying pancreatic mass identified. Left renal cyst. Hysterectomy.  Lower lumbar spine disc disease. Right hemicolectomy. No noncontrast MRI of inflammatory changes in the bowel.    Since then 5/21/2023 fecal jarrett wnl. CRP wnl. Ca 19-9 wnl.     EUS was scheduled and planned for 6/8/2023.     However she started to have reoccurring nausea and vomiting unable to tolerate po intake so presented here yesterday.     Labs showed CRP wnl. Mg at 1.50 today wnl. WBC, hgb, Cr wnl. Lipase 187 yesterday ---today 22.    Appears Cdiff and stool culture ordered. On clear liquid diet.     She tells me she was changed to Entivyo every eight weeks. Last tx was about a week ago. Since starting entivo she has felt intermittently nauseated. Seems worse right after a tx. Episodic nausea with vomiting. Reports some vomiting on Monday and then again yesterday. Usually worse with po intake. Was not taking Zofran around the  clock. Notes epigastric fullness and discomfort prior to the vomiting,  but not severe. At present this is improved. Tolerating liquids. Asking to advance and possible go home tomorrow. Denies lower abdominal pain, blood in stool, fever, rectal pain, melena, acid reflux indigestion. Denies change in bowel movements, has two to three bms a day at baseline, softener in consistency. Denies any recent abx use, nsaid use, new meds, abnormal exposures. Reports she continues to loose weight unable to quantify.   PCP:  Primary Doctor No    Review of patient's allergies indicates:  No Known Allergies     Current Facility-Administered Medications   Medication Dose Route Frequency Provider Last Rate Last Admin    0.9%  NaCl infusion   Intravenous Continuous Jeff Gonsales PA-C 75 mL/hr at 06/03/23 0445 New Bag at 06/03/23 0445    acetaminophen tablet 650 mg  650 mg Oral Q6H PRN Brigitte Fong MD        dextrose 10% bolus 125 mL 125 mL  12.5 g Intravenous PRN Jeff Gonsales PA-C        dextrose 10% bolus 250 mL 250 mL  25 g Intravenous PRN Jeff Gonsales PA-C        famotidine tablet 20 mg  20 mg Oral BID Brigitte Fong MD   20 mg at 06/03/23 0805    glucagon (human recombinant) injection 1 mg  1 mg Intramuscular PRN Jeff Gonsales PA-C        glucose chewable tablet 16 g  16 g Oral PRN Jeff Gonsales PA-C        glucose chewable tablet 24 g  24 g Oral PRN Jeff Gonsales PA-C        hydrALAZINE injection 10 mg  10 mg Intravenous Q8H PRN Brigitte Fong MD        HYDROmorphone (PF) injection 0.5 mg  0.5 mg Intravenous Q4H PRN Brigitte Fong MD   0.5 mg at 06/03/23 0556    mupirocin 2 % ointment   Nasal BID Brigitte Fong MD   Given at 06/03/23 0159    ondansetron injection 4 mg  4 mg Intravenous Q4H PRN Brigitte Fong MD   4 mg at 06/02/23 1704    oxyCODONE immediate release tablet Tab 10 mg  10 mg Oral Q6H PRN Brigitte Fong MD        potassium chloride 20 mEq in 100 mL IVPB (FOR CENTRAL LINE ADMINISTRATION ONLY)  40 mEq Intravenous Once MD mohan Colbert  injection Soln 2.5 mg  2.5 mg Intravenous Q6H PRN Brigitte Fong MD         Medications Prior to Admission   Medication Sig Dispense Refill Last Dose    cholecalciferol, vitamin D3, 1,250 mcg (50,000 unit) capsule Take 1,250 mcg by mouth once a week.       ondansetron (ZOFRAN-ODT) 4 MG TbDL Take 1 tablet (4 mg total) by mouth every 6 (six) hours as needed (Nausea/Vomiting). (Patient not taking: Reported on 4/21/2023) 20 tablet 0     simvastatin (ZOCOR) 20 MG tablet Take 20 mg by mouth every evening.       traMADoL (ULTRAM) 50 mg tablet Take 1 tablet (50 mg total) by mouth every 6 (six) hours as needed for Pain. (Patient not taking: Reported on 4/21/2023) 15 tablet 0        Past Medical History:  Past Medical History:   Diagnosis Date    Acute pancreatitis, unspecified complication status, unspecified pancreatitis type     Arthritis     Carpal tunnel syndrome, bilateral     Cervical cancer     Cervical radiculopathy     Crohn disease     Heart attack     HLD (hyperlipidemia)     Low back pain     Sleep apnea, unspecified     Spinal stenosis of lumbar region with neurogenic claudication       Past Surgical History:  Past Surgical History:   Procedure Laterality Date    bilateral L4-5, L5-S1 decompression, repair L5-S1 dural tear  10/01/2021    Dr. Marin    CARPAL TUNNEL RELEASE Right 09/06/2019    Dr. Marin    CARPAL TUNNEL RELEASE Left 12/2/2022    Procedure: RELEASE, CARPAL TUNNEL;  Surgeon: Jesse Marin MD;  Location: [facility] OR;  Service: Neurosurgery;  Laterality: Left;    CHOLECYSTECTOMY      COLECTOMY  07/2011    partial x3    HYSTERECTOMY      total    REPAIR OF EYELID Bilateral 11/21/2022    Procedure: BILATERAL ECTROPION REPAIR;  Surgeon: Justin Flores MD;  Location: [facility] OR;  Service: ENT;  Laterality: Bilateral;      Family History:  Family History   Problem Relation Age of Onset    Diabetes Mother     Hypertension Father     Hyperlipidemia Father     Cancer Father     Hyperlipidemia Sister      Hyperlipidemia Brother     Hyperlipidemia Daughter      Social History:  Social History     Tobacco Use    Smoking status: Former     Types: Cigarettes    Smokeless tobacco: Never   Substance Use Topics    Alcohol use: Yes     Comment: rarely           Review of Systems:     Review of Systems   Constitutional:  Negative for fatigue and fever.   Respiratory:  Negative for chest tightness and shortness of breath.    Cardiovascular:  Negative for chest pain.   Gastrointestinal:  Positive for nausea and vomiting. Negative for anal bleeding, blood in stool and constipation.   Musculoskeletal:  Negative for back pain.   Psychiatric/Behavioral:  Negative for agitation and confusion.    All other systems reviewed and are negative.    Objective:       VITAL SIGNS: 24 HR MIN & MAX LAST    Temp  Min: 97.7 °F (36.5 °C)  Max: 98.1 °F (36.7 °C)  97.9 °F (36.6 °C)        BP  Min: 123/66  Max: 186/109  134/68     Pulse  Min: 55  Max: 86  (!) 55     Resp  Min: 11  Max: 22  18    SpO2  Min: 95 %  Max: 100 %  98 %        Intake/Output Summary (Last 24 hours) at 6/3/2023 1033  Last data filed at 6/2/2023 2200  Gross per 24 hour   Intake 120 ml   Output --   Net 120 ml       Physical Exam  Constitutional:       General: She is not in acute distress.     Appearance: She is not ill-appearing.   HENT:      Head: Atraumatic.   Eyes:      Extraocular Movements: Extraocular movements intact.   Cardiovascular:      Rate and Rhythm: Normal rate and regular rhythm.   Pulmonary:      Effort: No respiratory distress.   Abdominal:      General: Bowel sounds are normal. There is no distension.      Tenderness: There is no abdominal tenderness. There is no guarding.   Neurological:      General: No focal deficit present.      Mental Status: She is alert.   Psychiatric:         Mood and Affect: Mood normal.         Recent Results (from the past 48 hour(s))   CBC with Differential    Collection Time: 06/02/23 10:41 AM   Result Value Ref Range    WBC  7.97 4.50 - 11.50 x10(3)/mcL    RBC 4.41 4.20 - 5.40 x10(6)/mcL    Hgb 13.8 12.0 - 16.0 g/dL    Hct 39.7 37.0 - 47.0 %    MCV 90.0 80.0 - 94.0 fL    MCH 31.3 (H) 27.0 - 31.0 pg    MCHC 34.8 33.0 - 36.0 g/dL    RDW 14.0 11.5 - 17.0 %    Platelet 311 130 - 400 x10(3)/mcL    MPV 9.1 7.4 - 10.4 fL    Neut % 85.8 %    Lymph % 9.7 %    Mono % 3.8 %    Eos % 0.0 %    Basophil % 0.3 %    Lymph # 0.77 0.6 - 4.6 x10(3)/mcL    Neut # 6.85 2.1 - 9.2 x10(3)/mcL    Mono # 0.30 0.1 - 1.3 x10(3)/mcL    Eos # 0.00 0 - 0.9 x10(3)/mcL    Baso # 0.02 <=0.2 x10(3)/mcL    IG# 0.03 0 - 0.04 x10(3)/mcL    IG% 0.4 %    NRBC% 0.0 %   Comprehensive Metabolic Panel    Collection Time: 06/02/23 10:41 AM   Result Value Ref Range    Sodium Level 138 136 - 145 mmol/L    Potassium Level 2.8 (L) 3.5 - 5.1 mmol/L    Chloride 102 98 - 107 mmol/L    Carbon Dioxide 24 23 - 31 mmol/L    Glucose Level 142 (H) 82 - 115 mg/dL    Blood Urea Nitrogen 7.9 (L) 9.8 - 20.1 mg/dL    Creatinine 0.78 0.55 - 1.02 mg/dL    Calcium Level Total 9.7 8.4 - 10.2 mg/dL    Protein Total 8.4 (H) 5.8 - 7.6 gm/dL    Albumin Level 4.4 3.4 - 4.8 g/dL    Globulin 4.0 (H) 2.4 - 3.5 gm/dL    Albumin/Globulin Ratio 1.1 1.1 - 2.0 ratio    Bilirubin Total 0.7 <=1.5 mg/dL    Alkaline Phosphatase 106 40 - 150 unit/L    Alanine Aminotransferase 21 0 - 55 unit/L    Aspartate Aminotransferase 25 5 - 34 unit/L    eGFR >60 mls/min/1.73/m2   Lipase    Collection Time: 06/02/23 10:41 AM   Result Value Ref Range    Lipase Level 187 (H) <=60 U/L   Troponin I    Collection Time: 06/02/23 10:41 AM   Result Value Ref Range    Troponin-I 0.022 0.000 - 0.045 ng/mL   Magnesium    Collection Time: 06/02/23 10:41 AM   Result Value Ref Range    Magnesium Level 1.50 (L) 1.60 - 2.60 mg/dL   Basic Metabolic Panel    Collection Time: 06/02/23  4:34 PM   Result Value Ref Range    Sodium Level 137 136 - 145 mmol/L    Potassium Level 3.5 3.5 - 5.1 mmol/L    Chloride 104 98 - 107 mmol/L    Carbon Dioxide 24 23 -  31 mmol/L    Glucose Level 109 82 - 115 mg/dL    Blood Urea Nitrogen 10.1 9.8 - 20.1 mg/dL    Creatinine 0.75 0.55 - 1.02 mg/dL    BUN/Creatinine Ratio 13     Calcium Level Total 9.0 8.4 - 10.2 mg/dL    Anion Gap 9.0 mEq/L    eGFR >60 mls/min/1.73/m2   Lipid Panel    Collection Time: 06/02/23  4:34 PM   Result Value Ref Range    Cholesterol Total 186 <=200 mg/dL    HDL Cholesterol 60 35 - 60 mg/dL    Triglyceride 79 37 - 140 mg/dL    Cholesterol/HDL Ratio 3 0 - 5    Very Low Density Lipoprotein 16     LDL Cholesterol 110.00 50.00 - 140.00 mg/dL   C-Reactive Protein    Collection Time: 06/02/23  4:34 PM   Result Value Ref Range    C-Reactive Protein 1.20 <5.00 mg/L   Comprehensive Metabolic Panel (CMP)    Collection Time: 06/03/23  3:54 AM   Result Value Ref Range    Sodium Level 137 136 - 145 mmol/L    Potassium Level 3.3 (L) 3.5 - 5.1 mmol/L    Chloride 107 98 - 107 mmol/L    Carbon Dioxide 23 23 - 31 mmol/L    Glucose Level 100 82 - 115 mg/dL    Blood Urea Nitrogen 12.8 9.8 - 20.1 mg/dL    Creatinine 0.82 0.55 - 1.02 mg/dL    Calcium Level Total 8.5 8.4 - 10.2 mg/dL    Protein Total 6.6 5.8 - 7.6 gm/dL    Albumin Level 3.4 3.4 - 4.8 g/dL    Globulin 3.2 2.4 - 3.5 gm/dL    Albumin/Globulin Ratio 1.1 1.1 - 2.0 ratio    Bilirubin Total 0.8 <=1.5 mg/dL    Alkaline Phosphatase 80 40 - 150 unit/L    Alanine Aminotransferase 18 0 - 55 unit/L    Aspartate Aminotransferase 24 5 - 34 unit/L    eGFR >60 mls/min/1.73/m2   Magnesium    Collection Time: 06/03/23  3:54 AM   Result Value Ref Range    Magnesium Level 2.10 1.60 - 2.60 mg/dL   Phosphorus    Collection Time: 06/03/23  3:54 AM   Result Value Ref Range    Phosphorus Level 3.6 2.3 - 4.7 mg/dL   Lipase    Collection Time: 06/03/23  3:54 AM   Result Value Ref Range    Lipase Level 22 <=60 U/L   CBC with Differential    Collection Time: 06/03/23  3:54 AM   Result Value Ref Range    WBC 6.70 4.50 - 11.50 x10(3)/mcL    RBC 3.64 (L) 4.20 - 5.40 x10(6)/mcL    Hgb 11.3 (L) 12.0  - 16.0 g/dL    Hct 33.6 (L) 37.0 - 47.0 %    MCV 92.3 80.0 - 94.0 fL    MCH 31.0 27.0 - 31.0 pg    MCHC 33.6 33.0 - 36.0 g/dL    RDW 14.5 11.5 - 17.0 %    Platelet 241 130 - 400 x10(3)/mcL    MPV 9.5 7.4 - 10.4 fL    Neut % 66.4 %    Lymph % 24.6 %    Mono % 7.8 %    Eos % 0.7 %    Basophil % 0.1 %    Lymph # 1.65 0.6 - 4.6 x10(3)/mcL    Neut # 4.44 2.1 - 9.2 x10(3)/mcL    Mono # 0.52 0.1 - 1.3 x10(3)/mcL    Eos # 0.05 0 - 0.9 x10(3)/mcL    Baso # 0.01 <=0.2 x10(3)/mcL    IG# 0.03 0 - 0.04 x10(3)/mcL    IG% 0.4 %    NRBC% 0.0 %       X-Ray Chest 1 View    Result Date: 5/20/2023  AP chest dated 5/20/2023 at 2:30 PM HISTORY:  Chest pain. Initial encounter. STUDY: A single frontal view of the chest is submitted for interpretation. FINDINGS: There is a Mediport catheter in place with the tip in the superior vena cava. Degenerative changes are seen at the right greater than left shoulder. Atherosclerotic calcification of the aortic arch is seen. The cardiac silhouette and mediastinum are otherwise unremarkable. There is no focal airspace or parenchymal disease, mass lesion, pneumothorax, or pleural effusion.    No acute abnormality. Nonacute findings as noted.    MRI Pelvis Without Contrast    Result Date: 5/4/2023  EXAMINATION: MRI ABDOMEN AND PELVIS WITHOUT CONTRAST CLINICAL HISTORY: Crohn's disease of both small and large intestine without complications TECHNIQUE: Haste, T2 fat sat, T1 fat sat, diffusion, and cine sequences of the abdomen and pelvis. No contrast administered.  The cine sequences did not properly load to PACS due to a recent change in MRI software. COMPARISON: CT April 16, 2023 FINDINGS: No liver lesion is identified. The gallbladder is absent. No intra or extrahepatic bile duct dilation is observed. The spleen is not enlarged. There is normal fat suppressed precontrast T1 parenchymal signal in the pancreatic head. At the pancreatic neck, there is a discrete change in pancreatic duct caliber, with  upstream pancreatic duct dilation to reach a maximum diameter of 10 mm (series 4, image 11). There is no obvious underlying pancreatic mass. There are associated small dilated side branch radicles in the pancreatic body. The adrenal glands are unremarkable. There is no solid renal mass or hydronephrosis. A few small left renal upper pole cysts are present, measuring up to 1 cm in diameter. The bladder shows no gross wall thickening or intraluminal abnormality. The uterus is absent. No adnexal masses identified.  Disc disease is advanced in the lower lumbar spine. There are no dilated loops of small bowel. No small bowel wall thickening or edema is appreciated. Limited evaluation of the colon shows right hemicolectomy changes, but no additional significant abnormality.     No noncontrast MRI evidence of active inflammatory bowel disease. Cholecystectomy. 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 No definite underlying pancreatic mass identified. Left renal cyst. Hysterectomy. Lower lumbar spine disc disease. Right hemicolectomy. No noncontrast MRI of inflammatory changes in the bowel. Electronically signed by: Ladarius Cueto Date:    05/04/2023 Time:    14:51    CT Abdomen Pelvis With Contrast    Result Date: 5/20/2023  REASON FOR EXAM: epigastric abdominal pain,  history of pancreatitis.. TECHNIQUE: CT images of the abdomen and pelvis were obtained with administration of intravenous contrast. Coronal and sagittal reconstructions were performed. All CT scans at this facility use dose modulation, iterative reconstruction, and/or weight-based dosing when appropriate to reduce radiation dose to as low as reasonably achievable. COMPARISON: None. FINDINGS: ABDOMEN: Stomach and distal esophagus: Small hiatal hernia. Duodenum: Unremarkable. Liver: Diffuse parenchymal hypoattenuation is noted compatible with fatty infiltration.  Gallbladder/bile duct: Cholecystectomy clips. The intrahepatic and extrahepatic bile ducts are mildly dilated compatible with reservoir phenomenon. Spleen: Unremarkable Pancreas: There are ductal dilatation to 1 cm. No obvious mass. Adrenal glands: Unremarkable Kidneys: Multiple simple cysts and too small to characterize hypodensities are present that are statistically likely to represent simple cysts. No further follow-up recommended. Vascular: Vascular atherosclerotic calcifications without evidence of abdominal aortic aneurysm or dissection. Adenopathy: None PELVIS: Small bowel: Unremarkable Appendix/Colon: Postsurgical changes of right hemicolectomy are noted. Urinary bladder: Unremarkable. Hysterectomy Adenopathy: None Free fluid, free air or fluid collection: None Aortoiliac bifurcation surgical clips. OSSEOUS STRUCTURES: Scattered degenerative changes without focal or acute osseous abnormality.    Dilated pancreatic duct. Correlate for stricture. No obvious mass within the limitations of this exam. Correlate with this EUS/ERCP.    MRI Abdomen Without Contrast    Result Date: 5/4/2023  EXAMINATION: MRI ABDOMEN AND PELVIS WITHOUT CONTRAST CLINICAL HISTORY: Crohn's disease of both small and large intestine without complications TECHNIQUE: Haste, T2 fat sat, T1 fat sat, diffusion, and cine sequences of the abdomen and pelvis.  No contrast administered.  The cine sequences did not properly load to PACS due to a recent change in MRI software. COMPARISON: CT April 16, 2023 FINDINGS: No liver lesion is identified. The gallbladder is absent. No intra or extrahepatic bile duct dilation is observed. The spleen is not enlarged. There is normal fat suppressed precontrast T1 parenchymal signal in the pancreatic head.  At the pancreatic neck, there is a discrete change in pancreatic duct caliber, with upstream pancreatic duct dilation to reach a maximum diameter of 10 mm (series 4, image 11).  There is no obvious underlying  pancreatic mass.  There are associated small dilated side branch radicles in the pancreatic body. The adrenal glands are unremarkable. There is no solid renal mass or hydronephrosis.  A few small left renal upper pole cysts are present, measuring up to 1 cm in diameter. The bladder shows no gross wall thickening or intraluminal abnormality.  The uterus is absent.  No adnexal masses identified.  Disc disease is advanced in the lower lumbar spine. There are no dilated loops of small bowel.  No small bowel wall thickening or edema is appreciated.  Limited evaluation of the colon shows right hemicolectomy changes, but no additional significant abnormality.     No noncontrast MRI evidence of active inflammatory bowel disease. Cholecystectomy. 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  No definite underlying pancreatic mass identified. Left renal cyst. Hysterectomy.  Lower lumbar spine disc disease. Right hemicolectomy. No noncontrast MRI of inflammatory changes in the bowel. Electronically signed by: Ladarius Cueto Date:    05/04/2023 Time:    14:51      [unfilled]    Assessment / Plan:       Assessment & Plan:   82 year-old  female known to Dr. Pierson with a PMH of Crohn's colitis/ileitis on Entyvio q 8weeks, hepatic steatosis, osteopenia, acute pancreatitis in 201, carpal tunnel syndrome, HTN, HLD, CAD/MI, lumbar stenosis with chronic back pain, DAQUAN, cervical cancer.    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       Reoccurring  episodic n/v   Hx of Crohn  MRI on 5/4 no active inflammation  CRP wnl   Recent fecal jarrett wnl  Entivyo tx was a week and a half ago.  Per pt nausea worse after tx of Enviyo   Follow up stools studies for cdiff and culture   3. 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  Lipase improved  Cr wnl   Tolerating liquids   EUS planned for 6/8/2023  Continue to monitor diet tolerance and clinical course.   Recommend zofran to be scheduled before each meal.   If tolerating diet, labs stable, pt stable then possible dc tomorrow? But will discuss with Dr. Yeboah.     Thank you for allowing us to participate in this patient's care.

## 2023-06-03 NOTE — PROGRESS NOTES
[facility] Hospital Medicine Progress Note        Chief Complaint: Abdominal Pain (Upper abd pain with n/v since last night. Pt reports several episodes since April. Feels weak. )         Patient information was obtained from patient, patient's family, past medical records and ER records.      81 yo female with PMHx HTN, HLD, RA, CAD/MI, DAQUAN, Cervical cancer , Crohn's colitis/ileitis on Entyvio q.6 weeks, s/p Rt hemicolectomy, hepatic steatosis, osteopenia, former smoker, and recurrent acute pancreatitis in the recent past with last admission in 4/2023 presented to the ED for evaluation of acute onset of abdominal pain since last night associated with constant nausea , multiple episodes of vomiting at home and diarrhea. Abd pain is generalized but worse in the epigastric and LUQ area . Denies radiation to the back. Suffers from chronic diarrhea and is not worse from baseline. 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 She was evaluated by primary Gastroenterologist Dr. Pierson  in that regard and scheduled an outpatient appointment with Dr. Max on 6/8/23 for possible EUS/ERCP.        Patient admitted for intractable abdominal pain, nausea and vomiting with concern for a pancreatic duct stricture.  GI has been consulted     Today's information   Patient seen and examined at bedside   Patient reports significant abdominal pain especially when she tries to eat anything.  She has lost significant amount of weight because she is afraid to eat   Vitals reviewed and stable on room air   Labs reviewed with potassium of 3.3   Await GI recs patient may need EUS/ercp  Continue with symptomatic control of pain and antiemetics and IV fluids        Exams  General appearance:  Elderly female in no apparent distress.  HEENNT: Atraumatic head.   Lungs: Clear to auscultation bilaterally.   Heart: Regular rate and  rhythm.    Abdomen: Soft, tenderness to palpation of left upper quadrant. Bowel sounds are normal.   Extremities: No cyanosis, clubbing, or edema. No deformities.   Skin: No Rash. Warm and dry.   Neuro: Awake, alert, and oriented.   Psych/mental status: Appropriate mood and affect.          A/P-  Severe abd pain associated with nausea and vomiting in the setting of known pancreatic duct stricture   H/O recurrent pancreatitis   H/O Crohn's colitis/ileitis  Hypokalemia ,    hypomagnesemia , resolved   Chronic diarrhea   Accelerated HTN on presentation     Plan-  Await GI recs patient may need EUS/ercp  Continue with symptomatic control of pain and antiemetics and IV fluids   Replete with IV potassium 40 x 1     IV hydration   Pain control   Anti emetics   Clear liquid   Replete electrolytes       Dvt prophylaxis- sc lovenox       VITAL SIGNS: 24 HRS MIN & MAX LAST   Temp  Min: 97.6 °F (36.4 °C)  Max: 98.1 °F (36.7 °C) 97.6 °F (36.4 °C)   BP  Min: 123/66  Max: 158/69 (!) 158/69   Pulse  Min: 55  Max: 71  64   Resp  Min: 11  Max: 18 18   SpO2  Min: 95 %  Max: 99 % 99 %     I have reviewed the following labs:    Recent Labs   Lab 06/02/23  1041 06/03/23  0354   WBC 7.97 6.70   RBC 4.41 3.64*   HGB 13.8 11.3*   HCT 39.7 33.6*   MCV 90.0 92.3   MCH 31.3* 31.0   MCHC 34.8 33.6   RDW 14.0 14.5    241   MPV 9.1 9.5       Recent Labs   Lab 06/02/23  1041 06/02/23  1634 06/03/23  0354    137 137   K 2.8* 3.5 3.3*   CO2 24 24 23   BUN 7.9* 10.1 12.8   CREATININE 0.78 0.75 0.82   CALCIUM 9.7 9.0 8.5   MG 1.50*  --  2.10   ALBUMIN 4.4  --  3.4   ALKPHOS 106  --  80   ALT 21  --  18   AST 25  --  24   BILITOT 0.7  --  0.8          Microbiology Results (last 7 days)       Procedure Component Value Units Date/Time    Stool Culture [[id number]]     Order Status: Sent Specimen: Stool     C. Difficile By Rapid Pcr [[id number]]     Order Status: Sent Specimen: Stool              See below for Radiology    Scheduled Med:    famotidine  20 mg Oral BID    mupirocin   Nasal BID        Continuous Infusions:   sodium chloride 0.9% 75 mL/hr at 06/03/23 0445        PRN Meds:  acetaminophen, dextrose 10%, dextrose 10%, glucagon (human recombinant), glucose, glucose, hydrALAZINE, HYDROmorphone, ondansetron, oxyCODONE, prochlorperazine       Assessment/Plan:      VTE prophylaxis:     Patient condition:  Stable/Fair/Guarded/ Serious/ Critical    Anticipated discharge and Disposition:         All diagnosis and differential diagnosis have been reviewed; assessment and plan has been documented; I have personally reviewed the labs and test results that are presently available; I have reviewed the patients medication list; I have reviewed the consulting providers response and recommendations. I have reviewed or attempted to review medical records based upon their availability    All of the patient's questions have been  addressed and answered. Patient's is agreeable to the above stated plan. I will continue to monitor closely and make adjustments to medical management as needed.  _____________________________________________________________________    Nutrition Status:    Radiology:  I have personally reviewed the following imaging and agree with the radiologist.     MRI Pelvis Without Contrast  Narrative: EXAMINATION:  MRI ABDOMEN AND PELVIS WITHOUT CONTRAST    CLINICAL HISTORY:  Crohn's disease of both small and large intestine without complications    TECHNIQUE:  Haste, T2 fat sat, T1 fat sat, diffusion, and cine sequences of the abdomen and pelvis. No contrast administered.  The cine sequences did not properly load to PACS due to a recent change in MRI software.    COMPARISON:  CT April 16, 2023    FINDINGS:  No liver lesion is identified. The gallbladder is absent. No intra or extrahepatic bile duct dilation is observed.    The spleen is not enlarged.    There is normal fat suppressed precontrast T1 parenchymal signal in the pancreatic head. At  the pancreatic neck, there is a discrete change in pancreatic duct caliber, with upstream pancreatic duct dilation to reach a maximum diameter of 10 mm (series 4, image 11). There is no obvious underlying pancreatic mass. There are associated small dilated side branch radicles in the pancreatic body.    The adrenal glands are unremarkable.    There is no solid renal mass or hydronephrosis. A few small left renal upper pole cysts are present, measuring up to 1 cm in diameter.    The bladder shows no gross wall thickening or intraluminal abnormality. The uterus is absent. No adnexal masses identified.  Disc disease is advanced in the lower lumbar spine.    There are no dilated loops of small bowel. No small bowel wall thickening or edema is appreciated. Limited evaluation of the colon shows right hemicolectomy changes, but no additional significant abnormality.  Impression: No noncontrast MRI evidence of active inflammatory bowel disease.    Cholecystectomy.  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 No definite underlying pancreatic mass identified.    Left renal cyst.    Hysterectomy. Lower lumbar spine disc disease.    Right hemicolectomy.    No noncontrast MRI of inflammatory changes in the bowel.    Electronically signed by: Ladarius Cueto  Date:    05/04/2023  Time:    14:51  MRI Abdomen Without Contrast  Narrative: EXAMINATION:  MRI ABDOMEN AND PELVIS WITHOUT CONTRAST    CLINICAL HISTORY:  Crohn's disease of both small and large intestine without complications    TECHNIQUE:  Haste, T2 fat sat, T1 fat sat, diffusion, and cine sequences of the abdomen and pelvis.  No contrast administered.  The cine sequences did not properly load to PACS due to a recent change in MRI software.    COMPARISON:  CT April 16, 2023    FINDINGS:  No liver lesion is identified. The gallbladder is absent. No intra or extrahepatic bile duct  dilation is observed.    The spleen is not enlarged.    There is normal fat suppressed precontrast T1 parenchymal signal in the pancreatic head.  At the pancreatic neck, there is a discrete change in pancreatic duct caliber, with upstream pancreatic duct dilation to reach a maximum diameter of 10 mm (series 4, image 11).  There is no obvious underlying pancreatic mass.  There are associated small dilated side branch radicles in the pancreatic body.    The adrenal glands are unremarkable.    There is no solid renal mass or hydronephrosis.  A few small left renal upper pole cysts are present, measuring up to 1 cm in diameter.    The bladder shows no gross wall thickening or intraluminal abnormality.  The uterus is absent.  No adnexal masses identified.  Disc disease is advanced in the lower lumbar spine.    There are no dilated loops of small bowel.  No small bowel wall thickening or edema is appreciated.  Limited evaluation of the colon shows right hemicolectomy changes, but no additional significant abnormality.  Impression: No noncontrast MRI evidence of active inflammatory bowel disease.    Cholecystectomy.  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  No definite underlying pancreatic mass identified.    Left renal cyst.    Hysterectomy.  Lower lumbar spine disc disease.    Right hemicolectomy.    No noncontrast MRI of inflammatory changes in the bowel.    Electronically signed by: Ladarius Cueto  Date:    05/04/2023  Time:    14:51      Hoang Quinonez MD   06/03/2023

## 2023-06-04 VITALS
HEART RATE: 73 BPM | TEMPERATURE: 99 F | RESPIRATION RATE: 16 BRPM | WEIGHT: 143.94 LBS | DIASTOLIC BLOOD PRESSURE: 78 MMHG | BODY MASS INDEX: 22.59 KG/M2 | OXYGEN SATURATION: 98 % | HEIGHT: 67 IN | SYSTOLIC BLOOD PRESSURE: 158 MMHG

## 2023-06-04 PROBLEM — K85.00 IDIOPATHIC ACUTE PANCREATITIS WITHOUT INFECTION OR NECROSIS: Status: ACTIVE | Noted: 2023-04-20

## 2023-06-04 LAB
ANION GAP SERPL CALC-SCNC: 7 MEQ/L
BUN SERPL-MCNC: 9.3 MG/DL (ref 9.8–20.1)
CALCIUM SERPL-MCNC: 8.4 MG/DL (ref 8.4–10.2)
CHLORIDE SERPL-SCNC: 111 MMOL/L (ref 98–107)
CO2 SERPL-SCNC: 25 MMOL/L (ref 23–31)
CREAT SERPL-MCNC: 0.75 MG/DL (ref 0.55–1.02)
CREAT/UREA NIT SERPL: 12
GFR SERPLBLD CREATININE-BSD FMLA CKD-EPI: >60 MLS/MIN/1.73/M2
GLUCOSE SERPL-MCNC: 101 MG/DL (ref 82–115)
MAGNESIUM SERPL-MCNC: 1.6 MG/DL (ref 1.6–2.6)
POTASSIUM SERPL-SCNC: 3.3 MMOL/L (ref 3.5–5.1)
SODIUM SERPL-SCNC: 143 MMOL/L (ref 136–145)

## 2023-06-04 PROCEDURE — 80048 BASIC METABOLIC PNL TOTAL CA: CPT | Performed by: INTERNAL MEDICINE

## 2023-06-04 PROCEDURE — 25000003 PHARM REV CODE 250: Performed by: INTERNAL MEDICINE

## 2023-06-04 PROCEDURE — 63600175 PHARM REV CODE 636 W HCPCS: Performed by: INTERNAL MEDICINE

## 2023-06-04 PROCEDURE — 83735 ASSAY OF MAGNESIUM: CPT | Performed by: INTERNAL MEDICINE

## 2023-06-04 PROCEDURE — 25000003 PHARM REV CODE 250: Performed by: PHYSICIAN ASSISTANT

## 2023-06-04 RX ORDER — ONDANSETRON 4 MG/1
4 TABLET, ORALLY DISINTEGRATING ORAL EVERY 6 HOURS PRN
Qty: 28 TABLET | Refills: 0 | Status: ON HOLD | OUTPATIENT
Start: 2023-06-04 | End: 2023-06-12

## 2023-06-04 RX ORDER — HEPARIN 100 UNIT/ML
5 SYRINGE INTRAVENOUS ONCE
Status: COMPLETED | OUTPATIENT
Start: 2023-06-04 | End: 2023-06-04

## 2023-06-04 RX ORDER — FAMOTIDINE 20 MG/1
20 TABLET, FILM COATED ORAL 2 TIMES DAILY
Qty: 60 TABLET | Refills: 0 | Status: ON HOLD | OUTPATIENT
Start: 2023-06-04 | End: 2023-06-12 | Stop reason: HOSPADM

## 2023-06-04 RX ORDER — OXYCODONE HYDROCHLORIDE 10 MG/1
10 TABLET ORAL EVERY 6 HOURS PRN
Qty: 28 TABLET | Refills: 0 | Status: ON HOLD | OUTPATIENT
Start: 2023-06-04 | End: 2023-06-12

## 2023-06-04 RX ORDER — MAGNESIUM SULFATE HEPTAHYDRATE 40 MG/ML
2 INJECTION, SOLUTION INTRAVENOUS ONCE
Status: COMPLETED | OUTPATIENT
Start: 2023-06-04 | End: 2023-06-04

## 2023-06-04 RX ORDER — POTASSIUM CHLORIDE 14.9 MG/ML
40 INJECTION INTRAVENOUS ONCE
Status: COMPLETED | OUTPATIENT
Start: 2023-06-04 | End: 2023-06-04

## 2023-06-04 RX ADMIN — FAMOTIDINE 20 MG: 20 TABLET, FILM COATED ORAL at 08:06

## 2023-06-04 RX ADMIN — HYDROMORPHONE HYDROCHLORIDE 0.5 MG: 2 INJECTION INTRAMUSCULAR; INTRAVENOUS; SUBCUTANEOUS at 04:06

## 2023-06-04 RX ADMIN — MAGNESIUM SULFATE HEPTAHYDRATE 2 G: 40 INJECTION, SOLUTION INTRAVENOUS at 09:06

## 2023-06-04 RX ADMIN — HEPARIN 500 UNITS: 100 SYRINGE at 05:06

## 2023-06-04 RX ADMIN — POTASSIUM CHLORIDE 40 MEQ: 14.9 INJECTION, SOLUTION INTRAVENOUS at 09:06

## 2023-06-04 RX ADMIN — SODIUM CHLORIDE: 9 INJECTION, SOLUTION INTRAVENOUS at 04:06

## 2023-06-04 NOTE — DISCHARGE SUMMARY
[facility]  Hospital Medicine Discharge Summary    Admit Date: 6/2/2023  Discharge Date and Time: 6/4/202312:22 PM  Admitting Physician:  Team  Discharging Physician: Hoang Quinonez MD.  Primary Care Physician: Primary Doctor No  Consults: Gastroenterology and Hospital Medicine    Discharge Diagnoses:  Severe abd pain associated with nausea and vomiting due to acute pancreatitis likely Entyvio induced  known pancreatic duct stricture   H/O recurrent pancreatitis   H/O Crohn's colitis/ileitis  Hypokalemia ,    hypomagnesemia , resolved   Chronic diarrhea   Accelerated HTN on presentation    Hospital Course:   Chief Complaint: Abdominal Pain (Upper abd pain with n/v since last night. Pt reports several episodes since April. Feels weak. )         Patient information was obtained from patient, patient's family, past medical records and ER records.      81 yo female with PMHx HTN, HLD, RA, CAD/MI, DAQUAN, Cervical cancer , Crohn's colitis/ileitis on Entyvio q.6 weeks, s/p Rt hemicolectomy, hepatic steatosis, osteopenia, former smoker, and recurrent acute pancreatitis in the recent past with last admission in 4/2023 presented to the ED for evaluation of acute onset of abdominal pain since last night associated with constant nausea , multiple episodes of vomiting at home and diarrhea. Abd pain is generalized but worse in the epigastric and LUQ area . Denies radiation to the back. Suffers from chronic diarrhea and is not worse from baseline. 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 She was evaluated by primary Gastroenterologist Dr. Pierson  in that regard and scheduled an outpatient appointment with Dr. Max on 6/8/23 for possible EUS/ERCP.         Patient admitted for intractable Severe abd pain associated with nausea and vomiting due to acute pancreatitis likely Entyvio induced.  Patient was managed  with pain meds, resting the bowels and IV fluids.  GI was consulted recommend to discuss with her GI doctors to discontinue Entyvio and try another medication for Crohn's disease.  She is currently tolerating a diet.  Should continue with outpatient EUS plans for her known pancreatic duct stricture.         Exams  General appearance:  Elderly female in no apparent distress.  HEENNT: Atraumatic head.   Lungs: Clear to auscultation bilaterally.   Heart: Regular rate and rhythm.    Abdomen: Soft, tenderness to palpation of left upper quadrant. Bowel sounds are normal.   Extremities: No cyanosis, clubbing, or edema. No deformities.   Skin: No Rash. Warm and dry.   Neuro: Awake, alert, and oriented.   Psych/mental status: Appropriate mood and affect.          Pt was seen and examined on the day of discharge  Vitals:  VITAL SIGNS: 24 HRS MIN & MAX LAST   Temp  Min: 97.2 °F (36.2 °C)  Max: 99.4 °F (37.4 °C) 99.4 °F (37.4 °C)   BP  Min: 133/66  Max: 157/66 (!) 157/66   Pulse  Min: 60  Max: 72  70   Resp  Min: 16  Max: 20 18   SpO2  Min: 95 %  Max: 99 % 99 %           Procedures Performed: No admission procedures for hospital encounter.     Significant Diagnostic Studies: See Full reports for all details    Recent Labs   Lab 06/02/23  1041 06/03/23  0354   WBC 7.97 6.70   RBC 4.41 3.64*   HGB 13.8 11.3*   HCT 39.7 33.6*   MCV 90.0 92.3   MCH 31.3* 31.0   MCHC 34.8 33.6   RDW 14.0 14.5    241   MPV 9.1 9.5       Recent Labs   Lab 06/02/23  1041 06/02/23  1634 06/03/23  0354 06/04/23  0518    137 137 143   K 2.8* 3.5 3.3* 3.3*   CO2 24 24 23 25   BUN 7.9* 10.1 12.8 9.3*   CREATININE 0.78 0.75 0.82 0.75   CALCIUM 9.7 9.0 8.5 8.4   MG 1.50*  --  2.10 1.60   ALBUMIN 4.4  --  3.4  --    ALKPHOS 106  --  80  --    ALT 21  --  18  --    AST 25  --  24  --    BILITOT 0.7  --  0.8  --         Microbiology Results (last 7 days)       Procedure Component Value Units Date/Time    Stool Culture [[id number]]     Order Status:  Sent Specimen: Stool     C. Difficile By Rapid Pcr [[id number]]     Order Status: Sent Specimen: Stool              MRI Pelvis Without Contrast  Narrative: EXAMINATION:  MRI ABDOMEN AND PELVIS WITHOUT CONTRAST    CLINICAL HISTORY:  Crohn's disease of both small and large intestine without complications    TECHNIQUE:  Haste, T2 fat sat, T1 fat sat, diffusion, and cine sequences of the abdomen and pelvis. No contrast administered.  The cine sequences did not properly load to PACS due to a recent change in MRI software.    COMPARISON:  CT April 16, 2023    FINDINGS:  No liver lesion is identified. The gallbladder is absent. No intra or extrahepatic bile duct dilation is observed.    The spleen is not enlarged.    There is normal fat suppressed precontrast T1 parenchymal signal in the pancreatic head. At the pancreatic neck, there is a discrete change in pancreatic duct caliber, with upstream pancreatic duct dilation to reach a maximum diameter of 10 mm (series 4, image 11). There is no obvious underlying pancreatic mass. There are associated small dilated side branch radicles in the pancreatic body.    The adrenal glands are unremarkable.    There is no solid renal mass or hydronephrosis. A few small left renal upper pole cysts are present, measuring up to 1 cm in diameter.    The bladder shows no gross wall thickening or intraluminal abnormality. The uterus is absent. No adnexal masses identified.  Disc disease is advanced in the lower lumbar spine.    There are no dilated loops of small bowel. No small bowel wall thickening or edema is appreciated. Limited evaluation of the colon shows right hemicolectomy changes, but no additional significant abnormality.  Impression: No noncontrast MRI evidence of active inflammatory bowel disease.    Cholecystectomy.  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 No definite underlying pancreatic  mass identified.    Left renal cyst.    Hysterectomy. Lower lumbar spine disc disease.    Right hemicolectomy.    No noncontrast MRI of inflammatory changes in the bowel.    Electronically signed by: Ladarius Cueto  Date:    05/04/2023  Time:    14:51  MRI Abdomen Without Contrast  Narrative: EXAMINATION:  MRI ABDOMEN AND PELVIS WITHOUT CONTRAST    CLINICAL HISTORY:  Crohn's disease of both small and large intestine without complications    TECHNIQUE:  Haste, T2 fat sat, T1 fat sat, diffusion, and cine sequences of the abdomen and pelvis.  No contrast administered.  The cine sequences did not properly load to PACS due to a recent change in MRI software.    COMPARISON:  CT April 16, 2023    FINDINGS:  No liver lesion is identified. The gallbladder is absent. No intra or extrahepatic bile duct dilation is observed.    The spleen is not enlarged.    There is normal fat suppressed precontrast T1 parenchymal signal in the pancreatic head.  At the pancreatic neck, there is a discrete change in pancreatic duct caliber, with upstream pancreatic duct dilation to reach a maximum diameter of 10 mm (series 4, image 11).  There is no obvious underlying pancreatic mass.  There are associated small dilated side branch radicles in the pancreatic body.    The adrenal glands are unremarkable.    There is no solid renal mass or hydronephrosis.  A few small left renal upper pole cysts are present, measuring up to 1 cm in diameter.    The bladder shows no gross wall thickening or intraluminal abnormality.  The uterus is absent.  No adnexal masses identified.  Disc disease is advanced in the lower lumbar spine.    There are no dilated loops of small bowel.  No small bowel wall thickening or edema is appreciated.  Limited evaluation of the colon shows right hemicolectomy changes, but no additional significant abnormality.  Impression: No noncontrast MRI evidence of active inflammatory bowel disease.    Cholecystectomy.  Probable  high-grade pancreatic duct stricture at the pancreatic neck, with upstream pancreatic duct dilation to a diameter of 10 mm.  No definite underlying pancreatic mass identified.    Left renal cyst.    Hysterectomy.  Lower lumbar spine disc disease.    Right hemicolectomy.    No noncontrast MRI of inflammatory changes in the bowel.    Electronically signed by: Ladarius Cueto  Date:    05/04/2023  Time:    14:51         Medication List        START taking these medications      famotidine 20 MG tablet  Commonly known as: PEPCID  Take 1 tablet (20 mg total) by mouth 2 (two) times daily.     oxyCODONE 10 mg Tab immediate release tablet  Commonly known as: ROXICODONE  Take 1 tablet (10 mg total) by mouth every 6 (six) hours as needed for Pain.            CONTINUE taking these medications      cholecalciferol (vitamin D3) 1,250 mcg (50,000 unit) capsule     ondansetron 4 MG Tbdl  Commonly known as: ZOFRAN-ODT  Take 1 tablet (4 mg total) by mouth every 6 (six) hours as needed (Nausea/Vomiting).     simvastatin 20 MG tablet  Commonly known as: ZOCOR            STOP taking these medications      traMADoL 50 mg tablet  Commonly known as: ULTRAM               Where to Get Your Medications        These medications were sent to [id number] - [place], [place] - [address]  [address] [address]      Phone: [phone]   famotidine 20 MG tablet  ondansetron 4 MG Tbdl  oxyCODONE 10 mg Tab immediate release tablet          Explained in detail to the patient about the discharge plan, medications, and follow-up visits. Pt understands and agrees with the treatment plan  Discharge Disposition: Home or Self Care   Discharged Condition: stable  Diet-   Dietary Orders (From admission, onward)       Start     Ordered    06/03/23 1702  Diet low fiber/residue  Diet effective now         06/03/23 1701                   Medications Per DC med rec  Activities as tolerated   Follow-up Information       Primary Doctor No  Follow up.               Flaco Pierson MD. Schedule an appointment as soon as possible for a visit in 1 week(s).    Specialty: Gastroenterology  Contact information:  [address] [address]  [phone]                           For further questions contact hospitalist office    Discharge time 33 minutes    For worsening symptoms, chest pain, shortness of breath, increased abdominal pain, high grade fever, stroke or stroke like symptoms, immediately go to the nearest Emergency Room or call 911 as soon as possible.      Hoang Penaloza M.D on 6/4/2023. at 12:22 PM.

## 2023-06-04 NOTE — PROGRESS NOTES
"Gastroenterology Progress Note      HPI:    Pt states she feels much better today. Tolerating PO well. No GI complaints.     ROS:    Review of Systems   Constitutional:  Negative for fever, malaise/fatigue and weight loss.   Respiratory:  Negative for cough and shortness of breath.    Cardiovascular:  Negative for chest pain, palpitations and leg swelling.   Gastrointestinal:  Negative for abdominal pain, blood in stool, constipation, diarrhea, heartburn, melena, nausea and vomiting.   Musculoskeletal:  Negative for back pain and myalgias.   Skin:  Negative for rash.   Neurological:  Negative for speech change and focal weakness.   All other systems reviewed and are negative.      Vital Signs:  /61 (BP Location: Left arm, Patient Position: Lying)   Pulse 60   Temp 97.2 °F (36.2 °C) (Oral)   Resp 18   Ht 5' 7" (1.702 m)   Wt 65.3 kg (143 lb 15.4 oz)   SpO2 97%   BMI 22.55 kg/m²   Body mass index is 22.55 kg/m².    Physical Exam:    Physical Exam  Vitals and nursing note reviewed.   Constitutional:       Appearance: Normal appearance.   HENT:      Head: Normocephalic and atraumatic.   Eyes:      General: No scleral icterus.     Extraocular Movements: Extraocular movements intact.   Cardiovascular:      Rate and Rhythm: Normal rate and regular rhythm.      Heart sounds: Normal heart sounds.   Pulmonary:      Effort: Pulmonary effort is normal. No respiratory distress.      Breath sounds: Normal breath sounds.   Abdominal:      General: Abdomen is flat. Bowel sounds are normal. There is no distension.      Palpations: Abdomen is soft.      Tenderness: There is no abdominal tenderness.   Musculoskeletal:         General: No swelling or deformity. Normal range of motion.      Right lower leg: No edema.      Left lower leg: No edema.   Skin:     General: Skin is warm and dry.   Neurological:      General: No focal deficit present.      Mental Status: She is alert and oriented to person, place, and time. "   Psychiatric:         Mood and Affect: Mood normal.         Behavior: Behavior normal.       Labs:  Recent Results (from the past 48 hour(s))   CBC with Differential    Collection Time: 06/02/23 10:41 AM   Result Value Ref Range    WBC 7.97 4.50 - 11.50 x10(3)/mcL    RBC 4.41 4.20 - 5.40 x10(6)/mcL    Hgb 13.8 12.0 - 16.0 g/dL    Hct 39.7 37.0 - 47.0 %    MCV 90.0 80.0 - 94.0 fL    MCH 31.3 (H) 27.0 - 31.0 pg    MCHC 34.8 33.0 - 36.0 g/dL    RDW 14.0 11.5 - 17.0 %    Platelet 311 130 - 400 x10(3)/mcL    MPV 9.1 7.4 - 10.4 fL    Neut % 85.8 %    Lymph % 9.7 %    Mono % 3.8 %    Eos % 0.0 %    Basophil % 0.3 %    Lymph # 0.77 0.6 - 4.6 x10(3)/mcL    Neut # 6.85 2.1 - 9.2 x10(3)/mcL    Mono # 0.30 0.1 - 1.3 x10(3)/mcL    Eos # 0.00 0 - 0.9 x10(3)/mcL    Baso # 0.02 <=0.2 x10(3)/mcL    IG# 0.03 0 - 0.04 x10(3)/mcL    IG% 0.4 %    NRBC% 0.0 %   Comprehensive Metabolic Panel    Collection Time: 06/02/23 10:41 AM   Result Value Ref Range    Sodium Level 138 136 - 145 mmol/L    Potassium Level 2.8 (L) 3.5 - 5.1 mmol/L    Chloride 102 98 - 107 mmol/L    Carbon Dioxide 24 23 - 31 mmol/L    Glucose Level 142 (H) 82 - 115 mg/dL    Blood Urea Nitrogen 7.9 (L) 9.8 - 20.1 mg/dL    Creatinine 0.78 0.55 - 1.02 mg/dL    Calcium Level Total 9.7 8.4 - 10.2 mg/dL    Protein Total 8.4 (H) 5.8 - 7.6 gm/dL    Albumin Level 4.4 3.4 - 4.8 g/dL    Globulin 4.0 (H) 2.4 - 3.5 gm/dL    Albumin/Globulin Ratio 1.1 1.1 - 2.0 ratio    Bilirubin Total 0.7 <=1.5 mg/dL    Alkaline Phosphatase 106 40 - 150 unit/L    Alanine Aminotransferase 21 0 - 55 unit/L    Aspartate Aminotransferase 25 5 - 34 unit/L    eGFR >60 mls/min/1.73/m2   Lipase    Collection Time: 06/02/23 10:41 AM   Result Value Ref Range    Lipase Level 187 (H) <=60 U/L   Troponin I    Collection Time: 06/02/23 10:41 AM   Result Value Ref Range    Troponin-I 0.022 0.000 - 0.045 ng/mL   Magnesium    Collection Time: 06/02/23 10:41 AM   Result Value Ref Range    Magnesium Level 1.50 (L)  1.60 - 2.60 mg/dL   Basic Metabolic Panel    Collection Time: 06/02/23  4:34 PM   Result Value Ref Range    Sodium Level 137 136 - 145 mmol/L    Potassium Level 3.5 3.5 - 5.1 mmol/L    Chloride 104 98 - 107 mmol/L    Carbon Dioxide 24 23 - 31 mmol/L    Glucose Level 109 82 - 115 mg/dL    Blood Urea Nitrogen 10.1 9.8 - 20.1 mg/dL    Creatinine 0.75 0.55 - 1.02 mg/dL    BUN/Creatinine Ratio 13     Calcium Level Total 9.0 8.4 - 10.2 mg/dL    Anion Gap 9.0 mEq/L    eGFR >60 mls/min/1.73/m2   Lipid Panel    Collection Time: 06/02/23  4:34 PM   Result Value Ref Range    Cholesterol Total 186 <=200 mg/dL    HDL Cholesterol 60 35 - 60 mg/dL    Triglyceride 79 37 - 140 mg/dL    Cholesterol/HDL Ratio 3 0 - 5    Very Low Density Lipoprotein 16     LDL Cholesterol 110.00 50.00 - 140.00 mg/dL   C-Reactive Protein    Collection Time: 06/02/23  4:34 PM   Result Value Ref Range    C-Reactive Protein 1.20 <5.00 mg/L   Comprehensive Metabolic Panel (CMP)    Collection Time: 06/03/23  3:54 AM   Result Value Ref Range    Sodium Level 137 136 - 145 mmol/L    Potassium Level 3.3 (L) 3.5 - 5.1 mmol/L    Chloride 107 98 - 107 mmol/L    Carbon Dioxide 23 23 - 31 mmol/L    Glucose Level 100 82 - 115 mg/dL    Blood Urea Nitrogen 12.8 9.8 - 20.1 mg/dL    Creatinine 0.82 0.55 - 1.02 mg/dL    Calcium Level Total 8.5 8.4 - 10.2 mg/dL    Protein Total 6.6 5.8 - 7.6 gm/dL    Albumin Level 3.4 3.4 - 4.8 g/dL    Globulin 3.2 2.4 - 3.5 gm/dL    Albumin/Globulin Ratio 1.1 1.1 - 2.0 ratio    Bilirubin Total 0.8 <=1.5 mg/dL    Alkaline Phosphatase 80 40 - 150 unit/L    Alanine Aminotransferase 18 0 - 55 unit/L    Aspartate Aminotransferase 24 5 - 34 unit/L    eGFR >60 mls/min/1.73/m2   Magnesium    Collection Time: 06/03/23  3:54 AM   Result Value Ref Range    Magnesium Level 2.10 1.60 - 2.60 mg/dL   Phosphorus    Collection Time: 06/03/23  3:54 AM   Result Value Ref Range    Phosphorus Level 3.6 2.3 - 4.7 mg/dL   Lipase    Collection Time: 06/03/23   3:54 AM   Result Value Ref Range    Lipase Level 22 <=60 U/L   CBC with Differential    Collection Time: 06/03/23  3:54 AM   Result Value Ref Range    WBC 6.70 4.50 - 11.50 x10(3)/mcL    RBC 3.64 (L) 4.20 - 5.40 x10(6)/mcL    Hgb 11.3 (L) 12.0 - 16.0 g/dL    Hct 33.6 (L) 37.0 - 47.0 %    MCV 92.3 80.0 - 94.0 fL    MCH 31.0 27.0 - 31.0 pg    MCHC 33.6 33.0 - 36.0 g/dL    RDW 14.5 11.5 - 17.0 %    Platelet 241 130 - 400 x10(3)/mcL    MPV 9.5 7.4 - 10.4 fL    Neut % 66.4 %    Lymph % 24.6 %    Mono % 7.8 %    Eos % 0.7 %    Basophil % 0.1 %    Lymph # 1.65 0.6 - 4.6 x10(3)/mcL    Neut # 4.44 2.1 - 9.2 x10(3)/mcL    Mono # 0.52 0.1 - 1.3 x10(3)/mcL    Eos # 0.05 0 - 0.9 x10(3)/mcL    Baso # 0.01 <=0.2 x10(3)/mcL    IG# 0.03 0 - 0.04 x10(3)/mcL    IG% 0.4 %    NRBC% 0.0 %   Basic Metabolic Panel    Collection Time: 06/04/23  5:18 AM   Result Value Ref Range    Sodium Level 143 136 - 145 mmol/L    Potassium Level 3.3 (L) 3.5 - 5.1 mmol/L    Chloride 111 (H) 98 - 107 mmol/L    Carbon Dioxide 25 23 - 31 mmol/L    Glucose Level 101 82 - 115 mg/dL    Blood Urea Nitrogen 9.3 (L) 9.8 - 20.1 mg/dL    Creatinine 0.75 0.55 - 1.02 mg/dL    BUN/Creatinine Ratio 12     Calcium Level Total 8.4 8.4 - 10.2 mg/dL    Anion Gap 7.0 mEq/L    eGFR >60 mls/min/1.73/m2   Magnesium    Collection Time: 06/04/23  5:18 AM   Result Value Ref Range    Magnesium Level 1.60 1.60 - 2.60 mg/dL         Assessment/Plan:     82 year-old  female known to Dr. Pierson with a PMH of Crohn's colitis/ileitis on Entyvio q 8weeks, s/p three surgical resections w/ Dr. Vlad Bowen, hepatic steatosis, osteopenia, acute pancreatitis, carpal tunnel syndrome, HTN, HLD, CAD/MI, lumbar stenosis with chronic back pain, DAQUAN, cervical cancer.     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 Max for EUS to evaluate.      Colonoscopy 2/14/23- ileocolonic  anastomosis in the transverse colon, benign appearing intrinsic stenosis in the anal canal that the scope was able to traverse, ulceration at the anastomosis compatible with Crohn's disease, otherwise normal exam      Episodic n/v  - possibly medication induced  - Continue zofran    Ileocolonic Crohn's disease -  managed on entyvio q 8weeks   - MRI on 5/4 no active inflammation  - CRP wnl  - Entivyo tx was a week and a half ago.    3. 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  - EUS scheduled w/ Dr. Max 6/8/2023  Tolerating liquids     - over all condition suspicious for pancreatitis, possibly secondary to entvio  - follow up as soon as possible in clinic with Dr. Pierson to discuss possibly changing biologics. We will arrange  - tolerating PO. Ok to d/c from GI standpoint for outpatient EUS in 4 days.   - ok for regular diet    Nakia Davalos PA-C  [facility], [facility]

## 2023-06-05 ENCOUNTER — ANESTHESIA EVENT (OUTPATIENT)
Dept: SURGERY | Facility: HOSPITAL | Age: 83
DRG: 439 | End: 2023-06-05
Payer: MEDICARE

## 2023-06-08 ENCOUNTER — ANESTHESIA (OUTPATIENT)
Dept: SURGERY | Facility: HOSPITAL | Age: 83
DRG: 439 | End: 2023-06-08
Payer: MEDICARE

## 2023-06-08 PROCEDURE — 63600175 PHARM REV CODE 636 W HCPCS: Performed by: NURSE ANESTHETIST, CERTIFIED REGISTERED

## 2023-06-08 PROCEDURE — 88305 TISSUE EXAM BY PATHOLOGIST: CPT

## 2023-06-08 PROCEDURE — D9220A PRA ANESTHESIA: ICD-10-PCS | Mod: CRNA,,, | Performed by: NURSE ANESTHETIST, CERTIFIED REGISTERED

## 2023-06-08 PROCEDURE — 25000003 PHARM REV CODE 250: Performed by: NURSE ANESTHETIST, CERTIFIED REGISTERED

## 2023-06-08 PROCEDURE — D9220A PRA ANESTHESIA: ICD-10-PCS | Mod: ANES,,, | Performed by: ANESTHESIOLOGY

## 2023-06-08 PROCEDURE — 88173 CYTOPATH EVAL FNA REPORT: CPT

## 2023-06-08 PROCEDURE — D9220A PRA ANESTHESIA: Mod: ANES,,, | Performed by: ANESTHESIOLOGY

## 2023-06-08 PROCEDURE — D9220A PRA ANESTHESIA: Mod: CRNA,,, | Performed by: NURSE ANESTHETIST, CERTIFIED REGISTERED

## 2023-06-08 RX ORDER — METOPROLOL TARTRATE 1 MG/ML
INJECTION, SOLUTION INTRAVENOUS
Status: DISCONTINUED | OUTPATIENT
Start: 2023-06-08 | End: 2023-06-08

## 2023-06-08 RX ORDER — ONDANSETRON 2 MG/ML
INJECTION INTRAMUSCULAR; INTRAVENOUS
Status: DISCONTINUED | OUTPATIENT
Start: 2023-06-08 | End: 2023-06-08

## 2023-06-08 RX ORDER — PROPOFOL 10 MG/ML
INJECTION, EMULSION INTRAVENOUS CONTINUOUS PRN
Status: DISCONTINUED | OUTPATIENT
Start: 2023-06-08 | End: 2023-06-08

## 2023-06-08 RX ORDER — GLYCOPYRROLATE 0.2 MG/ML
INJECTION INTRAMUSCULAR; INTRAVENOUS
Status: DISCONTINUED | OUTPATIENT
Start: 2023-06-08 | End: 2023-06-08

## 2023-06-08 RX ORDER — LIDOCAINE HYDROCHLORIDE 20 MG/ML
INJECTION INTRAVENOUS
Status: DISCONTINUED | OUTPATIENT
Start: 2023-06-08 | End: 2023-06-08

## 2023-06-08 RX ADMIN — METOPROLOL TARTRATE 1 MG: 1 INJECTION, SOLUTION INTRAVENOUS at 11:06

## 2023-06-08 RX ADMIN — PROPOFOL 20 MG: 10 INJECTION, EMULSION INTRAVENOUS at 10:06

## 2023-06-08 RX ADMIN — PROPOFOL 40 MG: 10 INJECTION, EMULSION INTRAVENOUS at 10:06

## 2023-06-08 RX ADMIN — PROPOFOL 100 MCG/KG/MIN: 10 INJECTION, EMULSION INTRAVENOUS at 10:06

## 2023-06-08 RX ADMIN — ONDANSETRON 4 MG: 2 INJECTION INTRAMUSCULAR; INTRAVENOUS at 11:06

## 2023-06-08 RX ADMIN — GLYCOPYRROLATE 0.2 MG: 0.2 INJECTION INTRAMUSCULAR; INTRAVENOUS at 10:06

## 2023-06-08 RX ADMIN — SODIUM CHLORIDE, SODIUM GLUCONATE, SODIUM ACETATE, POTASSIUM CHLORIDE AND MAGNESIUM CHLORIDE: 526; 502; 368; 37; 30 INJECTION, SOLUTION INTRAVENOUS at 10:06

## 2023-06-08 RX ADMIN — LIDOCAINE HYDROCHLORIDE 80 MG: 20 INJECTION INTRAVENOUS at 10:06

## 2023-06-08 NOTE — ANESTHESIA PREPROCEDURE EVALUATION
06/08/2023  Evelyn Proctor is a 82 y.o., femalePresents as an outpatient for EGD with EUS and recent admission with acute pancreatitis.  Patient denies any abdominal pain this morning    Left heart catheterization 2019   Nonobstructive CAD  EF 65%    Last 3 sets of Vitals    Vitals - 1 value per visit 6/4/2023 6/4/2023 6/8/2023   SYSTOLIC 157 158 153   DIASTOLIC 66 78 86   Pulse 70 73 75   Temp 99.4 99 99.1   Resp 18 16 18   SPO2 99 98 98   Weight (lb) - - -   Weight (kg) - - -   Height - - -   BMI (Calculated) - - -   VISIT REPORT - - -   Pain Score  - - -         Lab Results   Component Value Date    WBC 6.70 06/03/2023    HGB 11.3 (L) 06/03/2023    HCT 33.6 (L) 06/03/2023    MCV 92.3 06/03/2023     06/03/2023          BMP  Lab Results   Component Value Date     06/04/2023    K 3.3 (L) 06/04/2023    CO2 25 06/04/2023    BUN 9.3 (L) 06/04/2023    CREATININE 0.75 06/04/2023    CALCIUM 8.4 06/04/2023    EGFRNONAA >60 09/29/2021      Recent Labs   Lab 06/02/23  1041   TROPONINI 0.022        Lab Results   Component Value Date    INR 1.00 11/15/2022    PROTIME 13.1 11/15/2022       Pre-op Assessment    I have reviewed the Patient Summary Reports.    I have reviewed the NPO Status.   I have reviewed the Medications.     Review of Systems  Anesthesia Hx:   Denies Personal Hx of Anesthesia complications.   Social:  Non-Smoker    Cardiovascular:   Past MI   Functional Capacity 3.5 METS    Pulmonary:   Sleep Apnea        Physical Exam  General: Well nourished, Cooperative, Alert and Oriented    Airway:  Mallampati: II   Mouth Opening: Normal  TM Distance: Normal  Tongue: Normal  Neck ROM: Normal ROM    Dental:  Intact    Chest/Lungs:  Clear to auscultation, Normal Respiratory Rate    Heart:  Rate: Normal  Rhythm: Regular Rhythm        Anesthesia Plan  Type of Anesthesia, risks & benefits  discussed:    Anesthesia Type: Gen Natural Airway  Intra-op Monitoring Plan: Standard ASA Monitors  Induction:  IV  Informed Consent: Informed consent signed with the Patient and all parties understand the risks and agree with anesthesia plan.  All questions answered.   ASA Score: 3  Day of Surgery Review of History & Physical: H&P Update referred to the surgeon/provider.    Ready For Surgery From Anesthesia Perspective.     .

## 2023-06-08 NOTE — TRANSFER OF CARE
"Anesthesia Transfer of Care Note    Patient: Evelyn Proctor    Procedure(s) Performed: Procedure(s) (LRB):  UPPER EUS  W/  FNA (N/A)  EGD (ESOPHAGOGASTRODUODENOSCOPY) (N/A)    Patient location: OPS    Anesthesia Type: general    Transport from OR: Transported from OR on room air with adequate spontaneous ventilation    Post pain: adequate analgesia    Post assessment: no apparent anesthetic complications    Post vital signs: stable    Level of consciousness: awake and alert    Nausea/Vomiting: nausea and no vomiting    Complications: none    Transfer of care protocol was followed      Last vitals:   Visit Vitals  BP (!) 153/86   Pulse 75   Temp 37.3 °C (99.1 °F) (Oral)   Resp 18   Ht 5' 2" (1.575 m)   Wt 63.4 kg (139 lb 12.4 oz)   SpO2 98%   Breastfeeding No   BMI 21.89 kg/m²     "

## 2023-06-09 ENCOUNTER — PATIENT OUTREACH (OUTPATIENT)
Dept: ADMINISTRATIVE | Facility: CLINIC | Age: 83
End: 2023-06-09
Payer: MEDICARE

## 2023-06-09 ENCOUNTER — HOSPITAL ENCOUNTER (INPATIENT)
Facility: HOSPITAL | Age: 83
LOS: 3 days | Discharge: HOME HEALTH | DRG: 439 | End: 2023-06-12
Attending: STUDENT IN AN ORGANIZED HEALTH CARE EDUCATION/TRAINING PROGRAM | Admitting: INTERNAL MEDICINE
Payer: MEDICARE

## 2023-06-09 DIAGNOSIS — R11.2 NAUSEA AND VOMITING, UNSPECIFIED VOMITING TYPE: Primary | ICD-10-CM

## 2023-06-09 DIAGNOSIS — R10.84 GENERALIZED ABDOMINAL PAIN: ICD-10-CM

## 2023-06-09 DIAGNOSIS — I10 HYPERTENSION: ICD-10-CM

## 2023-06-09 DIAGNOSIS — C25.9 MALIGNANT NEOPLASM OF PANCREAS, UNSPECIFIED LOCATION OF MALIGNANCY: ICD-10-CM

## 2023-06-09 DIAGNOSIS — E87.6 HYPOKALEMIA: ICD-10-CM

## 2023-06-09 LAB
ALBUMIN SERPL-MCNC: 3.9 G/DL (ref 3.4–4.8)
ALBUMIN/GLOB SERPL: 1 RATIO (ref 1.1–2)
ALP SERPL-CCNC: 108 UNIT/L (ref 40–150)
ALT SERPL-CCNC: 14 UNIT/L (ref 0–55)
APPEARANCE UR: CLEAR
APTT PPP: 28.2 SECONDS (ref 23.2–33.7)
AST SERPL-CCNC: 18 UNIT/L (ref 5–34)
BACTERIA #/AREA URNS AUTO: NORMAL /HPF
BASOPHILS # BLD AUTO: 0.01 X10(3)/MCL
BASOPHILS NFR BLD AUTO: 0.2 %
BILIRUB UR QL STRIP.AUTO: NEGATIVE MG/DL
BILIRUBIN DIRECT+TOT PNL SERPL-MCNC: 0.7 MG/DL
BUN SERPL-MCNC: 4.4 MG/DL (ref 9.8–20.1)
CALCIUM SERPL-MCNC: 9.5 MG/DL (ref 8.4–10.2)
CHLORIDE SERPL-SCNC: 94 MMOL/L (ref 98–107)
CO2 SERPL-SCNC: 30 MMOL/L (ref 23–31)
COLOR UR: YELLOW
CREAT SERPL-MCNC: 0.77 MG/DL (ref 0.55–1.02)
EOSINOPHIL # BLD AUTO: 0.01 X10(3)/MCL (ref 0–0.9)
EOSINOPHIL NFR BLD AUTO: 0.2 %
ERYTHROCYTE [DISTWIDTH] IN BLOOD BY AUTOMATED COUNT: 13.5 % (ref 11.5–17)
GFR SERPLBLD CREATININE-BSD FMLA CKD-EPI: >60 MLS/MIN/1.73/M2
GLOBULIN SER-MCNC: 4 GM/DL (ref 2.4–3.5)
GLUCOSE SERPL-MCNC: 151 MG/DL (ref 82–115)
GLUCOSE UR QL STRIP.AUTO: NEGATIVE MG/DL
HCT VFR BLD AUTO: 37.1 % (ref 37–47)
HGB BLD-MCNC: 12.6 G/DL (ref 12–16)
IMM GRANULOCYTES # BLD AUTO: 0.01 X10(3)/MCL (ref 0–0.04)
IMM GRANULOCYTES NFR BLD AUTO: 0.2 %
INR BLD: 0.99 (ref 0–1.3)
KETONES UR QL STRIP.AUTO: ABNORMAL MG/DL
LEUKOCYTE ESTERASE UR QL STRIP.AUTO: NEGATIVE UNIT/L
LIPASE SERPL-CCNC: 13 U/L
LYMPHOCYTES # BLD AUTO: 0.65 X10(3)/MCL (ref 0.6–4.6)
LYMPHOCYTES NFR BLD AUTO: 10.9 %
MCH RBC QN AUTO: 30.8 PG (ref 27–31)
MCHC RBC AUTO-ENTMCNC: 34 G/DL (ref 33–36)
MCV RBC AUTO: 90.7 FL (ref 80–94)
MONOCYTES # BLD AUTO: 0.33 X10(3)/MCL (ref 0.1–1.3)
MONOCYTES NFR BLD AUTO: 5.5 %
NEUTROPHILS # BLD AUTO: 4.96 X10(3)/MCL (ref 2.1–9.2)
NEUTROPHILS NFR BLD AUTO: 83 %
NITRITE UR QL STRIP.AUTO: NEGATIVE
NRBC BLD AUTO-RTO: 0 %
PH UR STRIP.AUTO: 8 [PH]
PLATELET # BLD AUTO: 252 X10(3)/MCL (ref 130–400)
PMV BLD AUTO: 9.7 FL (ref 7.4–10.4)
POTASSIUM SERPL-SCNC: 2.8 MMOL/L (ref 3.5–5.1)
PROT SERPL-MCNC: 7.9 GM/DL (ref 5.8–7.6)
PROT UR QL STRIP.AUTO: NEGATIVE MG/DL
PROTHROMBIN TIME: 13 SECONDS (ref 12.5–14.5)
RBC # BLD AUTO: 4.09 X10(6)/MCL (ref 4.2–5.4)
RBC #/AREA URNS AUTO: <5 /HPF
RBC UR QL AUTO: NEGATIVE UNIT/L
SODIUM SERPL-SCNC: 137 MMOL/L (ref 136–145)
SP GR UR STRIP.AUTO: 1.01 (ref 1–1.03)
SQUAMOUS #/AREA URNS AUTO: <5 /HPF
UROBILINOGEN UR STRIP-ACNC: 0.2 MG/DL
WBC # SPEC AUTO: 5.97 X10(3)/MCL (ref 4.5–11.5)
WBC #/AREA URNS AUTO: <5 /HPF

## 2023-06-09 PROCEDURE — 63600175 PHARM REV CODE 636 W HCPCS: Performed by: PHYSICIAN ASSISTANT

## 2023-06-09 PROCEDURE — 85730 THROMBOPLASTIN TIME PARTIAL: CPT | Performed by: STUDENT IN AN ORGANIZED HEALTH CARE EDUCATION/TRAINING PROGRAM

## 2023-06-09 PROCEDURE — 25500020 PHARM REV CODE 255: Performed by: STUDENT IN AN ORGANIZED HEALTH CARE EDUCATION/TRAINING PROGRAM

## 2023-06-09 PROCEDURE — 21400001 HC TELEMETRY ROOM

## 2023-06-09 PROCEDURE — 85610 PROTHROMBIN TIME: CPT | Performed by: STUDENT IN AN ORGANIZED HEALTH CARE EDUCATION/TRAINING PROGRAM

## 2023-06-09 PROCEDURE — 63600175 PHARM REV CODE 636 W HCPCS: Performed by: INTERNAL MEDICINE

## 2023-06-09 PROCEDURE — 93010 EKG 12-LEAD: ICD-10-PCS | Mod: ,,, | Performed by: INTERNAL MEDICINE

## 2023-06-09 PROCEDURE — 25000003 PHARM REV CODE 250: Performed by: PHYSICIAN ASSISTANT

## 2023-06-09 PROCEDURE — 63600175 PHARM REV CODE 636 W HCPCS: Performed by: STUDENT IN AN ORGANIZED HEALTH CARE EDUCATION/TRAINING PROGRAM

## 2023-06-09 PROCEDURE — 93010 ELECTROCARDIOGRAM REPORT: CPT | Mod: ,,, | Performed by: INTERNAL MEDICINE

## 2023-06-09 PROCEDURE — 93005 ELECTROCARDIOGRAM TRACING: CPT

## 2023-06-09 PROCEDURE — 96375 TX/PRO/DX INJ NEW DRUG ADDON: CPT

## 2023-06-09 PROCEDURE — 11000001 HC ACUTE MED/SURG PRIVATE ROOM

## 2023-06-09 PROCEDURE — 83690 ASSAY OF LIPASE: CPT | Performed by: STUDENT IN AN ORGANIZED HEALTH CARE EDUCATION/TRAINING PROGRAM

## 2023-06-09 PROCEDURE — 99285 EMERGENCY DEPT VISIT HI MDM: CPT | Mod: 25

## 2023-06-09 PROCEDURE — 96374 THER/PROPH/DIAG INJ IV PUSH: CPT

## 2023-06-09 PROCEDURE — 80053 COMPREHEN METABOLIC PANEL: CPT | Performed by: STUDENT IN AN ORGANIZED HEALTH CARE EDUCATION/TRAINING PROGRAM

## 2023-06-09 PROCEDURE — 85025 COMPLETE CBC W/AUTO DIFF WBC: CPT | Performed by: STUDENT IN AN ORGANIZED HEALTH CARE EDUCATION/TRAINING PROGRAM

## 2023-06-09 PROCEDURE — 81001 URINALYSIS AUTO W/SCOPE: CPT | Performed by: STUDENT IN AN ORGANIZED HEALTH CARE EDUCATION/TRAINING PROGRAM

## 2023-06-09 RX ORDER — GLUCAGON 1 MG
1 KIT INJECTION
Status: DISCONTINUED | OUTPATIENT
Start: 2023-06-09 | End: 2023-06-12 | Stop reason: HOSPADM

## 2023-06-09 RX ORDER — POTASSIUM CHLORIDE 14.9 MG/ML
40 INJECTION INTRAVENOUS
Status: COMPLETED | OUTPATIENT
Start: 2023-06-09 | End: 2023-06-09

## 2023-06-09 RX ORDER — ONDANSETRON 2 MG/ML
4 INJECTION INTRAMUSCULAR; INTRAVENOUS
Status: COMPLETED | OUTPATIENT
Start: 2023-06-09 | End: 2023-06-09

## 2023-06-09 RX ORDER — MORPHINE SULFATE 4 MG/ML
4 INJECTION, SOLUTION INTRAMUSCULAR; INTRAVENOUS EVERY 4 HOURS PRN
Status: DISCONTINUED | OUTPATIENT
Start: 2023-06-09 | End: 2023-06-09

## 2023-06-09 RX ORDER — SODIUM CHLORIDE 9 MG/ML
INJECTION, SOLUTION INTRAVENOUS CONTINUOUS
Status: DISCONTINUED | OUTPATIENT
Start: 2023-06-09 | End: 2023-06-12

## 2023-06-09 RX ORDER — ENOXAPARIN SODIUM 100 MG/ML
40 INJECTION SUBCUTANEOUS EVERY 24 HOURS
Status: DISCONTINUED | OUTPATIENT
Start: 2023-06-09 | End: 2023-06-12 | Stop reason: HOSPADM

## 2023-06-09 RX ORDER — MORPHINE SULFATE 4 MG/ML
4 INJECTION, SOLUTION INTRAMUSCULAR; INTRAVENOUS
Status: COMPLETED | OUTPATIENT
Start: 2023-06-09 | End: 2023-06-09

## 2023-06-09 RX ORDER — LOPERAMIDE HYDROCHLORIDE 2 MG/1
2 CAPSULE ORAL 4 TIMES DAILY PRN
Status: DISCONTINUED | OUTPATIENT
Start: 2023-06-09 | End: 2023-06-12 | Stop reason: HOSPADM

## 2023-06-09 RX ORDER — IBUPROFEN 200 MG
24 TABLET ORAL
Status: DISCONTINUED | OUTPATIENT
Start: 2023-06-09 | End: 2023-06-12 | Stop reason: HOSPADM

## 2023-06-09 RX ORDER — POTASSIUM CHLORIDE 7.45 MG/ML
40 INJECTION INTRAVENOUS
Status: COMPLETED | OUTPATIENT
Start: 2023-06-09 | End: 2023-06-09

## 2023-06-09 RX ORDER — MAGNESIUM SULFATE HEPTAHYDRATE 40 MG/ML
2 INJECTION, SOLUTION INTRAVENOUS
Status: COMPLETED | OUTPATIENT
Start: 2023-06-09 | End: 2023-06-09

## 2023-06-09 RX ORDER — ACETAMINOPHEN 325 MG/1
650 TABLET ORAL EVERY 4 HOURS PRN
Status: DISCONTINUED | OUTPATIENT
Start: 2023-06-09 | End: 2023-06-12 | Stop reason: HOSPADM

## 2023-06-09 RX ORDER — HYDRALAZINE HYDROCHLORIDE 20 MG/ML
20 INJECTION INTRAMUSCULAR; INTRAVENOUS EVERY 6 HOURS PRN
Status: DISCONTINUED | OUTPATIENT
Start: 2023-06-09 | End: 2023-06-12 | Stop reason: HOSPADM

## 2023-06-09 RX ORDER — HYDROMORPHONE HYDROCHLORIDE 2 MG/ML
1 INJECTION, SOLUTION INTRAMUSCULAR; INTRAVENOUS; SUBCUTANEOUS EVERY 4 HOURS PRN
Status: DISCONTINUED | OUTPATIENT
Start: 2023-06-09 | End: 2023-06-12 | Stop reason: HOSPADM

## 2023-06-09 RX ORDER — IBUPROFEN 200 MG
16 TABLET ORAL
Status: DISCONTINUED | OUTPATIENT
Start: 2023-06-09 | End: 2023-06-12 | Stop reason: HOSPADM

## 2023-06-09 RX ORDER — ACETAMINOPHEN 325 MG/1
650 TABLET ORAL EVERY 8 HOURS PRN
Status: DISCONTINUED | OUTPATIENT
Start: 2023-06-09 | End: 2023-06-12 | Stop reason: HOSPADM

## 2023-06-09 RX ORDER — ONDANSETRON 2 MG/ML
4 INJECTION INTRAMUSCULAR; INTRAVENOUS EVERY 6 HOURS PRN
Status: DISCONTINUED | OUTPATIENT
Start: 2023-06-09 | End: 2023-06-12 | Stop reason: HOSPADM

## 2023-06-09 RX ORDER — SODIUM CHLORIDE 0.9 % (FLUSH) 0.9 %
10 SYRINGE (ML) INJECTION EVERY 12 HOURS PRN
Status: DISCONTINUED | OUTPATIENT
Start: 2023-06-09 | End: 2023-06-12 | Stop reason: HOSPADM

## 2023-06-09 RX ORDER — PROMETHAZINE HYDROCHLORIDE 25 MG/ML
12.5 INJECTION, SOLUTION INTRAMUSCULAR; INTRAVENOUS EVERY 6 HOURS PRN
Status: DISCONTINUED | OUTPATIENT
Start: 2023-06-09 | End: 2023-06-12 | Stop reason: HOSPADM

## 2023-06-09 RX ORDER — POTASSIUM CHLORIDE 7.45 MG/ML
40 INJECTION INTRAVENOUS
Status: DISCONTINUED | OUTPATIENT
Start: 2023-06-09 | End: 2023-06-09

## 2023-06-09 RX ORDER — HYDRALAZINE HYDROCHLORIDE 20 MG/ML
10 INJECTION INTRAMUSCULAR; INTRAVENOUS
Status: COMPLETED | OUTPATIENT
Start: 2023-06-09 | End: 2023-06-09

## 2023-06-09 RX ADMIN — SODIUM CHLORIDE, POTASSIUM CHLORIDE, SODIUM LACTATE AND CALCIUM CHLORIDE 1000 ML: 600; 310; 30; 20 INJECTION, SOLUTION INTRAVENOUS at 09:06

## 2023-06-09 RX ADMIN — POTASSIUM CHLORIDE 40 MEQ: 14.9 INJECTION, SOLUTION INTRAVENOUS at 04:06

## 2023-06-09 RX ADMIN — SODIUM CHLORIDE, POTASSIUM CHLORIDE, SODIUM LACTATE AND CALCIUM CHLORIDE 1000 ML: 600; 310; 30; 20 INJECTION, SOLUTION INTRAVENOUS at 08:06

## 2023-06-09 RX ADMIN — SODIUM CHLORIDE: 9 INJECTION, SOLUTION INTRAVENOUS at 01:06

## 2023-06-09 RX ADMIN — HYDROMORPHONE HYDROCHLORIDE 1 MG: 2 INJECTION INTRAMUSCULAR; INTRAVENOUS; SUBCUTANEOUS at 02:06

## 2023-06-09 RX ADMIN — HYDROMORPHONE HYDROCHLORIDE 1 MG: 2 INJECTION INTRAMUSCULAR; INTRAVENOUS; SUBCUTANEOUS at 06:06

## 2023-06-09 RX ADMIN — ONDANSETRON 4 MG: 2 INJECTION INTRAMUSCULAR; INTRAVENOUS at 12:06

## 2023-06-09 RX ADMIN — MORPHINE SULFATE 4 MG: 4 INJECTION INTRAVENOUS at 08:06

## 2023-06-09 RX ADMIN — POTASSIUM CHLORIDE 40 MEQ: 7.46 INJECTION, SOLUTION INTRAVENOUS at 09:06

## 2023-06-09 RX ADMIN — ONDANSETRON 4 MG: 2 INJECTION INTRAMUSCULAR; INTRAVENOUS at 06:06

## 2023-06-09 RX ADMIN — HYDROMORPHONE HYDROCHLORIDE 1 MG: 2 INJECTION INTRAMUSCULAR; INTRAVENOUS; SUBCUTANEOUS at 10:06

## 2023-06-09 RX ADMIN — MORPHINE SULFATE 4 MG: 4 INJECTION INTRAVENOUS at 12:06

## 2023-06-09 RX ADMIN — HYDRALAZINE HYDROCHLORIDE 10 MG: 20 INJECTION INTRAMUSCULAR; INTRAVENOUS at 09:06

## 2023-06-09 RX ADMIN — ONDANSETRON 4 MG: 2 INJECTION INTRAMUSCULAR; INTRAVENOUS at 08:06

## 2023-06-09 RX ADMIN — MAGNESIUM SULFATE HEPTAHYDRATE 2 G: 40 INJECTION, SOLUTION INTRAVENOUS at 10:06

## 2023-06-09 RX ADMIN — ENOXAPARIN SODIUM 40 MG: 40 INJECTION SUBCUTANEOUS at 05:06

## 2023-06-09 RX ADMIN — POTASSIUM CHLORIDE 40 MEQ: 14.9 INJECTION, SOLUTION INTRAVENOUS at 02:06

## 2023-06-09 RX ADMIN — IOPAMIDOL 100 ML: 755 INJECTION, SOLUTION INTRAVENOUS at 10:06

## 2023-06-09 NOTE — H&P
[facility] Hospital Medicine History & Physical Examination       Patient Name: Evelyn Proctor  MRN: [id number]  Patient Class: IP- Inpatient   Admission Date: 6/9/2023   Admitting Physician: Hoang Quinonez MD   Length of Stay: 0  Attending Physician: Hoang Quinonez MD   Primary Care Provider: Primary Doctor No  Face-to-Face encounter date: 06/09/2023  Code Status: Full Code  Chief Complaint: Abdominal Pain (Nausea and vomiting with upper abdominal pain. Discharged on Sunday w/ pancreatitisMarianna Max yesterday w/ EUS w/ Dx of pancreatic cancer, ate soup yesterday, last night n/v/abdominal pain. Patient daughter is present. )        Patient information was obtained from patient, patient's family, past medical records and ER records.     HISTORY OF PRESENT ILLNESS:   Evelyn Proctor is a 82 y.o. Black or  female with a past medical history of hyperlipidemia, coronary artery disease, Crohn's disease on Entyvio every 6 weeks status post right hemicolectomy, hepatic steatosis, rheumatoid arthritis, osteopenia and cervical cancer.  Patient recent admission to hospital medicine services from 06/02/2023 to 06/04/2022 or acute pancreatitis.  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  She underwent an ERCP with Dr. Max yesterday (06/08/2023).  He was noted to be normal esophagus, stomach and duodenum, no sign of significant pathology in the ampulla, common bile duct and left lobe of the liver, a mass was identified at the pancreatic neck and tissue was obtained and is adenocarcinoma staged T3. The patient presented to [facility] on 6/9/2023 with a primary complaint of worsening generalized abdominal pain.  Associated symptoms include nausea, vomiting and diarrhea which she reports is consistent with Crohn's.  She denies complaints of fever, cough, chest pain shortness of breath. Patient's daughter  walked in mid exam and is requesting consult to Dr. Philip, oncologist. I explained to her that this physician is with [facility] and does not come to [facility]. She states she does not see why he cannot come here and does not want any other physician to be consulted until it is approved by her.     Upon presentation to the ED, temperature 97.2° F, heart rate 95, blood pressure 201/124, respiratory rate 20 and SpO2 98% room air.  Labs with potassium 2.8.  UA negative for infection.  EKG normal sinus rhythm and nonspecific ST abnormalities.  Chest x-ray with no acute process.  CT abdomen and pelvis with contrast preliminary report reveals interval development of some colonic wall thickening in the left colon especially at the splenic flexure and proximal descending colon concerning for developing colitis, mild peripancreatic inflammatory changes seen anemia in the body and tail of the pancreas consistent with pancreatitis and persistent pancreatic duct dilation in the body and tail prescribed on previous imaging.  In ED patient received hydralazine, Zofran, morphine, 2 g of IV magnesium sulfate, 40 mEq of IV potassium chloride. Patient is admitted to hospital medicine services for further medical management.    PAST MEDICAL HISTORY:   Hyperlipidemia  Coronary artery disease  Crohn's disease on Entyvio every 6 weeks status post right hemicolectomy  Rheumatoid arthritis  Hepatic steatosis   Osteopenia  Lumbar spondylosis  Cervical radiculopathy  Carpal tunnel  Cervical cancer  Obstructive sleep apnea    PAST SURGICAL HISTORY:     Past Surgical History:   Procedure Laterality Date    bilateral L4-5, L5-S1 decompression, repair L5-S1 dural tear  10/01/2021    Dr. Marin    CARPAL TUNNEL RELEASE Right 09/06/2019    Dr. Marin    CARPAL TUNNEL RELEASE Left 12/2/2022    Procedure: RELEASE, CARPAL TUNNEL;  Surgeon: Jesse Marin MD;  [facility];  Service: Neurosurgery;  Laterality: Left;    CHOLECYSTECTOMY       COLECTOMY  07/2011    partial x3    ESOPHAGOGASTRODUODENOSCOPY N/A 6/8/2023    Procedure: EGD (ESOPHAGOGASTRODUODENOSCOPY);  Surgeon: Jose Alberto Max MD;  Location: [facility] OR;  Service: Gastroenterology;  Laterality: N/A;    HYSTERECTOMY      total    REPAIR OF EYELID Bilateral 11/21/2022    Procedure: BILATERAL ECTROPION REPAIR;  Surgeon: Justin Flores MD;  Location: [facility] OR;  Service: ENT;  Laterality: Bilateral;    ULTRASOUND, ENDOSCOPIC, WITH FINE NEEDLE ASPIRATION N/A 6/8/2023    Procedure: UPPER EUS  W/  FNA;  Surgeon: Jose Alberto Max MD;  Location: [facility] OR;  Service: Gastroenterology;  Laterality: N/A;  Dangelo, pt w/ CD on entyvio recently admitted to [facility] w/ acute pancreatitis. Possibly drug induced from entyvio? Hudsonshe does have dilated PD and a poss focal stricture in the HOP.CA 19-9 nml       ALLERGIES:   Patient has no known allergies.    FAMILY HISTORY:   Mother: Diabetes mellitus type 2, hypertension  Hypertension, coronary artery disease    SOCIAL HISTORY:   Former smoker   Denies alcohol or illicit drug use    HOME MEDICATIONS:     Prior to Admission medications    Medication Sig Start Date End Date Taking? Authorizing Provider   famotidine (PEPCID) 20 MG tablet Take 1 tablet (20 mg total) by mouth 2 (two) times daily. 6/4/23 7/4/23 Yes Hoang Quinonez MD   ondansetron (ZOFRAN-ODT) 4 MG TbDL Take 1 tablet (4 mg total) by mouth every 6 (six) hours as needed (Nausea/Vomiting). 6/4/23 6/11/23 Yes Hoang Quinonez MD   oxyCODONE (ROXICODONE) 10 mg Tab immediate release tablet Take 1 tablet (10 mg total) by mouth every 6 (six) hours as needed for Pain. 6/4/23 6/11/23 Yes Hoang Quinonez MD   cholecalciferol, vitamin D3, 1,250 mcg (50,000 unit) capsule Take 1,250 mcg by mouth once a week. 9/30/21   Historical Provider   pantoprazole (PROTONIX) 40 MG tablet Take 1 tablet by mouth every morning. 5/22/23 5/21/24  Historical Provider   simvastatin (ZOCOR) 20 MG tablet Take 20 mg by mouth every evening.  4/28/22   Historical Provider       REVIEW OF SYSTEMS:   Except as documented, all other systems reviewed and negative     PHYSICAL EXAM:     VITAL SIGNS: 24 HRS MIN & MAX LAST   Temp  Min: 97.2 °F (36.2 °C)  Max: 97.2 °F (36.2 °C) 97.2 °F (36.2 °C)   BP  Min: 169/106  Max: 222/97 (!) 196/111   Pulse  Min: 80  Max: 95  85   Resp  Min: 14  Max: 20 20   SpO2  Min: 95 %  Max: 99 % 99 %       General appearance: Well-developed, well-nourished female in no apparent distress.  Daughter at bedside.  HEENT: Atraumatic head.  Dry mucous membranes of oral cavity.  Lungs: Clear to auscultation bilaterally.   Heart: Regular rate and rhythm.   Abdomen: Soft, non-distended, generalized tenderness. Bowel sounds are normal.   Extremities: No cyanosis, clubbing. No deformities.  Skin: No Rash. Warm and dry.  Neuro: Awake, alert and oriented. Motor and sensory exams grossly intact.  Psych/mental status: Appropriate mood and affect. Cooperative. Responds appropriately to questions.       LABS AND IMAGING:     Recent Labs   Lab 06/03/23  0354 06/09/23  0812   WBC 6.70 5.97   RBC 3.64* 4.09*   HGB 11.3* 12.6   HCT 33.6* 37.1   MCV 92.3 90.7   MCH 31.0 30.8   MCHC 33.6 34.0   RDW 14.5 13.5    252   MPV 9.5 9.7       Recent Labs   Lab 06/03/23  0354 06/04/23  0518 06/09/23  0812    143 137   K 3.3* 3.3* 2.8*   CO2 23 25 30   BUN 12.8 9.3* 4.4*   CREATININE 0.82 0.75 0.77   CALCIUM 8.5 8.4 9.5   MG 2.10 1.60  --    ALBUMIN 3.4  --  3.9   ALKPHOS 80  --  108   ALT 18  --  14   AST 24  --  18   BILITOT 0.8  --  0.7       Microbiology Results (last 7 days)       ** No results found for the last 168 hours. **             X-Ray Chest AP Portable  Narrative: EXAMINATION:  XR CHEST AP PORTABLE    CLINICAL HISTORY:  Essential (primary) hypertension    TECHNIQUE:  Frontal view(s) of the chest.    COMPARISON:  Radiography 04/17/2023    FINDINGS:  Normal cardiac silhouette with stable positioning of the left MediPort.  The lungs are  well-inflated.  No consolidation identified.  No significant pleural effusion or discernible pneumothorax.  Impression: No acute pulmonary process identified.    Electronically signed by: Seth Wright  Date:    06/09/2023  Time:    11:18  CT Abdomen Pelvis With Contrast  Narrative: EXAMINATION:  CT ABDOMEN PELVIS WITH CONTRAST    CLINICAL HISTORY:  Nausea/vomiting;Pancreatitis, acute, severe;Abdominal pain, acute, nonlocalized;Recent pancreatitis, ERCP done yesterday, worsening abdominal pain;    TECHNIQUE:  Low dose axial images, sagittal and coronal reformations were obtained from the lung bases to the pubic symphysis following the IV administration of contrast. Automatic exposure control (AEC) is utilized to reduce patient radiation exposure.    COMPARISON:  04/16/2023    FINDINGS:  There is mild bibasilar atelectasis..  There is a hiatal hernia.    The liver is enlarged in size.  There is evidence of intra and extrahepatic biliary dilatation seen.  The patient is status post cholecystectomy.  There is significant pancreatic duct dilatation seen.  Pancreatic duct dilatation is seen in the body and tail.  Findings could represent a stricture in the pancreatic duct.  There has also been interval development of some peripancreatic edema and fluid especially in the body and tail consistent with possible pancreatitis.  No obvious pancreatic mass or lesion is seen..    The spleen shows no acute abnormality.    The adrenal glands appear normal.  No adrenal nodule is seen.    The kidneys appear normal.  There is some simple appearing cysts in both kidneys.  No hydronephrosis is seen.  No hydroureter is seen.  No nephrolithiasis is seen.  No obvious ureteral stones are seen.    Urinary bladder appears grossly unremarkable.    Postsurgical changes are seen consistent with previous hemicolectomy.  There is some bowel wall thickening seen in the colon at the splenic flexure and the descending colon.  Findings may represent  a colitis.  This is new since the prior examination from 04/16/2023.  Some mild colonic wall thickening is also seen in the sigmoid colon and in the rectum.    No free air is seen.  No free fluid is seen.  Impression: Interval development of some colonic wall thickening in the left colon especially at the splenic flexure and proximal descending colon concerning for developing colitis    Mild peripancreatic edema and inflammatory changes seen mainly in the body and tail the pancreas consistent with pancreatitis    Persistent pancreatic duct dilatation in the body and tail which was seen and described on multiple previous examinations    This report was flagged in Epic as abnormal.    Electronically signed by: Mary Cool  Date:    06/09/2023  Time:    10:50        ASSESSMENT & PLAN:   Assessment:  Acute on chronic pancreatitis   Colitis  Stage T3 adenocarcinoma of the pancreas  Hypokalemia  History of hyperlipidemia, coronary artery disease, Crohn's disease on Entyvio every 6 weeks status post right hemicolectomy, hepatic steatosis, rheumatoid arthritis, osteopenia and cervical cancer    Plan:  - NPO. IV fluid hydration with normal saline   - IV morphine as needed for pain   - IV Zofran as needed for nausea   - Potassium and magnesium replaced.  Continue to monitor electrolytes replacing as indicated  - Resume appropriate home medications when deemed necessary   - Labs in AM      VTE Prophylaxis: will be placed on Lovenox for DVT prophylaxis and will be advised to be as mobile as possible and sit in a chair as tolerated      __________________________________________________________________________  INPATIENT LIST OF MEDICATIONS     Current Facility-Administered Medications:     0.9%  NaCl infusion, , Intravenous, Continuous, Kia Wood PA-C    acetaminophen tablet 650 mg, 650 mg, Oral, Q8H PRN, Kia Wood PA-C    acetaminophen tablet 650 mg, 650 mg, Oral, Q4H PRN, Kia Wood PA-C     dextrose 10% bolus 125 mL 125 mL, 12.5 g, Intravenous, PRN, Hoang Quinonez MD    dextrose 10% bolus 250 mL 250 mL, 25 g, Intravenous, PRN, Hoang Quinonez MD    enoxaparin injection 40 mg, 40 mg, Subcutaneous, Daily, Kia Wood PA-C    glucagon (human recombinant) injection 1 mg, 1 mg, Intramuscular, PRN, Kia Wood PA-C    glucose chewable tablet 16 g, 16 g, Oral, PRN, Kia Wood PA-C    glucose chewable tablet 24 g, 24 g, Oral, PRN, Kia Wood PA-C    hydrALAZINE injection 20 mg, 20 mg, Intravenous, Q6H PRN, Hoang Quinonez MD    HYDROmorphone (PF) injection 1 mg, 1 mg, Intravenous, Q4H PRN, Hoang Quinonez MD    ondansetron injection 4 mg, 4 mg, Intravenous, Q6H PRN, Hoang Quinonez MD, 4 mg at 06/09/23 1214    potassium chloride 10 mEq in 100 mL IVPB, 40 mEq, Intravenous, Q1H, Hoang Quinonez MD    sodium chloride 0.9% flush 10 mL, 10 mL, Intravenous, Q12H PRN, Kia Wood PA-C    Current Outpatient Medications:     famotidine (PEPCID) 20 MG tablet, Take 1 tablet (20 mg total) by mouth 2 (two) times daily., Disp: 60 tablet, Rfl: 0    ondansetron (ZOFRAN-ODT) 4 MG TbDL, Take 1 tablet (4 mg total) by mouth every 6 (six) hours as needed (Nausea/Vomiting)., Disp: 28 tablet, Rfl: 0    oxyCODONE (ROXICODONE) 10 mg Tab immediate release tablet, Take 1 tablet (10 mg total) by mouth every 6 (six) hours as needed for Pain., Disp: 28 tablet, Rfl: 0    cholecalciferol, vitamin D3, 1,250 mcg (50,000 unit) capsule, Take 1,250 mcg by mouth once a week., Disp: , Rfl:     pantoprazole (PROTONIX) 40 MG tablet, Take 1 tablet by mouth every morning., Disp: , Rfl:     simvastatin (ZOCOR) 20 MG tablet, Take 20 mg by mouth every evening., Disp: , Rfl:       Scheduled Meds:   enoxparin  40 mg Subcutaneous Daily    potassium chloride  40 mEq Intravenous Q1H     Continuous Infusions:   sodium chloride 0.9%       PRN Meds:.acetaminophen, acetaminophen, dextrose 10%, dextrose 10%, glucagon (human  recombinant), glucose, glucose, hydrALAZINE, HYDROmorphone, ondansetron, sodium chloride 0.9%      Discharge Planning and Disposition: Anticipated discharge to be determined.    Kia SUH PA, have reviewed and discussed the case with Dr. RANDAL Wyatt.    Please see the following addendum for further assessment and plan from there attending MD.    AMBER Morgan-AMANDEEP  06/09/2023    MD Addendum:  I, Dr. Hoang wyatt ---assumed care of this patient today   For the patient encounter, I performed the substantive portion of the visit, I reviewed the NP/PA documentation, treatment plan, and medical decision making.  I had face to face time with this patient     A. History:  82 y.o. Black or  female with a past medical history of hyperlipidemia, coronary artery disease, Crohn's disease on Entyvio every 6 weeks status post right hemicolectomy, hepatic steatosis, rheumatoid arthritis, osteopenia and cervical cancer.     Patient presented to [facility] on 6/9/2023 with a primary complaint of worsening generalized abdominal pain with  nausea, vomiting and diarrhea. She underwent an ERCP with Dr. Max yesterday (06/08/2023).  He was noted to be normal esophagus, stomach and duodenum, no sign of significant pathology in the ampulla, common bile duct and left lobe of the liver, a mass was identified at the pancreatic neck and tissue was obtained and is adenocarcinoma staged T3. Daughter requests her oncologists who does nt have privileges here.    Patient is admitted to hospital medicine services for further medical management of abd pain due to acute pancreatitis and colitis. She will need further management of pancreatic adenocarcinoma with her oncologist out patient       Vitals   temperature 97.2° F, heart rate 95, blood pressure 201/124, respiratory rate 20 and SpO2 98% room air.      Labs  potassium 2.8.  UA negative for infection.     Imaging   EKG normal sinus rhythm and nonspecific ST abnormalities.     Chest x-ray with no acute process.    CT abdomen and pelvis   -with contrast preliminary report reveals interval development of some colonic wall thickening in the left colon especially at the splenic flexure and proximal descending colon concerning for developing colitis,   -mild peripancreatic inflammatory changes seen anemia in the body and tail of the pancreas consistent with pancreatitis and   -persistent pancreatic duct dilation in the body and tail prescribed on previous imaging.          B. Physical exam:  General appearance: Well-developed, well-nourished female in no apparent distress.  HEENT: Atraumatic head.  Dry mucous membranes of oral cavity.  Lungs: Clear to auscultation bilaterally.   Heart: Regular rate and rhythm.   Abdomen: Soft, non-distended, generalized tenderness. Bowel sounds are normal.   Extremities: No cyanosis, clubbing. No deformities.  Skin: No Rash. Warm and dry.  Neuro: Awake, alert and oriented. Motor and sensory exams grossly intact.  Psych/mental status: Appropriate mood and affect. Cooperative. Responds appropriately to questions.         Assessment:  Acute on chronic pancreatitis likely due to entyvio use   Acute Colitis with abdominal pain and diarrhea - ??possible Crohns flare   Hypokalemia due to diarrhea   HTN Urgency- likely due to uncontrolled pain  Stage T3 adenocarcinoma of the pancreas- newly diagnosed     History of hyperlipidemia, coronary artery disease, Crohn's disease on Entyvio every 6 weeks status post right hemicolectomy, hepatic steatosis, rheumatoid arthritis, osteopenia and cervical cancer    Plan:  Admit patient  NPO. IV fluid hydration with normal saline   Can begin Clear liquid diet when she feels she is ready   IV Dilaudid as needed for pain (morphine not working for her and causing more nausea)  IV Zofran as needed for nausea   Send GI panel, stool cultures,  Patient having more than 3 watery stools in 24 hrs, not on laxatives , will send for c diff  battery   Imodium prn for diarrhea   IV Potassium and magnesium replaced.  Continue to monitor electrolytes replacing as indicated  Consult GI consult for possible Crohns flare management   She will need further management of pancreatic adenocarcinoma with her oncologist out patient - daughter will like to be informed and give clearance before bringing another oncologist   Resume appropriate home medications when deemed necessary   Labs in AM      VTE Prophylaxis: will be placed on Lovenox for DVT prophylaxis and will be advised to be as mobile as possible and sit in a chair as tolerated      Discharge Planning and Disposition: No mobility needs. Ambulating well. Good social support system.   Anticipated discharge      Critical care diagnosis- htn urgency requiring management   Critical care time > 35 minutes       All diagnosis and differential diagnosis have been reviewed; assessment and plan has been documented; I have personally reviewed the labs and test results that are presently available; I have reviewed the patients medication list; I have reviewed the consulting providers response and recommendations. I have reviewed or attempted to review medical records based upon their availability.    All of the patient and family questions have been addressed and answered. Patient's is agreeable to the above stated plan. I will continue to monitor closely and make adjustments to medical management as needed.      Hoang Quinonez MD   06/09/2023

## 2023-06-09 NOTE — ANESTHESIA POSTPROCEDURE EVALUATION
Anesthesia Post Evaluation    Patient: Evelyn Proctor    Procedure(s) Performed: Procedure(s) (LRB):  UPPER EUS  W/  FNA (N/A)  EGD (ESOPHAGOGASTRODUODENOSCOPY) (N/A)    Final Anesthesia Type: general      Patient location during evaluation: floor  Patient participation: Yes- Able to Participate  Level of consciousness: awake and alert  Post-procedure vital signs: reviewed and stable  Pain management: adequate  Airway patency: patent    PONV status at discharge: No PONV  Anesthetic complications: no      Cardiovascular status: blood pressure returned to baseline  Respiratory status: spontaneous ventilation and room air  Hydration status: euvolemic  Follow-up not needed.          Vitals Value Taken Time   /70 06/08/23 1310   Temp 36.9 °C (98.4 °F) 06/08/23 1140   Pulse 66 06/08/23 1310   Resp 18 06/08/23 1156   SpO2 96 % 06/08/23 1310         No case tracking events are documented in the log.      Pain/Natalia Score: Pain Rating Prior to Med Admin: 9 (6/8/2023 11:56 AM)  Natalia Score: 10 (6/8/2023 12:00 PM)

## 2023-06-09 NOTE — NURSING
Nurses Note -- 4 Eyes      6/9/2023   6:40 PM      Skin assessed during: Admit      [x] No Altered Skin Integrity Present    []Prevention Measures Documented      [] Yes- Altered Skin Integrity Present or Discovered   [] LDA Added if Not in Epic (Describe Wound)   [] New Altered Skin Integrity was Present on Admit and Documented in LDA   [] Wound Image Taken    Wound Care Consulted? No    Attending Nurse:  Fredrick Tay RN     Second RN/Staff Member:  Heidy MIRNADA

## 2023-06-09 NOTE — ED PROVIDER NOTES
Encounter Date: 6/9/2023       History     Chief Complaint   Patient presents with    Abdominal Pain     Nausea and vomiting with upper abdominal pain. Discharged on Sunday w/ pancreatitis. Winter yesterday w/ EUS w/ Dx of pancreatic cancer, ate soup yesterday, last night n/v/abdominal pain. Patient daughter is present.      Patient is a 82-year-old female with past medical history of Crohn's disease, ulcerative colitis, recent admission for pancreatitis with endoscopy and EUS done yesterday with concerning diagnosis for pancreatic cancer (path report still pending) presents to the emergency department for abdominal pain, nausea, vomiting.  Patient presents with the daughter at bedside.  No mitigating factors despite receiving rectal Phenergan earlier.  Patient denies any fever.      Abdominal Pain  The current episode started yesterday. The onset of the illness was gradual. The problem has been gradually worsening. The abdominal pain is generalized. The abdominal pain does not radiate. The severity of the abdominal pain is 10/10. The abdominal pain is relieved by nothing. The abdominal pain is exacerbated by vomiting. The other symptoms of the illness include vomiting. The other symptoms of the illness do not include fever, shortness of breath or dysuria.   The vomiting began yesterday. Vomiting occurs 6 to 10 times per day. The emesis contains stomach contents.         Review of patient's allergies indicates:  No Known Allergies  Past Medical History:   Diagnosis Date    Acute pancreatitis, unspecified complication status, unspecified pancreatitis type     Arthritis     Carpal tunnel syndrome, bilateral     Cervical cancer     Cervical radiculopathy     Crohn disease     Heart attack     HLD (hyperlipidemia)     Low back pain     Sleep apnea, unspecified     Spinal stenosis of lumbar region with neurogenic claudication      Past Surgical History:   Procedure Laterality Date    bilateral L4-5, L5-S1 decompression,  repair L5-S1 dural tear  10/01/2021    Dr. Marin    CARPAL TUNNEL RELEASE Right 09/06/2019    Dr. Marin    CARPAL TUNNEL RELEASE Left 12/2/2022    Procedure: RELEASE, CARPAL TUNNEL;  Surgeon: Jesse Marin MD;  Location: [facility] OR;  Service: Neurosurgery;  Laterality: Left;    CHOLECYSTECTOMY      COLECTOMY  07/2011    partial x3    ESOPHAGOGASTRODUODENOSCOPY N/A 6/8/2023    Procedure: EGD (ESOPHAGOGASTRODUODENOSCOPY);  Surgeon: Jose Alberto Max MD;  Location: [facility] OR;  Service: Gastroenterology;  Laterality: N/A;    HYSTERECTOMY      total    REPAIR OF EYELID Bilateral 11/21/2022    Procedure: BILATERAL ECTROPION REPAIR;  Surgeon: Justin Flores MD;  Location: [facility] OR;  Service: ENT;  Laterality: Bilateral;    ULTRASOUND, ENDOSCOPIC, WITH FINE NEEDLE ASPIRATION N/A 6/8/2023    Procedure: UPPER EUS  W/  FNA;  Surgeon: Jose Alberto Max MD;  Location: [facility] OR;  Service: Gastroenterology;  Laterality: N/A;  Dangelo, pt w/ CD on entyvio recently admitted to [facility] w/ acute pancreatitis. Possibly drug induced from entyvio? Althoughshe does have dilated PD and a poss focal stricture in the HOP.CA 19-9 nml     Family History   Problem Relation Age of Onset    Diabetes Mother     Hypertension Father     Hyperlipidemia Father     Cancer Father     Hyperlipidemia Sister     Hyperlipidemia Brother     Hyperlipidemia Daughter      Social History     Tobacco Use    Smoking status: Former     Types: Cigarettes    Smokeless tobacco: Never   Substance Use Topics    Alcohol use: Yes     Comment: rarely    Drug use: Never     Review of Systems   Constitutional:  Negative for fever.   HENT:  Negative for sore throat.    Eyes:  Negative for visual disturbance.   Respiratory:  Negative for shortness of breath.    Cardiovascular:  Negative for chest pain.   Gastrointestinal:  Positive for abdominal pain and vomiting.   Genitourinary:  Negative for dysuria.   Musculoskeletal:  Negative for joint swelling.   Skin:  Negative for rash.    Neurological:  Negative for weakness.   Psychiatric/Behavioral:  Negative for confusion.      Physical Exam     Initial Vitals [06/09/23 0746]   BP Pulse Resp Temp SpO2   (!) 201/124 95 20 97.2 °F (36.2 °C) 98 %      MAP       --         Physical Exam    Nursing note and vitals reviewed.  Constitutional: She appears well-developed and well-nourished.   HENT:   Head: Normocephalic and atraumatic.   Eyes: EOM are normal. Pupils are equal, round, and reactive to light.   Neck:   Normal range of motion.  Cardiovascular:  Normal rate, regular rhythm, normal heart sounds and intact distal pulses.           No murmur heard.  Pulmonary/Chest: Breath sounds normal. No respiratory distress. She has no wheezes. She has no rales.   Abdominal: Abdomen is soft. She exhibits no distension. There is no abdominal tenderness. There is no rebound.   Musculoskeletal:         General: No tenderness or edema. Normal range of motion.      Cervical back: Normal range of motion.     Neurological: She is alert. She has normal strength. No cranial nerve deficit. GCS score is 15. GCS eye subscore is 4. GCS verbal subscore is 5. GCS motor subscore is 6.   Skin: Skin is warm and dry. Capillary refill takes less than 2 seconds. No rash noted. No erythema.   Psychiatric: She has a normal mood and affect.       ED Course   Procedures  Labs Reviewed   COMPREHENSIVE METABOLIC PANEL - Abnormal; Notable for the following components:       Result Value    Potassium Level 2.8 (*)     Chloride 94 (*)     Glucose Level 151 (*)     Blood Urea Nitrogen 4.4 (*)     Protein Total 7.9 (*)     Globulin 4.0 (*)     Albumin/Globulin Ratio 1.0 (*)     All other components within normal limits   URINALYSIS, REFLEX TO URINE CULTURE - Abnormal; Notable for the following components:    Ketones, UA Trace (*)     All other components within normal limits   CBC WITH DIFFERENTIAL - Abnormal; Notable for the following components:    RBC 4.09 (*)     All other components  within normal limits   PROTIME-INR - Normal   APTT - Normal   LIPASE - Normal   URINALYSIS, MICROSCOPIC - Normal   CBC W/ AUTO DIFFERENTIAL    Narrative:     The following orders were created for panel order CBC auto differential.  Procedure                               Abnormality         Status                     ---------                               -----------         ------                     CBC with Differential[[id number]]        Abnormal            Final result                 Please view results for these tests on the individual orders.     EKG Readings: (Independently Interpreted)   Normal sinus rhythm.  Rate of 89.  Normal intervals.  No STEMI.   ECG Results              EKG 12-lead (Final result)  Result time 06/09/23 22:35:41      Final result by Interface, Lab In [facility] (06/09/23 22:35:41)                   Narrative:    Test Reason : R10.84,    Vent. Rate : 089 BPM     Atrial Rate : 089 BPM     P-R Int : 172 ms          QRS Dur : 088 ms      QT Int : 376 ms       P-R-T Axes : 078 067 070 degrees     QTc Int : 457 ms    Normal sinus rhythm  Nonspecific ST abnormality  Abnormal ECG  Confirmed by Jeffrey Fuentes MD ([id number]) on 6/9/2023 10:35:34 PM    Referred By: [email or web address]   SELF           Confirmed By:Jeffrey Fuentes MD                                  Imaging Results               CT Abdomen Pelvis With Contrast (Final result)  Result time 06/09/23 10:50:52      Final result by Mary Cool MD (06/09/23 10:50:52)                   Impression:      Interval development of some colonic wall thickening in the left colon especially at the splenic flexure and proximal descending colon concerning for developing colitis    Mild peripancreatic edema and inflammatory changes seen mainly in the body and tail the pancreas consistent with pancreatitis    Persistent pancreatic duct dilatation in the body and tail which was seen and described on multiple previous examinations    This report was flagged in  Epic as abnormal.      Electronically signed by: Mary Cool  Date:    06/09/2023  Time:    10:50               Narrative:    EXAMINATION:  CT ABDOMEN PELVIS WITH CONTRAST    CLINICAL HISTORY:  Nausea/vomiting;Pancreatitis, acute, severe;Abdominal pain, acute, nonlocalized;Recent pancreatitis, ERCP done yesterday, worsening abdominal pain;    TECHNIQUE:  Low dose axial images, sagittal and coronal reformations were obtained from the lung bases to the pubic symphysis following the IV administration of contrast. Automatic exposure control (AEC) is utilized to reduce patient radiation exposure.    COMPARISON:  04/16/2023    FINDINGS:  There is mild bibasilar atelectasis..  There is a hiatal hernia.    The liver is enlarged in size.  There is evidence of intra and extrahepatic biliary dilatation seen.  The patient is status post cholecystectomy.  There is significant pancreatic duct dilatation seen.  Pancreatic duct dilatation is seen in the body and tail.  Findings could represent a stricture in the pancreatic duct.  There has also been interval development of some peripancreatic edema and fluid especially in the body and tail consistent with possible pancreatitis.  No obvious pancreatic mass or lesion is seen..    The spleen shows no acute abnormality.    The adrenal glands appear normal.  No adrenal nodule is seen.    The kidneys appear normal.  There is some simple appearing cysts in both kidneys.  No hydronephrosis is seen.  No hydroureter is seen.  No nephrolithiasis is seen.  No obvious ureteral stones are seen.    Urinary bladder appears grossly unremarkable.    Postsurgical changes are seen consistent with previous hemicolectomy.  There is some bowel wall thickening seen in the colon at the splenic flexure and the descending colon.  Findings may represent a colitis.  This is new since the prior examination from 04/16/2023.  Some mild colonic wall thickening is also seen in the sigmoid colon and in the  rectum.    No free air is seen.  No free fluid is seen.                                       X-Ray Chest AP Portable (Final result)  Result time 06/09/23 11:18:32      Final result by Seth Wright MD (06/09/23 11:18:32)                   Impression:      No acute pulmonary process identified.      Electronically signed by: Seth Wright  Date:    06/09/2023  Time:    11:18               Narrative:    EXAMINATION:  XR CHEST AP PORTABLE    CLINICAL HISTORY:  Essential (primary) hypertension    TECHNIQUE:  Frontal view(s) of the chest.    COMPARISON:  Radiography 04/17/2023    FINDINGS:  Normal cardiac silhouette with stable positioning of the left MediPort.  The lungs are well-inflated.  No consolidation identified.  No significant pleural effusion or discernible pneumothorax.                                    X-Rays:   Independently Interpreted Readings:   Chest X-Ray: Normal heart size.  No infiltrates.  No acute abnormalities.   Medications   lactated ringers bolus 1,000 mL (0 mLs Intravenous Stopped 6/9/23 0923)   ondansetron injection 4 mg (4 mg Intravenous Given 6/9/23 0822)   morphine injection 4 mg (4 mg Intravenous Given 6/9/23 0823)   hydrALAZINE injection 10 mg (10 mg Intravenous Given 6/9/23 0903)   lactated ringers bolus 1,000 mL (0 mLs Intravenous Stopped 6/9/23 1155)   magnesium sulfate 2g in water 50mL IVPB (premix) (0 g Intravenous Stopped 6/9/23 1155)   potassium chloride 10 mEq in 100 mL IVPB (0 mEq Intravenous Stopped 6/9/23 1445)   iopamidoL (ISOVUE-370) injection 100 mL (100 mLs Intravenous Given 6/9/23 1038)   potassium chloride 20 mEq in 100 mL IVPB (FOR CENTRAL LINE ADMINISTRATION ONLY) (40 mEq Intravenous New Bag 6/9/23 1600)   magnesium sulfate 2g in water 50mL IVPB (premix) (0 g Intravenous Stopped 6/11/23 1048)   oxyCODONE immediate release tablet 10 mg (10 mg Oral Given 6/12/23 1050)   HYDROcodone-acetaminophen 7.5-325 mg per tablet 1 tablet (1 tablet Oral Given 6/12/23 1311)    traMADoL tablet 50 mg (50 mg Oral Given 6/12/23 0417)     Medical Decision Making:   History:   I obtained history from: someone other than patient.       <> Summary of History: Collateral history from daughter.  Old Medical Records: I decided to obtain old medical records.  Old Records Summarized: records from previous admission(s) and records from clinic visits.       <> Summary of Records: Reviewed old records including recent admission.  Pathology report still pending.  Initial Assessment:   Abdominal pain, nausea, vomiting.  Differential Diagnosis:   Judging by the patient's chief complaint and pertinent history, the patient has the following possible differential diagnoses, including but not limited to the following.  Some of these are deemed to be lower likelihood and some more likely based on my physical exam and history combined with possible lab work and/or imaging studies.   Please see the pertinent studies, and refer to the HPI.  Some of these diagnoses will take further evaluation to fully rule out, perhaps as an outpatient and the patient was encouraged to follow up when discharged for more comprehensive evaluation.    appendicitis, biliary disease, diverticulitis, mesenteric ischemia, intraabdominal abcess, retroperitoneal abcess, gastritis, gastroenteritis, hepatitis, hernia, pancreatitis, inflammatory bowel disease, PUD, SBP, nephrolithiasis, constipation, GERD, IBS    Independently Interpreted Test(s):   I have ordered and independently interpreted X-rays - see prior notes.  I have ordered and independently interpreted EKG Reading(s) - see prior notes  Clinical Tests:   Lab Tests: Ordered and Reviewed  Radiological Study: Ordered and Reviewed  Medical Tests: Ordered and Reviewed  ED Management:  Patient is a 82-year-old female presents to emergency department for generalized abdominal can not, nausea, vomiting, unable to keep anything down since yesterday.  Had a endoscopy and biopsy done  yesterday.  Pathology reports still pending.  Per daughter concern for pancreatic cancer.  See HPI.  See physical exam.  Labs and imaging as noted.  Patient with diffuse colitis, history of Crohn's and ulcerative colitis.  Pain and nausea control.  Electrolytes repleted.  Will admit for continued IV fluids, electrolyte repletion, pain and nausea control.  All results discussed with the patient and family.  Answered all questions at this time.  Verbalized understanding agreed to plan.  Hemodynamically stable.  Other:   I have discussed this case with another health care provider.      Medical Decision Making  Problems Addressed:  Generalized abdominal pain: acute illness or injury that poses a threat to life or bodily functions  Hypertension: acute illness or injury that poses a threat to life or bodily functions  Hypokalemia: acute illness or injury that poses a threat to life or bodily functions  Nausea and vomiting, unspecified vomiting type: acute illness or injury that poses a threat to life or bodily functions    Amount and/or Complexity of Data Reviewed  Labs: ordered.  Radiology: ordered and independent interpretation performed.  ECG/medicine tests: ordered and independent interpretation performed.    Risk  Parenteral controlled substances.  Decision regarding hospitalization.                   ED Course as of 06/13/23 1020   Fri Jun 09, 2023   0824 Reviewed media tab for pathology results.  Pathology results are still in process. [RP]      ED Course User Index  [RP] Afshin Fairbanks MD                 Clinical Impression:   Final diagnoses:  [R10.84] Generalized abdominal pain  [R11.2] Nausea and vomiting, unspecified vomiting type (Primary)  [I10] Hypertension  [E87.6] Hypokalemia        ED Disposition Condition    Admit Stable                Afshin Fairbanks MD  06/13/23 1022

## 2023-06-10 PROBLEM — C25.9 PANCREATIC CANCER: Status: ACTIVE | Noted: 2023-06-10

## 2023-06-10 LAB
ADV 40+41 DNA STL QL NAA+NON-PROBE: NOT DETECTED
ALBUMIN SERPL-MCNC: 3.3 G/DL (ref 3.4–4.8)
ALBUMIN/GLOB SERPL: 1.1 RATIO (ref 1.1–2)
ALP SERPL-CCNC: 93 UNIT/L (ref 40–150)
ALT SERPL-CCNC: 11 UNIT/L (ref 0–55)
AST SERPL-CCNC: 16 UNIT/L (ref 5–34)
ASTRO TYP 1-8 RNA STL QL NAA+NON-PROBE: NOT DETECTED
BASOPHILS # BLD AUTO: 0.01 X10(3)/MCL
BASOPHILS NFR BLD AUTO: 0.2 %
BILIRUBIN DIRECT+TOT PNL SERPL-MCNC: 0.7 MG/DL
BUN SERPL-MCNC: 9.2 MG/DL (ref 9.8–20.1)
C CAYETANENSIS DNA STL QL NAA+NON-PROBE: NOT DETECTED
C COLI+JEJ+UPSA DNA STL QL NAA+NON-PROBE: NOT DETECTED
CALCIUM SERPL-MCNC: 8.7 MG/DL (ref 8.4–10.2)
CHLORIDE SERPL-SCNC: 105 MMOL/L (ref 98–107)
CLOSTRIDIUM DIFFICILE GDH ANTIGEN (OHS): POSITIVE
CLOSTRIDIUM DIFFICILE TOXIN A/B (OHS): POSITIVE
CO2 SERPL-SCNC: 24 MMOL/L (ref 23–31)
CREAT SERPL-MCNC: 1.05 MG/DL (ref 0.55–1.02)
CRP SERPL-MCNC: 25.3 MG/L
CRYPTOSP DNA STL QL NAA+NON-PROBE: NOT DETECTED
E HISTOLYT DNA STL QL NAA+NON-PROBE: NOT DETECTED
EAEC PAA PLAS AGGR+AATA ST NAA+NON-PRB: NOT DETECTED
EC STX1+STX2 GENES STL QL NAA+NON-PROBE: NOT DETECTED
EOSINOPHIL # BLD AUTO: 0.07 X10(3)/MCL (ref 0–0.9)
EOSINOPHIL NFR BLD AUTO: 1.1 %
EPEC EAE GENE STL QL NAA+NON-PROBE: NOT DETECTED
ERYTHROCYTE [DISTWIDTH] IN BLOOD BY AUTOMATED COUNT: 14.3 % (ref 11.5–17)
ETEC LTA+ST1A+ST1B TOX ST NAA+NON-PROBE: NOT DETECTED
G LAMBLIA DNA STL QL NAA+NON-PROBE: NOT DETECTED
GFR SERPLBLD CREATININE-BSD FMLA CKD-EPI: 53 MLS/MIN/1.73/M2
GLOBULIN SER-MCNC: 2.9 GM/DL (ref 2.4–3.5)
GLUCOSE SERPL-MCNC: 89 MG/DL (ref 82–115)
HCT VFR BLD AUTO: 36.1 % (ref 37–47)
HGB BLD-MCNC: 11.8 G/DL (ref 12–16)
IMM GRANULOCYTES # BLD AUTO: 0.02 X10(3)/MCL (ref 0–0.04)
IMM GRANULOCYTES NFR BLD AUTO: 0.3 %
LYMPHOCYTES # BLD AUTO: 1.64 X10(3)/MCL (ref 0.6–4.6)
LYMPHOCYTES NFR BLD AUTO: 25.2 %
MAGNESIUM SERPL-MCNC: 1.7 MG/DL (ref 1.6–2.6)
MCH RBC QN AUTO: 31.4 PG (ref 27–31)
MCHC RBC AUTO-ENTMCNC: 32.7 G/DL (ref 33–36)
MCV RBC AUTO: 96 FL (ref 80–94)
MONOCYTES # BLD AUTO: 0.64 X10(3)/MCL (ref 0.1–1.3)
MONOCYTES NFR BLD AUTO: 9.8 %
NEUTROPHILS # BLD AUTO: 4.13 X10(3)/MCL (ref 2.1–9.2)
NEUTROPHILS NFR BLD AUTO: 63.4 %
NOROVIRUS GI+II RNA STL QL NAA+NON-PROBE: NOT DETECTED
NRBC BLD AUTO-RTO: 0 %
P SHIGELLOIDES DNA STL QL NAA+NON-PROBE: NOT DETECTED
PHOSPHATE SERPL-MCNC: 3.2 MG/DL (ref 2.3–4.7)
PLATELET # BLD AUTO: 179 X10(3)/MCL (ref 130–400)
PMV BLD AUTO: 10.3 FL (ref 7.4–10.4)
POTASSIUM SERPL-SCNC: 3.8 MMOL/L (ref 3.5–5.1)
PROT SERPL-MCNC: 6.2 GM/DL (ref 5.8–7.6)
RBC # BLD AUTO: 3.76 X10(6)/MCL (ref 4.2–5.4)
RVA RNA STL QL NAA+NON-PROBE: NOT DETECTED
S ENT+BONG DNA STL QL NAA+NON-PROBE: NOT DETECTED
SAPO I+II+IV+V RNA STL QL NAA+NON-PROBE: NOT DETECTED
SHIGELLA SP+EIEC IPAH ST NAA+NON-PROBE: NOT DETECTED
SODIUM SERPL-SCNC: 140 MMOL/L (ref 136–145)
V CHOL+PARA+VUL DNA STL QL NAA+NON-PROBE: NOT DETECTED
V CHOLERAE DNA STL QL NAA+NON-PROBE: NOT DETECTED
WBC # SPEC AUTO: 6.51 X10(3)/MCL (ref 4.5–11.5)
Y ENTEROCOL DNA STL QL NAA+NON-PROBE: NOT DETECTED

## 2023-06-10 PROCEDURE — 21400001 HC TELEMETRY ROOM

## 2023-06-10 PROCEDURE — 86318 IA INFECTIOUS AGENT ANTIBODY: CPT | Performed by: INTERNAL MEDICINE

## 2023-06-10 PROCEDURE — 99223 1ST HOSP IP/OBS HIGH 75: CPT | Mod: ,,, | Performed by: INTERNAL MEDICINE

## 2023-06-10 PROCEDURE — 86140 C-REACTIVE PROTEIN: CPT

## 2023-06-10 PROCEDURE — 85025 COMPLETE CBC W/AUTO DIFF WBC: CPT | Performed by: PHYSICIAN ASSISTANT

## 2023-06-10 PROCEDURE — 87045 FECES CULTURE AEROBIC BACT: CPT | Performed by: INTERNAL MEDICINE

## 2023-06-10 PROCEDURE — 99223 PR INITIAL HOSPITAL CARE,LEVL III: ICD-10-PCS | Mod: ,,, | Performed by: INTERNAL MEDICINE

## 2023-06-10 PROCEDURE — 25000003 PHARM REV CODE 250: Performed by: INTERNAL MEDICINE

## 2023-06-10 PROCEDURE — 83735 ASSAY OF MAGNESIUM: CPT | Performed by: INTERNAL MEDICINE

## 2023-06-10 PROCEDURE — 82397 CHEMILUMINESCENT ASSAY: CPT

## 2023-06-10 PROCEDURE — 63600175 PHARM REV CODE 636 W HCPCS: Performed by: PHYSICIAN ASSISTANT

## 2023-06-10 PROCEDURE — C9113 INJ PANTOPRAZOLE SODIUM, VIA: HCPCS

## 2023-06-10 PROCEDURE — 80280 DRUG ASSAY VEDOLIZUMAB: CPT

## 2023-06-10 PROCEDURE — 63600175 PHARM REV CODE 636 W HCPCS: Performed by: INTERNAL MEDICINE

## 2023-06-10 PROCEDURE — 87507 IADNA-DNA/RNA PROBE TQ 12-25: CPT | Performed by: INTERNAL MEDICINE

## 2023-06-10 PROCEDURE — 63600175 PHARM REV CODE 636 W HCPCS

## 2023-06-10 PROCEDURE — 80053 COMPREHEN METABOLIC PANEL: CPT | Performed by: PHYSICIAN ASSISTANT

## 2023-06-10 PROCEDURE — 25000003 PHARM REV CODE 250: Performed by: PHYSICIAN ASSISTANT

## 2023-06-10 PROCEDURE — 84100 ASSAY OF PHOSPHORUS: CPT | Performed by: INTERNAL MEDICINE

## 2023-06-10 RX ORDER — SUCRALFATE 1 G/1
1 TABLET ORAL
Status: DISCONTINUED | OUTPATIENT
Start: 2023-06-10 | End: 2023-06-12 | Stop reason: HOSPADM

## 2023-06-10 RX ORDER — PANTOPRAZOLE SODIUM 40 MG/10ML
40 INJECTION, POWDER, LYOPHILIZED, FOR SOLUTION INTRAVENOUS
Status: DISCONTINUED | OUTPATIENT
Start: 2023-06-10 | End: 2023-06-12 | Stop reason: HOSPADM

## 2023-06-10 RX ORDER — PANTOPRAZOLE SODIUM 40 MG/1
40 TABLET, DELAYED RELEASE ORAL EVERY MORNING
Status: DISCONTINUED | OUTPATIENT
Start: 2023-06-10 | End: 2023-06-10

## 2023-06-10 RX ADMIN — HYDROMORPHONE HYDROCHLORIDE 1 MG: 2 INJECTION INTRAMUSCULAR; INTRAVENOUS; SUBCUTANEOUS at 07:06

## 2023-06-10 RX ADMIN — HYDROMORPHONE HYDROCHLORIDE 1 MG: 2 INJECTION INTRAMUSCULAR; INTRAVENOUS; SUBCUTANEOUS at 08:06

## 2023-06-10 RX ADMIN — SODIUM CHLORIDE: 9 INJECTION, SOLUTION INTRAVENOUS at 11:06

## 2023-06-10 RX ADMIN — HYDROMORPHONE HYDROCHLORIDE 1 MG: 2 INJECTION INTRAMUSCULAR; INTRAVENOUS; SUBCUTANEOUS at 11:06

## 2023-06-10 RX ADMIN — PANTOPRAZOLE SODIUM 40 MG: 40 TABLET, DELAYED RELEASE ORAL at 08:06

## 2023-06-10 RX ADMIN — HYDROMORPHONE HYDROCHLORIDE 1 MG: 2 INJECTION INTRAMUSCULAR; INTRAVENOUS; SUBCUTANEOUS at 03:06

## 2023-06-10 RX ADMIN — PANTOPRAZOLE SODIUM 40 MG: 40 INJECTION, POWDER, FOR SOLUTION INTRAVENOUS at 07:06

## 2023-06-10 RX ADMIN — Medication 125 MG: at 11:06

## 2023-06-10 RX ADMIN — SUCRALFATE 1 G: 1 TABLET ORAL at 07:06

## 2023-06-10 RX ADMIN — ONDANSETRON 4 MG: 2 INJECTION INTRAMUSCULAR; INTRAVENOUS at 03:06

## 2023-06-10 RX ADMIN — ENOXAPARIN SODIUM 40 MG: 40 INJECTION SUBCUTANEOUS at 07:06

## 2023-06-10 NOTE — CONSULTS
[facility] - Oncology [facility]  Hematology/Oncology  Consult Note    Patient Name: Evelyn Proctor  MRN: [id number]  Admission Date: 6/9/2023  Hospital Length of Stay: 1 days  Attending Provider: Hoang Quinonez MD  Consulting Provider: Ronan Burns MD  Principal Problem:<principal problem not specified>    Inpatient consult to Oncology  Consult performed by: Ronan Burns MD  Consult ordered by: AMBER Levine      Subjective:     HPI:     Oncology History Overview Note   This is a 82 y.o. female known to Dr. Pierson with PMH of recently diagnosed pancreatic ductal adenocarcinoma, hyperlipidemia, coronary artery disease, Crohn's disease on Entyvio every 6 weeks status post right hemicolectomy, hepatic steatosis, rheumatoid arthritis, osteopenia and cervical cancer. She presented to the [facility] ED 06/09/23 with worsening generalized abd pain, n/v/d that she reports is c/w Crohn's.     Upon arrival, workup included: CT abd pel with interval development of some colonic wall thickening in left colon especially at splenic flexure and proximal descending colon concerning for developing colitis, mild peripancreatic edema and inflammatory changes seen mainly in the body and tail c/w pancreatitis, persistent pancreatic duct dilation in the body and tail; labs revealed WBC 5.97, H&H 12.6/37.1, plt 252, INR 0.99, K 2.8, Cl 94, Tbili 0.7, AST 18, ALT 14, lipase 13. IVF initiated and electrolytes replenished. GI panel pending. GI consulted for Crohn's flare/pancreatic adenocarcinoma.     Of note, patient was recently admitted 06/02/23 - 06/04/23 for acute pancreatitis and was noted to have a high grade pancreatic duct stricture at pancreatic neck with upstream pancreatic duct dilation. Dr. Max performed EUS 06/08/23 that revealed a mass in the pancreatic neck that was determined to be ductal adenocarcinoma staged T3N0Mx by endosonographic criteria. He recommended oncology referral and PET scan.  Patient and family requested to see Dr. Nuñez and Dr. Philip for further care.     Pancreatic cancer   6/8/2023 Initial Diagnosis    Pancreatic cancer  FINAL DIAGNOSIS       PANCREATIC NECK MASS, FINE NEEDLE ASPIRATION AND CELL BLOCK:       PANCREATIC DUCTAL ADENOCARCINOMA.          6/8/2023 Procedure    EUS Impression:            - Normal esophagus.                          - Normal stomach.                          - Normal examined duodenum.                          - There was no sign of significant pathology in                          the ampulla.                          - There was no sign of significant pathology in                          the common bile duct.                          - There was no evidence of significant pathology                          in the left lobe of the liver.                          - A mass was identified in the pancreatic neck.                          Tissue was obtained from this exam and is of                          adenocarcinoma. This was staged T3 N0 Mx by                          endosonographic criteria. Fine needle biopsy                          performed.      6/9/2023 Imaging Significant Findings    Impression:     Interval development of some colonic wall thickening in the left colon especially at the splenic flexure and proximal descending colon concerning for developing colitis     Mild peripancreatic edema and inflammatory changes seen mainly in the body and tail the pancreas consistent with pancreatitis     Persistent pancreatic duct dilatation in the body and tail which was seen and described on multiple previous examinations          Medications:  Continuous Infusions:   sodium chloride 0.9% 100 mL/hr at 06/10/23 1110     Scheduled Meds:   enoxparin  40 mg Subcutaneous Daily    pantoprazole  40 mg Intravenous BID AC    sucralfate  1 g Oral QID (AC & HS)     PRN Meds:acetaminophen, acetaminophen, dextrose 10%, dextrose 10%, glucagon (human  recombinant), glucose, glucose, hydrALAZINE, HYDROmorphone, loperamide, ondansetron, promethazine, sodium chloride 0.9%     Review of patient's allergies indicates:  No Known Allergies     Past Medical History:   Diagnosis Date    Acute pancreatitis, unspecified complication status, unspecified pancreatitis type     Arthritis     Carpal tunnel syndrome, bilateral     Cervical cancer     Cervical radiculopathy     Crohn disease     Heart attack     HLD (hyperlipidemia)     Low back pain     Sleep apnea, unspecified     Spinal stenosis of lumbar region with neurogenic claudication      Past Surgical History:   Procedure Laterality Date    bilateral L4-5, L5-S1 decompression, repair L5-S1 dural tear  10/01/2021    Dr. Marin    CARPAL TUNNEL RELEASE Right 09/06/2019    Dr. Marin    CARPAL TUNNEL RELEASE Left 12/2/2022    Procedure: RELEASE, CARPAL TUNNEL;  Surgeon: Jesse Marin MD;  Location: [facility];  Service: Neurosurgery;  Laterality: Left;    CHOLECYSTECTOMY      COLECTOMY  07/2011    partial x3    ESOPHAGOGASTRODUODENOSCOPY N/A 6/8/2023    Procedure: EGD (ESOPHAGOGASTRODUODENOSCOPY);  Surgeon: Jose Alberto Max MD;  Location: [facility] OR;  Service: Gastroenterology;  Laterality: N/A;    HYSTERECTOMY      total    REPAIR OF EYELID Bilateral 11/21/2022    Procedure: BILATERAL ECTROPION REPAIR;  Surgeon: Justin Flores MD;  Location: [facility] OR;  Service: ENT;  Laterality: Bilateral;    ULTRASOUND, ENDOSCOPIC, WITH FINE NEEDLE ASPIRATION N/A 6/8/2023    Procedure: UPPER EUS  W/  FNA;  Surgeon: Jose Alberto Max MD;  Location: [facility] OR;  Service: Gastroenterology;  Laterality: N/A;  Dangelo, pt w/ CD on entyvio recently admitted to [facility] w/ acute pancreatitis. Possibly drug induced from entyvio? Althoughshe does have dilated PD and a poss focal stricture in the HOP.CA 19-9 nml     Family History       Problem Relation (Age of Onset)    Cancer Father    Diabetes Mother    Hyperlipidemia Father, Sister, Brother, Daughter     Hypertension Father          Tobacco Use    Smoking status: Former     Types: Cigarettes    Smokeless tobacco: Never   Substance and Sexual Activity    Alcohol use: Yes     Comment: rarely    Drug use: Never    Sexual activity: Not Currently       Review of Systems   Constitutional:  Positive for appetite change and unexpected weight change.   HENT:  Negative for congestion, facial swelling and trouble swallowing.    Eyes: Negative.    Respiratory: Negative.     Cardiovascular: Negative.    Gastrointestinal:  Positive for abdominal distention, abdominal pain and nausea. Negative for blood in stool, diarrhea, rectal pain and vomiting.   Genitourinary:  Negative for difficulty urinating, hematuria and vaginal bleeding.   Musculoskeletal:  Positive for arthralgias.   Skin:  Positive for rash.   Allergic/Immunologic: Positive for immunocompromised state.   Neurological:  Positive for numbness. Negative for facial asymmetry and headaches.   Hematological:  Negative for adenopathy. Does not bruise/bleed easily.   Objective:     Vital Signs (Most Recent):  Temp: 98.5 °F (36.9 °C) (06/10/23 1047)  Pulse: 77 (06/10/23 1047)  Resp: 18 (06/10/23 0822)  BP: (!) 151/78 (06/10/23 1047)  SpO2: 95 % (06/10/23 1047) Vital Signs (24h Range):  Temp:  [97.7 °F (36.5 °C)-98.5 °F (36.9 °C)] 98.5 °F (36.9 °C)  Pulse:  [69-85] 77  Resp:  [13-20] 18  SpO2:  [95 %-99 %] 95 %  BP: (117-196)/() 151/78        There is no height or weight on file to calculate BMI.  There is no height or weight on file to calculate BSA.      Intake/Output Summary (Last 24 hours) at 6/10/2023 1118  Last data filed at 6/9/2023 1231  Gross per 24 hour   Intake --   Output 1000 ml   Net -1000 ml       Physical Exam  Vitals reviewed.   Constitutional:       General: She is awake.      Appearance: She is ill-appearing.   HENT:      Head: Normocephalic and atraumatic.      Mouth/Throat:      Mouth: Mucous membranes are moist.      Pharynx: Oropharynx is clear.    Eyes:      Extraocular Movements: Extraocular movements intact.      Conjunctiva/sclera: Conjunctivae normal.      Pupils: Pupils are equal, round, and reactive to light.   Cardiovascular:      Rate and Rhythm: Normal rate and regular rhythm.      Pulses: Normal pulses.      Heart sounds: Normal heart sounds.   Pulmonary:      Effort: Pulmonary effort is normal.      Breath sounds: Normal breath sounds and air entry.   Chest:      Comments: Left port  Right dilated veins --states from previous culver  Abdominal:      General: Abdomen is flat. Bowel sounds are normal.      Palpations: Abdomen is soft.      Tenderness: There is no abdominal tenderness.   Musculoskeletal:      Cervical back: Normal range of motion.      Right lower leg: No edema.      Left lower leg: No edema.   Lymphadenopathy:      Cervical: No cervical adenopathy.      Upper Body:      Right upper body: No axillary adenopathy.      Left upper body: No axillary adenopathy.      Lower Body: No right inguinal adenopathy. No left inguinal adenopathy.   Skin:     General: Skin is warm and dry.   Neurological:      General: No focal deficit present.      Mental Status: She is alert and oriented to person, place, and time. Mental status is at baseline.   Psychiatric:         Attention and Perception: Attention normal.         Mood and Affect: Mood and affect normal.         Behavior: Behavior is cooperative.       Significant Labs:   CBC:   Recent Labs   Lab 06/09/23  0812   WBC 5.97   HGB 12.6   HCT 37.1      , CMP:   Recent Labs   Lab 06/09/23  0812 06/10/23  0922    140   K 2.8* 3.8   CO2 30 24   BUN 4.4* 9.2*   CREATININE 0.77 1.05*   CALCIUM 9.5 8.7   ALBUMIN 3.9 3.3*   BILITOT 0.7 0.7   ALKPHOS 108 93   AST 18 16   ALT 14 11   , and Tumor Markers: No results for input(s): PSA, CEA, , AFPTM, WA0216,  in the last 48 hours.    Invalid input(s): ALGTM    Diagnostic Results:  I have reviewed all pertinent imaging results/findings  within the past 24 hours.    Assessment/Plan:     Active Hospital Problems    Diagnosis    Pancreatic cancer   Chronic Medical problems  Wt loss  Watch for malnutrition    83 yo with Pancreatic Carcinoma EUS with T3NOMO on EUS and CT abd/pelvis--still had good functional status of walking up till recently  Independent with ADL--ECOG 1  Natural history of pancreatic cancer discussed with patient and family         Risk of death, progression and metastatic disease and current stage based on current information      NCCN guidelines discussed  Roles  of  chemotherapy, surgery and radiation  Options of treatment include: Chemotherapy, Chemo/XRT, XRT alone, evaluation for surgery,    if stage IV or progression Palliative care  Aggressive tumor discussed at length     Needs to complete staging with image of chest or PET (currently Stage II)      Prefer PET    Not sure if her darkening skin neck is Acanthosis nigricans    RECS--  Agree with Dr. Mittal note for   CA 19-9     PET outpatient to complete staging     Dr. Nuñez evaluation per request and Dr. Philip outpatient      Pain meds per primary   Consider  celiac block with GI if not improving from pain   Consider Creon if diarrhea ? Pancreatic insuffiencey  Agree with genetics on tumor and and family outpatient--also discussed with patient    D/c  zocor      Thank you for your consult. --will sign off  Call if questions  Ronan Burns MD  Hematology/Oncology  [facility] - [facility]

## 2023-06-10 NOTE — PROGRESS NOTES
[facility]  Hospital Medicine Progress Note        Chief Complaint: Inpatient Follow-up for     HPI:   Evelyn Proctor is a 82 y.o. Black or  female with a past medical history of hyperlipidemia, coronary artery disease, Crohn's disease on Entyvio every 6 weeks status post right hemicolectomy, hepatic steatosis, rheumatoid arthritis, osteopenia and cervical cancer.  Patient recent admission to hospital medicine services from 06/02/2023 to 06/04/2022 or acute pancreatitis.  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  She underwent an ERCP with Dr. Max yesterday (06/08/2023).  He was noted to be normal esophagus, stomach and duodenum, no sign of significant pathology in the ampulla, common bile duct and left lobe of the liver, a mass was identified at the pancreatic neck and tissue was obtained and is adenocarcinoma staged T3. The patient presented to [facility] on 6/9/2023 with a primary complaint of worsening generalized abdominal pain.  Associated symptoms include nausea, vomiting and diarrhea which she reports is consistent with Crohn's.  She denies complaints of fever, cough, chest pain shortness of breath. Patient's daughter walked in mid exam and is requesting consult to Dr. Philip, oncologist. I explained to her that this physician is with [facility] and does not come to [facility]. She states she does not see why he cannot come here and does not want any other physician to be consulted until it is approved by her.      Upon presentation to the ED, temperature 97.2° F, heart rate 95, blood pressure 201/124, respiratory rate 20 and SpO2 98% room air.  Labs with potassium 2.8.  UA negative for infection.  EKG normal sinus rhythm and nonspecific ST abnormalities.  Chest x-ray with no acute process.  CT abdomen and pelvis with contrast preliminary report reveals interval development  of some colonic wall thickening in the left colon especially at the splenic flexure and proximal descending colon concerning for developing colitis, mild peripancreatic inflammatory changes seen anemia in the body and tail of the pancreas consistent with pancreatitis and persistent pancreatic duct dilation in the body and tail prescribed on previous imaging.  In ED patient received hydralazine, Zofran, morphine, 2 g of IV magnesium sulfate, 40 mEq of IV potassium chloride. Patient is admitted to hospital medicine services for further medical management.    Interval Hx:   Seen and examined at bedside.  She was alert, upright irritable awaiting further recommendations from Oncology regarding regurg diagnosis of adenocarcinoma of pancreas.  Also complains of nausea and reports inadequate p.o. intake.  Labs were not collected from this morning .  No family at bedside     Objective/physical exam:  Vitals:    06/09/23 1807 06/09/23 2235 06/10/23 0423 06/10/23 0735   BP:   117/74 (!) 141/64   Pulse:   70 69   Resp: 19 18  15   Temp:   97.7 °F (36.5 °C) 98.2 °F (36.8 °C)   TempSrc:   Oral Oral   SpO2:   96% 96%     General: In no acute distress, afebrile  Respiratory: Clear to auscultation bilaterally  Cardiovascular: S1, S2, no appreciable murmur  Abdomen: Soft,tender, BS +  MSK: Warm, no lower extremity edema, no clubbing or cyanosis  Neurologic: Alert and oriented x4, moving all extremities with good strength     Lab Results   Component Value Date     06/09/2023    K 2.8 (L) 06/09/2023    CO2 30 06/09/2023    BUN 4.4 (L) 06/09/2023    CREATININE 0.77 06/09/2023    CALCIUM 9.5 06/09/2023    EGFRNONAA >60 09/29/2021      Lab Results   Component Value Date    ALT 14 06/09/2023    AST 18 06/09/2023    ALKPHOS 108 06/09/2023    BILITOT 0.7 06/09/2023      Lab Results   Component Value Date    WBC 5.97 06/09/2023    HGB 12.6 06/09/2023    HCT 37.1 06/09/2023    MCV 90.7 06/09/2023     06/09/2023            Medications:   enoxparin  40 mg Subcutaneous Daily    pantoprazole  40 mg Oral QAM    sucralfate  1 g Oral QID (AC & HS)      acetaminophen, acetaminophen, dextrose 10%, dextrose 10%, glucagon (human recombinant), glucose, glucose, hydrALAZINE, HYDROmorphone, loperamide, ondansetron, promethazine, sodium chloride 0.9%     Assessment/Plan:    Acute abdominal pain   Acute on chronic pancreatitis causing above  Splenic flexure and proximal descending colon colitis  Possible Crohn's flare   Hypokalemia at admission-improving  Stage T3 adenocarcinoma of pancreas    HX: hyperlipidemia, coronary artery disease, Crohn's disease on Entyvio every 6 weeks status post right hemicolectomy, hepatic steatosis, rheumatoid arthritis, osteopenia and cervical cancer      Plan:  -continue gentle IV hydration.  GI following.  Keep antiemetics as needed.  IV morphine if necessary   -patient requesting oncology consult  -stool workup ordered  -monitor electrolytes.  Encourage  and advance p.o. intake as tolerated  -will review home medications        Hernan Guerrero MD

## 2023-06-10 NOTE — CONSULTS
Gastroenterology Consultation Note    Reason for Consult:  Crohn's flare    PCP:   Primary Doctor No    Referring MD:  Hoang Quinonez MD    Hospital Day: 1     Initial History of Present Illness (HPI):  This is a 82 y.o. female known to Dr. Pierson with PMH of recently diagnosed pancreatic ductal adenocarcinoma, hyperlipidemia, coronary artery disease, Crohn's disease on Entyvio every 6 weeks status post right hemicolectomy, hepatic steatosis, rheumatoid arthritis, osteopenia and cervical cancer. She presented to the [facility] ED 06/09/23 with worsening generalized abd pain, n/v/d that she reports is c/w Crohn's.    Upon arrival, workup included: CT abd pel with interval development of some colonic wall thickening in left colon especially at splenic flexure and proximal descending colon concerning for developing colitis, mild peripancreatic edema and inflammatory changes seen mainly in the body and tail c/w pancreatitis, persistent pancreatic duct dilation in the body and tail; labs revealed WBC 5.97, H&H 12.6/37.1, plt 252, INR 0.99, K 2.8, Cl 94, Tbili 0.7, AST 18, ALT 14, lipase 13. IVF initiated and electrolytes replenished. GI panel pending. GI consulted for Crohn's flare/pancreatic adenocarcinoma.    Of note, patient was recently admitted 06/02/23 - 06/04/23 for acute pancreatitis and was noted to have a high grade pancreatic duct stricture at pancreatic neck with upstream pancreatic duct dilation. Dr. Max performed EUS 06/08/23 that revealed a mass in the pancreatic neck that was determined to be ductal adenocarcinoma staged [id number] by endosonographic criteria. He recommended oncology referral and PET scan. Patient and family requested to see Dr. Nuñez and Dr. Philip for further care.    Patient diagnosed with Crohn's in 1976. She was on Remicade for approx 40 years until her drug levels were found to be low despite adjustments to strength and frequency of Remicade administration. She was placed on Entyvio  every 8 weeks, in May 2022 she was switched to every 6 weeks d/t low drug levels. Last infusion approx 2 weeks ago.    History of 2 resections, the most recent in 2012. H/o SBO in 2011 that resolved on its own.    Colonoscopy Feb 2023 Dr. Pierson for Crohn's: ulceration in anastomosis c/w Crohn's, stenosis in anal canal, abnormal rectal exam - posterior scar with no active fissure. Path: inactive Crohn's disease, no dysplasia    Patient holding abd, she is nauseated and in pain. She states the pain is all over her abd and she is tender. She is very upset and is requesting TPN for nutrition. She wants to see Dr. Philip for her pancreatic cancer and is asking if he can get privileges to see her in this hospital. I inquired if patient would like to see an oncologist who has privileges here and she agreed. She admits to last Entyvio infusion being 2 weeks ago.    ROS:  Review of Systems   Constitutional:  Positive for malaise/fatigue.   Respiratory:  Negative for cough and shortness of breath.    Cardiovascular:  Negative for chest pain.   Gastrointestinal:  Positive for abdominal pain, diarrhea, nausea and vomiting.   Psychiatric/Behavioral:  The patient is nervous/anxious.      Medical History:   Past Medical History:   Diagnosis Date    Acute pancreatitis, unspecified complication status, unspecified pancreatitis type     Arthritis     Carpal tunnel syndrome, bilateral     Cervical cancer     Cervical radiculopathy     Crohn disease     Heart attack     HLD (hyperlipidemia)     Low back pain     Sleep apnea, unspecified     Spinal stenosis of lumbar region with neurogenic claudication        Surgical History:   Past Surgical History:   Procedure Laterality Date    bilateral L4-5, L5-S1 decompression, repair L5-S1 dural tear  10/01/2021    Dr. Marin    CARPAL TUNNEL RELEASE Right 09/06/2019    Dr. Marin    CARPAL TUNNEL RELEASE Left 12/2/2022    Procedure: RELEASE, CARPAL TUNNEL;  Surgeon: Jesse Marin MD;   Location: [facility] OR;  Service: Neurosurgery;  Laterality: Left;    CHOLECYSTECTOMY      COLECTOMY  07/2011    partial x3    ESOPHAGOGASTRODUODENOSCOPY N/A 6/8/2023    Procedure: EGD (ESOPHAGOGASTRODUODENOSCOPY);  Surgeon: Jose Alberto Max MD;  Location: [facility] OR;  Service: Gastroenterology;  Laterality: N/A;    HYSTERECTOMY      total    REPAIR OF EYELID Bilateral 11/21/2022    Procedure: BILATERAL ECTROPION REPAIR;  Surgeon: Justin Flores MD;  Location: [facility] OR;  Service: ENT;  Laterality: Bilateral;    ULTRASOUND, ENDOSCOPIC, WITH FINE NEEDLE ASPIRATION N/A 6/8/2023    Procedure: UPPER EUS  W/  FNA;  Surgeon: Jose Alberto Max MD;  Location: [facility] OR;  Service: Gastroenterology;  Laterality: N/A;  Dangelo, pt w/ CD on entyvio recently admitted to [facility] w/ acute pancreatitis. Possibly drug induced from entyvio? Marisa does have dilated PD and a poss focal stricture in the HOP.CA 19-9 nml       Family History:   Family History   Problem Relation Age of Onset    Diabetes Mother     Hypertension Father     Hyperlipidemia Father     Cancer Father     Hyperlipidemia Sister     Hyperlipidemia Brother     Hyperlipidemia Daughter    .     Social History:   Social History     Tobacco Use    Smoking status: Former     Types: Cigarettes    Smokeless tobacco: Never   Substance Use Topics    Alcohol use: Yes     Comment: rarely       Allergies:  Review of patient's allergies indicates:  No Known Allergies    Medications Prior to Admission   Medication Sig Dispense Refill Last Dose    famotidine (PEPCID) 20 MG tablet Take 1 tablet (20 mg total) by mouth 2 (two) times daily. 60 tablet 0 Past Week    ondansetron (ZOFRAN-ODT) 4 MG TbDL Take 1 tablet (4 mg total) by mouth every 6 (six) hours as needed (Nausea/Vomiting). 28 tablet 0 Past Week    oxyCODONE (ROXICODONE) 10 mg Tab immediate release tablet Take 1 tablet (10 mg total) by mouth every 6 (six) hours as needed for Pain. 28 tablet 0 Past Week    cholecalciferol, vitamin D3,  1,250 mcg (50,000 unit) capsule Take 1,250 mcg by mouth once a week.   Unknown    pantoprazole (PROTONIX) 40 MG tablet Take 1 tablet by mouth every morning.   Unknown    simvastatin (ZOCOR) 20 MG tablet Take 20 mg by mouth every evening.   Unknown         Objective Findings:    Vital Signs:  BP (!) 141/64   Pulse 69   Temp 98.2 °F (36.8 °C) (Oral)   Resp 18   SpO2 96%   There is no height or weight on file to calculate BMI.    Physical Exam:  Physical Exam  Constitutional:       General: She is not in acute distress.     Appearance: She is normal weight. She is not ill-appearing.   HENT:      Head: Normocephalic and atraumatic.      Right Ear: External ear normal.      Left Ear: External ear normal.      Nose: Nose normal.      Mouth/Throat:      Pharynx: Oropharynx is clear.   Eyes:      Conjunctiva/sclera: Conjunctivae normal.   Pulmonary:      Effort: Pulmonary effort is normal. No respiratory distress.   Abdominal:      General: Bowel sounds are decreased. There is no distension.      Palpations: Abdomen is soft.      Tenderness: There is generalized abdominal tenderness. There is guarding.   Musculoskeletal:         General: Normal range of motion.      Cervical back: Normal range of motion.   Skin:     General: Skin is warm and dry.      Coloration: Skin is not pale.   Neurological:      Mental Status: She is alert. Mental status is at baseline.      Motor: No weakness.       Labs:  Recent Results (from the past 24 hour(s))   Urinalysis, Reflex to Urine Culture    Collection Time: 06/09/23 10:16 AM    Specimen: Urine   Result Value Ref Range    Color, UA Yellow Yellow, Light-Yellow, Dark Yellow, Misty, Straw    Appearance, UA Clear Clear    Specific Gravity, UA 1.009 1.005 - 1.030    pH, UA 8.0 5.0 - 8.5    Protein, UA Negative Negative mg/dL    Glucose, UA Negative Negative, Normal mg/dL    Ketones, UA Trace (A) Negative mg/dL    Blood, UA Negative Negative unit/L    Bilirubin, UA Negative Negative mg/dL     Urobilinogen, UA 0.2 0.2, 1.0, Normal mg/dL    Nitrites, UA Negative Negative    Leukocyte Esterase, UA Negative Negative unit/L   Urinalysis, Microscopic    Collection Time: 06/09/23 10:16 AM   Result Value Ref Range    RBC, UA <5 <=5 /HPF    WBC, UA <5 <=5 /HPF    Squamous Epithelial Cells, UA <5 <=5 /HPF    Bacteria, UA None Seen None Seen, Rare, Occasional /HPF       CT Abdomen Pelvis With Contrast   Final Result   Abnormal      Interval development of some colonic wall thickening in the left colon especially at the splenic flexure and proximal descending colon concerning for developing colitis      Mild peripancreatic edema and inflammatory changes seen mainly in the body and tail the pancreas consistent with pancreatitis      Persistent pancreatic duct dilatation in the body and tail which was seen and described on multiple previous examinations      This report was flagged in Epic as abnormal.         Electronically signed by: Mary Cool   Date:    06/09/2023   Time:    10:50      X-Ray Chest AP Portable   Final Result      No acute pulmonary process identified.         Electronically signed by: Seth Wright   Date:    06/09/2023   Time:    11:18            Assessment/Plan:  This is a 82 y.o. female known to Dr. Pierson with PMH of recently diagnosed pancreatic ductal adenocarcinoma, hyperlipidemia, coronary artery disease, Crohn's disease on Entyvio every 6 weeks status post right hemicolectomy, hepatic steatosis, rheumatoid arthritis, osteopenia and cervical cancer. She presented to the [facility] ED 06/09/23 with worsening generalized abd pain, n/v/d that she reports is c/w Crohn's. GI consulted for Crohn's flare/pancreatic adenocarcinoma.    Crohn's of the small and large intestine, in remission - s/p resection x2  - on Entyvio every 6 weeks, last infusion approx 2 weeks ago  - last colonoscopy Feb 2023 with Crohn's in remission  - CT abd/pel at admission with colitis in splenic flexure and  proximal descending colon  - GI panel, stool for Cdiff pending; will also add fecal calprotectin  - CRP, Entyvio levels and Entyvio Ab  - no urgent indication for colonoscopy at this time  Pancreatic adenocarcinoma with pancreatitis  - pancreatitis episode 06/02/23; imaging revealed pancreatic neck stricture  - EUS 06/08/23 revealed mass in pancreatic neck c/w pancreatic ductal adenocarcinoma  - oncology referral and PET scan recommended - patient and family requested Dr. Philip and Dr. Nuñez for further care  - will consult oncology while inpatient  - supportive care: IVF, antiemetics as needed    - will discuss case further with Dr. Paul for additional reccs    Thank you for allowing us to participate in the care of Evelyn VIVIENNE Proctor.    Nancy Clarke, PA-C  Gastroenterology  [facility]

## 2023-06-11 LAB
ABS NEUT (OLG): 5.53 X10(3)/MCL (ref 2.1–9.2)
ALBUMIN SERPL-MCNC: 3.4 G/DL (ref 3.4–4.8)
ALBUMIN/GLOB SERPL: 0.9 RATIO (ref 1.1–2)
ALP SERPL-CCNC: 96 UNIT/L (ref 40–150)
ALT SERPL-CCNC: 10 UNIT/L (ref 0–55)
AST SERPL-CCNC: 17 UNIT/L (ref 5–34)
BILIRUBIN DIRECT+TOT PNL SERPL-MCNC: 0.7 MG/DL
BUN SERPL-MCNC: 8.7 MG/DL (ref 9.8–20.1)
CALCIUM SERPL-MCNC: 9 MG/DL (ref 8.4–10.2)
CHLORIDE SERPL-SCNC: 106 MMOL/L (ref 98–107)
CO2 SERPL-SCNC: 23 MMOL/L (ref 23–31)
CREAT SERPL-MCNC: 0.85 MG/DL (ref 0.55–1.02)
ERYTHROCYTE [DISTWIDTH] IN BLOOD BY AUTOMATED COUNT: 14.1 % (ref 11.5–17)
GFR SERPLBLD CREATININE-BSD FMLA CKD-EPI: >60 MLS/MIN/1.73/M2
GLOBULIN SER-MCNC: 3.7 GM/DL (ref 2.4–3.5)
GLUCOSE SERPL-MCNC: 101 MG/DL (ref 82–115)
HCT VFR BLD AUTO: 37.1 % (ref 37–47)
HGB BLD-MCNC: 12.1 G/DL (ref 12–16)
INSTRUMENT WBC (OLG): 7.9 X10(3)/MCL
LYMPHOCYTES NFR BLD MANUAL: 1.98 X10(3)/MCL
LYMPHOCYTES NFR BLD MANUAL: 25 %
MAGNESIUM SERPL-MCNC: 1.5 MG/DL (ref 1.6–2.6)
MCH RBC QN AUTO: 30.7 PG (ref 27–31)
MCHC RBC AUTO-ENTMCNC: 32.6 G/DL (ref 33–36)
MCV RBC AUTO: 94.2 FL (ref 80–94)
MONOCYTES NFR BLD MANUAL: 0.4 X10(3)/MCL (ref 0.1–1.3)
MONOCYTES NFR BLD MANUAL: 5 %
NEUTROPHILS NFR BLD MANUAL: 70 %
NRBC BLD AUTO-RTO: 0 %
PLATELET # BLD AUTO: 173 X10(3)/MCL (ref 130–400)
PLATELET # BLD EST: NORMAL 10*3/UL
PMV BLD AUTO: 10.2 FL (ref 7.4–10.4)
POTASSIUM SERPL-SCNC: 3.6 MMOL/L (ref 3.5–5.1)
PROT SERPL-MCNC: 7.1 GM/DL (ref 5.8–7.6)
RBC # BLD AUTO: 3.94 X10(6)/MCL (ref 4.2–5.4)
RBC MORPH BLD: NORMAL
SODIUM SERPL-SCNC: 139 MMOL/L (ref 136–145)
WBC # SPEC AUTO: 7.91 X10(3)/MCL (ref 4.5–11.5)

## 2023-06-11 PROCEDURE — 85027 COMPLETE CBC AUTOMATED: CPT | Performed by: INTERNAL MEDICINE

## 2023-06-11 PROCEDURE — 80053 COMPREHEN METABOLIC PANEL: CPT | Performed by: INTERNAL MEDICINE

## 2023-06-11 PROCEDURE — 83735 ASSAY OF MAGNESIUM: CPT | Performed by: INTERNAL MEDICINE

## 2023-06-11 PROCEDURE — 25000003 PHARM REV CODE 250: Performed by: INTERNAL MEDICINE

## 2023-06-11 PROCEDURE — C9113 INJ PANTOPRAZOLE SODIUM, VIA: HCPCS

## 2023-06-11 PROCEDURE — 63600175 PHARM REV CODE 636 W HCPCS: Performed by: INTERNAL MEDICINE

## 2023-06-11 PROCEDURE — 25000003 PHARM REV CODE 250: Performed by: PHYSICIAN ASSISTANT

## 2023-06-11 PROCEDURE — 63600175 PHARM REV CODE 636 W HCPCS: Performed by: PHYSICIAN ASSISTANT

## 2023-06-11 PROCEDURE — 63600175 PHARM REV CODE 636 W HCPCS

## 2023-06-11 PROCEDURE — 21400001 HC TELEMETRY ROOM

## 2023-06-11 RX ORDER — MAGNESIUM SULFATE HEPTAHYDRATE 40 MG/ML
2 INJECTION, SOLUTION INTRAVENOUS ONCE
Status: COMPLETED | OUTPATIENT
Start: 2023-06-11 | End: 2023-06-11

## 2023-06-11 RX ADMIN — HYDROMORPHONE HYDROCHLORIDE 1 MG: 2 INJECTION INTRAMUSCULAR; INTRAVENOUS; SUBCUTANEOUS at 02:06

## 2023-06-11 RX ADMIN — PANTOPRAZOLE SODIUM 40 MG: 40 INJECTION, POWDER, FOR SOLUTION INTRAVENOUS at 04:06

## 2023-06-11 RX ADMIN — SUCRALFATE 1 G: 1 TABLET ORAL at 12:06

## 2023-06-11 RX ADMIN — Medication 125 MG: at 12:06

## 2023-06-11 RX ADMIN — Medication 125 MG: at 07:06

## 2023-06-11 RX ADMIN — HYDROMORPHONE HYDROCHLORIDE 1 MG: 2 INJECTION INTRAMUSCULAR; INTRAVENOUS; SUBCUTANEOUS at 11:06

## 2023-06-11 RX ADMIN — ENOXAPARIN SODIUM 40 MG: 40 INJECTION SUBCUTANEOUS at 04:06

## 2023-06-11 RX ADMIN — SODIUM CHLORIDE: 9 INJECTION, SOLUTION INTRAVENOUS at 08:06

## 2023-06-11 RX ADMIN — SUCRALFATE 1 G: 1 TABLET ORAL at 09:06

## 2023-06-11 RX ADMIN — MAGNESIUM SULFATE HEPTAHYDRATE 2 G: 40 INJECTION, SOLUTION INTRAVENOUS at 08:06

## 2023-06-11 RX ADMIN — Medication 125 MG: at 11:06

## 2023-06-11 RX ADMIN — HYDROMORPHONE HYDROCHLORIDE 1 MG: 2 INJECTION INTRAMUSCULAR; INTRAVENOUS; SUBCUTANEOUS at 08:06

## 2023-06-11 RX ADMIN — ONDANSETRON 4 MG: 2 INJECTION INTRAMUSCULAR; INTRAVENOUS at 08:06

## 2023-06-11 RX ADMIN — SUCRALFATE 1 G: 1 TABLET ORAL at 05:06

## 2023-06-11 RX ADMIN — HYDROMORPHONE HYDROCHLORIDE 1 MG: 2 INJECTION INTRAMUSCULAR; INTRAVENOUS; SUBCUTANEOUS at 07:06

## 2023-06-11 RX ADMIN — ONDANSETRON 4 MG: 2 INJECTION INTRAMUSCULAR; INTRAVENOUS at 02:06

## 2023-06-11 RX ADMIN — HYDROMORPHONE HYDROCHLORIDE 1 MG: 2 INJECTION INTRAMUSCULAR; INTRAVENOUS; SUBCUTANEOUS at 04:06

## 2023-06-11 RX ADMIN — SUCRALFATE 1 G: 1 TABLET ORAL at 04:06

## 2023-06-11 RX ADMIN — PANTOPRAZOLE SODIUM 40 MG: 40 INJECTION, POWDER, FOR SOLUTION INTRAVENOUS at 05:06

## 2023-06-11 RX ADMIN — ACETAMINOPHEN 650 MG: 325 TABLET ORAL at 04:06

## 2023-06-11 RX ADMIN — Medication 125 MG: at 05:06

## 2023-06-11 NOTE — PROGRESS NOTES
Progress Note         Hospital follow up  CC:    Subjective:   Last 24 hours diagnosed with C difficile, vancomycin started.  Remains with mild abdominal pain.  Also seen by Hematology/Oncology.  She clarifies today that she wants to be seen by the [facility] cancer team      Review of Systems:     Review of Systems    Objective:   Scheduled Meds:   enoxparin  40 mg Subcutaneous Daily    pantoprazole  40 mg Intravenous BID AC    sucralfate  1 g Oral QID (AC & HS)    vancomycin  125 mg Oral Q6H     Continuous Infusions:   sodium chloride 0.9% 75 mL/hr at 06/11/23 0944     PRN Meds:  acetaminophen, acetaminophen, dextrose 10%, dextrose 10%, glucagon (human recombinant), glucose, glucose, hydrALAZINE, HYDROmorphone, loperamide, ondansetron, promethazine, sodium chloride 0.9%      VITAL SIGNS: 24 HR MIN & MAX LAST    Temp  Min: 97.4 °F (36.3 °C)  Max: 99.7 °F (37.6 °C)  99.3 °F (37.4 °C)        BP  Min: 119/61  Max: 164/89  (!) 151/69     Pulse  Min: 73  Max: 98  77     Resp  Min: 18  Max: 19  18   SpO2  Min: 91 %  Max: 100 %  96 %        Intake/Output Summary (Last 24 hours) at 6/11/2023 1328  Last data filed at 6/11/2023 0635  Gross per 24 hour   Intake 1060 ml   Output 200 ml   Net 860 ml       Physical Exam       Recent Results (from the past 24 hour(s))   GI Panel    Collection Time: 06/10/23  4:05 PM   Result Value Ref Range    CAMPYLOBACTER Not Detected Not Detected    PLESIOMONAS SHIGELLOIDES Not Detected Not Detected    SALMONELLA Not Detected Not Detected    Vibrio Not Detected Not Detected    YERSINIA ENTEROCOLITICA Not Detected Not Detected    ENTEROAGGREGATIVE E. COLI (EAEC) Not Detected Not Detected    ENTEROPATHOGENIC E. COLI (EPEC) Not Detected Not Detected    Enterotoxigenic E. coli (ETEC) Not Detected Not Detected    SHIGA-LIKE TOXIN-PRODUCING E. COLI (STEC) Not Detected Not Detected    Shigella/Enteroinvasive E. coli (EIEC) Not Detected Not Detected    CRYPTOSPORIDIUM Not Detected Not Detected     Cycolospora cayetanensis Not Detected Not Detected    Entamoeba histolytica Not Detected Not Detected    Giardia lamblia Not Detected Not Detected    Adenovirus F 40/41 Not Detected Not Detected    Astrovirus Not Detected Not Detected    Norovirus GI/GII Not Detected Not Detected    Rotavirus A Not Detected Not Detected    Sapovirus Not Detected Not Detected    VIBRIO CHOLERAE Not Detected Not Detected   Stool Culture    Collection Time: 06/10/23  4:05 PM    Specimen: Stool   Result Value Ref Range    Stool Culture       Negative for Salmonella, Shigella, Campylobacter, Vibrio, Aeromonas, Pleisiomonas,Yersinia, or Shiga Toxin 1 and 2.   Clostridium Diff Toxin, A & B, EIA    Collection Time: 06/10/23  4:06 PM    Specimen: Stool   Result Value Ref Range    Clostridium Difficile GDH Antigen Positive (A) Negative    Clostridium Difficile Toxin A/B Positive (A) Negative   Comprehensive Metabolic Panel    Collection Time: 06/11/23  5:28 AM   Result Value Ref Range    Sodium Level 139 136 - 145 mmol/L    Potassium Level 3.6 3.5 - 5.1 mmol/L    Chloride 106 98 - 107 mmol/L    Carbon Dioxide 23 23 - 31 mmol/L    Glucose Level 101 82 - 115 mg/dL    Blood Urea Nitrogen 8.7 (L) 9.8 - 20.1 mg/dL    Creatinine 0.85 0.55 - 1.02 mg/dL    Calcium Level Total 9.0 8.4 - 10.2 mg/dL    Protein Total 7.1 5.8 - 7.6 gm/dL    Albumin Level 3.4 3.4 - 4.8 g/dL    Globulin 3.7 (H) 2.4 - 3.5 gm/dL    Albumin/Globulin Ratio 0.9 (L) 1.1 - 2.0 ratio    Bilirubin Total 0.7 <=1.5 mg/dL    Alkaline Phosphatase 96 40 - 150 unit/L    Alanine Aminotransferase 10 0 - 55 unit/L    Aspartate Aminotransferase 17 5 - 34 unit/L    eGFR >60 mls/min/1.73/m2   CBC with Differential    Collection Time: 06/11/23  5:28 AM   Result Value Ref Range    WBC 7.91 4.50 - 11.50 x10(3)/mcL    RBC 3.94 (L) 4.20 - 5.40 x10(6)/mcL    Hgb 12.1 12.0 - 16.0 g/dL    Hct 37.1 37.0 - 47.0 %    MCV 94.2 (H) 80.0 - 94.0 fL    MCH 30.7 27.0 - 31.0 pg    MCHC 32.6 (L) 33.0 - 36.0 g/dL     RDW 14.1 11.5 - 17.0 %    Platelet 173 130 - 400 x10(3)/mcL    MPV 10.2 7.4 - 10.4 fL    NRBC% 0.0 %   Magnesium    Collection Time: 06/11/23  5:28 AM   Result Value Ref Range    Magnesium Level 1.50 (L) 1.60 - 2.60 mg/dL   Manual Differential    Collection Time: 06/11/23  5:28 AM   Result Value Ref Range    Neut Man 70 %    Lymph Man 25 %    Monocyte Man 5 %    Instr WBC 7.9 x10(3)/mcL    Abs Mono 0.395 0.1 - 1.3 x10(3)/mcL    Abs Lymp 1.975 0.6 - 4.6 x10(3)/mcL    Abs Neut 5.53 2.1 - 9.2 x10(3)/mcL    RBC Morph Normal Normal    Platelet Est Normal Normal, Adequate       X-Ray Chest 1 View    Result Date: 5/20/2023  AP chest dated 5/20/2023 at 2:30 PM HISTORY:  Chest pain. Initial encounter. STUDY: A single frontal view of the chest is submitted for interpretation. FINDINGS: There is a Mediport catheter in place with the tip in the superior vena cava. Degenerative changes are seen at the right greater than left shoulder. Atherosclerotic calcification of the aortic arch is seen. The cardiac silhouette and mediastinum are otherwise unremarkable. There is no focal airspace or parenchymal disease, mass lesion, pneumothorax, or pleural effusion.    No acute abnormality. Nonacute findings as noted.    CT Abdomen Pelvis With Contrast    Result Date: 6/9/2023  EXAMINATION: CT ABDOMEN PELVIS WITH CONTRAST CLINICAL HISTORY: Nausea/vomiting;Pancreatitis, acute, severe;Abdominal pain, acute, nonlocalized;Recent pancreatitis, ERCP done yesterday, worsening abdominal pain; TECHNIQUE: Low dose axial images, sagittal and coronal reformations were obtained from the lung bases to the pubic symphysis following the IV administration of contrast. Automatic exposure control (AEC) is utilized to reduce patient radiation exposure. COMPARISON: 04/16/2023 FINDINGS: There is mild bibasilar atelectasis..  There is a hiatal hernia. The liver is enlarged in size.  There is evidence of intra and extrahepatic biliary dilatation seen.  The  patient is status post cholecystectomy.  There is significant pancreatic duct dilatation seen.  Pancreatic duct dilatation is seen in the body and tail.  Findings could represent a stricture in the pancreatic duct.  There has also been interval development of some peripancreatic edema and fluid especially in the body and tail consistent with possible pancreatitis.  No obvious pancreatic mass or lesion is seen.. The spleen shows no acute abnormality. The adrenal glands appear normal.  No adrenal nodule is seen. The kidneys appear normal.  There is some simple appearing cysts in both kidneys.  No hydronephrosis is seen.  No hydroureter is seen.  No nephrolithiasis is seen.  No obvious ureteral stones are seen. Urinary bladder appears grossly unremarkable. Postsurgical changes are seen consistent with previous hemicolectomy.  There is some bowel wall thickening seen in the colon at the splenic flexure and the descending colon.  Findings may represent a colitis.  This is new since the prior examination from 04/16/2023.  Some mild colonic wall thickening is also seen in the sigmoid colon and in the rectum. No free air is seen.  No free fluid is seen.     Interval development of some colonic wall thickening in the left colon especially at the splenic flexure and proximal descending colon concerning for developing colitis Mild peripancreatic edema and inflammatory changes seen mainly in the body and tail the pancreas consistent with pancreatitis Persistent pancreatic duct dilatation in the body and tail which was seen and described on multiple previous examinations This report was flagged in Epic as abnormal. Electronically signed by: Mary Cool Date:    06/09/2023 Time:    10:50    CT Abdomen Pelvis With Contrast    Result Date: 5/20/2023  REASON FOR EXAM: epigastric abdominal pain,  history of pancreatitis.. TECHNIQUE: CT images of the abdomen and pelvis were obtained with administration of intravenous contrast.  Coronal and sagittal reconstructions were performed. All CT scans at this facility use dose modulation, iterative reconstruction, and/or weight-based dosing when appropriate to reduce radiation dose to as low as reasonably achievable. COMPARISON: None. FINDINGS: ABDOMEN: Stomach and distal esophagus: Small hiatal hernia. Duodenum: Unremarkable. Liver: Diffuse parenchymal hypoattenuation is noted compatible with fatty infiltration. Gallbladder/bile duct: Cholecystectomy clips. The intrahepatic and extrahepatic bile ducts are mildly dilated compatible with reservoir phenomenon. Spleen: Unremarkable Pancreas: There are ductal dilatation to 1 cm. No obvious mass. Adrenal glands: Unremarkable Kidneys: Multiple simple cysts and too small to characterize hypodensities are present that are statistically likely to represent simple cysts. No further follow-up recommended. Vascular: Vascular atherosclerotic calcifications without evidence of abdominal aortic aneurysm or dissection. Adenopathy: None PELVIS: Small bowel: Unremarkable Appendix/Colon: Postsurgical changes of right hemicolectomy are noted. Urinary bladder: Unremarkable. Hysterectomy Adenopathy: None Free fluid, free air or fluid collection: None Aortoiliac bifurcation surgical clips. OSSEOUS STRUCTURES: Scattered degenerative changes without focal or acute osseous abnormality.    Dilated pancreatic duct. Correlate for stricture. No obvious mass within the limitations of this exam. Correlate with this EUS/ERCP.    X-Ray Chest AP Portable    Result Date: 6/9/2023  EXAMINATION: XR CHEST AP PORTABLE CLINICAL HISTORY: Essential (primary) hypertension TECHNIQUE: Frontal view(s) of the chest. COMPARISON: Radiography 04/17/2023 FINDINGS: Normal cardiac silhouette with stable positioning of the left MediPort.  The lungs are well-inflated.  No consolidation identified.  No significant pleural effusion or discernible pneumothorax.     No acute pulmonary process identified.  Electronically signed by: Seth Wright Date:    06/09/2023 Time:    11:18      Imaging personally reviewed by myself and SP.    Assessment / Plan:       82-year-old female known to Dr. Pierson's GI service through the years for Crohn's disease of the small and large intestine status post resection x2 currently on Entyvio every 6 weeks and stable until recently.    1/ Crohn's disease small and large intestine-stable on Entyvio q.6 weeks.  Last Entyvio 2 weeks ago    2/C difficile infection-vancomycin started yesterday with plans for 10 day course    3/pancreatic neck adenocarcinoma  Initial consultation with Dr. Burns this admit  Clarification--although initial notes reported that she would follow up with the [facility] system Dr. Philip/Joaquin; patient prefers to stay in the [facility] cancer team and requests--Dr. Nair/Austen Bowen    From a GI standpoint--okay for discharge home once seen by oncology service and outpatient plan scheduled, continue vancomycin 125 p.o. q.i.d. for a total of 10 days, next Entyvio dose in 4 weeks.  Suggest GI visit/PA in 2-3 weeks    We will sign off now, call if we can be of further assistance            [facility]  [phone]

## 2023-06-11 NOTE — PROGRESS NOTES
[facility]  Hospital Medicine Progress Note        Chief Complaint: Inpatient Follow-up for     HPI:   Evelyn Proctor is a 82 y.o. Black or  female with a past medical history of hyperlipidemia, coronary artery disease, Crohn's disease on Entyvio every 6 weeks status post right hemicolectomy, hepatic steatosis, rheumatoid arthritis, osteopenia and cervical cancer.  Patient recent admission to hospital medicine services from 06/02/2023 to 06/04/2022 or acute pancreatitis.  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  She underwent an ERCP with Dr. Max yesterday (06/08/2023).  He was noted to be normal esophagus, stomach and duodenum, no sign of significant pathology in the ampulla, common bile duct and left lobe of the liver, a mass was identified at the pancreatic neck and tissue was obtained and is adenocarcinoma staged T3. The patient presented to [facility] on 6/9/2023 with a primary complaint of worsening generalized abdominal pain.  Associated symptoms include nausea, vomiting and diarrhea which she reports is consistent with Crohn's.  She denies complaints of fever, cough, chest pain shortness of breath. Patient's daughter walked in mid exam and is requesting consult to Dr. Philip, oncologist. I explained to her that this physician is with [facility] and does not come to [facility]. She states she does not see why he cannot come here and does not want any other physician to be consulted until it is approved by her.      Upon presentation to the ED, temperature 97.2° F, heart rate 95, blood pressure 201/124, respiratory rate 20 and SpO2 98% room air.  Labs with potassium 2.8.  UA negative for infection.  EKG normal sinus rhythm and nonspecific ST abnormalities.  Chest x-ray with no acute process.  CT abdomen and pelvis with contrast preliminary report reveals interval development  of some colonic wall thickening in the left colon especially at the splenic flexure and proximal descending colon concerning for developing colitis, mild peripancreatic inflammatory changes seen anemia in the body and tail of the pancreas consistent with pancreatitis and persistent pancreatic duct dilation in the body and tail prescribed on previous imaging.  In ED patient received hydralazine, Zofran, morphine, 2 g of IV magnesium sulfate, 40 mEq of IV potassium chloride. Patient is admitted to hospital medicine services for further medical management.    Interval Hx:     T-max 99.7° last 24 hours.  Mildly hypotensive.  Appears comfortable this morning.  Pain improving.  Denies any nausea or vomiting.  Tolerating p.o..  IV hydration was continued.  Magnesium low this morning.  Stool tested positive for C diff and p.o. vancomycin was started.     Objective/physical exam:  Vitals:    06/10/23 2313 06/11/23 0044 06/11/23 0425 06/11/23 0502   BP:  119/61  (!) 136/57   Pulse:  73  76   Resp: 19 19 19 19   Temp:  97.4 °F (36.3 °C)  98.6 °F (37 °C)   TempSrc:  Oral  Oral   SpO2:  100%  96%     General: In no acute distress, afebrile  Respiratory: Clear to auscultation bilaterally  Cardiovascular: S1, S2, no appreciable murmur  Abdomen: Soft,tender, BS +  MSK: Warm, no lower extremity edema, no clubbing or cyanosis  Neurologic: Alert and oriented x4, moving all extremities with good strength     Lab Results   Component Value Date     06/11/2023    K 3.6 06/11/2023    CO2 23 06/11/2023    BUN 8.7 (L) 06/11/2023    CREATININE 0.85 06/11/2023    CALCIUM 9.0 06/11/2023    EGFRNONAA >60 09/29/2021      Lab Results   Component Value Date    ALT 10 06/11/2023    AST 17 06/11/2023    ALKPHOS 96 06/11/2023    BILITOT 0.7 06/11/2023      Lab Results   Component Value Date    WBC 7.91 06/11/2023    HGB 12.1 06/11/2023    HCT 37.1 06/11/2023    MCV 94.2 (H) 06/11/2023     06/11/2023           Medications:   enoxparin  40  mg Subcutaneous Daily    pantoprazole  40 mg Intravenous BID AC    sucralfate  1 g Oral QID (AC & HS)    vancomycin  125 mg Oral Q6H      acetaminophen, acetaminophen, dextrose 10%, dextrose 10%, glucagon (human recombinant), glucose, glucose, hydrALAZINE, HYDROmorphone, loperamide, ondansetron, promethazine, sodium chloride 0.9%     Assessment/Plan:    C diff colitis-POA  Acute abdominal pain   Acute on chronic pancreatitis causing above  Hypokalemia at admission-improving  Mild hypomagnesemia  Stage T3 adenocarcinoma of pancreas    HX: hyperlipidemia, coronary artery disease, Crohn's disease on Entyvio every 6 weeks status post right hemicolectomy, hepatic steatosis, rheumatoid arthritis, osteopenia and cervical cancer      Plan:  -continue p.o. vancomycin for 10 days.  Encourage p.o. intake, hydration   -mild IV hydration today.  DC when oral intake is adequate  -oncology input noted.  Needs outpatient follow up, PET scan  -continue current analgesics  -Creon to be considered if diarrhea continues after C diff treatment  -other home medications were reviewed   -possible discharge tomorrow          Hernan Guerrero MD

## 2023-06-12 ENCOUNTER — TELEPHONE (OUTPATIENT)
Dept: FAMILY MEDICINE | Facility: CLINIC | Age: 83
End: 2023-06-12

## 2023-06-12 VITALS
DIASTOLIC BLOOD PRESSURE: 71 MMHG | HEART RATE: 80 BPM | TEMPERATURE: 99 F | SYSTOLIC BLOOD PRESSURE: 159 MMHG | RESPIRATION RATE: 18 BRPM | OXYGEN SATURATION: 98 %

## 2023-06-12 PROBLEM — A49.8 CLOSTRIDIUM DIFFICILE INFECTION: Status: ACTIVE | Noted: 2023-06-12

## 2023-06-12 LAB
ALBUMIN SERPL-MCNC: 3.4 G/DL (ref 3.4–4.8)
ALBUMIN/GLOB SERPL: 1 RATIO (ref 1.1–2)
ALP SERPL-CCNC: 92 UNIT/L (ref 40–150)
ALT SERPL-CCNC: 10 UNIT/L (ref 0–55)
AST SERPL-CCNC: 18 UNIT/L (ref 5–34)
BACTERIA STL CULT: NORMAL
BASOPHILS # BLD AUTO: 0.01 X10(3)/MCL
BASOPHILS NFR BLD AUTO: 0.2 %
BILIRUBIN DIRECT+TOT PNL SERPL-MCNC: 0.7 MG/DL
BUN SERPL-MCNC: 5.6 MG/DL (ref 9.8–20.1)
CALCIUM SERPL-MCNC: 8.7 MG/DL (ref 8.4–10.2)
CHLORIDE SERPL-SCNC: 106 MMOL/L (ref 98–107)
CO2 SERPL-SCNC: 20 MMOL/L (ref 23–31)
CREAT SERPL-MCNC: 0.73 MG/DL (ref 0.55–1.02)
DEPRECATED CALCIDIOL+CALCIFEROL SERPL-MC: 24.7 NG/ML (ref 30–80)
EOSINOPHIL # BLD AUTO: 0.06 X10(3)/MCL (ref 0–0.9)
EOSINOPHIL NFR BLD AUTO: 0.9 %
ERYTHROCYTE [DISTWIDTH] IN BLOOD BY AUTOMATED COUNT: 13.8 % (ref 11.5–17)
GFR SERPLBLD CREATININE-BSD FMLA CKD-EPI: >60 MLS/MIN/1.73/M2
GLOBULIN SER-MCNC: 3.4 GM/DL (ref 2.4–3.5)
GLUCOSE SERPL-MCNC: 103 MG/DL (ref 82–115)
HCT VFR BLD AUTO: 35.1 % (ref 37–47)
HGB BLD-MCNC: 11.3 G/DL (ref 12–16)
IMM GRANULOCYTES # BLD AUTO: 0.02 X10(3)/MCL (ref 0–0.04)
IMM GRANULOCYTES NFR BLD AUTO: 0.3 %
LYMPHOCYTES # BLD AUTO: 1.34 X10(3)/MCL (ref 0.6–4.6)
LYMPHOCYTES NFR BLD AUTO: 20.8 %
MAGNESIUM SERPL-MCNC: 1.7 MG/DL (ref 1.6–2.6)
MCH RBC QN AUTO: 31.5 PG (ref 27–31)
MCHC RBC AUTO-ENTMCNC: 32.2 G/DL (ref 33–36)
MCV RBC AUTO: 97.8 FL (ref 80–94)
MONOCYTES # BLD AUTO: 0.75 X10(3)/MCL (ref 0.1–1.3)
MONOCYTES NFR BLD AUTO: 11.6 %
NEUTROPHILS # BLD AUTO: 4.26 X10(3)/MCL (ref 2.1–9.2)
NEUTROPHILS NFR BLD AUTO: 66.2 %
NRBC BLD AUTO-RTO: 0 %
OVALOCYTES (OLG): ABNORMAL
PLATELET # BLD AUTO: 155 X10(3)/MCL (ref 130–400)
PLATELET # BLD EST: NORMAL 10*3/UL
PMV BLD AUTO: 10.9 FL (ref 7.4–10.4)
POIKILOCYTOSIS BLD QL SMEAR: ABNORMAL
POTASSIUM SERPL-SCNC: 4 MMOL/L (ref 3.5–5.1)
PROT SERPL-MCNC: 6.8 GM/DL (ref 5.8–7.6)
RBC # BLD AUTO: 3.59 X10(6)/MCL (ref 4.2–5.4)
RBC MORPH BLD: ABNORMAL
SODIUM SERPL-SCNC: 135 MMOL/L (ref 136–145)
WBC # SPEC AUTO: 6.44 X10(3)/MCL (ref 4.5–11.5)

## 2023-06-12 PROCEDURE — 25000003 PHARM REV CODE 250: Performed by: INTERNAL MEDICINE

## 2023-06-12 PROCEDURE — 80053 COMPREHEN METABOLIC PANEL: CPT | Performed by: INTERNAL MEDICINE

## 2023-06-12 PROCEDURE — 85025 COMPLETE CBC W/AUTO DIFF WBC: CPT | Performed by: INTERNAL MEDICINE

## 2023-06-12 PROCEDURE — 63600175 PHARM REV CODE 636 W HCPCS

## 2023-06-12 PROCEDURE — 82306 VITAMIN D 25 HYDROXY: CPT | Performed by: INTERNAL MEDICINE

## 2023-06-12 PROCEDURE — 63600175 PHARM REV CODE 636 W HCPCS: Performed by: INTERNAL MEDICINE

## 2023-06-12 PROCEDURE — C9113 INJ PANTOPRAZOLE SODIUM, VIA: HCPCS

## 2023-06-12 PROCEDURE — 83735 ASSAY OF MAGNESIUM: CPT | Performed by: INTERNAL MEDICINE

## 2023-06-12 RX ORDER — ONDANSETRON 4 MG/1
4 TABLET, ORALLY DISINTEGRATING ORAL EVERY 6 HOURS PRN
Qty: 28 TABLET | Refills: 0 | Status: SHIPPED | OUTPATIENT
Start: 2023-06-12 | End: 2023-06-19

## 2023-06-12 RX ORDER — TRAMADOL HYDROCHLORIDE 50 MG/1
50 TABLET ORAL ONCE
Status: COMPLETED | OUTPATIENT
Start: 2023-06-12 | End: 2023-06-12

## 2023-06-12 RX ORDER — OXYCODONE HYDROCHLORIDE 10 MG/1
10 TABLET ORAL EVERY 6 HOURS PRN
Qty: 28 TABLET | Refills: 0 | Status: SHIPPED | OUTPATIENT
Start: 2023-06-12 | End: 2023-06-19

## 2023-06-12 RX ORDER — SUCRALFATE 1 G/1
1 TABLET ORAL
Qty: 120 TABLET | Refills: 0 | Status: SHIPPED | OUTPATIENT
Start: 2023-06-12 | End: 2023-07-05

## 2023-06-12 RX ORDER — OXYCODONE HYDROCHLORIDE 5 MG/1
10 TABLET ORAL ONCE
Status: COMPLETED | OUTPATIENT
Start: 2023-06-12 | End: 2023-06-12

## 2023-06-12 RX ORDER — HYDROCODONE BITARTRATE AND ACETAMINOPHEN 7.5; 325 MG/1; MG/1
1 TABLET ORAL ONCE
Status: COMPLETED | OUTPATIENT
Start: 2023-06-12 | End: 2023-06-12

## 2023-06-12 RX ADMIN — Medication 125 MG: at 06:06

## 2023-06-12 RX ADMIN — SUCRALFATE 1 G: 1 TABLET ORAL at 06:06

## 2023-06-12 RX ADMIN — TRAMADOL HYDROCHLORIDE 50 MG: 50 TABLET ORAL at 04:06

## 2023-06-12 RX ADMIN — PANTOPRAZOLE SODIUM 40 MG: 40 INJECTION, POWDER, FOR SOLUTION INTRAVENOUS at 06:06

## 2023-06-12 RX ADMIN — OXYCODONE HYDROCHLORIDE 10 MG: 5 TABLET ORAL at 10:06

## 2023-06-12 RX ADMIN — HYDROMORPHONE HYDROCHLORIDE 1 MG: 2 INJECTION INTRAMUSCULAR; INTRAVENOUS; SUBCUTANEOUS at 08:06

## 2023-06-12 RX ADMIN — HYDROCODONE BITARTRATE AND ACETAMINOPHEN 1 TABLET: 7.5; 325 TABLET ORAL at 01:06

## 2023-06-12 RX ADMIN — SUCRALFATE 1 G: 1 TABLET ORAL at 11:06

## 2023-06-12 RX ADMIN — HYDROMORPHONE HYDROCHLORIDE 1 MG: 2 INJECTION INTRAMUSCULAR; INTRAVENOUS; SUBCUTANEOUS at 04:06

## 2023-06-12 RX ADMIN — Medication 125 MG: at 11:06

## 2023-06-12 NOTE — NURSING
Pt AAOx4, VSS. Granddaughter at bedside. Discussed discharge instructions with patient in detail, discussed new medications, follow up appointments, medication side effects, when to return to the ED, when and where to  prescriptions, and discharge instructions. Pt verbalized understanding. Pt informed me that she had an appointment already set up with Dr. Schneider for tomorrow. All questions answered. Peripheral IV was discontinued and gauze/tape was applied to both sites. Telemetry monitoring was discontinued and returned to cabinet. Pt wheeled down by transport to personal transportation provided by granddaughter taking patient home.

## 2023-06-12 NOTE — TELEPHONE ENCOUNTER
Ruby with [facility] home care - in [place]  we haven't seen the pt in a while , but the home health is wondering if you would sign a plan of care for her upon discharge of hospital .  And make and appointment for her for hospital f/u     [phone]

## 2023-06-12 NOTE — PLAN OF CARE
06/12/23 1203   Final Note   Assessment Type Final Discharge Note   Anticipated Discharge Disposition Home-Health  (FOC: New referral sent to [facility] for () services per patient and daughter's requests.)   Hospital Resources/Appts/Education Provided Appointments scheduled and added to AVS   Post-Acute Status   Post-Acute Authorization Home Health   Home Health Status Referrals Sent   Patient choice form signed by patient/caregiver List with quality metrics by geographic area provided   Discharge Delays None known at this time     Patient will be D/C'd home with (): through [facility] as agency of choice to follow upon D/C home. New referral sent to [facility] for F/U upon D/C home. Documents; clinical notes () packet and () D/C packet were uploaded and sent via [employer].

## 2023-06-12 NOTE — TELEPHONE ENCOUNTER
They are going to admit her tomorrow they found a [facility] doctor and they will work with the Np signing it .    She said thank you for getting back in touch with her

## 2023-06-12 NOTE — CONSULTS
COURTESY CONSULT    82 YEAR OLD FEMALE WITH HX OF PANCREATITIS WITH NEW FINDINGS OF PANCREATIC NECK MASS; EUS CONFIRMED MALIGNANCY    PT INITIALLY REQUEST DR CANALES  THEN CHANGED HER MIND AND WANTED TO BE SEEN BY MED ONC HERE AND WELL AS DR RYAN    CHART REVIEWED IN ITS ENTIRETY     VISITED WITH PT AND DAUGHTER  DISCUSSION ABOUT DX HAD  PT THEN STATES THAT DR CANALESS OFFICE CALLED THEM WITH APPOINTMENT IN OFFICE SCHEDULED FOR TOMORROW AND SHE HAS DECIDED TO SEE DR CANALES    EXPLAINED TO HER TO CALL US IF NEEDED

## 2023-06-12 NOTE — DISCHARGE SUMMARY
[facility] Hospital Medicine  Discharge Summary      Patient Name: Evelyn Proctor  MRN: [id number]  [facility]: [id number]  Patient Class: IP- Inpatient  Admission Date: 6/9/2023  Hospital Length of Stay: 3 days  Discharge Date and Time:  06/12/2023 9:47 AM  Attending Physician: Hoang Quinonez MD   Discharging Provider: Hernan Guerrero MD  Primary Care Provider: Primary Doctor Susan    Primary Care Team: Networked reference to record PCT     Evelyn Proctor is a 82 y.o. Black or  female with a past medical history of hyperlipidemia, coronary artery disease, Crohn's disease on Entyvio every 6 weeks status post right hemicolectomy, hepatic steatosis, rheumatoid arthritis, osteopenia and cervical cancer.      Patient recent admission to hospital medicine services from 06/02/2023 to 06/04/2022 or acute pancreatitis.  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  She underwent an ERCP with Dr. Max yesterday (06/08/2023).  He was noted to be normal esophagus, stomach and duodenum, no sign of significant pathology in the ampulla, common bile duct and left lobe of the liver, a mass was identified at the pancreatic neck and tissue was obtained and is adenocarcinoma staged T3.     The patient presented to [facility] on 6/9/2023 with a primary complaint of worsening generalized abdominal pain.  Associated symptoms include nausea, vomiting and diarrhea which she reports is consistent with Crohn's.  She denied complaints of fever, cough, chest pain shortness of breath. Upon presentation to the ED, temperature 97.2° F, heart rate 95, blood pressure 201/124, respiratory rate 20 and SpO2 98% room air.  Labs with potassium 2.8.  UA negative for infection.  EKG normal sinus rhythm and nonspecific ST abnormalities.  Chest x-ray with no acute process.  CT abdomen and pelvis with contrast preliminary  report reveals interval development of some colonic wall thickening in the left colon especially at the splenic flexure and proximal descending colon concerning for developing colitis, mild peripancreatic inflammatory changes seen anemia in the body and tail of the pancreas consistent with pancreatitis and persistent pancreatic duct dilation in the body and tail prescribed on previous imaging.  In ED patient received hydralazine, Zofran, morphine, 2 g of IV magnesium sulfate, 40 mEq of IV potassium chloride. Patient is admitted to hospital medicine services for further medical management.    IV hydration was continued and GI was consulted for further input.  Stool workup was ordered and stool came back positive for C diff.  Patient was started on p.o. vancomycin.  Analgesics were added for pain control.  Abdominal symptoms have improved and oral intake was upgraded as tolerated.  GI cleared for discharge and recommended to complete antibiotics.  Oncology was consulted as per patient's family request.  Oncology recommended outpatient follow up, PET scan.  Outpatient oncology follow-up will be arranged.  Prior to discharge all her medications were reconciled, necessary prescriptions were provided.      C diff colitis-POA  Acute abdominal pain due to above- improving  Hypokalemia at admission-improved  Mild hypomagnesemia  Stage T3 adenocarcinoma of pancreas     HX: hyperlipidemia, coronary artery disease, Crohn's disease on Entyvio every 6 weeks status post right hemicolectomy, hepatic steatosis, rheumatoid arthritis, osteopenia and cervical cancer      Goals of Care Treatment Preferences:  Code Status: Full Code    Living Will: Yes          Vitals:    06/12/23 0826   BP: (!) 150/70   Pulse: 85   Resp: 20   Temp: 99.3 °F (37.4 °C)      General: In no acute distress, afebrile  Respiratory: Clear to auscultation bilaterally  Cardiovascular: S1, S2, no appreciable murmur  Abdomen: Soft,tender, BS +  MSK: Warm, no lower  extremity edema, no clubbing or cyanosis  Neurologic: Alert and oriented x4, moving all extremities with good strength           Consults:   Consults (From admission, onward)          Status Ordering Provider     Inpatient consult to Social Work/Case Management  Once        Provider:  (Not yet assigned)    Acknowledged DARCIE BARNEY     Inpatient consult to General Surgery  Once        Provider:  Austen Bowen MD    Acknowledged BOLA HOLLEY     Inpatient consult to Oncology  Once        Provider:  Ronan Burns MD    Completed BOLA HOLLEY     Inpatient consult to Gastroenterology  Once        Provider:  Abel Cummins MD    Completed DARCIE BARNEY            No new Assessment & Plan notes have been filed under this hospital service since the last note was generated.  Service: Hospital Medicine    Final Active Diagnoses:    Diagnosis Date Noted POA    PRINCIPAL PROBLEM:  Clostridium difficile infection [A49.8] 06/12/2023 Yes    Pancreatic cancer [C25.9] 06/10/2023 Yes      Problems Resolved During this Admission:       Discharged Condition: good    Disposition: Home or Self Care    Follow Up:   Follow-up Information       Nam Nair MD Follow up in 10 day(s).    Specialties: Oncology, Hematology and Oncology  Why: NEEDS OUTPATIENT PET SCAN    office will call you to schedule this appointment.  Contact information:  [address] [place] [address] [address]  [facility] [id number]  [phone]               Flaco Pierson MD Follow up on 7/3/2023.    Specialty: Gastroenterology  Why: @3:00 PM  Contact information:  [address] [place] [id number]  [phone]                           Patient Instructions:   No discharge procedures on file.    Significant Diagnostic Studies: Labs: CMP   Recent Labs   Lab 06/11/23  0528 06/12/23  0352    135*   K 3.6 4.0   CO2 23 20*   BUN 8.7* 5.6*   CREATININE 0.85 0.73   CALCIUM 9.0 8.7   ALBUMIN 3.4 3.4   BILITOT 0.7 0.7   ALKPHOS 96 92    AST 17 18   ALT 10 10       Pending Diagnostic Studies:       Procedure Component Value Units Date/Time    Calprotectin, Stool [[id number]] Updated: 06/10/23 1627    Order Status: Sent Lab Status: No result     Specimen: Stool     Vedolizumab and Anti-Vedolizumab Ab [[id number]] Collected: 06/10/23 0922    Order Status: Sent Lab Status: In process Updated: 06/10/23 1016    Specimen: Blood            Medications:  Reconciled Home Medications:      Medication List        START taking these medications      sucralfate 1 gram tablet  Commonly known as: CARAFATE  Take 1 tablet (1 g total) by mouth 4 (four) times daily before meals and nightly.     vancomycin 125 mg/5 mL Soln  Take 5 mLs (125 mg total) by mouth every 6 (six) hours. for 10 days            CONTINUE taking these medications      cholecalciferol (vitamin D3) 1,250 mcg (50,000 unit) capsule  Take 1,250 mcg by mouth once a week.     ondansetron 4 MG Tbdl  Commonly known as: ZOFRAN-ODT  Take 1 tablet (4 mg total) by mouth every 6 (six) hours as needed (Nausea/Vomiting).     oxyCODONE 10 mg Tab immediate release tablet  Commonly known as: ROXICODONE  Take 1 tablet (10 mg total) by mouth every 6 (six) hours as needed for Pain.     pantoprazole 40 MG tablet  Commonly known as: PROTONIX  Take 1 tablet by mouth every morning.     simvastatin 20 MG tablet  Commonly known as: ZOCOR  Take 20 mg by mouth every evening.            STOP taking these medications      famotidine 20 MG tablet  Commonly known as: PEPCID              Indwelling Lines/Drains at time of discharge:   Lines/Drains/Airways       None                   Time spent on the discharge of patient: 35 minutes         Hernan Guerrero MD  Department of Hospital Medicine  [facility]

## 2023-06-13 ENCOUNTER — PATIENT OUTREACH (OUTPATIENT)
Dept: ADMINISTRATIVE | Facility: CLINIC | Age: 83
End: 2023-06-13
Payer: MEDICARE

## 2023-06-13 NOTE — PROGRESS NOTES
C3 nurse attempted to contact Evelyn Proctor for a TCC post hospital discharge follow up call. No answer, left voicemail with callback information on the pts cell and home phone. No answer or voicemail available on the pts granddaughter's phone. Spoke with pts daughter who requested a callback on the pts phone. The patient has a scheduled follow up with Marina Morales MD (Family Medicine) on 6/26/2023; @7:40 AM with Dr. Morales's NP , Flaco Pierson MD (Gastroenterology) on 7/3/2023 @ 1500, and still needs a follow up with Nam Nair MD (oncology). Will route to PCP after speaking with the pt. Will attempt an additional TCC call at a later time.

## 2023-06-14 NOTE — PROGRESS NOTES
2nd attempt by C3 nurse to contact Evelyn ShahBlanc for a TCC post hospital discharge follow up call. Spoke with pts daughter who requested a callback today at 1430. The patient has a scheduled follow up with Marina Morales MD (Family Medicine) on 6/26/2023 @ 0740 with Dr. Morales's NP, Flaco Pierson MD (Gastroenterology) on 7/3/2023 @ 1500, and still needs a follow up with Nam Nair MD (oncology). Will route to PCP after speaking with the pt. Will attempt an additional TCC call at a later time.

## 2023-06-14 NOTE — PROGRESS NOTES
C3 nurse spoke with Evelyn Proctor's daughter for a TCC post hospital discharge follow up call. Ps daughter who states the pt is having pain even with her pain medication, and that she wants to discuss additional pain medication, nausea medication, and a possible medication for her mother's depression at her follow up appointment. She also states the pts hands and legs are swollen and verbalized she would call Marina Morales MD to notify about swelling. She states she has [facility] number if needed. The patient has a scheduled follow up with Marina Morales MD (Family Medicine) on 6/26/2023 @ 0740 with NP, an appt with Flaco Laws MD (Gastroenterology) on 7/3/2023 @ 1500, and still needs a follow up with Nam Nair MD (oncology). Message routed to PCP.

## 2023-06-14 NOTE — TELEPHONE ENCOUNTER
Spoke with the daughter and she is aware of the situation and knows about the up coming appt, she also knows to bring her to the er if it worsens.

## 2023-06-19 LAB
CLINICAL BIOCHEMIST REVIEW: NORMAL
VEDOLIZUMAB AB SERPL IA-MCNC: <9.8 NG/ML
VEDOLIZUMAB TROUGH SERPL LC/MS/MS-MCNC: 65.6 MCG/ML

## 2023-06-20 DIAGNOSIS — C25.7: Primary | ICD-10-CM

## 2023-06-21 ENCOUNTER — HOSPITAL ENCOUNTER (INPATIENT)
Facility: HOSPITAL | Age: 83
LOS: 8 days | Discharge: HOME HEALTH | DRG: 372 | End: 2023-06-29
Attending: FAMILY MEDICINE | Admitting: INTERNAL MEDICINE
Payer: MEDICARE

## 2023-06-21 DIAGNOSIS — K86.1 ACUTE ON CHRONIC PANCREATITIS: ICD-10-CM

## 2023-06-21 DIAGNOSIS — I10 HYPERTENSION, UNSPECIFIED TYPE: ICD-10-CM

## 2023-06-21 DIAGNOSIS — E83.42 HYPOMAGNESEMIA: ICD-10-CM

## 2023-06-21 DIAGNOSIS — E87.6 HYPOKALEMIA: ICD-10-CM

## 2023-06-21 DIAGNOSIS — N17.9 ACUTE KIDNEY INJURY: ICD-10-CM

## 2023-06-21 DIAGNOSIS — R10.13 EPIGASTRIC PAIN: ICD-10-CM

## 2023-06-21 DIAGNOSIS — K85.90 ACUTE ON CHRONIC PANCREATITIS: ICD-10-CM

## 2023-06-21 DIAGNOSIS — R07.9 CHEST PAIN: ICD-10-CM

## 2023-06-21 DIAGNOSIS — K52.9 COLITIS: Primary | ICD-10-CM

## 2023-06-21 DIAGNOSIS — N39.0 URINARY TRACT INFECTION WITHOUT HEMATURIA, SITE UNSPECIFIED: ICD-10-CM

## 2023-06-21 LAB
ALBUMIN SERPL-MCNC: 3.5 G/DL (ref 3.4–4.8)
ALBUMIN/GLOB SERPL: 0.7 RATIO (ref 1.1–2)
ALP SERPL-CCNC: 115 UNIT/L (ref 40–150)
ALT SERPL-CCNC: 36 UNIT/L (ref 0–55)
AMYLASE SERPL-CCNC: 124 UNIT/L (ref 20–160)
APPEARANCE UR: ABNORMAL
AST SERPL-CCNC: 46 UNIT/L (ref 5–34)
BACTERIA #/AREA URNS AUTO: ABNORMAL /HPF
BASOPHILS # BLD AUTO: 0.02 X10(3)/MCL
BASOPHILS NFR BLD AUTO: 0.2 %
BILIRUB UR QL STRIP.AUTO: NEGATIVE MG/DL
BILIRUBIN DIRECT+TOT PNL SERPL-MCNC: 0.6 MG/DL
BUN SERPL-MCNC: 15.4 MG/DL (ref 9.8–20.1)
CALCIUM SERPL-MCNC: 10.1 MG/DL (ref 8.4–10.2)
CHLORIDE SERPL-SCNC: 95 MMOL/L (ref 98–107)
CO2 SERPL-SCNC: 25 MMOL/L (ref 23–31)
COLOR UR: YELLOW
CREAT SERPL-MCNC: 1.73 MG/DL (ref 0.55–1.02)
EOSINOPHIL # BLD AUTO: 0.05 X10(3)/MCL (ref 0–0.9)
EOSINOPHIL NFR BLD AUTO: 0.4 %
ERYTHROCYTE [DISTWIDTH] IN BLOOD BY AUTOMATED COUNT: 13.1 % (ref 11.5–17)
GFR SERPLBLD CREATININE-BSD FMLA CKD-EPI: 29 MLS/MIN/1.73/M2
GLOBULIN SER-MCNC: 4.9 GM/DL (ref 2.4–3.5)
GLUCOSE SERPL-MCNC: 161 MG/DL (ref 82–115)
GLUCOSE UR QL STRIP.AUTO: NEGATIVE MG/DL
HCT VFR BLD AUTO: 37.8 % (ref 37–47)
HGB BLD-MCNC: 13.2 G/DL (ref 12–16)
IMM GRANULOCYTES # BLD AUTO: 0.05 X10(3)/MCL (ref 0–0.04)
IMM GRANULOCYTES NFR BLD AUTO: 0.4 %
KETONES UR QL STRIP.AUTO: NEGATIVE MG/DL
LEUKOCYTE ESTERASE UR QL STRIP.AUTO: ABNORMAL UNIT/L
LIPASE SERPL-CCNC: 37 U/L
LYMPHOCYTES # BLD AUTO: 2.03 X10(3)/MCL (ref 0.6–4.6)
LYMPHOCYTES NFR BLD AUTO: 15.7 %
MAGNESIUM SERPL-MCNC: 1.4 MG/DL (ref 1.6–2.6)
MCH RBC QN AUTO: 31 PG (ref 27–31)
MCHC RBC AUTO-ENTMCNC: 34.9 G/DL (ref 33–36)
MCV RBC AUTO: 88.7 FL (ref 80–94)
MONOCYTES # BLD AUTO: 0.95 X10(3)/MCL (ref 0.1–1.3)
MONOCYTES NFR BLD AUTO: 7.4 %
NEUTROPHILS # BLD AUTO: 9.79 X10(3)/MCL (ref 2.1–9.2)
NEUTROPHILS NFR BLD AUTO: 75.9 %
NITRITE UR QL STRIP.AUTO: NEGATIVE
NRBC BLD AUTO-RTO: 0 %
PH UR STRIP.AUTO: 6 [PH]
PLATELET # BLD AUTO: 489 X10(3)/MCL (ref 130–400)
PMV BLD AUTO: 9.3 FL (ref 7.4–10.4)
POTASSIUM SERPL-SCNC: 2.6 MMOL/L (ref 3.5–5.1)
PROT SERPL-MCNC: 8.4 GM/DL (ref 5.8–7.6)
PROT UR QL STRIP.AUTO: ABNORMAL MG/DL
RBC # BLD AUTO: 4.26 X10(6)/MCL (ref 4.2–5.4)
RBC #/AREA URNS AUTO: ABNORMAL /HPF
RBC UR QL AUTO: ABNORMAL UNIT/L
SODIUM SERPL-SCNC: 136 MMOL/L (ref 136–145)
SP GR UR STRIP.AUTO: 1.01
SQUAMOUS #/AREA URNS AUTO: ABNORMAL /HPF
UROBILINOGEN UR STRIP-ACNC: 0.2 MG/DL
WBC # SPEC AUTO: 12.89 X10(3)/MCL (ref 4.5–11.5)
WBC #/AREA URNS AUTO: ABNORMAL /HPF

## 2023-06-21 PROCEDURE — 99285 EMERGENCY DEPT VISIT HI MDM: CPT | Mod: 25

## 2023-06-21 PROCEDURE — 96375 TX/PRO/DX INJ NEW DRUG ADDON: CPT

## 2023-06-21 PROCEDURE — 87088 URINE BACTERIA CULTURE: CPT | Performed by: FAMILY MEDICINE

## 2023-06-21 PROCEDURE — S0030 INJECTION, METRONIDAZOLE: HCPCS | Performed by: FAMILY MEDICINE

## 2023-06-21 PROCEDURE — 25000003 PHARM REV CODE 250: Performed by: FAMILY MEDICINE

## 2023-06-21 PROCEDURE — 25000003 PHARM REV CODE 250: Performed by: INTERNAL MEDICINE

## 2023-06-21 PROCEDURE — 21400001 HC TELEMETRY ROOM

## 2023-06-21 PROCEDURE — 83735 ASSAY OF MAGNESIUM: CPT | Performed by: FAMILY MEDICINE

## 2023-06-21 PROCEDURE — 11000001 HC ACUTE MED/SURG PRIVATE ROOM

## 2023-06-21 PROCEDURE — 82150 ASSAY OF AMYLASE: CPT | Performed by: FAMILY MEDICINE

## 2023-06-21 PROCEDURE — 93010 EKG 12-LEAD: ICD-10-PCS | Mod: ,,, | Performed by: INTERNAL MEDICINE

## 2023-06-21 PROCEDURE — 81001 URINALYSIS AUTO W/SCOPE: CPT | Performed by: FAMILY MEDICINE

## 2023-06-21 PROCEDURE — 63600175 PHARM REV CODE 636 W HCPCS: Performed by: INTERNAL MEDICINE

## 2023-06-21 PROCEDURE — 96376 TX/PRO/DX INJ SAME DRUG ADON: CPT

## 2023-06-21 PROCEDURE — 63600175 PHARM REV CODE 636 W HCPCS: Performed by: FAMILY MEDICINE

## 2023-06-21 PROCEDURE — 93005 ELECTROCARDIOGRAM TRACING: CPT

## 2023-06-21 PROCEDURE — 96365 THER/PROPH/DIAG IV INF INIT: CPT

## 2023-06-21 PROCEDURE — 96372 THER/PROPH/DIAG INJ SC/IM: CPT | Performed by: FAMILY MEDICINE

## 2023-06-21 PROCEDURE — 80053 COMPREHEN METABOLIC PANEL: CPT | Performed by: FAMILY MEDICINE

## 2023-06-21 PROCEDURE — 83690 ASSAY OF LIPASE: CPT | Performed by: FAMILY MEDICINE

## 2023-06-21 PROCEDURE — 85025 COMPLETE CBC W/AUTO DIFF WBC: CPT | Performed by: FAMILY MEDICINE

## 2023-06-21 PROCEDURE — 93010 ELECTROCARDIOGRAM REPORT: CPT | Mod: ,,, | Performed by: INTERNAL MEDICINE

## 2023-06-21 PROCEDURE — 96361 HYDRATE IV INFUSION ADD-ON: CPT

## 2023-06-21 RX ORDER — POTASSIUM CHLORIDE 7.45 MG/ML
20 INJECTION INTRAVENOUS
Status: DISCONTINUED | OUTPATIENT
Start: 2023-06-21 | End: 2023-06-21

## 2023-06-21 RX ORDER — TALC
6 POWDER (GRAM) TOPICAL NIGHTLY PRN
Status: DISCONTINUED | OUTPATIENT
Start: 2023-06-21 | End: 2023-06-29 | Stop reason: HOSPADM

## 2023-06-21 RX ORDER — PROMETHAZINE HYDROCHLORIDE 25 MG/ML
25 INJECTION, SOLUTION INTRAMUSCULAR; INTRAVENOUS
Status: COMPLETED | OUTPATIENT
Start: 2023-06-21 | End: 2023-06-21

## 2023-06-21 RX ORDER — CIPROFLOXACIN 2 MG/ML
400 INJECTION, SOLUTION INTRAVENOUS
Status: DISCONTINUED | OUTPATIENT
Start: 2023-06-22 | End: 2023-06-21

## 2023-06-21 RX ORDER — MAG HYDROX/ALUMINUM HYD/SIMETH 200-200-20
30 SUSPENSION, ORAL (FINAL DOSE FORM) ORAL 4 TIMES DAILY PRN
Status: DISCONTINUED | OUTPATIENT
Start: 2023-06-22 | End: 2023-06-29 | Stop reason: HOSPADM

## 2023-06-21 RX ORDER — MORPHINE SULFATE 4 MG/ML
2 INJECTION, SOLUTION INTRAMUSCULAR; INTRAVENOUS EVERY 4 HOURS PRN
Status: DISCONTINUED | OUTPATIENT
Start: 2023-06-21 | End: 2023-06-22

## 2023-06-21 RX ORDER — ONDANSETRON 2 MG/ML
4 INJECTION INTRAMUSCULAR; INTRAVENOUS EVERY 8 HOURS PRN
Status: DISCONTINUED | OUTPATIENT
Start: 2023-06-21 | End: 2023-06-21

## 2023-06-21 RX ORDER — POTASSIUM CHLORIDE 7.45 MG/ML
10 INJECTION INTRAVENOUS
Status: DISCONTINUED | OUTPATIENT
Start: 2023-06-21 | End: 2023-06-21

## 2023-06-21 RX ORDER — ONDANSETRON 2 MG/ML
4 INJECTION INTRAMUSCULAR; INTRAVENOUS EVERY 4 HOURS PRN
Status: DISCONTINUED | OUTPATIENT
Start: 2023-06-22 | End: 2023-06-29 | Stop reason: HOSPADM

## 2023-06-21 RX ORDER — MORPHINE SULFATE 4 MG/ML
4 INJECTION, SOLUTION INTRAMUSCULAR; INTRAVENOUS
Status: COMPLETED | OUTPATIENT
Start: 2023-06-21 | End: 2023-06-21

## 2023-06-21 RX ORDER — POTASSIUM CHLORIDE 14.9 MG/ML
40 INJECTION INTRAVENOUS ONCE
Status: COMPLETED | OUTPATIENT
Start: 2023-06-21 | End: 2023-06-22

## 2023-06-21 RX ORDER — HYDRALAZINE HYDROCHLORIDE 20 MG/ML
20 INJECTION INTRAMUSCULAR; INTRAVENOUS EVERY 4 HOURS PRN
Status: DISCONTINUED | OUTPATIENT
Start: 2023-06-21 | End: 2023-06-29 | Stop reason: HOSPADM

## 2023-06-21 RX ORDER — MAGNESIUM SULFATE HEPTAHYDRATE 40 MG/ML
2 INJECTION, SOLUTION INTRAVENOUS
Status: COMPLETED | OUTPATIENT
Start: 2023-06-21 | End: 2023-06-21

## 2023-06-21 RX ORDER — ACETAMINOPHEN 325 MG/1
650 TABLET ORAL EVERY 4 HOURS PRN
Status: DISCONTINUED | OUTPATIENT
Start: 2023-06-22 | End: 2023-06-29 | Stop reason: HOSPADM

## 2023-06-21 RX ORDER — CIPROFLOXACIN 2 MG/ML
400 INJECTION, SOLUTION INTRAVENOUS
Status: COMPLETED | OUTPATIENT
Start: 2023-06-21 | End: 2023-06-21

## 2023-06-21 RX ORDER — ACETAMINOPHEN 500 MG
1000 TABLET ORAL EVERY 6 HOURS PRN
Status: DISCONTINUED | OUTPATIENT
Start: 2023-06-22 | End: 2023-06-29 | Stop reason: HOSPADM

## 2023-06-21 RX ORDER — ONDANSETRON 2 MG/ML
4 INJECTION INTRAMUSCULAR; INTRAVENOUS
Status: COMPLETED | OUTPATIENT
Start: 2023-06-21 | End: 2023-06-21

## 2023-06-21 RX ORDER — CLONIDINE HYDROCHLORIDE 0.2 MG/1
0.2 TABLET ORAL 3 TIMES DAILY PRN
Status: DISCONTINUED | OUTPATIENT
Start: 2023-06-21 | End: 2023-06-29 | Stop reason: HOSPADM

## 2023-06-21 RX ORDER — SODIUM CHLORIDE 0.9 % (FLUSH) 0.9 %
10 SYRINGE (ML) INJECTION
Status: DISCONTINUED | OUTPATIENT
Start: 2023-06-21 | End: 2023-06-29 | Stop reason: HOSPADM

## 2023-06-21 RX ORDER — PROCHLORPERAZINE EDISYLATE 5 MG/ML
5 INJECTION INTRAMUSCULAR; INTRAVENOUS EVERY 6 HOURS PRN
Status: DISCONTINUED | OUTPATIENT
Start: 2023-06-22 | End: 2023-06-29 | Stop reason: HOSPADM

## 2023-06-21 RX ORDER — LABETALOL HYDROCHLORIDE 5 MG/ML
10 INJECTION, SOLUTION INTRAVENOUS EVERY 4 HOURS PRN
Status: DISCONTINUED | OUTPATIENT
Start: 2023-06-21 | End: 2023-06-29 | Stop reason: HOSPADM

## 2023-06-21 RX ORDER — METRONIDAZOLE 500 MG/100ML
500 INJECTION, SOLUTION INTRAVENOUS
Status: DISCONTINUED | OUTPATIENT
Start: 2023-06-21 | End: 2023-06-21

## 2023-06-21 RX ORDER — SODIUM CHLORIDE 9 MG/ML
INJECTION, SOLUTION INTRAVENOUS CONTINUOUS
Status: DISCONTINUED | OUTPATIENT
Start: 2023-06-21 | End: 2023-06-21

## 2023-06-21 RX ORDER — SODIUM CHLORIDE, SODIUM LACTATE, POTASSIUM CHLORIDE, CALCIUM CHLORIDE 600; 310; 30; 20 MG/100ML; MG/100ML; MG/100ML; MG/100ML
INJECTION, SOLUTION INTRAVENOUS CONTINUOUS
Status: DISCONTINUED | OUTPATIENT
Start: 2023-06-21 | End: 2023-06-22

## 2023-06-21 RX ORDER — HYDROMORPHONE HYDROCHLORIDE 2 MG/ML
1 INJECTION, SOLUTION INTRAMUSCULAR; INTRAVENOUS; SUBCUTANEOUS EVERY 4 HOURS PRN
Status: DISCONTINUED | OUTPATIENT
Start: 2023-06-21 | End: 2023-06-21

## 2023-06-21 RX ADMIN — HYDROMORPHONE HYDROCHLORIDE 1 MG: 2 INJECTION INTRAMUSCULAR; INTRAVENOUS; SUBCUTANEOUS at 11:06

## 2023-06-21 RX ADMIN — METRONIDAZOLE 500 MG: 5 INJECTION, SOLUTION INTRAVENOUS at 11:06

## 2023-06-21 RX ADMIN — MORPHINE SULFATE 4 MG: 4 INJECTION INTRAVENOUS at 04:06

## 2023-06-21 RX ADMIN — ONDANSETRON 4 MG: 2 INJECTION INTRAMUSCULAR; INTRAVENOUS at 11:06

## 2023-06-21 RX ADMIN — PROMETHAZINE HYDROCHLORIDE 25 MG: 25 INJECTION INTRAMUSCULAR; INTRAVENOUS at 06:06

## 2023-06-21 RX ADMIN — ONDANSETRON 4 MG: 2 INJECTION INTRAMUSCULAR; INTRAVENOUS at 03:06

## 2023-06-21 RX ADMIN — MAGNESIUM SULFATE HEPTAHYDRATE 2 G: 40 INJECTION, SOLUTION INTRAVENOUS at 07:06

## 2023-06-21 RX ADMIN — POTASSIUM CHLORIDE 10 MEQ: 7.46 INJECTION, SOLUTION INTRAVENOUS at 10:06

## 2023-06-21 RX ADMIN — SODIUM CHLORIDE: 9 INJECTION, SOLUTION INTRAVENOUS at 10:06

## 2023-06-21 RX ADMIN — SODIUM CHLORIDE 1000 ML: 9 INJECTION, SOLUTION INTRAVENOUS at 03:06

## 2023-06-21 RX ADMIN — POTASSIUM BICARBONATE 50 MEQ: 977.5 TABLET, EFFERVESCENT ORAL at 09:06

## 2023-06-21 RX ADMIN — CIPROFLOXACIN 400 MG: 2 INJECTION, SOLUTION INTRAVENOUS at 05:06

## 2023-06-21 RX ADMIN — SODIUM CHLORIDE 1000 ML: 9 INJECTION, SOLUTION INTRAVENOUS at 05:06

## 2023-06-21 RX ADMIN — MORPHINE SULFATE 4 MG: 4 INJECTION INTRAVENOUS at 03:06

## 2023-06-21 NOTE — Clinical Note
Campbell Sadaf accompanied their mother to the emergency department on 6/21/2023. They may return to work on 06/23/2023.      If you have any questions or concerns, please don't hesitate to call.      Olivia Damian RN

## 2023-06-21 NOTE — Clinical Note
Diagnosis: Colitis [[id number]]  Admitting Provider:: JASON ANDINO [[id number]]  Future Attending Provider: JASON ANDINO [[id number]]  Reason for IP Medical Treatment  (Clinical interventions that can only be accomplished in the IP setting? ) :: GERBER, a cute on chronic pancreatits, HTN, hypokalemia, hypomagnesemia, colitis, UTI  I certify that Inpatient services for greater than or equal to 2 midnights are medically necessary:: Yes  Plans for Post-Acute care--if anticipated (pick the single best opt ion):: A. No post acute care anticipated at this time  Special Needs:: No Special Needs [1]

## 2023-06-21 NOTE — Clinical Note
Campbell Talavera accompanied their child to the emergency department on 6/21/2023. They may return to work on 06/23/2023.      If you have any questions or concerns, please don't hesitate to call.      Olivia Damian RN

## 2023-06-21 NOTE — ED PROVIDER NOTES
Encounter Date: 6/21/2023       History     Chief Complaint   Patient presents with    Abdominal Pain     The history is provided by the patient (Family member). No  was used.   Abdominal Pain  The current episode started several days ago. The onset of the illness was gradual. The problem has been gradually worsening. The abdominal pain is located in the epigastric region. The abdominal pain does not radiate. The abdominal pain is relieved by nothing. The abdominal pain is exacerbated by vomiting and movement (Palpation). The other symptoms of the illness include nausea and vomiting. The other symptoms of the illness do not include fever, fatigue, jaundice, melena, diarrhea, dysuria, hematemesis or hematochezia.   The nausea is associated with eating. The nausea is exacerbated by food.  Daughter reports recent admission to the hospital with diagnosis of pancreatic cancer.  Due to see Oncology.  GI is Dr. Max.  Review of patient's allergies indicates:  No Known Allergies  Past Medical History:   Diagnosis Date    Acute pancreatitis, unspecified complication status, unspecified pancreatitis type     Arthritis     Carpal tunnel syndrome, bilateral     Cervical cancer     Cervical radiculopathy     Crohn disease     Heart attack     HLD (hyperlipidemia)     Low back pain     Sleep apnea, unspecified     Spinal stenosis of lumbar region with neurogenic claudication      Past Surgical History:   Procedure Laterality Date    bilateral L4-5, L5-S1 decompression, repair L5-S1 dural tear  10/01/2021    Dr. Marin    CARPAL TUNNEL RELEASE Right 09/06/2019    Dr. Marin    CARPAL TUNNEL RELEASE Left 12/2/2022    Procedure: RELEASE, CARPAL TUNNEL;  Surgeon: Jesse Marin MD;  Location: [facility];  Service: Neurosurgery;  Laterality: Left;    CHOLECYSTECTOMY      COLECTOMY  07/2011    partial x3    ESOPHAGOGASTRODUODENOSCOPY N/A 6/8/2023    Procedure: EGD (ESOPHAGOGASTRODUODENOSCOPY);  Surgeon: Jose Alberto STRONG  MD Winter;  Location: [facility] OR;  Service: Gastroenterology;  Laterality: N/A;    HYSTERECTOMY      total    REPAIR OF EYELID Bilateral 11/21/2022    Procedure: BILATERAL ECTROPION REPAIR;  Surgeon: Justin Flores MD;  Location: [facility] OR;  Service: ENT;  Laterality: Bilateral;    ULTRASOUND, ENDOSCOPIC, WITH FINE NEEDLE ASPIRATION N/A 6/8/2023    Procedure: UPPER EUS  W/  FNA;  Surgeon: Jose Alberto Max MD;  Location: [facility] OR;  Service: Gastroenterology;  Laterality: N/A;  Dangelo, pt w/ CD on entyvio recently admitted to [facility] w/ acute pancreatitis. Possibly drug induced from entyvio? Marisa does have dilated PD and a poss focal stricture in the HOP.CA 19-9 nml     Family History   Problem Relation Age of Onset    Diabetes Mother     Hypertension Father     Hyperlipidemia Father     Cancer Father     Hyperlipidemia Sister     Hyperlipidemia Brother     Hyperlipidemia Daughter      Social History     Tobacco Use    Smoking status: Former     Types: Cigarettes    Smokeless tobacco: Never   Substance Use Topics    Alcohol use: Yes     Comment: rarely    Drug use: Never     Review of Systems   Constitutional:  Negative for fatigue and fever.   Gastrointestinal:  Positive for abdominal pain, nausea and vomiting. Negative for diarrhea, hematemesis, hematochezia, jaundice and melena.   Genitourinary:  Negative for dysuria.   All other systems reviewed and are negative.    Physical Exam     Initial Vitals [06/21/23 1500]   BP Pulse Resp Temp SpO2   (!) 158/82 88 (!) 24 98.6 °F (37 °C) 98 %      MAP       --         Physical Exam    Nursing note and vitals reviewed.  Constitutional: She appears well-developed and well-nourished. No distress.   Uncomfortable   HENT:   Head: Normocephalic and atraumatic.   Eyes: Conjunctivae and EOM are normal. Pupils are equal, round, and reactive to light.   Neck: Neck supple.   Normal range of motion.  Cardiovascular:  Normal rate, regular rhythm, normal heart sounds and intact distal  pulses.           No murmur heard.  Pulmonary/Chest: Breath sounds normal. No respiratory distress. She has no wheezes. She has no rhonchi. She has no rales.   Abdominal: Abdomen is soft. Bowel sounds are normal. She exhibits no distension. There is abdominal tenderness (In moderate in epigastric area, mild diffusely).   Midline scar noted There is no rebound and no guarding.   Musculoskeletal:         General: Normal range of motion.      Cervical back: Normal range of motion and neck supple.     Neurological: She is alert and oriented to person, place, and time. GCS score is 15. GCS eye subscore is 4. GCS verbal subscore is 5. GCS motor subscore is 6.   Skin: Skin is warm and dry. Capillary refill takes less than 2 seconds.       ED Course   Procedures  Labs Reviewed   COMPREHENSIVE METABOLIC PANEL - Abnormal; Notable for the following components:       Result Value    Potassium Level 2.6 (*)     Chloride 95 (*)     Glucose Level 161 (*)     Creatinine 1.73 (*)     Protein Total 8.4 (*)     Globulin 4.9 (*)     Albumin/Globulin Ratio 0.7 (*)     Aspartate Aminotransferase 46 (*)     All other components within normal limits   URINALYSIS, REFLEX TO URINE CULTURE - Abnormal; Notable for the following components:    Appearance, UA SL CLOUDY (*)     Protein, UA Trace (*)     Blood, UA Trace (*)     Leukocyte Esterase, UA Large (*)     All other components within normal limits   CBC WITH DIFFERENTIAL - Abnormal; Notable for the following components:    WBC 12.89 (*)     Platelet 489 (*)     Neut # 9.79 (*)     IG# 0.05 (*)     All other components within normal limits   MAGNESIUM - Abnormal; Notable for the following components:    Magnesium Level 1.40 (*)     All other components within normal limits   URINALYSIS, MICROSCOPIC - Abnormal; Notable for the following components:    Bacteria, UA Moderate (*)     WBC, UA 21-50 (*)     Squamous Epithelial Cells, UA Many (*)     All other components within normal limits     Narrative:     Urine microscopic results may be inaccurate, <12 cc sample submitted   LIPASE - Normal   AMYLASE - Normal   CULTURE, URINE   CBC W/ AUTO DIFFERENTIAL    Narrative:     The following orders were created for panel order CBC W/ AUTO DIFFERENTIAL.  Procedure                               Abnormality         Status                     ---------                               -----------         ------                     CBC with Differential[[id number]]        Abnormal            Final result                 Please view results for these tests on the individual orders.   RAINBOW DRAW    Narrative:     The following orders were created for panel order [facility] Draw.  Procedure                               Abnormality         Status                     ---------                               -----------         ------                     Light Blue Top Hold[[id number]]                              In process                 Light Green Top Hold[[id number]]                             In process                 Lavender Top Hold[[id number]]                                In process                 Gold Top Hold[[id number]]                                    In process                   Please view results for these tests on the individual orders.   LIGHT BLUE TOP HOLD   LIGHT GREEN TOP HOLD   LAVENDER TOP HOLD   GOLD TOP HOLD     EKG Readings: (Independently Interpreted)   Initial Reading: No STEMI. Rhythm: Normal Sinus Rhythm. Heart Rate: 89. Ectopy: No Ectopy. Conduction: Normal. ST Segments: Normal ST Segments. T Waves: Normal. Axis: Normal. Clinical Impression: Normal Sinus Rhythm     Imaging Results              CT Abdomen Pelvis  Without Contrast (Final result)  Result time 06/21/23 16:57:59      Final result by Mary Cool MD (06/21/23 16:57:59)                   Impression:      Dilated pancreatic duct and some inflammatory changes around the pancreas possibly representing pancreatitis.  Similar  changes were seen on the prior examination    Some colonic wall thickening seen in the distal transverse and the descending colon possibly representing a colitis.      Electronically signed by: Roccoedwinabe Artli  Date:    06/21/2023  Time:    16:57               Narrative:    EXAMINATION:  CT ABDOMEN PELVIS WITHOUT CONTRAST    CLINICAL HISTORY:  Epigastric pain;    TECHNIQUE:  Low dose axial images, sagittal and coronal reformations were obtained from the lung bases to the pubic symphysis.  No contrast was administered.  Automatic exposure control is utilized to reduce patient radiation exposure.    COMPARISON:  06/09/2023    FINDINGS:  The lung bases are clear.    The liver appears normal.  No obvious liver mass or lesion is seen.    The patient is status post cholecystectomy.  The pancreas is seen in the pancreatic duct appears to be dilated.  This was seen on the prior examination as well.  Mild peripancreatic inflammatory changes are seen.  Similar changes were seen on the prior examination.    Spleen appears normal and adrenal glands appear normal.  No adrenal nodule is seen.    No nephrolithiasis is seen.  No hydronephrosis is seen.  No hydroureter is seen.  No ureteral stone is seen.    The colonic wall is thickened and the transverse colon and the descending colon and there is some inflammatory changes around it.  Colitis cannot be excluded.  Postsurgical changes are seen in the transverse colon.    No free air seen.  No free fluid is seen.  The urinary bladder appears normal.    Bones show no acute abnormality.                                       X-Ray Chest AP Portable (Final result)  Result time 06/21/23 15:33:14      Final result by Darren Montoya MD (06/21/23 15:33:14)                   Impression:      No acute cardiopulmonary process.      Electronically signed by: Darren Montoya  Date:    06/21/2023  Time:    15:33               Narrative:    EXAMINATION:  XR CHEST AP PORTABLE    CLINICAL  HISTORY:  epigastric pain;    TECHNIQUE:  Single view of the chest    COMPARISON:  06/09/2023    FINDINGS:  No focal opacification, pleural effusion, or pneumothorax.    The cardiomediastinal silhouette is within normal limits.    Left-sided MediPort remains.    No acute osseous abnormality.                                       Medications   magnesium sulfate 2g in water 50mL IVPB (premix) (has no administration in time range)   ciprofloxacin (CIPRO)400mg/200ml D5W IVPB 400 mg (400 mg Intravenous New Bag 6/21/23 1755)   sodium chloride 0.9% bolus 1,000 mL 1,000 mL (1,000 mLs Intravenous New Bag 6/21/23 1525)   ondansetron injection 4 mg (4 mg Intravenous Given 6/21/23 1522)   morphine injection 4 mg (4 mg Intravenous Given 6/21/23 1524)   morphine injection 4 mg (4 mg Intravenous Given 6/21/23 1658)   sodium chloride 0.9% bolus 1,000 mL 1,000 mL (1,000 mLs Intravenous New Bag 6/21/23 1737)   promethazine injection 25 mg (25 mg Intramuscular Given 6/21/23 1817)     Medical Decision Making:   Initial Assessment:   82-year-old female with history of pancreatitis, pancreatic cancer, and Crohn's disease status post colon resection who presents for diffuse abdominal pain, nausea, and vomiting.  Symptoms started several days ago.  Gradually worsening and worse after eating.  Zofran at home is not helping nausea.  Daughter at bedside reports recent admission with diagnosis of pancreatic cancer.  Differential Diagnosis:   Nonspecific abdominal pain, bowel obstruction, pancreatitis, gastritis, gastroenteritis  Clinical Tests:   Lab Tests: Ordered and Reviewed  Radiological Study: Ordered and Reviewed  Medical Tests: Ordered and Reviewed  ED Management:  Reviewed labs, ECG, and imaging with patient and patient's daughter at bedside.  Pain improved.  Blood pressure improving as well.  Patient with 1 of 4 sirs criteria.  Discussed findings of colitis, UTI, chronic pancreatitis, acute kidney injury, hypokalemia, hypomagnesemia,  and hypertension.  Discussed need for admission for IV antibiotics and rehydration as well as replacement of electrolytes.  Patient and patient's daughter in agreement with plan.  We will consult Hospitalist for admission           ED Course as of 06/21/23 1844 Wed Jun 21, 2023   1630 Patient's daughter reports the patient was diagnosed with C diff and stopped taking her oral liquid vancomycin yesterday as she no longer wants to take the medication. [SHANTEL]   1728 Reviewed labs, ECG, and imaging with patient and patient's daughter at bedside.  Pain improved.  Blood pressure improving as well.  Patient with 1 of 4 sirs criteria.  Discussed findings of colitis, chronic pancreatitis, acute kidney injury, hypokalemia, hypomagnesemia, and hypertension.  Discussed need for admission for IV antibiotics and rehydration as well as replacement of electrolytes.  Patient and patient's daughter are in agreement with plan.  We will consult hospitalist for admission [SHANTEL]   1836 Spoke with Cliff with hospital medicine.  Discussed case, labs, and imaging.  Accepted by Dr. Esteban [SHANTEL]      ED Course User Index  [SHANTEL] Ash Malik MD                 Clinical Impression:   Final diagnoses:  [R10.13] Epigastric pain  [K52.9] Colitis (Primary)  [N39.0] Urinary tract infection without hematuria, site unspecified  [K85.90, K86.1] Acute on chronic pancreatitis  [N17.9] Acute kidney injury  [E87.6] Hypokalemia  [E83.42] Hypomagnesemia  [I10] Hypertension, unspecified type        ED Disposition Condition    Admit Stable                Ash Malik MD  06/21/23 1839       Ash Malik MD  06/21/23 4634

## 2023-06-22 LAB
ALBUMIN SERPL-MCNC: 2.9 G/DL (ref 3.4–4.8)
ALBUMIN/GLOB SERPL: 0.9 RATIO (ref 1.1–2)
ALP SERPL-CCNC: 106 UNIT/L (ref 40–150)
ALT SERPL-CCNC: 31 UNIT/L (ref 0–55)
AST SERPL-CCNC: 39 UNIT/L (ref 5–34)
BILIRUBIN DIRECT+TOT PNL SERPL-MCNC: 0.6 MG/DL
BUN SERPL-MCNC: 11.8 MG/DL (ref 9.8–20.1)
CALCIUM SERPL-MCNC: 8.6 MG/DL (ref 8.4–10.2)
CHLORIDE SERPL-SCNC: 97 MMOL/L (ref 98–107)
CO2 SERPL-SCNC: 27 MMOL/L (ref 23–31)
CREAT SERPL-MCNC: 1.07 MG/DL (ref 0.55–1.02)
CRP SERPL-MCNC: 45.5 MG/L
ERYTHROCYTE [DISTWIDTH] IN BLOOD BY AUTOMATED COUNT: 13.6 % (ref 11.5–17)
ERYTHROCYTE [SEDIMENTATION RATE] IN BLOOD: 63 MM/HR (ref 0–20)
EST. AVERAGE GLUCOSE BLD GHB EST-MCNC: 111.2 MG/DL
GFR SERPLBLD CREATININE-BSD FMLA CKD-EPI: 52 MLS/MIN/1.73/M2
GLOBULIN SER-MCNC: 3.3 GM/DL (ref 2.4–3.5)
GLUCOSE SERPL-MCNC: 94 MG/DL (ref 82–115)
HBA1C MFR BLD: 5.5 %
HCT VFR BLD AUTO: 30.2 % (ref 37–47)
HGB BLD-MCNC: 10.2 G/DL (ref 12–16)
MAGNESIUM SERPL-MCNC: 1.9 MG/DL (ref 1.6–2.6)
MCH RBC QN AUTO: 30.5 PG (ref 27–31)
MCHC RBC AUTO-ENTMCNC: 33.8 G/DL (ref 33–36)
MCV RBC AUTO: 90.4 FL (ref 80–94)
NRBC BLD AUTO-RTO: 0 %
PHOSPHATE SERPL-MCNC: 3 MG/DL (ref 2.3–4.7)
PLATELET # BLD AUTO: 359 X10(3)/MCL (ref 130–400)
PMV BLD AUTO: 9.1 FL (ref 7.4–10.4)
POTASSIUM SERPL-SCNC: 3.5 MMOL/L (ref 3.5–5.1)
PROT SERPL-MCNC: 6.2 GM/DL (ref 5.8–7.6)
RBC # BLD AUTO: 3.34 X10(6)/MCL (ref 4.2–5.4)
SODIUM SERPL-SCNC: 137 MMOL/L (ref 136–145)
WBC # SPEC AUTO: 7.16 X10(3)/MCL (ref 4.5–11.5)

## 2023-06-22 PROCEDURE — 85652 RBC SED RATE AUTOMATED: CPT | Performed by: INTERNAL MEDICINE

## 2023-06-22 PROCEDURE — 85027 COMPLETE CBC AUTOMATED: CPT | Performed by: INTERNAL MEDICINE

## 2023-06-22 PROCEDURE — 21400001 HC TELEMETRY ROOM

## 2023-06-22 PROCEDURE — 87040 BLOOD CULTURE FOR BACTERIA: CPT | Performed by: INTERNAL MEDICINE

## 2023-06-22 PROCEDURE — 99222 1ST HOSP IP/OBS MODERATE 55: CPT | Mod: ,,, | Performed by: INTERNAL MEDICINE

## 2023-06-22 PROCEDURE — 25000003 PHARM REV CODE 250: Performed by: INTERNAL MEDICINE

## 2023-06-22 PROCEDURE — 63600175 PHARM REV CODE 636 W HCPCS: Performed by: INTERNAL MEDICINE

## 2023-06-22 PROCEDURE — 63600175 PHARM REV CODE 636 W HCPCS: Performed by: NURSE PRACTITIONER

## 2023-06-22 PROCEDURE — 80053 COMPREHEN METABOLIC PANEL: CPT | Performed by: INTERNAL MEDICINE

## 2023-06-22 PROCEDURE — 83735 ASSAY OF MAGNESIUM: CPT | Performed by: INTERNAL MEDICINE

## 2023-06-22 PROCEDURE — 86140 C-REACTIVE PROTEIN: CPT | Performed by: INTERNAL MEDICINE

## 2023-06-22 PROCEDURE — 83036 HEMOGLOBIN GLYCOSYLATED A1C: CPT | Performed by: NURSE PRACTITIONER

## 2023-06-22 PROCEDURE — 84100 ASSAY OF PHOSPHORUS: CPT | Performed by: INTERNAL MEDICINE

## 2023-06-22 PROCEDURE — 99222 PR INITIAL HOSPITAL CARE,LEVL II: ICD-10-PCS | Mod: ,,, | Performed by: INTERNAL MEDICINE

## 2023-06-22 RX ORDER — HYDROMORPHONE HYDROCHLORIDE 2 MG/ML
0.5 INJECTION, SOLUTION INTRAMUSCULAR; INTRAVENOUS; SUBCUTANEOUS EVERY 6 HOURS PRN
Status: DISCONTINUED | OUTPATIENT
Start: 2023-06-22 | End: 2023-06-24

## 2023-06-22 RX ORDER — ENOXAPARIN SODIUM 100 MG/ML
40 INJECTION SUBCUTANEOUS EVERY 24 HOURS
Status: DISCONTINUED | OUTPATIENT
Start: 2023-06-22 | End: 2023-06-29 | Stop reason: HOSPADM

## 2023-06-22 RX ORDER — HYDROMORPHONE HYDROCHLORIDE 2 MG/ML
0.5 INJECTION, SOLUTION INTRAMUSCULAR; INTRAVENOUS; SUBCUTANEOUS ONCE
Status: COMPLETED | OUTPATIENT
Start: 2023-06-22 | End: 2023-06-22

## 2023-06-22 RX ADMIN — FIDAXOMICIN 200 MG: 200 TABLET, FILM COATED ORAL at 08:06

## 2023-06-22 RX ADMIN — ONDANSETRON 4 MG: 2 INJECTION INTRAMUSCULAR; INTRAVENOUS at 01:06

## 2023-06-22 RX ADMIN — ONDANSETRON 4 MG: 2 INJECTION INTRAMUSCULAR; INTRAVENOUS at 07:06

## 2023-06-22 RX ADMIN — FIDAXOMICIN 200 MG: 200 TABLET, FILM COATED ORAL at 01:06

## 2023-06-22 RX ADMIN — LEUCINE, PHENYLALANINE, LYSINE, METHIONINE, ISOLEUCINE, VALINE, HISTIDINE, THREONINE, TRYPTOPHAN, ALANINE, GLYCINE, ARGININE, PROLINE, SERINE, TYROSINE, DEXTROSE: 311; 238; 247; 170; 255; 247; 204; 179; 77; 880; 438; 489; 289; 213; 17; 5 INJECTION INTRAVENOUS at 01:06

## 2023-06-22 RX ADMIN — HYDROMORPHONE HYDROCHLORIDE 0.5 MG: 2 INJECTION INTRAMUSCULAR; INTRAVENOUS; SUBCUTANEOUS at 07:06

## 2023-06-22 RX ADMIN — SODIUM CHLORIDE, POTASSIUM CHLORIDE, SODIUM LACTATE AND CALCIUM CHLORIDE: 600; 310; 30; 20 INJECTION, SOLUTION INTRAVENOUS at 12:06

## 2023-06-22 RX ADMIN — FIDAXOMICIN 200 MG: 200 TABLET, FILM COATED ORAL at 12:06

## 2023-06-22 RX ADMIN — HYDROMORPHONE HYDROCHLORIDE 0.5 MG: 2 INJECTION INTRAMUSCULAR; INTRAVENOUS; SUBCUTANEOUS at 01:06

## 2023-06-22 RX ADMIN — POTASSIUM BICARBONATE 50 MEQ: 977.5 TABLET, EFFERVESCENT ORAL at 01:06

## 2023-06-22 RX ADMIN — POTASSIUM CHLORIDE 40 MEQ: 14.9 INJECTION, SOLUTION INTRAVENOUS at 12:06

## 2023-06-22 RX ADMIN — ENOXAPARIN SODIUM 40 MG: 40 INJECTION SUBCUTANEOUS at 05:06

## 2023-06-22 NOTE — PROGRESS NOTES
Pharmacist Renal Dose Adjustment Note    Evelyn Proctor is a 82 y.o. female being treated with the medication ciprofloxacin.    Patient Data:    Vital Signs (Most Recent):  Temp: 98.6 °F (37 °C) (06/21/23 1500)  Pulse: 79 (06/21/23 2146)  Resp: 18 (06/21/23 1658)  BP: (!) 186/97 (06/21/23 2146)  SpO2: 95 % (06/21/23 2146) Vital Signs (72h Range):  Temp:  [98.6 °F (37 °C)]   Pulse:  [73-88]   Resp:  [18-24]   BP: (158-202)/()   SpO2:  [92 %-99 %]      Recent Labs   Lab 06/21/23  1530   CREATININE 1.73*     Serum creatinine: 1.73 mg/dL (H) 06/21/23 1530  Estimated creatinine clearance: 24.8 mL/min (A)    Ciprofloxacin 400 mg q12h will be changed to 400 mg q24h.    Pharmacist's Name: Mila Horvath

## 2023-06-22 NOTE — H&P
[facility] Hospital Medicine   History & Physical Note      Patient Name: Evelyn Proctor  : 1940  MRN: [id number]  Patient Class: IP- Inpatient   Admission Date: 2023   Length of Stay: 0  Admitting Physician:  Service  Attending Physician: Syeda Flores MD  PCP: Marina Morales MD  Source of history: Patient, patient's family, and EMR.   Face-to-Face encounter date: 2023  Code status: --Full      Chief Complaint   Abdominal Pain      History of Present Illness   Ms. Proctor is an 82 year old female with a pmh of CAD, HLD, Crohn's disease RA, cervical cancer, DAQUAN who presented to [facility] ED with complaints of abdominal pain, nausea, vomiting.  Daughter at the bedside states this has been going on for months, and was just recently diagnosed with pancreatic cancer.  She also states that the patient was diagnosed with C diff and has not been compliant with her oral vancomycin.    Today's ED lab work revealed WBC 12.89, K 2.6, creatinine 1.73, glucose 161, Mag 1.4.  UA was contaminated.  CXR showed no acute cardiopulmonary process.  CT abdomen pelvis without contrast showed dilated pancreatic duct and some inflammatory changes around the pancreas possibly representing pancreatitis.  Similar changes seen on the prior exam.  Some colonic wall thickening seen in the distal transverse and the descending colon possibly representing colitis.  ED vitals:  Temp 98.6° F, pulse 88, resp 20, /82, SpO2 98% on RA.  While in the ED, she became markedly hypertensive  Potassium was replaced, she was given flagyl and Cipro, and transferred to [facility]. She was admitted to hospital medicine for management.    ROS   Except as documented, all other systems reviewed and negative     Past Medical History     Hyperlipidemia  Coronary artery disease  Crohn's disease on Entyvio every 6 weeks status post right hemicolectomy  Rheumatoid arthritis  Hepatic steatosis   Osteopenia  Lumbar  spondylosis  Cervical radiculopathy  Carpal tunnel  Cervical cancer  Pancreatic cancer  Obstructive sleep apnea    Past Surgical History     Past Surgical History:   Procedure Laterality Date    bilateral L4-5, L5-S1 decompression, repair L5-S1 dural tear  10/01/2021    Dr. Marin    CARPAL TUNNEL RELEASE Right 09/06/2019    Dr. Marin    CARPAL TUNNEL RELEASE Left 12/2/2022    Procedure: RELEASE, CARPAL TUNNEL;  Surgeon: Jesse Marin MD;  Location: [facility] OR;  Service: Neurosurgery;  Laterality: Left;    CHOLECYSTECTOMY      COLECTOMY  07/2011    partial x3    ESOPHAGOGASTRODUODENOSCOPY N/A 6/8/2023    Procedure: EGD (ESOPHAGOGASTRODUODENOSCOPY);  Surgeon: Jose Alberto Max MD;  Location: [facility] OR;  Service: Gastroenterology;  Laterality: N/A;    HYSTERECTOMY      total    REPAIR OF EYELID Bilateral 11/21/2022    Procedure: BILATERAL ECTROPION REPAIR;  Surgeon: Justin Flores MD;  Location: [facility] OR;  Service: ENT;  Laterality: Bilateral;    ULTRASOUND, ENDOSCOPIC, WITH FINE NEEDLE ASPIRATION N/A 6/8/2023    Procedure: UPPER EUS  W/  FNA;  Surgeon: Jose Alberto Max MD;  Location: [facility] OR;  Service: Gastroenterology;  Laterality: N/A;  Dangelo, pt w/ CD on entyvio recently admitted to [facility] w/ acute pancreatitis. Possibly drug induced from entyvio? Althoughshe does have dilated PD and a poss focal stricture in the HOP.CA 19-9 nml       Social History     Social History     Tobacco Use    Smoking status: Former     Types: Cigarettes    Smokeless tobacco: Never   Substance Use Topics    Alcohol use: Yes     Comment: rarely        Family History   Reviewed and negative    Allergies   Patient has no known allergies.    Home Medications     Prior to Admission medications    Medication Sig Start Date End Date Taking? Authorizing Provider   cholecalciferol, vitamin D3, 1,250 mcg (50,000 unit) capsule Take 1,250 mcg by mouth once a week. 9/30/21   Historical Provider   pantoprazole (PROTONIX) 40 MG tablet Take 1 tablet by  mouth every morning. 5/22/23 5/21/24  Historical Provider   simvastatin (ZOCOR) 20 MG tablet Take 20 mg by mouth every evening. 4/28/22   Historical Provider   sucralfate (CARAFATE) 1 gram tablet Take 1 tablet (1 g total) by mouth 4 (four) times daily before meals and nightly. 6/12/23 7/12/23  Hernan Guerrero MD   vancomycin 125 mg/5 mL Soln Take 5 mLs (125 mg total) by mouth every 6 (six) hours. for 10 days  Patient not taking: Reported on 6/14/2023 6/12/23 6/22/23  Hernan Guerrero MD        Inpatient Medications   Scheduled Meds   fidaxomicin  200 mg Oral BID    potassium chloride  40 mEq Intravenous Once     Continuous Infusions   lactated ringers       PRN Meds  [START ON 6/22/2023] acetaminophen, [START ON 6/22/2023] acetaminophen, [START ON 6/22/2023] aluminum-magnesium hydroxide-simethicone, cloNIDine, hydrALAZINE, labetalol, melatonin, morphine, [START ON 6/22/2023] ondansetron, [START ON 6/22/2023] prochlorperazine, sodium chloride 0.9%    Physical Exam   Vital Signs  Temp:  [98.6 °F (37 °C)]   Pulse:  [73-88]   Resp:  [18-24]   BP: (158-202)/()   SpO2:  [92 %-99 %]       General: Well developed, well nourished. In no acute distress, non-toxic appearing. Appears comfortable  HEENT: NC/AT  Neck:  Supple  Chest: Clear bilaterally, no wheezing or rales, no accessory muscle use   CVS: Regular rhythm. Normal S1/S2.  Abdomen: Soft, non-distended, mild diffuse tenderness.  MSK: No obvious deformity or joint swelling  Skin: Warm and dry  Neuro: AAOx3, no focal neurological deficit  Psych: Cooperative    Labs     Recent Labs     06/21/23  1530   WBC 12.89*   RBC 4.26   HGB 13.2   HCT 37.8   MCV 88.7   MCH 31.0   MCHC 34.9   RDW 13.1   *     No results for input(s): LACTIC in the last 72 hours.  No results for input(s): INR, APTT, D-DIMER in the last 72 hours.  No results for input(s): HGBA1C, CHOL, TRIG, LDL, VLDL, HDL in the last 72 hours.   Recent Labs     06/21/23  1530 06/21/23  1624      --    K 2.6*  --    CHLORIDE 95*  --    CO2 25  --    BUN 15.4  --    CREATININE 1.73*  --    GLUCOSE 161*  --    CALCIUM 10.1  --    MG  --  1.40*   ALBUMIN 3.5  --    GLOBULIN 4.9*  --    ALKPHOS 115  --    ALT 36  --    AST 46*  --    BILITOT 0.6  --    LIPASE 37  --      No results for input(s): BNP, CPK, TROPONINI in the last 72 hours.       Microbiology Results (last 7 days)       Procedure Component Value Units Date/Time    Urine culture [[id number]] Collected: 06/21/23 1704    Order Status: Sent Specimen: Urine Updated: 06/21/23 1731           Imaging     CT Abdomen Pelvis  Without Contrast   Final Result      Dilated pancreatic duct and some inflammatory changes around the pancreas possibly representing pancreatitis.  Similar changes were seen on the prior examination      Some colonic wall thickening seen in the distal transverse and the descending colon possibly representing a colitis.         Electronically signed by: Mary Cool   Date:    06/21/2023   Time:    16:57      X-Ray Chest AP Portable   Final Result      No acute cardiopulmonary process.         Electronically signed by: Darren Montoya   Date:    06/21/2023   Time:    15:33        Assessment & Plan   Colitis  Recent diagnosis of CDI, likely contributing to above  Acute kidney injury  Hypokalemia  Hypomagnesemia  Elevated blood glucose without history of DM  Accelerated blood pressure    History:  CAD, HLD, Crohn's disease RA, cervical cancer, DAQUAN, pancreatic cancer    Plan:  -Fidaxomicin 200 mg BID  -LR @ 125 ml/hr  -Electrolyte replacement  -Pain control  -Blood culture pending  -Urine culture pending  -Antihypertensives as needed  -HgbA1c in a.m.  -Resume home meds as appropriate when available  -Labs in AM      VTE Prophylaxis: Cata    I, Yeimy Galvan NP have reviewed and discussed this case with Dr. Flores.  Please see addendum for further assessment and plan from attending MD.

## 2023-06-22 NOTE — CONSULTS
Gastroenterology Consultation Note    Reason for Consult:  n/v, C diff    PCP:   Marina Morales MD    Referring MD:  Trevor Joyce MD    Hospital Day: 1     Initial History of Present Illness (HPI):  This is a 82 y.o. female known to Dr. Pierson with PMH of recently diagnosed pancreatic ductal adenocarcinoma, hyperlipidemia, coronary artery disease, Crohn's disease on Entyvio every 6 weeks status post right hemicolectomy, hepatic steatosis, rheumatoid arthritis, osteopenia and cervical cancer.    Of note, patient was recently admitted 06/02/23 - 06/04/23 for acute pancreatitis and was noted to have a high grade pancreatic duct stricture at pancreatic neck with upstream pancreatic duct dilation. Dr. Max performed EUS 06/08/23 that revealed a mass in the pancreatic neck that was determined to be ductal adenocarcinoma staged [id number] by endosonographic criteria. He recommended oncology referral and PET scan. Patient and family requested to see Dr. Nuñez and Dr. Philip for further care.    She was admitted 06/10/23 to [facility] for abd pain, n/v/d. GI was consulted for concern for Crohn's exacerbation - CT abd pel with interval development of some colonic wall thickening in left colon especially at splenic flexure and proximal descending colon concerning for developing colitis, mild peripancreatic edema and inflammatory changes seen mainly in the body and tail c/w pancreatitis, persistent pancreatic duct dilation in the body and tail. However, colonoscopy Feb 2023 [see below] revealed inactive Crohn's disease. Entyvio serum levels wnl and Ab levels nondetectable. GI panel negative; stool for Cdiff Ag and toxin positive. Patient was started on PO vanc and discharged home with plans for oncology outpatient f/u and PET scan.    Patient saw Dr. Nuñez 06/13/23, she was determined to not be an appropriate candidate for surgical resection of her pancreatic cancer d/t extent of disease, advanced age, frailty,  underlying autoimmune disorder [Crohn's], and extensive past surgical history.    PET scan 06/20/23: focal activity at junction of body and head of pancreas concerning for neoplasm, no definite discrete mass on low dose CT scan, dilation of distal PD    Patient presented to the ED 06/21/23 with abd pain, n/v ongoing for months. She reported noncompliance with PO vanc for Cdiff. CT abd/pel revealed dilated PD and some inflammatory changes around pancreas possible representing pancreatitis - similar to previous exam, some colonic wall thickening seen in distal transverse and descending colon possibly representing colitis. Labs revealed WBC 12.89, H&H 13.2/37.8, plt 489, sed 63, K 2.6, Cr 1.73, Mg 1.4, Tbili 0.6, AST 46, ALT 36, amylase 124, lipase 37, CRP 45.5. Electrolytes replenished and PO dificid 200mg BID initiated. GI consulted for n/v, Cdiff.    Patient diagnosed with Crohn's in 1976. She was on Remicade for approx 40 years until her drug levels were found to be low despite adjustments to strength and frequency of Remicade administration. She was placed on Entyvio every 8 weeks, in May 2022 she was switched to every 6 weeks d/t low drug levels. Last infusion unknown.     History of 2 resections, the most recent in 2012. H/o SBO in 2011 that resolved on its own.     Colonoscopy Feb 2023 Dr. Pierson for Crohn's: ulceration in anastomosis c/w Crohn's, stenosis in anal canal, abnormal rectal exam - posterior scar with no active fissure. Path: inactive Crohn's disease, no dysplasia    TODAY, patient resting in bed with family at bedside. They are requesting TPN to build up patient's strength since she cannot tolerate PO intake when she is in pain. She states when she was last hospitalized she had a good pain med regimen and she was able to keep her food down, she wants a similar regimen. She states she was noncompliant with vanc for Cdiff because the pharmacy did not have the medication. When asked about her Entyvio  she states she was never on a 6 week dose and it has always been 8 weeks, she also states she does not want to take Entyvio any longer.    Abd firm superior to umbilicus, soft inferior to umbilicus. Generalized tenderness.    ROS:  Review of Systems   Constitutional:  Positive for malaise/fatigue.   Respiratory:  Negative for cough and shortness of breath.    Cardiovascular:  Negative for chest pain.   Gastrointestinal:  Positive for abdominal pain, diarrhea, nausea and vomiting.   Neurological:  Positive for weakness.     Medical History:   Past Medical History:   Diagnosis Date    Acute pancreatitis, unspecified complication status, unspecified pancreatitis type     Arthritis     Carpal tunnel syndrome, bilateral     Cervical cancer     Cervical radiculopathy     Crohn disease     Heart attack     HLD (hyperlipidemia)     Low back pain     Sleep apnea, unspecified     Spinal stenosis of lumbar region with neurogenic claudication        Surgical History:   Past Surgical History:   Procedure Laterality Date    bilateral L4-5, L5-S1 decompression, repair L5-S1 dural tear  10/01/2021    Dr. Marin    CARPAL TUNNEL RELEASE Right 09/06/2019    Dr. Marin    CARPAL TUNNEL RELEASE Left 12/2/2022    Procedure: RELEASE, CARPAL TUNNEL;  Surgeon: Jesse Marin MD;  Location: [facility];  Service: Neurosurgery;  Laterality: Left;    CHOLECYSTECTOMY      COLECTOMY  07/2011    partial x3    ESOPHAGOGASTRODUODENOSCOPY N/A 6/8/2023    Procedure: EGD (ESOPHAGOGASTRODUODENOSCOPY);  Surgeon: Jose Alberto Max MD;  Location: [facility];  Service: Gastroenterology;  Laterality: N/A;    HYSTERECTOMY      total    REPAIR OF EYELID Bilateral 11/21/2022    Procedure: BILATERAL ECTROPION REPAIR;  Surgeon: Justin Flores MD;  Location: [facility] OR;  Service: ENT;  Laterality: Bilateral;    ULTRASOUND, ENDOSCOPIC, WITH FINE NEEDLE ASPIRATION N/A 6/8/2023    Procedure: UPPER EUS  W/  FNA;  Surgeon: Jose Alberto Max MD;  Location: [facility] OR;  Service:  "Gastroenterology;  Laterality: N/A;  Dangelo, pt w/ CD on entyvio recently admitted to [facility] w/ acute pancreatitis. Possibly drug induced from entyvio? Marisa does have dilated PD and a poss focal stricture in the HOP.CA 19-9 nml       Family History:   Family History   Problem Relation Age of Onset    Diabetes Mother     Hypertension Father     Hyperlipidemia Father     Cancer Father     Hyperlipidemia Sister     Hyperlipidemia Brother     Hyperlipidemia Daughter    .     Social History:   Social History     Tobacco Use    Smoking status: Former     Types: Cigarettes    Smokeless tobacco: Never   Substance Use Topics    Alcohol use: Yes     Comment: rarely       Allergies:  Review of patient's allergies indicates:  No Known Allergies    Medications Prior to Admission   Medication Sig Dispense Refill Last Dose    cholecalciferol, vitamin D3, 1,250 mcg (50,000 unit) capsule Take 1,250 mcg by mouth once a week.       pantoprazole (PROTONIX) 40 MG tablet Take 1 tablet by mouth every morning.       simvastatin (ZOCOR) 20 MG tablet Take 20 mg by mouth every evening.       sucralfate (CARAFATE) 1 gram tablet Take 1 tablet (1 g total) by mouth 4 (four) times daily before meals and nightly. 120 tablet 0     vancomycin 125 mg/5 mL Soln Take 5 mLs (125 mg total) by mouth every 6 (six) hours. for 10 days (Patient not taking: Reported on 6/14/2023) 200 mL 0          Objective Findings:    Vital Signs:  /72   Pulse 94   Temp 98.5 °F (36.9 °C) (Oral)   Resp 18   Ht 5' 7" (1.702 m)   Wt 65.8 kg (145 lb)   SpO2 97%   BMI 22.71 kg/m²   Body mass index is 22.71 kg/m².    Physical Exam:  Physical Exam  Constitutional:       General: She is not in acute distress.     Appearance: She is normal weight. She is ill-appearing.   HENT:      Head: Normocephalic and atraumatic.      Right Ear: External ear normal.      Left Ear: External ear normal.      Nose: Nose normal.      Mouth/Throat:      Pharynx: Oropharynx is clear. "   Eyes:      Conjunctiva/sclera: Conjunctivae normal.   Pulmonary:      Effort: Pulmonary effort is normal. No respiratory distress.   Abdominal:      General: There is distension.      Tenderness: There is generalized abdominal tenderness. There is guarding.      Comments: Firm superior to umbilicus, soft inferior to umbilicus   Musculoskeletal:         General: Normal range of motion.      Cervical back: Normal range of motion.   Skin:     General: Skin is warm and dry.   Neurological:      Mental Status: She is alert and oriented to person, place, and time. Mental status is at baseline.      Motor: No weakness.   Psychiatric:         Mood and Affect: Mood normal.         Behavior: Behavior normal.         Thought Content: Thought content normal.       Labs:  Recent Results (from the past 24 hour(s))   Comp. Metabolic Panel    Collection Time: 06/21/23  3:30 PM   Result Value Ref Range    Sodium Level 136 136 - 145 mmol/L    Potassium Level 2.6 (LL) 3.5 - 5.1 mmol/L    Chloride 95 (L) 98 - 107 mmol/L    Carbon Dioxide 25 23 - 31 mmol/L    Glucose Level 161 (H) 82 - 115 mg/dL    Blood Urea Nitrogen 15.4 9.8 - 20.1 mg/dL    Creatinine 1.73 (H) 0.55 - 1.02 mg/dL    Calcium Level Total 10.1 8.4 - 10.2 mg/dL    Protein Total 8.4 (H) 5.8 - 7.6 gm/dL    Albumin Level 3.5 3.4 - 4.8 g/dL    Globulin 4.9 (H) 2.4 - 3.5 gm/dL    Albumin/Globulin Ratio 0.7 (L) 1.1 - 2.0 ratio    Bilirubin Total 0.6 <=1.5 mg/dL    Alkaline Phosphatase 115 40 - 150 unit/L    Alanine Aminotransferase 36 0 - 55 unit/L    Aspartate Aminotransferase 46 (H) 5 - 34 unit/L    eGFR 29 mls/min/1.73/m2   Lipase    Collection Time: 06/21/23  3:30 PM   Result Value Ref Range    Lipase Level 37 <=60 U/L   Amylase    Collection Time: 06/21/23  3:30 PM   Result Value Ref Range    Amylase Level 124 20 - 160 unit/L   CBC with Differential    Collection Time: 06/21/23  3:30 PM   Result Value Ref Range    WBC 12.89 (H) 4.50 - 11.50 x10(3)/mcL    RBC 4.26 4.20 -  5.40 x10(6)/mcL    Hgb 13.2 12.0 - 16.0 g/dL    Hct 37.8 37.0 - 47.0 %    MCV 88.7 80.0 - 94.0 fL    MCH 31.0 27.0 - 31.0 pg    MCHC 34.9 33.0 - 36.0 g/dL    RDW 13.1 11.5 - 17.0 %    Platelet 489 (H) 130 - 400 x10(3)/mcL    MPV 9.3 7.4 - 10.4 fL    Neut % 75.9 %    Lymph % 15.7 %    Mono % 7.4 %    Eos % 0.4 %    Basophil % 0.2 %    Lymph # 2.03 0.6 - 4.6 x10(3)/mcL    Neut # 9.79 (H) 2.1 - 9.2 x10(3)/mcL    Mono # 0.95 0.1 - 1.3 x10(3)/mcL    Eos # 0.05 0 - 0.9 x10(3)/mcL    Baso # 0.02 <=0.2 x10(3)/mcL    IG# 0.05 (H) 0 - 0.04 x10(3)/mcL    IG% 0.4 %    NRBC% 0.0 %   Magnesium    Collection Time: 06/21/23  4:24 PM   Result Value Ref Range    Magnesium Level 1.40 (L) 1.60 - 2.60 mg/dL   Urinalysis, Reflex to Urine Culture    Collection Time: 06/21/23  5:04 PM    Specimen: Urine   Result Value Ref Range    Color, UA Yellow Yellow, Light-Yellow, Dark Yellow, Misty, Straw    Appearance, UA SL CLOUDY (A) Clear    Specific Gravity, UA 1.010     pH, UA 6.0 5.0 - 8.5    Protein, UA Trace (A) Negative mg/dL    Glucose, UA Negative Negative, Normal mg/dL    Ketones, UA Negative Negative mg/dL    Blood, UA Trace (A) Negative unit/L    Bilirubin, UA Negative Negative mg/dL    Urobilinogen, UA 0.2 0.2, 1.0, Normal mg/dL    Nitrites, UA Negative Negative    Leukocyte Esterase, UA Large (A) Negative unit/L   Urinalysis, Microscopic    Collection Time: 06/21/23  5:04 PM   Result Value Ref Range    Bacteria, UA Moderate (A) None Seen, Rare, Occasional /HPF    RBC, UA 0-2 None Seen, 0-2, 3-5, 0-5 /HPF    WBC, UA 21-50 (A) None Seen, 0-2, 3-5, 0-5 /HPF    Squamous Epithelial Cells, UA Many (A) None Seen, Rare, Occasional, Occ /HPF   Comprehensive Metabolic Panel    Collection Time: 06/22/23  4:45 AM   Result Value Ref Range    Sodium Level 137 136 - 145 mmol/L    Potassium Level 3.5 3.5 - 5.1 mmol/L    Chloride 97 (L) 98 - 107 mmol/L    Carbon Dioxide 27 23 - 31 mmol/L    Glucose Level 94 82 - 115 mg/dL    Blood Urea Nitrogen 11.8  9.8 - 20.1 mg/dL    Creatinine 1.07 (H) 0.55 - 1.02 mg/dL    Calcium Level Total 8.6 8.4 - 10.2 mg/dL    Protein Total 6.2 5.8 - 7.6 gm/dL    Albumin Level 2.9 (L) 3.4 - 4.8 g/dL    Globulin 3.3 2.4 - 3.5 gm/dL    Albumin/Globulin Ratio 0.9 (L) 1.1 - 2.0 ratio    Bilirubin Total 0.6 <=1.5 mg/dL    Alkaline Phosphatase 106 40 - 150 unit/L    Alanine Aminotransferase 31 0 - 55 unit/L    Aspartate Aminotransferase 39 (H) 5 - 34 unit/L    eGFR 52 mls/min/1.73/m2   Phosphorus    Collection Time: 06/22/23  4:45 AM   Result Value Ref Range    Phosphorus Level 3.0 2.3 - 4.7 mg/dL   Magnesium    Collection Time: 06/22/23  4:45 AM   Result Value Ref Range    Magnesium Level 1.90 1.60 - 2.60 mg/dL   C-Reactive Protein    Collection Time: 06/22/23  4:45 AM   Result Value Ref Range    C-Reactive Protein 45.50 (H) <5.00 mg/L   Sedimentation rate    Collection Time: 06/22/23  4:45 AM   Result Value Ref Range    Sed Rate 63 (H) 0 - 20 mm/hr   Hemoglobin A1C    Collection Time: 06/22/23  4:45 AM   Result Value Ref Range    Hemoglobin A1c 5.5 <=7.0 %    Estimated Average Glucose 111.2 mg/dL   CBC Without Differential    Collection Time: 06/22/23  6:39 AM   Result Value Ref Range    WBC 7.16 4.50 - 11.50 x10(3)/mcL    RBC 3.34 (L) 4.20 - 5.40 x10(6)/mcL    Hgb 10.2 (L) 12.0 - 16.0 g/dL    Hct 30.2 (L) 37.0 - 47.0 %    MCV 90.4 80.0 - 94.0 fL    MCH 30.5 27.0 - 31.0 pg    MCHC 33.8 33.0 - 36.0 g/dL    RDW 13.6 11.5 - 17.0 %    Platelet 359 130 - 400 x10(3)/mcL    MPV 9.1 7.4 - 10.4 fL    NRBC% 0.0 %       CT Abdomen Pelvis  Without Contrast   Final Result      Dilated pancreatic duct and some inflammatory changes around the pancreas possibly representing pancreatitis.  Similar changes were seen on the prior examination      Some colonic wall thickening seen in the distal transverse and the descending colon possibly representing a colitis.         Electronically signed by: Mary Cool   Date:    06/21/2023   Time:    16:57       X-Ray Chest AP Portable   Final Result      No acute cardiopulmonary process.         Electronically signed by: Darren Lindsay   Date:    06/21/2023   Time:    15:33            Assessment/Plan:  This is a 82 y.o. female known to Dr. Pierson with PMH of recently diagnosed pancreatic ductal adenocarcinoma, Cdiff colitis, hyperlipidemia, coronary artery disease, Crohn's disease on Entyvio every 6 weeks status post right hemicolectomy, hepatic steatosis, rheumatoid arthritis, osteopenia and cervical cancer. GI consulted for n/v, Cdiff.    C diff colitis  - stool for C diff Ag and toxin positive 06/10/23, patient started on PO vanc  - reported noncompliance with PO vanc  - CT abd/pel 06/21/23: some colonic wall thickening seen in distal transverse and descending colon possibly representing colitis  - agree with PO dificid 200mg BID  - ID following, appreciate reccs  N/v  - supportive care: IVF, antiemetics as needed. ADAT  - defer nutritional management to primary team - family requesting TPN  Pancreatic adenocarcinoma  - recently diagnosed via EUS 06/08/23  - not a surgical candidate per Dr. Nuñez  - oncology consulted per family request, appreciate reccs  Crohn's disease of small and large intestine, in remission - s/p resection x2  - colonoscopy Feb 2023 with inactive Crohn's disease  - on Entyvio every 6 weeks    Thank you for allowing us to participate in the care of Evelyn Proctor.    CHANA LevineC  Gastroenterology  [facility]

## 2023-06-22 NOTE — PROGRESS NOTES
[facility] Hospital Medicine Progress Note        Chief Complaint: Inpatient follow-up on Clostridium difficile colitis    HPI:   Ms. Proctor is an 82 year old female with a pmh of CAD, HLD, Crohn's disease RA, cervical cancer, DAQUAN who presented to [facility] ED with complaints of abdominal pain, nausea, vomiting.  Daughter at the bedside states this has been going on for months, and was just recently diagnosed with pancreatic cancer.  She also states that the patient was diagnosed with C diff and has not been compliant with her oral vancomycin.     Today's ED lab work revealed WBC 12.89, K 2.6, creatinine 1.73, glucose 161, Mag 1.4.  UA was contaminated.  CXR showed no acute cardiopulmonary process.  CT abdomen pelvis without contrast showed dilated pancreatic duct and some inflammatory changes around the pancreas possibly representing pancreatitis.  Similar changes seen on the prior exam.  Some colonic wall thickening seen in the distal transverse and the descending colon possibly representing colitis.  ED vitals:  Temp 98.6° F, pulse 88, resp 20, /82, SpO2 98% on RA.  While in the ED, she became markedly hypertensive  Potassium was replaced, she was given flagyl and Cipro, and transferred to [facility]. She was admitted to hospital medicine for management.      Interval Hx:   Patient is awake and sitting up in bed.  Breakfast tray in front of her appears to be untouched.  Daughter is at bedside asking why we do not have the patient on TPN.  She reports that the patient is unable to tolerate very much oral intake.  She reports diarrhea is ongoing as well.  Patient is afebrile, on room air, hemodynamically stable.    Objective/physical exam:  General:  Elderly  female in no acute distress  HENT: normocephalic, atraumatic  Eye: PERRL, EOMI, clear conjunctiva  Neck: full ROM, no thyromegaly, no JVD  Respiratory: clear to auscultation bilaterally  Cardiovascular: regular rate  and rhythm  Gastrointestinal: non-distended, positive bowel sounds, non-tender  Musculoskeletal:  Generalized muscular atrophy  Integumentary: warm, dry, intact, no rashes  Neurological: cranial nerves grossly intact, no focal neurological deficit  Psychiatric: cooperative, flat affect      VITAL SIGNS: 24 HRS MIN & MAX LAST   Temp  Min: 98.5 °F (36.9 °C)  Max: 98.6 °F (37 °C) 98.5 °F (36.9 °C)   BP  Min: 114/70  Max: 202/98 138/72   Pulse  Min: 73  Max: 94  94   Resp  Min: 18  Max: 24 18   SpO2  Min: 92 %  Max: 99 % 97 %     I have reviewed the following labs:    Recent Labs   Lab 06/21/23  1530 06/22/23  0639   WBC 12.89* 7.16   RBC 4.26 3.34*   HGB 13.2 10.2*   HCT 37.8 30.2*   MCV 88.7 90.4   MCH 31.0 30.5   MCHC 34.9 33.8   RDW 13.1 13.6   * 359   MPV 9.3 9.1       Recent Labs   Lab 06/21/23  1530 06/21/23  1624 06/22/23  0445     --  137   K 2.6*  --  3.5   CO2 25  --  27   BUN 15.4  --  11.8   CREATININE 1.73*  --  1.07*   CALCIUM 10.1  --  8.6   MG  --  1.40* 1.90   ALBUMIN 3.5  --  2.9*   ALKPHOS 115  --  106   ALT 36  --  31   AST 46*  --  39*   BILITOT 0.6  --  0.6          Microbiology Results (last 7 days)       Procedure Component Value Units Date/Time    Blood Culture [[id number]] Collected: 06/22/23 0639    Order Status: Resulted Specimen: Blood Updated: 06/22/23 0647    Blood Culture [[id number]] Collected: 06/22/23 0121    Order Status: Resulted Specimen: Blood Updated: 06/22/23 0242    Urine culture [[id number]] Collected: 06/21/23 1704    Order Status: Sent Specimen: Urine Updated: 06/21/23 1731             See below for Radiology    Scheduled Med:   enoxparin  40 mg Subcutaneous Q24H (prophylaxis, 1700)    fidaxomicin  200 mg Oral BID    potassium bicarbonate  50 mEq Oral Once        Continuous Infusions:   amino acid 4.25% - dextrose 5% solution          PRN Meds:  acetaminophen, acetaminophen, aluminum-magnesium hydroxide-simethicone, cloNIDine, hydrALAZINE, labetalol, melatonin,  morphine, ondansetron, prochlorperazine, sodium chloride 0.9%       Assessment/Plan:  Recurrent nausea and vomiting   Unresolved Clostridium difficile colitis, noncompliant with oral vancomycin  Recently diagnosed stage III pancreatic cancer  Hypertensive urgency, resolved   Anemia of chronic disease  Acute kidney injury  Crohn's disease on Entyvio every 6 weeks status post right hemicolectomy      Plan:  Start Clinimix  Continue fidaxomicin  Request Gastroenterology evaluation   Request medical oncology evaluation per patient's family request   Continue supportive care    VTE prophylaxis:  Lovenox    Patient condition:  Stable    Anticipated discharge and Disposition:     TBD    All diagnosis and differential diagnosis have been reviewed; assessment and plan has been documented; I have personally reviewed the labs and test results that are presently available; I have reviewed the patients medication list; I have reviewed the consulting providers response and recommendations. I have reviewed or attempted to review medical records based upon their availability    All of the patient's questions have been  addressed and answered. Patient's is agreeable to the above stated plan. I will continue to monitor closely and make adjustments to medical management as needed.  _____________________________________________________________________      Radiology:  I have personally reviewed the following imaging and agree with the radiologist.     CT Abdomen Pelvis  Without Contrast  Narrative: EXAMINATION:  CT ABDOMEN PELVIS WITHOUT CONTRAST    CLINICAL HISTORY:  Epigastric pain;    TECHNIQUE:  Low dose axial images, sagittal and coronal reformations were obtained from the lung bases to the pubic symphysis.  No contrast was administered.  Automatic exposure control is utilized to reduce patient radiation exposure.    COMPARISON:  06/09/2023    FINDINGS:  The lung bases are clear.    The liver appears normal.  No obvious liver mass  or lesion is seen.    The patient is status post cholecystectomy.  The pancreas is seen in the pancreatic duct appears to be dilated.  This was seen on the prior examination as well.  Mild peripancreatic inflammatory changes are seen.  Similar changes were seen on the prior examination.    Spleen appears normal and adrenal glands appear normal.  No adrenal nodule is seen.    No nephrolithiasis is seen.  No hydronephrosis is seen.  No hydroureter is seen.  No ureteral stone is seen.    The colonic wall is thickened and the transverse colon and the descending colon and there is some inflammatory changes around it.  Colitis cannot be excluded.  Postsurgical changes are seen in the transverse colon.    No free air seen.  No free fluid is seen.  The urinary bladder appears normal.    Bones show no acute abnormality.  Impression: Dilated pancreatic duct and some inflammatory changes around the pancreas possibly representing pancreatitis.  Similar changes were seen on the prior examination    Some colonic wall thickening seen in the distal transverse and the descending colon possibly representing a colitis.    Electronically signed by: Mary Cool  Date:    06/21/2023  Time:    16:57  X-Ray Chest AP Portable  Narrative: EXAMINATION:  XR CHEST AP PORTABLE    CLINICAL HISTORY:  epigastric pain;    TECHNIQUE:  Single view of the chest    COMPARISON:  06/09/2023    FINDINGS:  No focal opacification, pleural effusion, or pneumothorax.    The cardiomediastinal silhouette is within normal limits.    Left-sided MediPort remains.    No acute osseous abnormality.  Impression: No acute cardiopulmonary process.    Electronically signed by: Darren Montoya  Date:    06/21/2023  Time:    15:33      Trevor Joyce MD   06/22/2023

## 2023-06-22 NOTE — CONSULTS
"[facility] - Oncology Acute  Hematology/Oncology  Consult Note    Patient Name: Evelyn Proctor  MRN: [id number]  Admission Date: 6/21/2023  Hospital Length of Stay: 1 days  Attending Provider: Trevor Joyce MD  Consulting Provider: Ronan Burns MD  Principal Problem:Clostridium difficile infection    Inpatient consult to Oncology  Consult performed by: Ronan Burns MD  Consult ordered by: Trevor Joyce MD      Subjective:     HPI:  82-year-old female with a recent diagnosis of pancreatic adenocarcinoma was scheduled to see Dr. Nuñez and potentially Dr. Philip as an outpatient.  She was seen by Dr. Nuñez deemed not to be a surgical candidate  "Locally advanced adenocarcinoma of the neck of the pancreas confirmed on endoscopic ultrasound. Extension of the disease process into the georgina hepatis with encasement of the common hepatic artery. Near circumferential encasement of portal vein. Apparent extension into retroperitoneum. Significant boring pain consistent with parasympathetic nerve invasion."         she also had PET scan done.  She presented back to the emergency room on 06/21/2023 with progressive abdominal.  Bouts of diarrhea about 3 bowel movements a day with mucousy watery diarrhea.  She was seen by her hospital team, Gastroenterology    Repeat CT showed stable pancreas, colitis    Oncology Treatment Plan:   [No matching plan found]    Oncology History Overview Note   This is a 82 y.o. female known to Dr. Pierson with PMH of recently diagnosed pancreatic ductal adenocarcinoma, hyperlipidemia, coronary artery disease, Crohn's disease on Entyvio every 6 weeks status post right hemicolectomy, hepatic steatosis, rheumatoid arthritis, osteopenia and cervical cancer. She presented to the [facility] ED 06/09/23 with worsening generalized abd pain, n/v/d that she reports is c/w Crohn's.     Upon arrival, workup included: CT abd pel with interval development of some colonic wall " thickening in left colon especially at splenic flexure and proximal descending colon concerning for developing colitis, mild peripancreatic edema and inflammatory changes seen mainly in the body and tail c/w pancreatitis, persistent pancreatic duct dilation in the body and tail; labs revealed WBC 5.97, H&H 12.6/37.1, plt 252, INR 0.99, K 2.8, Cl 94, Tbili 0.7, AST 18, ALT 14, lipase 13. IVF initiated and electrolytes replenished. GI panel pending. GI consulted for Crohn's flare/pancreatic adenocarcinoma.     Of note, patient was recently admitted 06/02/23 - 06/04/23 for acute pancreatitis and was noted to have a high grade pancreatic duct stricture at pancreatic neck with upstream pancreatic duct dilation. Dr. Max performed EUS 06/08/23 that revealed a mass in the pancreatic neck that was determined to be ductal adenocarcinoma staged T3N0Mx by endosonographic criteria. He recommended oncology referral and PET scan. Patient and family requested to see Dr. Nuñez and Dr. Philip for further care.     Pancreatic cancer   6/8/2023 Initial Diagnosis    Pancreatic cancer  FINAL DIAGNOSIS       PANCREATIC NECK MASS, FINE NEEDLE ASPIRATION AND CELL BLOCK:       PANCREATIC DUCTAL ADENOCARCINOMA.          6/8/2023 Procedure    EUS Impression:            - Normal esophagus.                          - Normal stomach.                          - Normal examined duodenum.                          - There was no sign of significant pathology in                          the ampulla.                          - There was no sign of significant pathology in                          the common bile duct.                          - There was no evidence of significant pathology                          in the left lobe of the liver.                          - A mass was identified in the pancreatic neck.                          Tissue was obtained from this exam and is of                          adenocarcinoma. This was staged T3  N0 Mx by                          endosonographic criteria. Fine needle biopsy                          performed.      6/9/2023 Imaging Significant Findings    Impression:     Interval development of some colonic wall thickening in the left colon especially at the splenic flexure and proximal descending colon concerning for developing colitis     Mild peripancreatic edema and inflammatory changes seen mainly in the body and tail the pancreas consistent with pancreatitis     Persistent pancreatic duct dilatation in the body and tail which was seen and described on multiple previous examinations     6/10/2023 Cancer Staged    Staging form: Exocrine Pancreas, AJCC 8th Edition  - Clinical stage from 6/10/2023: Stage IIA (cT3, cN0, cM0)     6/20/2023 Imaging Significant Findings    PET:Impression: Focal activity at the junction of the body and head of the pancreas concerning for neoplasm. There is no definite discrete mass on the low-dose CT scan. There is dilatation of the distal pancreatic duct.           Medications:  Continuous Infusions:   amino acid 4.25% - dextrose 5% solution 100 mL/hr at 06/22/23 1315     Scheduled Meds:   enoxparin  40 mg Subcutaneous Q24H (prophylaxis, 1700)    fidaxomicin  200 mg Oral BID     PRN Meds:acetaminophen, acetaminophen, aluminum-magnesium hydroxide-simethicone, cloNIDine, hydrALAZINE, HYDROmorphone, labetalol, melatonin, ondansetron, prochlorperazine, sodium chloride 0.9%     Review of patient's allergies indicates:  No Known Allergies     Past Medical History:   Diagnosis Date    Acute pancreatitis, unspecified complication status, unspecified pancreatitis type     Arthritis     Carpal tunnel syndrome, bilateral     Cervical cancer     Cervical radiculopathy     Crohn disease     Heart attack     HLD (hyperlipidemia)     Low back pain     Sleep apnea, unspecified     Spinal stenosis of lumbar region with neurogenic claudication      Past Surgical History:   Procedure Laterality  Date    bilateral L4-5, L5-S1 decompression, repair L5-S1 dural tear  10/01/2021    Dr. Marin    CARPAL TUNNEL RELEASE Right 09/06/2019    Dr. Marin    CARPAL TUNNEL RELEASE Left 12/2/2022    Procedure: RELEASE, CARPAL TUNNEL;  Surgeon: Jesse Marin MD;  Location: [facility] OR;  Service: Neurosurgery;  Laterality: Left;    CHOLECYSTECTOMY      COLECTOMY  07/2011    partial x3    ESOPHAGOGASTRODUODENOSCOPY N/A 6/8/2023    Procedure: EGD (ESOPHAGOGASTRODUODENOSCOPY);  Surgeon: Jose Alberto Max MD;  Location: [facility] OR;  Service: Gastroenterology;  Laterality: N/A;    HYSTERECTOMY      total    REPAIR OF EYELID Bilateral 11/21/2022    Procedure: BILATERAL ECTROPION REPAIR;  Surgeon: Justin Flores MD;  Location: [facility] OR;  Service: ENT;  Laterality: Bilateral;    ULTRASOUND, ENDOSCOPIC, WITH FINE NEEDLE ASPIRATION N/A 6/8/2023    Procedure: UPPER EUS  W/  FNA;  Surgeon: Jose Alberto Max MD;  Location: [facility] OR;  Service: Gastroenterology;  Laterality: N/A;  Dangelo, pt w/ CD on entyvio recently admitted to [facility] w/ acute pancreatitis. Possibly drug induced from entyvio? Althoughshe does have dilated PD and a poss focal stricture in the HOP.CA 19-9 nml     Family History       Problem Relation (Age of Onset)    Cancer Father    Diabetes Mother    Hyperlipidemia Father, Sister, Brother, Daughter    Hypertension Father          Tobacco Use    Smoking status: Former     Types: Cigarettes    Smokeless tobacco: Never   Substance and Sexual Activity    Alcohol use: Yes     Comment: rarely    Drug use: Never    Sexual activity: Not Currently       Review of Systems   Constitutional:  Positive for appetite change, fatigue and unexpected weight change.   HENT:  Positive for congestion.    Respiratory:  Positive for cough.    Gastrointestinal:  Positive for abdominal distention, abdominal pain, diarrhea and nausea.   Genitourinary:  Negative for hematuria.   Musculoskeletal:  Positive for arthralgias.   Neurological:  Positive for  weakness.   Hematological:  Negative for adenopathy. Does not bruise/bleed easily.   Objective:     Vital Signs (Most Recent):  Temp: 98.9 °F (37.2 °C) (06/22/23 1548)  Pulse: 64 (06/22/23 1548)  Resp: 13 (06/22/23 1311)  BP: 139/65 (06/22/23 1548)  SpO2: 98 % (06/22/23 1548) Vital Signs (24h Range):  Temp:  [98.5 °F (36.9 °C)-99.1 °F (37.3 °C)] 98.9 °F (37.2 °C)  Pulse:  [64-94] 64  Resp:  [13-19] 13  SpO2:  [92 %-99 %] 98 %  BP: (114-202)/() 139/65     Weight: 65.8 kg (145 lb)  Body mass index is 22.71 kg/m².  Body surface area is 1.76 meters squared.      Intake/Output Summary (Last 24 hours) at 6/22/2023 1603  Last data filed at 6/22/2023 0641  Gross per 24 hour   Intake 51 ml   Output 300 ml   Net -249 ml       Physical Exam  Vitals reviewed.   Constitutional:       General: She is awake. She is not in acute distress.     Appearance: She is ill-appearing. She is not toxic-appearing.   HENT:      Head: Normocephalic and atraumatic.      Mouth/Throat:      Mouth: Mucous membranes are moist.      Pharynx: Oropharynx is clear.   Eyes:      Extraocular Movements: Extraocular movements intact.      Conjunctiva/sclera: Conjunctivae normal.      Pupils: Pupils are equal, round, and reactive to light.   Cardiovascular:      Rate and Rhythm: Normal rate and regular rhythm.   Pulmonary:      Effort: Pulmonary effort is normal.      Breath sounds: Normal breath sounds and air entry.   Chest:   Breasts:     Right: Normal.      Left: Normal.      Comments: Port-A-Cath in place  Dilated chest wall veins  Abdominal:      General: Abdomen is flat. Bowel sounds are decreased.      Palpations: There is no mass.      Tenderness: There is abdominal tenderness in the periumbilical area.   Musculoskeletal:      Cervical back: Normal range of motion.      Right lower leg: No edema.      Left lower leg: No edema.   Lymphadenopathy:      Cervical: No cervical adenopathy.   Skin:     General: Skin is warm and dry.      Comments:  Darkening of the skin of the neck   Neurological:      General: No focal deficit present.      Mental Status: She is alert and oriented to person, place, and time. Mental status is at baseline.   Psychiatric:         Attention and Perception: Attention normal.         Mood and Affect: Mood and affect normal.         Behavior: Behavior is cooperative.       Significant Labs:   CBC:   Recent Labs   Lab 06/21/23  1530 06/22/23  0639   WBC 12.89* 7.16   HGB 13.2 10.2*   HCT 37.8 30.2*   * 359    and CMP:   Recent Labs   Lab 06/21/23  1530 06/22/23  0445    137   K 2.6* 3.5   CO2 25 27   BUN 15.4 11.8   CREATININE 1.73* 1.07*   CALCIUM 10.1 8.6   ALBUMIN 3.5 2.9*   BILITOT 0.6 0.6   ALKPHOS 115 106   AST 46* 39*   ALT 36 31       Diagnostic Results:  I have reviewed all pertinent imaging results/findings within the past 24 hours.  06/21/2023: CT abdomen pelvis  Impression:     Dilated pancreatic duct and some inflammatory changes around the pancreas possibly representing pancreatitis.  Similar changes were seen on the prior examination     Some colonic wall thickening seen in the distal transverse and the descending colon possibly representing a colitis.    Assessment/Plan:     Active Hospital Problems    Diagnosis    *Clostridium difficile infection    Acute kidney injury     82-year-old with recent diagnosis of T3 N0 pancreatic --adenocarcinoma.  Admitted with the above problems      Reviewed with the patient and her family the PET-CT,   the diagnosis    the fact that she is not a surgical candidate.--Dr. Palacio's note    We discussed chemotherapy  -  chemotherapy radiation as an option.    We discussed that she is not a candidate for treatment until she clears her infection    Agree with treatment of Clostridium difficile  This patient must clear her infection before considering systemic chemotherapy -     Consider nutrition consultation   Not for chemotherapy with infection   Consider Celiac plexus  nerve block       Thank you for your consult. I will follow-up with patient. Please contact us if you have any additional questions.  Keep her scheduled appointment June 29, 2023        Ronan Burns MD  Hematology/Oncology  [facility] - Oncology [facility]

## 2023-06-22 NOTE — NURSING
Patient arrived on unit from [facility]. AAO x 4 with daughter at bedside. Notified HANSEL Galvan NP of arrival and of elevated BP (199/90).     Nurses Note -- 4 Eyes      6/21/2023   10:47 PM      Skin assessed during: Admit      [x] No Altered Skin Integrity Present    []Prevention Measures Documented      [] Yes- Altered Skin Integrity Present or Discovered   [] LDA Added if Not in Epic (Describe Wound)   [] New Altered Skin Integrity was Present on Admit and Documented in LDA   [] Wound Image Taken    Wound Care Consulted? No    Attending Nurse:  Cheryl Tinoco RN     Second RN/Staff Member:  CHELSI Gonzáles

## 2023-06-22 NOTE — PLAN OF CARE
06/22/23 0911   Discharge Assessment   Assessment Type Discharge Planning Assessment   Confirmed/corrected address, phone number and insurance Yes   Confirmed Demographics Correct on Facesheet   Source of Information patient;family  (Daughter:Campbell Talavera (Daughter)   [phone] (Mobile))   Communicated RADHA with patient/caregiver Date not available/Unable to determine   Reason For Admission E87.6 Hypokalemia  E83.42 Hypomagnesemia  K52.9 Colitis  R10.13 Epigastric pain  N17.9 Acute kidney injury  N39.0 Urinary tract infection without hematuria, site unspecified  K85.90, K86.1 Acute on chronic pancreatitis  I10 Hypertension, unspecified type   People in Home spouse  (Patient lives with her : Roberta Talavera.)   Facility Arrived From: Private residence.   Do you expect to return to your current living situation? Yes   Do you have help at home or someone to help you manage your care at home? Yes   Who are your caregiver(s) and their phone number(s)? Husbamd: Roberta Sadaf and [facility] () services as she was presently active with this agency.   Prior to hospitilization cognitive status: Alert/Oriented   Current cognitive status: Alert/Oriented   Walking or Climbing Stairs ambulation difficulty, requires equipment   Mobility Management Patient uses a rollator for mobility.   Home Accessibility wheelchair accessible  (Patient's home is built on a slab.)   Home Layout Able to live on 1st floor   Equipment Currently Used at Home respiratory supplies;CPAP   Readmission within 30 days? No   Patient currently being followed by outpatient case management? No   Do you currently have service(s) that help you manage your care at home? Yes   Name and Contact number of agency Patient is currently active with [facility] for () services.   Is the pt/caregiver preference to resume services with current agency Yes   Do you take prescription medications? Yes   Do you have prescription coverage? Yes   Coverage Payor:   MEDICARE - MEDICARE PART A & B   Do you have any problems affording any of your prescribed medications? No   Is the patient taking medications as prescribed? yes   Who is going to help you get home at discharge? : Roberta Talavera and Daughter: Campbell Talavera: [phone]   How do you get to doctors appointments? car, drives self;family or friend will provide   Are you on dialysis? No   Do you take coumadin? No   Discharge Plan A Home Health  (FOC: updates and clinical notes sent to [facility] for (HH) services as requested per patient & daughter's requests.)   Discharge Plan B Home Health   DME Needed Upon Discharge  none   Discharge Plan discussed with: Patient;Adult children  (Daughter: Campbell Talavera [phone])   Transition of Care Barriers None   Social Connections   How often do you attend Muslim or Mosque services? More than 4   Do you belong to any clubs or organizations such as Muslim groups, unions, fraternal or athletic groups, or school groups? 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 More than 4   Are you , , , , never , or living with a partner?   ( x59 years.)   Alcohol Use   Q1: How often do you have a drink containing alcohol? Never   Q2: How many drinks containing alcohol do you have on a typical day when you are drinking? None   Q3: How often do you have six or more drinks on one occasion? Never   OTHER   Name(s) of People in Home : Roberta Talavera and Daughter: Campbell Talavera [phone]

## 2023-06-23 LAB
ANION GAP SERPL CALC-SCNC: 10 MEQ/L
BACTERIA UR CULT: ABNORMAL
BACTERIA UR CULT: ABNORMAL
BASOPHILS # BLD AUTO: 0.02 X10(3)/MCL
BASOPHILS NFR BLD AUTO: 0.3 %
BUN SERPL-MCNC: 17.9 MG/DL (ref 9.8–20.1)
CALCIUM SERPL-MCNC: 8.8 MG/DL (ref 8.4–10.2)
CHLORIDE SERPL-SCNC: 100 MMOL/L (ref 98–107)
CO2 SERPL-SCNC: 26 MMOL/L (ref 23–31)
CREAT SERPL-MCNC: 0.92 MG/DL (ref 0.55–1.02)
CREAT/UREA NIT SERPL: 19
EOSINOPHIL # BLD AUTO: 0.12 X10(3)/MCL (ref 0–0.9)
EOSINOPHIL NFR BLD AUTO: 1.8 %
ERYTHROCYTE [DISTWIDTH] IN BLOOD BY AUTOMATED COUNT: 13.7 % (ref 11.5–17)
GFR SERPLBLD CREATININE-BSD FMLA CKD-EPI: >60 MLS/MIN/1.73/M2
GLUCOSE SERPL-MCNC: 131 MG/DL (ref 82–115)
HCT VFR BLD AUTO: 31.4 % (ref 37–47)
HGB BLD-MCNC: 10.3 G/DL (ref 12–16)
IMM GRANULOCYTES # BLD AUTO: 0.04 X10(3)/MCL (ref 0–0.04)
IMM GRANULOCYTES NFR BLD AUTO: 0.6 %
LYMPHOCYTES # BLD AUTO: 1.43 X10(3)/MCL (ref 0.6–4.6)
LYMPHOCYTES NFR BLD AUTO: 21.2 %
MCH RBC QN AUTO: 30.4 PG (ref 27–31)
MCHC RBC AUTO-ENTMCNC: 32.8 G/DL (ref 33–36)
MCV RBC AUTO: 92.6 FL (ref 80–94)
MONOCYTES # BLD AUTO: 0.67 X10(3)/MCL (ref 0.1–1.3)
MONOCYTES NFR BLD AUTO: 9.9 %
NEUTROPHILS # BLD AUTO: 4.46 X10(3)/MCL (ref 2.1–9.2)
NEUTROPHILS NFR BLD AUTO: 66.2 %
NRBC BLD AUTO-RTO: 0 %
PLATELET # BLD AUTO: 345 X10(3)/MCL (ref 130–400)
PMV BLD AUTO: 9.4 FL (ref 7.4–10.4)
POTASSIUM SERPL-SCNC: 3 MMOL/L (ref 3.5–5.1)
RBC # BLD AUTO: 3.39 X10(6)/MCL (ref 4.2–5.4)
SODIUM SERPL-SCNC: 136 MMOL/L (ref 136–145)
WBC # SPEC AUTO: 6.74 X10(3)/MCL (ref 4.5–11.5)

## 2023-06-23 PROCEDURE — 80048 BASIC METABOLIC PNL TOTAL CA: CPT | Performed by: INTERNAL MEDICINE

## 2023-06-23 PROCEDURE — 25000003 PHARM REV CODE 250: Performed by: INTERNAL MEDICINE

## 2023-06-23 PROCEDURE — 85025 COMPLETE CBC W/AUTO DIFF WBC: CPT | Performed by: INTERNAL MEDICINE

## 2023-06-23 PROCEDURE — 21400001 HC TELEMETRY ROOM

## 2023-06-23 PROCEDURE — 63600175 PHARM REV CODE 636 W HCPCS: Performed by: INTERNAL MEDICINE

## 2023-06-23 RX ORDER — POTASSIUM CHLORIDE 14.9 MG/ML
40 INJECTION INTRAVENOUS EVERY 4 HOURS
Status: DISPENSED | OUTPATIENT
Start: 2023-06-23 | End: 2023-06-23

## 2023-06-23 RX ORDER — POTASSIUM CHLORIDE 20 MEQ/1
40 TABLET, EXTENDED RELEASE ORAL EVERY 4 HOURS
Status: DISCONTINUED | OUTPATIENT
Start: 2023-06-23 | End: 2023-06-23

## 2023-06-23 RX ADMIN — FIDAXOMICIN 200 MG: 200 TABLET, FILM COATED ORAL at 08:06

## 2023-06-23 RX ADMIN — LEUCINE, PHENYLALANINE, LYSINE, METHIONINE, ISOLEUCINE, VALINE, HISTIDINE, THREONINE, TRYPTOPHAN, ALANINE, GLYCINE, ARGININE, PROLINE, SERINE, TYROSINE, DEXTROSE: 311; 238; 247; 170; 255; 247; 204; 179; 77; 880; 438; 489; 289; 213; 17; 5 INJECTION INTRAVENOUS at 05:06

## 2023-06-23 RX ADMIN — FIDAXOMICIN 200 MG: 200 TABLET, FILM COATED ORAL at 09:06

## 2023-06-23 RX ADMIN — HYDROMORPHONE HYDROCHLORIDE 0.5 MG: 2 INJECTION INTRAMUSCULAR; INTRAVENOUS; SUBCUTANEOUS at 12:06

## 2023-06-23 RX ADMIN — HYDROMORPHONE HYDROCHLORIDE 0.5 MG: 2 INJECTION INTRAMUSCULAR; INTRAVENOUS; SUBCUTANEOUS at 07:06

## 2023-06-23 RX ADMIN — LEUCINE, PHENYLALANINE, LYSINE, METHIONINE, ISOLEUCINE, VALINE, HISTIDINE, THREONINE, TRYPTOPHAN, ALANINE, GLYCINE, ARGININE, PROLINE, SERINE, TYROSINE, SODIUM ACETATE, DIBASIC POTASSIUM PHOSPHATE, MAGNESIUM CHLORIDE, SODIUM CHLORIDE, CALCIUM CHLORIDE, DEXTROSE
311; 238; 247; 170; 255; 247; 204; 179; 77; 880; 438; 489; 289; 213; 17; 297; 261; 51; 77; 33; 5 INJECTION INTRAVENOUS at 08:06

## 2023-06-23 RX ADMIN — ONDANSETRON 4 MG: 2 INJECTION INTRAMUSCULAR; INTRAVENOUS at 12:06

## 2023-06-23 RX ADMIN — POTASSIUM CHLORIDE 40 MEQ: 14.9 INJECTION, SOLUTION INTRAVENOUS at 04:06

## 2023-06-23 RX ADMIN — ENOXAPARIN SODIUM 40 MG: 40 INJECTION SUBCUTANEOUS at 04:06

## 2023-06-23 RX ADMIN — HYDROMORPHONE HYDROCHLORIDE 0.5 MG: 2 INJECTION INTRAMUSCULAR; INTRAVENOUS; SUBCUTANEOUS at 05:06

## 2023-06-23 NOTE — PROGRESS NOTES
Inpatient Nutrition Assessment    Admit Date: 6/21/2023   Total duration of encounter: 2 days     Nutrition Recommendation/Prescription     Continue regular diet as tolerated.    Add Boost Original PO daily to provide 240kcal and 10g protein per serving.     Changing to Clinimix E 4.25/5 at 85mL/hr to provide 694kcal, 87g protein, 102g dextrose and 2040mL fluid daily.     Monitor K, Phos, Mag, Na and gluc daily while on PPN.     Monitor oral intakes and tolerance.       Communication of Recommendations: reviewed with provider and reviewed with patient    Nutrition Assessment     Malnutrition Assessment/Nutrition-Focused Physical Exam    Malnutrition Context: acute illness or injury  Malnutrition Level: moderate  Energy Intake (Malnutrition): less than 75% for greater than 7 days  Weight Loss (Malnutrition): greater than 7.5% in 3 months  Subcutaneous Fat (Malnutrition): mild depletion     Upper Arm Region (Subcutaneous Fat Loss): mild depletion     Muscle Mass (Malnutrition): mild depletion     Clavicle Bone Region (Muscle Loss): mild depletion  Clavicle and Acromion Bone Region (Muscle Loss): mild depletion                 Fluid Accumulation (Malnutrition): other (see comments) (does not meet criteria)        A minimum of two characteristics is recommended for diagnosis of either severe or non-severe malnutrition.    Chart Review    Reason Seen: malnutrition screening tool (MST)    Malnutrition Screening Tool Results   Have you recently lost weight without trying?: Yes: 24-33 lbs  Have you been eating poorly because of a decreased appetite?: Yes   MST Score: 4     Diagnosis:  Colitis  Recent diagnosis of CDI, likely contributing to above  Acute kidney injury  Hypokalemia  Elevated blood glucose without history of DM  Accelerated blood pressure    Relevant Medical History:  Crohn's disease, right hemicolectomy, CAD, HLD, cervical cancer, recently diagnosed pancreatic cancer, C diff colitis     Nutrition-Related  "Medications: Clinimix 4.25/5 @ 100mL/hr  Calorie Containing IV Medications: Clinimix    Nutrition-Related Labs:  : HgbA1c 5.5  : K 3.0, Gluc 131    Diet/PN Order: Diet Adult Regular  amino acid 4.25% - dextrose 5% solution  Oral Supplement Order: none  Tube Feeding Order: none  Appetite/Oral Intake: fair/50-75% of meals  Factors Affecting Nutritional Intake:  persistent abdominal pain, nausea, vomiting and diarrhea   Food/Holiness/Cultural Preferences: none reported  Food Allergies: no known food allergies       Wound(s):   none noted    Comments    23: Patient reports persistent abdominal pain, nausea, vomiting and diarrhea since 2023. Reports fair appetite this AM, willing to try oral nutrition supplement in chocolate flavor. Clinimix running as ordered during rounds. Discussed changes to PPN with MD to better meet patient est needs. MD to correct depressed  K orally today.      Anthropometrics    Height: 5' 7" (170.2 cm) Height Method: Stated  Last Weight: 65.8 kg (145 lb) (23 0100) Weight Method: Bed Scale  BMI (Calculated): 22.7  BMI Classification: normal (BMI 18.5-24.9)     Ideal Body Weight (IBW), Female: 135 lb     % Ideal Body Weight, Female (lb): 107.41 %                    Usual Body Weight (UBW), k.45 kg  % Usual Body Weight: 93.56     Usual Weight Provided By: patient and EMR weight history, patient reports UBW about 155-160lbs about 2-3 months ago.    Wt Readings from Last 5 Encounters:   23 65.8 kg (145 lb)   23 63.4 kg (139 lb 12.4 oz)   23 65.3 kg (143 lb 15.4 oz)   23 65.3 kg (144 lb)   23 66.2 kg (145 lb 14.4 oz)     Weight Change(s) Since Admission:  Admit Weight: 65.8 kg (145 lb) (23 1500)  .    Estimated Needs    Weight Used For Calorie Calculations: 65.8 kg (145 lb 1 oz)  Energy Calorie Requirements (kcal): 1500 kcal/day (mifflin st jeor x 1.3 SF)  Energy Need Method: Irwin-St Jeor  Weight Used For Protein Calculations: 65.8 " kg (145 lb 1 oz)  Protein Requirements: 79-86 g/day (1.2-1.3g/kg/d)  Fluid Requirements (mL): 1500mL/day (1mL/kcal)  Temp (24hrs), Av.3 °F (37.4 °C), Min:98.9 °F (37.2 °C), Max:99.6 °F (37.6 °C)       Enteral Nutrition    Patient not receiving enteral nutrition at this time.    Parenteral Nutrition    Standard Formula: Clinimix 4.25/5  Custom Formula: not applicable  Additives: none  Rate/Volume: 100mL/hr  Lipids: none  Total Nutrition Provided by Parenteral Nutrition:  Calories Provided  816 kcal/d, 54% needs   Protein Provided  102 g/d, 118% needs   Dextrose Provided  120 g/d, GIR 1.3 mg CHO/kg/min   Fluid Provided  2400 ml/d, 160% needs       Evaluation of Received Nutrient Intake    Calories: not meeting estimated needs  Protein: exceeding estimated needs    Patient Education    Not applicable.    Nutrition Diagnosis     PES: Suboptimal protein/energy intake related to altered GI function as evidenced by persistent abdominal pain, nausea, vomiting and diarrhea. (new)  PES: Unintended weight loss related to catabolic illness as evidenced by 8.8% wt loss over past 3 months. (new)     Interventions/Goals     Intervention(s): modified composition of parenteral nutrition and collaboration with other providers  Goal: Meet greater than 75% of nutritional needs by follow-up. (new)    Monitoring & Evaluation     Dietitian will monitor energy intake, weight, electrolyte/renal panel, and glucose/endocrine profile.  Nutrition Risk/Follow-Up: high (follow-up in 1-4 days)   Please consult if re-assessment needed sooner.

## 2023-06-23 NOTE — CONSULTS
Infectious Diseases Consultation       Inpatient consult to Infectious Diseases  Consult performed by: Tuan Jolley MD  Consult ordered by: Syeda Flores MD      HPI:   82-year-old female with past medical history of CAD/MI, HLD, Crohn's disease and recently diagnosed with pancreatic cancer, is admitted to [facility] on 06/21/2023 presenting with abdominal pain, vomiting and diarrhea.  On admission she was noted to be afebrile, with leukocytosis of 12.8, in acute kidney injury with creatinine 1.73.  Urinalysis abnormal with WBC 21-50, large LE, moderate bacteria and urine culture with more than 100,000 colonies of Gram-negative, with 3 different isolates.  Blood cultures are pending.  Chest x-ray unremarkable.  CT scan of the abdomen and pelvis without contrast showed dilated pancreatic duct and some inflammatory changes around the pancreas possibly representing pancreatitis, similar changes were seen on prior examination, some colonic wall thickening seen in the distal transverse and descending colon possibly representing colitis.  Review of her records show a recent admission for C difficile colitis with 6/10 stool C difficile GDH antigen positive, toxin A/B positive.  She is on antimicrobial coverage with Dificid.    Past Medical and Surgical History  Allergies :   Patient has no known allergies.    Medical :   She has a past medical history of Acute pancreatitis, unspecified complication status, unspecified pancreatitis type, Arthritis, Carpal tunnel syndrome, bilateral, Cervical cancer, Cervical radiculopathy, Crohn disease, Heart attack, HLD (hyperlipidemia), Low back pain, Sleep apnea, unspecified, and Spinal stenosis of lumbar region with neurogenic claudication.    Surgical :   She has a past surgical history that includes bilateral L4-5, L5-S1 decompression, repair L5-S1 dural tear (10/01/2021); Carpal tunnel release (Right, 09/06/2019); Colectomy (07/2011);  Cholecystectomy; Hysterectomy; Repair of eyelid (Bilateral, 11/21/2022); Carpal tunnel release (Left, 12/2/2022); ultrasound, endoscopic, with fine needle aspiration (N/A, 6/8/2023); and Esophagogastroduodenoscopy (N/A, 6/8/2023).     Family History  Her family history includes Cancer in her father; Diabetes in her mother; Hyperlipidemia in her brother, daughter, father, and sister; Hypertension in her father.    Social History  She reports that she has quit smoking. Her smoking use included cigarettes. She has never used smokeless tobacco. She reports current alcohol use. She reports that she does not use drugs.     Review of Systems   Constitutional:  Positive for malaise/fatigue.   HENT: Negative.     Respiratory: Negative.     Gastrointestinal:  Positive for abdominal pain and diarrhea.   Genitourinary: Negative.    Musculoskeletal: Negative.    Neurological:  Positive for weakness.   Endo/Heme/Allergies: Negative.    Psychiatric/Behavioral: Negative.     All other Systems review done and negative.    Objective   Physical Exam  Vitals reviewed.   Constitutional:       General: She is not in acute distress.     Appearance: She is not toxic-appearing.   HENT:      Head: Normocephalic and atraumatic.   Eyes:      Pupils: Pupils are equal, round, and reactive to light.   Cardiovascular:      Rate and Rhythm: Normal rate and regular rhythm.   Pulmonary:      Effort: Pulmonary effort is normal.      Breath sounds: Normal breath sounds.   Abdominal:      General: Bowel sounds are normal. There is no distension.      Palpations: Abdomen is soft.      Tenderness: There is abdominal tenderness.   Genitourinary:     Comments: No suprapubic tenderness  Musculoskeletal:      Cervical back: Neck supple.      Right lower leg: No edema.      Left lower leg: No edema.   Skin:     Findings: No erythema or rash.   Neurological:      Mental Status: She is alert.   Psychiatric:      Comments: Calm and cooperative     VITAL SIGNS: 24  "HR MIN & MAX LAST    Temp  Min: 98.5 °F (36.9 °C)  Max: 99.6 °F (37.6 °C)  99.6 °F (37.6 °C)        BP  Min: 114/70  Max: 199/90  137/74     Pulse  Min: 64  Max: 94  71     Resp  Min: 13  Max: 20  20    SpO2  Min: 95 %  Max: 98 %  95 %      HT: 5' 7" (170.2 cm)  WT: 65.8 kg (145 lb)  BMI: 22.7     Recent Results (from the past 24 hour(s))   Comprehensive Metabolic Panel    Collection Time: 06/22/23  4:45 AM   Result Value Ref Range    Sodium Level 137 136 - 145 mmol/L    Potassium Level 3.5 3.5 - 5.1 mmol/L    Chloride 97 (L) 98 - 107 mmol/L    Carbon Dioxide 27 23 - 31 mmol/L    Glucose Level 94 82 - 115 mg/dL    Blood Urea Nitrogen 11.8 9.8 - 20.1 mg/dL    Creatinine 1.07 (H) 0.55 - 1.02 mg/dL    Calcium Level Total 8.6 8.4 - 10.2 mg/dL    Protein Total 6.2 5.8 - 7.6 gm/dL    Albumin Level 2.9 (L) 3.4 - 4.8 g/dL    Globulin 3.3 2.4 - 3.5 gm/dL    Albumin/Globulin Ratio 0.9 (L) 1.1 - 2.0 ratio    Bilirubin Total 0.6 <=1.5 mg/dL    Alkaline Phosphatase 106 40 - 150 unit/L    Alanine Aminotransferase 31 0 - 55 unit/L    Aspartate Aminotransferase 39 (H) 5 - 34 unit/L    eGFR 52 mls/min/1.73/m2   Phosphorus    Collection Time: 06/22/23  4:45 AM   Result Value Ref Range    Phosphorus Level 3.0 2.3 - 4.7 mg/dL   Magnesium    Collection Time: 06/22/23  4:45 AM   Result Value Ref Range    Magnesium Level 1.90 1.60 - 2.60 mg/dL   C-Reactive Protein    Collection Time: 06/22/23  4:45 AM   Result Value Ref Range    C-Reactive Protein 45.50 (H) <5.00 mg/L   Sedimentation rate    Collection Time: 06/22/23  4:45 AM   Result Value Ref Range    Sed Rate 63 (H) 0 - 20 mm/hr   Hemoglobin A1C    Collection Time: 06/22/23  4:45 AM   Result Value Ref Range    Hemoglobin A1c 5.5 <=7.0 %    Estimated Average Glucose 111.2 mg/dL   CBC Without Differential    Collection Time: 06/22/23  6:39 AM   Result Value Ref Range    WBC 7.16 4.50 - 11.50 x10(3)/mcL    RBC 3.34 (L) 4.20 - 5.40 x10(6)/mcL    Hgb 10.2 (L) 12.0 - 16.0 g/dL    Hct 30.2 " (L) 37.0 - 47.0 %    MCV 90.4 80.0 - 94.0 fL    MCH 30.5 27.0 - 31.0 pg    MCHC 33.8 33.0 - 36.0 g/dL    RDW 13.6 11.5 - 17.0 %    Platelet 359 130 - 400 x10(3)/mcL    MPV 9.1 7.4 - 10.4 fL    NRBC% 0.0 %       Imaging  Imaging Results              CT Abdomen Pelvis  Without Contrast (Final result)  Result time 06/21/23 16:57:59      Final result by Mary Cool MD (06/21/23 16:57:59)                   Impression:      Dilated pancreatic duct and some inflammatory changes around the pancreas possibly representing pancreatitis.  Similar changes were seen on the prior examination    Some colonic wall thickening seen in the distal transverse and the descending colon possibly representing a colitis.      Electronically signed by: Mary Cool  Date:    06/21/2023  Time:    16:57               Narrative:    EXAMINATION:  CT ABDOMEN PELVIS WITHOUT CONTRAST    CLINICAL HISTORY:  Epigastric pain;    TECHNIQUE:  Low dose axial images, sagittal and coronal reformations were obtained from the lung bases to the pubic symphysis.  No contrast was administered.  Automatic exposure control is utilized to reduce patient radiation exposure.    COMPARISON:  06/09/2023    FINDINGS:  The lung bases are clear.    The liver appears normal.  No obvious liver mass or lesion is seen.    The patient is status post cholecystectomy.  The pancreas is seen in the pancreatic duct appears to be dilated.  This was seen on the prior examination as well.  Mild peripancreatic inflammatory changes are seen.  Similar changes were seen on the prior examination.    Spleen appears normal and adrenal glands appear normal.  No adrenal nodule is seen.    No nephrolithiasis is seen.  No hydronephrosis is seen.  No hydroureter is seen.  No ureteral stone is seen.    The colonic wall is thickened and the transverse colon and the descending colon and there is some inflammatory changes around it.  Colitis cannot be excluded.  Postsurgical changes  are seen in the transverse colon.    No free air seen.  No free fluid is seen.  The urinary bladder appears normal.    Bones show no acute abnormality.                                       X-Ray Chest AP Portable (Final result)  Result time 06/21/23 15:33:14      Final result by Darren Montoya MD (06/21/23 15:33:14)                   Impression:      No acute cardiopulmonary process.      Electronically signed by: Darren Montoya  Date:    06/21/2023  Time:    15:33               Narrative:    EXAMINATION:  XR CHEST AP PORTABLE    CLINICAL HISTORY:  epigastric pain;    TECHNIQUE:  Single view of the chest    COMPARISON:  06/09/2023    FINDINGS:  No focal opacification, pleural effusion, or pneumothorax.    The cardiomediastinal silhouette is within normal limits.    Left-sided MediPort remains.    No acute osseous abnormality.                                       Impression  1. C- difficile colitis  2.  Pyuria/bacteria  3. Leukocytosis  4. Acute kidney injury   5. Stage III pancreatic cancer   6.  Crohn's disease with right hemicolectomy   7.  Anemia      Recommendations  I agree with the management of this patient.  History of stage III pancreatic cancer presenting with abdominal pain and diarrhea, recently diagnosed with C difficile infection with CT imaging indicative of colitis.  We will continue antimicrobial coverage with Dificid and can control diarrhea with use of Questran if needed.  We will plan a 10 day course of Dificid and maintain on transmission based contact precautions for C difficile.  She seems to have responded to treatment with resolution of leukocytosis and improved renal function.  We will follow with labs in a.m.  Case is discussed with patient at length, questions solicited and answered.  I have also discussed the case with nursing staff.  I would like to thank the team very much for the opportunity to assist in the care of this patient.

## 2023-06-23 NOTE — PROGRESS NOTES
[facility] Hospital Medicine Progress Note        Chief Complaint: Inpatient follow-up on Clostridium difficile colitis    HPI:   Ms. Proctor is an 82 year old female with a pmh of CAD, HLD, Crohn's disease RA, cervical cancer, DAQUAN who presented to [facility] ED with complaints of abdominal pain, nausea, vomiting.  Daughter at the bedside states this has been going on for months, and was just recently diagnosed with pancreatic cancer.  She also states that the patient was diagnosed with C diff and has not been compliant with her oral vancomycin.     Today's ED lab work revealed WBC 12.89, K 2.6, creatinine 1.73, glucose 161, Mag 1.4.  UA was contaminated.  CXR showed no acute cardiopulmonary process.  CT abdomen pelvis without contrast showed dilated pancreatic duct and some inflammatory changes around the pancreas possibly representing pancreatitis.  Similar changes seen on the prior exam.  Some colonic wall thickening seen in the distal transverse and the descending colon possibly representing colitis.  ED vitals:  Temp 98.6° F, pulse 88, resp 20, /82, SpO2 98% on RA.  While in the ED, she became markedly hypertensive  Potassium was replaced, she was given flagyl and Cipro, and transferred to [facility]. She was admitted to hospital medicine for management.      Interval Hx:   Patient is awake and sitting up in bed.  Patient's sister who is a retired registered nurse is at bedside.  Case was discussed extensively with her.  PET scan results were reviewed with her as well.  Diarrhea has subsided somewhat.  Patient remains with some nausea however abdominal pain seems to be controlled at present.  Patient is afebrile, on room air, hemodynamically stable.    Objective/physical exam:  General:  Elderly  female in no acute distress  HENT: normocephalic, atraumatic  Eye: PERRL, EOMI, clear conjunctiva  Neck: full ROM, no thyromegaly, no JVD  Respiratory: clear to auscultation  bilaterally  Cardiovascular: regular rate and rhythm  Gastrointestinal: non-distended, positive bowel sounds, non-tender  Musculoskeletal:  Generalized muscular atrophy  Integumentary: warm, dry, intact, no rashes  Neurological: cranial nerves grossly intact, no focal neurological deficit  Psychiatric: cooperative, flat affect      VITAL SIGNS: 24 HRS MIN & MAX LAST   Temp  Min: 98.9 °F (37.2 °C)  Max: 99.6 °F (37.6 °C) 99.2 °F (37.3 °C)   BP  Min: 106/61  Max: 153/77 (!) 153/77   Pulse  Min: 64  Max: 71  70   Resp  Min: 16  Max: 20 18   SpO2  Min: 92 %  Max: 98 % 96 %     I have reviewed the following labs:    Recent Labs   Lab 06/21/23  1530 06/22/23  0639 06/23/23 0447   WBC 12.89* 7.16 6.74   RBC 4.26 3.34* 3.39*   HGB 13.2 10.2* 10.3*   HCT 37.8 30.2* 31.4*   MCV 88.7 90.4 92.6   MCH 31.0 30.5 30.4   MCHC 34.9 33.8 32.8*   RDW 13.1 13.6 13.7   * 359 345   MPV 9.3 9.1 9.4       Recent Labs   Lab 06/21/23  1530 06/21/23  1624 06/22/23  0445 06/23/23 0447     --  137 136   K 2.6*  --  3.5 3.0*   CO2 25  --  27 26   BUN 15.4  --  11.8 17.9   CREATININE 1.73*  --  1.07* 0.92   CALCIUM 10.1  --  8.6 8.8   MG  --  1.40* 1.90  --    ALBUMIN 3.5  --  2.9*  --    ALKPHOS 115  --  106  --    ALT 36  --  31  --    AST 46*  --  39*  --    BILITOT 0.6  --  0.6  --           Microbiology Results (last 7 days)       Procedure Component Value Units Date/Time    Blood Culture [[id number]]  (Normal) Collected: 06/22/23 0639    Order Status: Completed Specimen: Blood Updated: 06/23/23 0800     CULTURE, BLOOD ([facility]) No Growth At 24 Hours    Urine culture [[id number]]  (Abnormal) Collected: 06/21/23 1704    Order Status: Completed Specimen: Urine Updated: 06/23/23 0705     Urine Culture Multiple organisms present indicate probable contamination. Recommend recollection.      >/= 100,000 colonies/ml - Three different species of Gram-negative Rods    Blood Culture [[id number]]  (Normal) Collected: 06/22/23 0121    Order  Status: Completed Specimen: Blood Updated: 06/23/23 0500     CULTURE, BLOOD ([facility]) No Growth At 24 Hours    Blood Culture [[id number]] Collected: 06/22/23 1606    Order Status: Resulted Specimen: Blood from Arm, Right Updated: 06/22/23 1611    Blood Culture [[id number]] Collected: 06/22/23 1606    Order Status: Resulted Specimen: Blood from Arm, Left Updated: 06/22/23 1611             See below for Radiology    Scheduled Med:   enoxparin  40 mg Subcutaneous Q24H (prophylaxis, 1700)    fidaxomicin  200 mg Oral BID    potassium chloride  40 mEq Intravenous Q4H        Continuous Infusions:   Amino acid 4.25% - dextrose 5% (CLINIMIX-E) solution (1L provides 42.5 gm AA, 50 gm CHO (170 kcal/L dextrose), Na 35, K 30, Mg 5, Ca 4.5, Acetate 70, Cl 39, Phos 15)          PRN Meds:  acetaminophen, acetaminophen, aluminum-magnesium hydroxide-simethicone, cloNIDine, hydrALAZINE, HYDROmorphone, labetalol, melatonin, ondansetron, prochlorperazine, sodium chloride 0.9%       Assessment/Plan:  Recurrent nausea and vomiting   Unresolved Clostridium difficile colitis, noncompliant with oral vancomycin  Recently diagnosed stage III pancreatic cancer  Hypertensive urgency, resolved   Anemia of chronic disease  Acute kidney injury, resolved  Crohn's disease on Entyvio every 6 weeks status post right hemicolectomy  Contaminated urine culture  Hypokalemia       Plan:  Continue Clinimix. Case was discussed with registered dietitian.  Appreciate Gastroenterology evaluation.  Recommendations noted to continue oral Dificid   Appreciate medical oncology evaluation.  Patient is not a surgical candidate per Surgical Oncology.  She may be a candidate for palliative chemotherapy and radiation after she clears her current infection.  Per replace potassium intravenously and continue supportive care      Critical Care Diagnosis:  Critical hypokalemia requiring intravenous potassium supplementation  Critical Care Interventions: Hands-on evaluation, review  of labs, radiographs, medical records, and discussion with the patient and medical staff in order to assess and manage the high probability of imminent or life-threatening deterioration of cardio-respiratory status requiring vasopressor support and/or intubation and mechanical ventilation.  Critical Care Time Spent: 35 minutes     VTE prophylaxis:  Lovenox    Patient condition:  Stable    Anticipated discharge and Disposition:     TBD    All diagnosis and differential diagnosis have been reviewed; assessment and plan has been documented; I have personally reviewed the labs and test results that are presently available; I have reviewed the patients medication list; I have reviewed the consulting providers response and recommendations. I have reviewed or attempted to review medical records based upon their availability    All of the patient's questions have been  addressed and answered. Patient's is agreeable to the above stated plan. I will continue to monitor closely and make adjustments to medical management as needed.  _____________________________________________________________________      Radiology:  I have personally reviewed the following imaging and agree with the radiologist.     CT Abdomen Pelvis  Without Contrast  Narrative: EXAMINATION:  CT ABDOMEN PELVIS WITHOUT CONTRAST    CLINICAL HISTORY:  Epigastric pain;    TECHNIQUE:  Low dose axial images, sagittal and coronal reformations were obtained from the lung bases to the pubic symphysis.  No contrast was administered.  Automatic exposure control is utilized to reduce patient radiation exposure.    COMPARISON:  06/09/2023    FINDINGS:  The lung bases are clear.    The liver appears normal.  No obvious liver mass or lesion is seen.    The patient is status post cholecystectomy.  The pancreas is seen in the pancreatic duct appears to be dilated.  This was seen on the prior examination as well.  Mild peripancreatic inflammatory changes are seen.  Similar  changes were seen on the prior examination.    Spleen appears normal and adrenal glands appear normal.  No adrenal nodule is seen.    No nephrolithiasis is seen.  No hydronephrosis is seen.  No hydroureter is seen.  No ureteral stone is seen.    The colonic wall is thickened and the transverse colon and the descending colon and there is some inflammatory changes around it.  Colitis cannot be excluded.  Postsurgical changes are seen in the transverse colon.    No free air seen.  No free fluid is seen.  The urinary bladder appears normal.    Bones show no acute abnormality.  Impression: Dilated pancreatic duct and some inflammatory changes around the pancreas possibly representing pancreatitis.  Similar changes were seen on the prior examination    Some colonic wall thickening seen in the distal transverse and the descending colon possibly representing a colitis.    Electronically signed by: Mary Cool  Date:    06/21/2023  Time:    16:57  X-Ray Chest AP Portable  Narrative: EXAMINATION:  XR CHEST AP PORTABLE    CLINICAL HISTORY:  epigastric pain;    TECHNIQUE:  Single view of the chest    COMPARISON:  06/09/2023    FINDINGS:  No focal opacification, pleural effusion, or pneumothorax.    The cardiomediastinal silhouette is within normal limits.    Left-sided MediPort remains.    No acute osseous abnormality.  Impression: No acute cardiopulmonary process.    Electronically signed by: Darren Montoya  Date:    06/21/2023  Time:    15:33      Trevor Joyce MD   06/23/2023

## 2023-06-23 NOTE — PLAN OF CARE
Dr. Fraga recommended celiac plexus block for chronic abd pain. Will have our office arrange this outpatient. Continue dificid for Cdiff. Supportive care.    GI available if needed.

## 2023-06-24 LAB
ANION GAP SERPL CALC-SCNC: 5 MEQ/L
BUN SERPL-MCNC: 19.5 MG/DL (ref 9.8–20.1)
CALCIUM SERPL-MCNC: 9.1 MG/DL (ref 8.4–10.2)
CHLORIDE SERPL-SCNC: 106 MMOL/L (ref 98–107)
CO2 SERPL-SCNC: 26 MMOL/L (ref 23–31)
CREAT SERPL-MCNC: 0.8 MG/DL (ref 0.55–1.02)
CREAT/UREA NIT SERPL: 24
GFR SERPLBLD CREATININE-BSD FMLA CKD-EPI: >60 MLS/MIN/1.73/M2
GLUCOSE SERPL-MCNC: 111 MG/DL (ref 82–115)
POTASSIUM SERPL-SCNC: 3.9 MMOL/L (ref 3.5–5.1)
SODIUM SERPL-SCNC: 137 MMOL/L (ref 136–145)

## 2023-06-24 PROCEDURE — 63600175 PHARM REV CODE 636 W HCPCS: Performed by: INTERNAL MEDICINE

## 2023-06-24 PROCEDURE — 80048 BASIC METABOLIC PNL TOTAL CA: CPT | Performed by: INTERNAL MEDICINE

## 2023-06-24 PROCEDURE — 25000003 PHARM REV CODE 250: Performed by: INTERNAL MEDICINE

## 2023-06-24 PROCEDURE — 21400001 HC TELEMETRY ROOM

## 2023-06-24 PROCEDURE — 25000003 PHARM REV CODE 250: Performed by: FAMILY MEDICINE

## 2023-06-24 RX ORDER — POTASSIUM CHLORIDE 14.9 MG/ML
40 INJECTION INTRAVENOUS ONCE
Status: COMPLETED | OUTPATIENT
Start: 2023-06-24 | End: 2023-06-24

## 2023-06-24 RX ORDER — HYDROMORPHONE HYDROCHLORIDE 2 MG/ML
0.5 INJECTION, SOLUTION INTRAMUSCULAR; INTRAVENOUS; SUBCUTANEOUS EVERY 4 HOURS PRN
Status: DISCONTINUED | OUTPATIENT
Start: 2023-06-24 | End: 2023-06-27

## 2023-06-24 RX ADMIN — HYDROMORPHONE HYDROCHLORIDE 0.5 MG: 2 INJECTION INTRAMUSCULAR; INTRAVENOUS; SUBCUTANEOUS at 05:06

## 2023-06-24 RX ADMIN — LEUCINE, PHENYLALANINE, LYSINE, METHIONINE, ISOLEUCINE, VALINE, HISTIDINE, THREONINE, TRYPTOPHAN, ALANINE, GLYCINE, ARGININE, PROLINE, SERINE, TYROSINE, SODIUM ACETATE, DIBASIC POTASSIUM PHOSPHATE, MAGNESIUM CHLORIDE, SODIUM CHLORIDE, CALCIUM CHLORIDE, DEXTROSE
311; 238; 247; 170; 255; 247; 204; 179; 77; 880; 438; 489; 289; 213; 17; 297; 261; 51; 77; 33; 5 INJECTION INTRAVENOUS at 09:06

## 2023-06-24 RX ADMIN — Medication 6 MG: at 10:06

## 2023-06-24 RX ADMIN — FIDAXOMICIN 200 MG: 200 TABLET, FILM COATED ORAL at 09:06

## 2023-06-24 RX ADMIN — HYDROMORPHONE HYDROCHLORIDE 0.5 MG: 2 INJECTION INTRAMUSCULAR; INTRAVENOUS; SUBCUTANEOUS at 01:06

## 2023-06-24 RX ADMIN — ENOXAPARIN SODIUM 40 MG: 40 INJECTION SUBCUTANEOUS at 05:06

## 2023-06-24 RX ADMIN — ONDANSETRON 4 MG: 2 INJECTION INTRAMUSCULAR; INTRAVENOUS at 01:06

## 2023-06-24 RX ADMIN — ONDANSETRON 4 MG: 2 INJECTION INTRAMUSCULAR; INTRAVENOUS at 05:06

## 2023-06-24 RX ADMIN — POTASSIUM CHLORIDE 40 MEQ: 14.9 INJECTION, SOLUTION INTRAVENOUS at 02:06

## 2023-06-24 RX ADMIN — HYDROMORPHONE HYDROCHLORIDE 0.5 MG: 2 INJECTION INTRAMUSCULAR; INTRAVENOUS; SUBCUTANEOUS at 09:06

## 2023-06-24 RX ADMIN — FIDAXOMICIN 200 MG: 200 TABLET, FILM COATED ORAL at 08:06

## 2023-06-24 RX ADMIN — HYDROMORPHONE HYDROCHLORIDE 0.5 MG: 2 INJECTION INTRAMUSCULAR; INTRAVENOUS; SUBCUTANEOUS at 07:06

## 2023-06-24 NOTE — PROGRESS NOTES
[facility] Hospital Medicine Progress Note        Chief Complaint: Inpatient follow-up on Clostridium difficile colitis    HPI:   Ms. Proctor is an 82 year old female with a pmh of CAD, HLD, Crohn's disease RA, cervical cancer, DAQUAN who presented to [facility] ED with complaints of abdominal pain, nausea, vomiting.  Daughter at the bedside states this has been going on for months, and was just recently diagnosed with pancreatic cancer.  She also states that the patient was diagnosed with C diff and has not been compliant with her oral vancomycin.     Today's ED lab work revealed WBC 12.89, K 2.6, creatinine 1.73, glucose 161, Mag 1.4.  UA was contaminated.  CXR showed no acute cardiopulmonary process.  CT abdomen pelvis without contrast showed dilated pancreatic duct and some inflammatory changes around the pancreas possibly representing pancreatitis.  Similar changes seen on the prior exam.  Some colonic wall thickening seen in the distal transverse and the descending colon possibly representing colitis.  ED vitals:  Temp 98.6° F, pulse 88, resp 20, /82, SpO2 98% on RA.  While in the ED, she became markedly hypertensive  Potassium was replaced, she was given flagyl and Cipro, and transferred to [facility]. She was admitted to hospital medicine for management.      Interval Hx:   Patient is awake and sitting up in bed.  Patient reports diarrhea has subsided.  She is still not eating much of anything.  She only drinks her dietary supplements.   Patient remains with some nausea however abdominal pain seems to be controlled at present.  Patient is afebrile, on room air, hemodynamically stable. No family is present in the room at this time.    Objective/physical exam:  General:  Elderly  female in no acute distress  HENT: normocephalic, atraumatic  Eye: PERRL, EOMI, clear conjunctiva  Neck: full ROM, no thyromegaly, no JVD  Respiratory: clear to auscultation  bilaterally  Cardiovascular: regular rate and rhythm  Gastrointestinal: non-distended, positive bowel sounds, non-tender  Musculoskeletal:  Generalized muscular atrophy  Integumentary: warm, dry, intact, no rashes  Neurological: cranial nerves grossly intact, no focal neurological deficit  Psychiatric: cooperative, flat affect      VITAL SIGNS: 24 HRS MIN & MAX LAST   Temp  Min: 98.4 °F (36.9 °C)  Max: 99.3 °F (37.4 °C) 98.4 °F (36.9 °C)   BP  Min: 109/57  Max: 159/62 136/60   Pulse  Min: 65  Max: 72  65   Resp  Min: 18  Max: 18 18   SpO2  Min: 97 %  Max: 98 % 98 %     I have reviewed the following labs:    Recent Labs   Lab 06/21/23  1530 06/22/23  0639 06/23/23  0447   WBC 12.89* 7.16 6.74   RBC 4.26 3.34* 3.39*   HGB 13.2 10.2* 10.3*   HCT 37.8 30.2* 31.4*   MCV 88.7 90.4 92.6   MCH 31.0 30.5 30.4   MCHC 34.9 33.8 32.8*   RDW 13.1 13.6 13.7   * 359 345   MPV 9.3 9.1 9.4       Recent Labs   Lab 06/21/23  1530 06/21/23  1624 06/22/23  0445 06/23/23  0447 06/24/23  0709     --  137 136 137   K 2.6*  --  3.5 3.0* 3.9   CO2 25  --  27 26 26   BUN 15.4  --  11.8 17.9 19.5   CREATININE 1.73*  --  1.07* 0.92 0.80   CALCIUM 10.1  --  8.6 8.8 9.1   MG  --  1.40* 1.90  --   --    ALBUMIN 3.5  --  2.9*  --   --    ALKPHOS 115  --  106  --   --    ALT 36  --  31  --   --    AST 46*  --  39*  --   --    BILITOT 0.6  --  0.6  --   --           Microbiology Results (last 7 days)       Procedure Component Value Units Date/Time    Blood Culture [[id number]]  (Normal) Collected: 06/22/23 0639    Order Status: Completed Specimen: Blood Updated: 06/24/23 0800     CULTURE, BLOOD ([facility]) No Growth At 48 Hours    Blood Culture [[id number]]  (Normal) Collected: 06/22/23 0121    Order Status: Completed Specimen: Blood Updated: 06/24/23 0500     CULTURE, BLOOD ([facility]) No Growth At 48 Hours    Blood Culture [[id number]]  (Normal) Collected: 06/22/23 1606    Order Status: Completed Specimen: Blood from Arm, Right Updated: 06/23/23 1701      CULTURE, BLOOD ([facility]) No Growth At 24 Hours    Blood Culture [[id number]]  (Normal) Collected: 06/22/23 1606    Order Status: Completed Specimen: Blood from Arm, Left Updated: 06/23/23 1701     CULTURE, BLOOD ([facility]) No Growth At 24 Hours    Urine culture [[id number]]  (Abnormal) Collected: 06/21/23 1704    Order Status: Completed Specimen: Urine Updated: 06/23/23 0705     Urine Culture Multiple organisms present indicate probable contamination. Recommend recollection.      >/= 100,000 colonies/ml - Three different species of Gram-negative Rods             See below for Radiology    Scheduled Med:   enoxparin  40 mg Subcutaneous Q24H (prophylaxis, 1700)    fidaxomicin  200 mg Oral BID        Continuous Infusions:   Amino acid 4.25% - dextrose 5% (CLINIMIX-E) solution (1L provides 42.5 gm AA, 50 gm CHO (170 kcal/L dextrose), Na 35, K 30, Mg 5, Ca 4.5, Acetate 70, Cl 39, Phos 15) 85 mL/hr at 06/23/23 2023        PRN Meds:  acetaminophen, acetaminophen, aluminum-magnesium hydroxide-simethicone, cloNIDine, hydrALAZINE, HYDROmorphone, labetalol, melatonin, ondansetron, prochlorperazine, sodium chloride 0.9%       Assessment/Plan:  Recurrent nausea and vomiting   Unresolved Clostridium difficile colitis, noncompliant with oral vancomycin  Recently diagnosed stage III pancreatic cancer  Hypertensive urgency, resolved   Anemia of chronic disease  Acute kidney injury, resolved  Crohn's disease on Entyvio every 6 weeks status post right hemicolectomy  Bacteriuria and pyuria due to contaminated urine culture      Plan:  Continue Clinimix. Case was discussed with registered dietitian.    Appreciate Gastroenterology evaluation.  Recommendations noted to continue oral Dificid     Appreciate medical oncology evaluation.  Patient is not a surgical candidate per Surgical Oncology.  She may be a candidate for palliative chemotherapy and radiation after she clears her current infection.      VTE prophylaxis:  Lovenox    Patient  condition:  Stable    Anticipated discharge and Disposition:     TBD    All diagnosis and differential diagnosis have been reviewed; assessment and plan has been documented; I have personally reviewed the labs and test results that are presently available; I have reviewed the patients medication list; I have reviewed the consulting providers response and recommendations. I have reviewed or attempted to review medical records based upon their availability    All of the patient's questions have been  addressed and answered. Patient's is agreeable to the above stated plan. I will continue to monitor closely and make adjustments to medical management as needed.  _____________________________________________________________________      Radiology:  I have personally reviewed the following imaging and agree with the radiologist.     CT Abdomen Pelvis  Without Contrast  Narrative: EXAMINATION:  CT ABDOMEN PELVIS WITHOUT CONTRAST    CLINICAL HISTORY:  Epigastric pain;    TECHNIQUE:  Low dose axial images, sagittal and coronal reformations were obtained from the lung bases to the pubic symphysis.  No contrast was administered.  Automatic exposure control is utilized to reduce patient radiation exposure.    COMPARISON:  06/09/2023    FINDINGS:  The lung bases are clear.    The liver appears normal.  No obvious liver mass or lesion is seen.    The patient is status post cholecystectomy.  The pancreas is seen in the pancreatic duct appears to be dilated.  This was seen on the prior examination as well.  Mild peripancreatic inflammatory changes are seen.  Similar changes were seen on the prior examination.    Spleen appears normal and adrenal glands appear normal.  No adrenal nodule is seen.    No nephrolithiasis is seen.  No hydronephrosis is seen.  No hydroureter is seen.  No ureteral stone is seen.    The colonic wall is thickened and the transverse colon and the descending colon and there is some inflammatory changes around  it.  Colitis cannot be excluded.  Postsurgical changes are seen in the transverse colon.    No free air seen.  No free fluid is seen.  The urinary bladder appears normal.    Bones show no acute abnormality.  Impression: Dilated pancreatic duct and some inflammatory changes around the pancreas possibly representing pancreatitis.  Similar changes were seen on the prior examination    Some colonic wall thickening seen in the distal transverse and the descending colon possibly representing a colitis.    Electronically signed by: Mary Cool  Date:    06/21/2023  Time:    16:57  X-Ray Chest AP Portable  Narrative: EXAMINATION:  XR CHEST AP PORTABLE    CLINICAL HISTORY:  epigastric pain;    TECHNIQUE:  Single view of the chest    COMPARISON:  06/09/2023    FINDINGS:  No focal opacification, pleural effusion, or pneumothorax.    The cardiomediastinal silhouette is within normal limits.    Left-sided MediPort remains.    No acute osseous abnormality.  Impression: No acute cardiopulmonary process.    Electronically signed by: Darren Montoya  Date:    06/21/2023  Time:    15:33      Trevor Joyce MD   06/24/2023

## 2023-06-24 NOTE — PROGRESS NOTES
Infectious Diseases Progress Note  81 y/o female with Hx of CAD/MI, HLD, Crohn's disease and recently diagnosed pancreatic CA who presented to ER on 6/21 with abdominal pain with vomiting and diarrhea. On admit she was afebrile with leukocytosis, WBC 12.8. She was noted to be in GERBER with Crea 1.73, U/A with 21-50 WBC, 0-2 RBC, large LE, moderate bacteria, negative nitrites. Urine culture with >100k GNR, 3 different isolates. CXR unremarkable. CT abdomen/pelvis without contrast showed dilated pancreatic duct and some inflammatory changes around the pancreas possibly representing pancreatitis, similar changes were seen on the prior examination, some colonic wall thickening seen in the distal transverse and the descending colon possibly representing a colitis. Blood cultures negative thus far. ESR 63 and CRP 45.4. Of note she was recently admitted here with C-Diff colitis. She was discharged on oral Vancomycin.   She is currently on Dificid     Subjective:  Sitting up in bed in no acute distress. No new complaints reported. Low grade temps.     ROS  Constitutional:  Positive for fever and malaise/fatigue.   HENT: Negative.     Eyes: Negative.    Respiratory: Negative.     Cardiovascular: Negative.    Gastrointestinal:  Positive for abdominal pain and diarrhea.   Genitourinary: Negative.    Musculoskeletal: Negative.    Skin: Negative.    Neurological: Negative.    All other systems reviewed and are negative.     Review of patient's allergies indicates:  No Known Allergies    Past Medical History:   Diagnosis Date    Acute pancreatitis, unspecified complication status, unspecified pancreatitis type     Arthritis     Carpal tunnel syndrome, bilateral     Cervical cancer     Cervical radiculopathy     Crohn disease     Heart attack     HLD (hyperlipidemia)     Low back pain     Sleep apnea, unspecified     Spinal stenosis of lumbar region with neurogenic claudication        Past Surgical History:   Procedure Laterality  Date    bilateral L4-5, L5-S1 decompression, repair L5-S1 dural tear  10/01/2021    Dr. Marin    CARPAL TUNNEL RELEASE Right 09/06/2019    Dr. Marin    CARPAL TUNNEL RELEASE Left 12/2/2022    Procedure: RELEASE, CARPAL TUNNEL;  Surgeon: Jesse Marin MD;  Location: [facility] OR;  Service: Neurosurgery;  Laterality: Left;    CHOLECYSTECTOMY      COLECTOMY  07/2011    partial x3    ESOPHAGOGASTRODUODENOSCOPY N/A 6/8/2023    Procedure: EGD (ESOPHAGOGASTRODUODENOSCOPY);  Surgeon: Jose Alberto Max MD;  Location: [facility] OR;  Service: Gastroenterology;  Laterality: N/A;    HYSTERECTOMY      total    REPAIR OF EYELID Bilateral 11/21/2022    Procedure: BILATERAL ECTROPION REPAIR;  Surgeon: Justin Flores MD;  Location: [facility] OR;  Service: ENT;  Laterality: Bilateral;    ULTRASOUND, ENDOSCOPIC, WITH FINE NEEDLE ASPIRATION N/A 6/8/2023    Procedure: UPPER EUS  W/  FNA;  Surgeon: Jose Alberto Max MD;  Location: [facility] OR;  Service: Gastroenterology;  Laterality: N/A;  Dangelo, pt w/ CD on ent[facility]vio [facility]. Possibly drug induced from entyvio? Althoughshe does have dilated PD and a poss focal stricture in the HOP.CA 19-9 nml       Social History     Socioeconomic History    Marital status:    Tobacco Use    Smoking status: Former     Types: Cigarettes    Smokeless tobacco: Never   Substance and Sexual Activity    Alcohol use: Yes     Comment: rarely    Drug use: Never    Sexual activity: Not Currently     Social Determinants of Health     Social Connections: Unknown    Attends [employer] Services: More than 4 times per year    Active Member of Clubs or Organizations: Yes    Attends Club or Organization Meetings: More than 4 times per year    Marital Status:          Scheduled Meds:   enoxparin  40 mg Subcutaneous Q24H (prophylaxis, 1700)    fidaxomicin  200 mg Oral BID     Continuous Infusions:   Amino acid 4.25% - dextrose 5% (CLINIMIX-E) solution (1L provides 42.5 gm AA, 50 gm CHO  "(170 kcal/L dextrose), Na 35, K 30, Mg 5, Ca 4.5, Acetate 70, Cl 39, Phos 15) 85 mL/hr at 06/24/23 0940     PRN Meds:acetaminophen, acetaminophen, aluminum-magnesium hydroxide-simethicone, cloNIDine, hydrALAZINE, HYDROmorphone, labetalol, melatonin, ondansetron, prochlorperazine, sodium chloride 0.9%    Objective:  BP (!) 155/81   Pulse 64   Temp 99.1 °F (37.3 °C) (Oral)   Resp 19   Ht 5' 7" (1.702 m)   Wt 65.8 kg (145 lb)   SpO2 99%   BMI 22.71 kg/m²     Physical Exam:   Physical Exam  Vitals reviewed.   HENT:      Head: Normocephalic.   Cardiovascular:      Rate and Rhythm: Normal rate and regular rhythm.   Pulmonary:      Effort: Pulmonary effort is normal. No respiratory distress.      Breath sounds: Normal breath sounds. No wheezing.   Abdominal:      General: Bowel sounds are normal. There is no distension.      Palpations: Abdomen is soft.      Tenderness: There is no abdominal tenderness.   Musculoskeletal:         General: Normal range of motion.      Cervical back: Normal range of motion.   Skin:     Findings: No rash   Neurological:      Mental Status: She is alert and oriented to person, place, and time.   Psychiatric:         Mood and Affect: Mood normal.         Behavior: Behavior normal.     Imaging      Lab Review   Recent Results (from the past 24 hour(s))   Basic Metabolic Panel    Collection Time: 06/24/23  7:09 AM   Result Value Ref Range    Sodium Level 137 136 - 145 mmol/L    Potassium Level 3.9 3.5 - 5.1 mmol/L    Chloride 106 98 - 107 mmol/L    Carbon Dioxide 26 23 - 31 mmol/L    Glucose Level 111 82 - 115 mg/dL    Blood Urea Nitrogen 19.5 9.8 - 20.1 mg/dL    Creatinine 0.80 0.55 - 1.02 mg/dL    BUN/Creatinine Ratio 24     Calcium Level Total 9.1 8.4 - 10.2 mg/dL    Anion Gap 5.0 mEq/L    eGFR >60 mls/min/1.73/m2     Assessment/Plan:  C-Diff colitis  Pyuria / bacteriuria  Leukocytosis  GERBER  Stage III Pancreatic CA  Crohn's disease with R hemicolectomy  Anemia     -Continue Dificid #3. " Plan a 10 day course  -Low grade temps without leukocytosis  -6/22 Blood cultures negative thus far  -U/A abnormal with urine culture with >100k GNR, 3 different isolates. Likely a contaminant  -Oncology on board, inputs noted including plans for possible chemotherapy once infection cleared  -Maintain strict contact isolation for c-diff  -Discussed with patient and nursing

## 2023-06-24 NOTE — PROGRESS NOTES
Infectious Diseases Progress Note  83 y/o female with Hx of CAD/MI, HLD, Crohn's disease and recently diagnosed pancreatic CA who presented to ER on 6/21 with abdominal pain with vomiting and diarrhea. On admit she was afebrile with leukocytosis, WBC 12.8. She was noted to be in GERBER with Crea 1.73, U/A with 21-50 WBC, 0-2 RBC, large LE, moderate bacteria, negative nitrites. Urine culture with >100k GNR, 3 different isolates. CXR unremarkable. CT abdomen/pelvis without contrast showed dilated pancreatic duct and some inflammatory changes around the pancreas possibly representing pancreatitis, similar changes were seen on the prior examination, some colonic wall thickening seen in the distal transverse and the descending colon possibly representing a colitis. Blood cultures negative thus far. ESR 63 and CRP 45.4. Of note she was recently admitted here with C-Diff colitis. She was discharged on oral Vancomycin.   She is currently on Dificid     Subjective:  Sitting up in bed in no acute distress. No new complaints reported. Afebrile.     Review of Systems   Constitutional:  Positive for fever and malaise/fatigue.   HENT: Negative.     Eyes: Negative.    Respiratory: Negative.     Cardiovascular: Negative.    Gastrointestinal:  Positive for abdominal pain and diarrhea.   Genitourinary: Negative.    Musculoskeletal: Negative.    Skin: Negative.    Neurological: Negative.    All other systems reviewed and are negative.    Review of patient's allergies indicates:  No Known Allergies    Past Medical History:   Diagnosis Date    Acute pancreatitis, unspecified complication status, unspecified pancreatitis type     Arthritis     Carpal tunnel syndrome, bilateral     Cervical cancer     Cervical radiculopathy     Crohn disease     Heart attack     HLD (hyperlipidemia)     Low back pain     Sleep apnea, unspecified     Spinal stenosis of lumbar region with neurogenic claudication        Past Surgical History:   Procedure  Laterality Date    bilateral L4-5, L5-S1 decompression, repair L5-S1 dural tear  10/01/2021    Dr. Marin    CARPAL TUNNEL RELEASE Right 09/06/2019    Dr. Marin    CARPAL TUNNEL RELEASE Left 12/2/2022    Procedure: RELEASE, CARPAL TUNNEL;  Surgeon: Jesse Marin MD;  Location: [facility] OR;  Service: Neurosurgery;  Laterality: Left;    CHOLECYSTECTOMY      COLECTOMY  07/2011    partial x3    ESOPHAGOGASTRODUODENOSCOPY N/A 6/8/2023    Procedure: EGD (ESOPHAGOGASTRODUODENOSCOPY);  Surgeon: Jose Alberto Max MD;  Location: [facility] OR;  Service: Gastroenterology;  Laterality: N/A;    HYSTERECTOMY      total    REPAIR OF EYELID Bilateral 11/21/2022    Procedure: BILATERAL ECTROPION REPAIR;  Surgeon: Justin Flores MD;  Location: [facility] OR;  Service: ENT;  Laterality: Bilateral;    ULTRASOUND, ENDOSCOPIC, WITH FINE NEEDLE ASPIRATION N/A 6/8/2023    Procedure: UPPER EUS  W/  FNA;  Surgeon: Jose Alberto Max MD;  Location: [facility] OR;  Service: Gastroenterology;  Laterality: N/A;  Dangelo, pt w/ CD on entyvio recently admitted to [facility] w/ acute pancreatitis. Possibly drug induced from entyvio? Althoughshe does have dilated PD and a poss focal stricture in the HOP.CA 19-9 nml       Social History     Socioeconomic History    Marital status:    Tobacco Use    Smoking status: Former     Types: Cigarettes    Smokeless tobacco: Never   Substance and Sexual Activity    Alcohol use: Yes     Comment: rarely    Drug use: Never    Sexual activity: Not Currently     Social Determinants of Health     Social Connections: Unknown    Attends [employer] Services: More than 4 times per year    Active Member of Clubs or Organizations: Yes    Attends Club or Organization Meetings: More than 4 times per year    Marital Status:          Scheduled Meds:   enoxparin  40 mg Subcutaneous Q24H (prophylaxis, 1700)    fidaxomicin  200 mg Oral BID    potassium chloride  40 mEq Intravenous Q4H     Continuous Infusions:   Amino acid 4.25% - dextrose 5%  "(CLINIMIX-E) solution (1L provides 42.5 gm AA, 50 gm CHO (170 kcal/L dextrose), Na 35, K 30, Mg 5, Ca 4.5, Acetate 70, Cl 39, Phos 15)       PRN Meds:acetaminophen, acetaminophen, aluminum-magnesium hydroxide-simethicone, cloNIDine, hydrALAZINE, HYDROmorphone, labetalol, melatonin, ondansetron, prochlorperazine, sodium chloride 0.9%    Objective:  BP (!) 109/57   Pulse 72   Temp 99.3 °F (37.4 °C) (Oral)   Resp 18   Ht 5' 7" (1.702 m)   Wt 65.8 kg (145 lb)   SpO2 97%   BMI 22.71 kg/m²     Physical Exam:   Physical Exam  Vitals reviewed.   HENT:      Head: Normocephalic.   Cardiovascular:      Rate and Rhythm: Normal rate and regular rhythm.   Pulmonary:      Effort: Pulmonary effort is normal. No respiratory distress.      Breath sounds: Normal breath sounds. No wheezing.   Abdominal:      General: Bowel sounds are normal. There is no distension.      Palpations: Abdomen is soft.      Tenderness: There is no abdominal tenderness.   Musculoskeletal:         General: Normal range of motion.      Cervical back: Normal range of motion.   Skin:     Findings: Rash present.   Neurological:      Mental Status: She is alert and oriented to person, place, and time.   Psychiatric:         Mood and Affect: Mood normal.         Behavior: Behavior normal.       Imaging      Lab Review   Recent Results (from the past 24 hour(s))   Basic Metabolic Panel    Collection Time: 06/23/23  4:47 AM   Result Value Ref Range    Sodium Level 136 136 - 145 mmol/L    Potassium Level 3.0 (L) 3.5 - 5.1 mmol/L    Chloride 100 98 - 107 mmol/L    Carbon Dioxide 26 23 - 31 mmol/L    Glucose Level 131 (H) 82 - 115 mg/dL    Blood Urea Nitrogen 17.9 9.8 - 20.1 mg/dL    Creatinine 0.92 0.55 - 1.02 mg/dL    BUN/Creatinine Ratio 19     Calcium Level Total 8.8 8.4 - 10.2 mg/dL    Anion Gap 10.0 mEq/L    eGFR >60 mls/min/1.73/m2   CBC with Differential    Collection Time: 06/23/23  4:47 AM   Result Value Ref Range    WBC 6.74 4.50 - 11.50 x10(3)/mcL    " RBC 3.39 (L) 4.20 - 5.40 x10(6)/mcL    Hgb 10.3 (L) 12.0 - 16.0 g/dL    Hct 31.4 (L) 37.0 - 47.0 %    MCV 92.6 80.0 - 94.0 fL    MCH 30.4 27.0 - 31.0 pg    MCHC 32.8 (L) 33.0 - 36.0 g/dL    RDW 13.7 11.5 - 17.0 %    Platelet 345 130 - 400 x10(3)/mcL    MPV 9.4 7.4 - 10.4 fL    Neut % 66.2 %    Lymph % 21.2 %    Mono % 9.9 %    Eos % 1.8 %    Basophil % 0.3 %    Lymph # 1.43 0.6 - 4.6 x10(3)/mcL    Neut # 4.46 2.1 - 9.2 x10(3)/mcL    Mono # 0.67 0.1 - 1.3 x10(3)/mcL    Eos # 0.12 0 - 0.9 x10(3)/mcL    Baso # 0.02 <=0.2 x10(3)/mcL    IG# 0.04 0 - 0.04 x10(3)/mcL    IG% 0.6 %    NRBC% 0.0 %       Assessment/Plan:  C-Diff colitis  Pyuria / bacteriuria  Leukocytosis  GERBER  Stage III Pancreatic CA  Crohn's disease with R hemicolectomy  Anemia    -Continue Dificid #2. Plan a 10 day course  -Low grade temps without leukocytosis  -6/22 Blood cultures negative thus far  -U/A abnormal with urine culture with >100k GNR, 3 different isolates. Likely a contaminant  -Oncology on board, inputs noted including plans for possible chemotherapy once infection cleared  -Maintain strict contact isolation for c-diff  -Discussed with patient and nursing

## 2023-06-25 PROCEDURE — 63600175 PHARM REV CODE 636 W HCPCS: Performed by: INTERNAL MEDICINE

## 2023-06-25 PROCEDURE — 21400001 HC TELEMETRY ROOM

## 2023-06-25 PROCEDURE — 25000003 PHARM REV CODE 250: Performed by: INTERNAL MEDICINE

## 2023-06-25 PROCEDURE — 25000003 PHARM REV CODE 250: Performed by: FAMILY MEDICINE

## 2023-06-25 RX ADMIN — FIDAXOMICIN 200 MG: 200 TABLET, FILM COATED ORAL at 08:06

## 2023-06-25 RX ADMIN — HYDROMORPHONE HYDROCHLORIDE 0.5 MG: 2 INJECTION INTRAMUSCULAR; INTRAVENOUS; SUBCUTANEOUS at 09:06

## 2023-06-25 RX ADMIN — HYDROMORPHONE HYDROCHLORIDE 0.5 MG: 2 INJECTION INTRAMUSCULAR; INTRAVENOUS; SUBCUTANEOUS at 01:06

## 2023-06-25 RX ADMIN — ONDANSETRON 4 MG: 2 INJECTION INTRAMUSCULAR; INTRAVENOUS at 08:06

## 2023-06-25 RX ADMIN — LEUCINE, PHENYLALANINE, LYSINE, METHIONINE, ISOLEUCINE, VALINE, HISTIDINE, THREONINE, TRYPTOPHAN, ALANINE, GLYCINE, ARGININE, PROLINE, SERINE, TYROSINE, SODIUM ACETATE, DIBASIC POTASSIUM PHOSPHATE, MAGNESIUM CHLORIDE, SODIUM CHLORIDE, CALCIUM CHLORIDE, DEXTROSE
311; 238; 247; 170; 255; 247; 204; 179; 77; 880; 438; 489; 289; 213; 17; 297; 261; 51; 77; 33; 5 INJECTION INTRAVENOUS at 01:06

## 2023-06-25 RX ADMIN — ENOXAPARIN SODIUM 40 MG: 40 INJECTION SUBCUTANEOUS at 05:06

## 2023-06-25 RX ADMIN — HYDROMORPHONE HYDROCHLORIDE 0.5 MG: 2 INJECTION INTRAMUSCULAR; INTRAVENOUS; SUBCUTANEOUS at 05:06

## 2023-06-25 RX ADMIN — HYDROMORPHONE HYDROCHLORIDE 0.5 MG: 2 INJECTION INTRAMUSCULAR; INTRAVENOUS; SUBCUTANEOUS at 08:06

## 2023-06-25 RX ADMIN — HYDROMORPHONE HYDROCHLORIDE 0.5 MG: 2 INJECTION INTRAMUSCULAR; INTRAVENOUS; SUBCUTANEOUS at 04:06

## 2023-06-25 RX ADMIN — Medication 6 MG: at 09:06

## 2023-06-25 RX ADMIN — ONDANSETRON 4 MG: 2 INJECTION INTRAMUSCULAR; INTRAVENOUS at 05:06

## 2023-06-25 RX ADMIN — ONDANSETRON 4 MG: 2 INJECTION INTRAMUSCULAR; INTRAVENOUS at 01:06

## 2023-06-25 NOTE — PLAN OF CARE
Problem: Adult Inpatient Plan of Care  Goal: Plan of Care Review  6/25/2023 1836 by Alexander Bagley RN  Outcome: Ongoing, Progressing  6/25/2023 1835 by Alexander Bagley RN  Outcome: Ongoing, Progressing  Goal: Patient-Specific Goal (Individualized)  6/25/2023 1836 by Alexander Bagley RN  Outcome: Ongoing, Progressing  6/25/2023 1835 by Alexander Bagley RN  Outcome: Ongoing, Progressing  Goal: Absence of Hospital-Acquired Illness or Injury  6/25/2023 1836 by Alexander Bagley RN  Outcome: Ongoing, Progressing  6/25/2023 1835 by Alexander Bagley RN  Outcome: Ongoing, Progressing  Goal: Optimal Comfort and Wellbeing  6/25/2023 1836 by Alexander Bagley RN  Outcome: Ongoing, Progressing  6/25/2023 1835 by Alexander Bagley RN  Outcome: Ongoing, Progressing  Goal: Readiness for Transition of Care  6/25/2023 1836 by Alexander Bagley RN  Outcome: Ongoing, Progressing  6/25/2023 1835 by Alexander Bagley RN  Outcome: Ongoing, Progressing     Problem: Fluid and Electrolyte Imbalance (Acute Kidney Injury/Impairment)  Goal: Fluid and Electrolyte Balance  6/25/2023 1836 by Alexander Bagley RN  Outcome: Ongoing, Progressing  6/25/2023 1835 by Alexander Bagley RN  Outcome: Ongoing, Progressing     Problem: Oral Intake Inadequate (Acute Kidney Injury/Impairment)  Goal: Optimal Nutrition Intake  6/25/2023 1836 by Alexander Bagley RN  Outcome: Ongoing, Progressing  6/25/2023 1835 by Alexander Bagley RN  Outcome: Ongoing, Progressing     Problem: Renal Function Impairment (Acute Kidney Injury/Impairment)  Goal: Effective Renal Function  6/25/2023 1836 by Alexander Bagley RN  Outcome: Ongoing, Progressing  6/25/2023 1835 by Alexander Bagley RN  Outcome: Ongoing, Progressing     Problem: Skin Injury Risk Increased  Goal: Skin Health and Integrity  6/25/2023 1836 by Alexander Bagley RN  Outcome: Ongoing, Progressing  6/25/2023 1835 by Alexander Bagley RN  Outcome: Ongoing, Progressing     Problem: Infection  Goal: Absence of Infection Signs and  Symptoms  6/25/2023 1836 by Alexander Bagley, RN  Outcome: Ongoing, Progressing  6/25/2023 1835 by Alexander Bagley, RN  Outcome: Ongoing, Progressing

## 2023-06-25 NOTE — PROGRESS NOTES
[facility] Hospital Medicine Progress Note        Chief Complaint: Inpatient follow-up on Clostridium difficile colitis    HPI:   Ms. Proctor is an 82 year old female with a pmh of CAD, HLD, Crohn's disease RA, cervical cancer, DAQUAN who presented to [facility] ED with complaints of abdominal pain, nausea, vomiting.  Daughter at the bedside states this has been going on for months, and was just recently diagnosed with pancreatic cancer.  She also states that the patient was diagnosed with C diff and has not been compliant with her oral vancomycin.     Today's ED lab work revealed WBC 12.89, K 2.6, creatinine 1.73, glucose 161, Mag 1.4.  UA was contaminated.  CXR showed no acute cardiopulmonary process.  CT abdomen pelvis without contrast showed dilated pancreatic duct and some inflammatory changes around the pancreas possibly representing pancreatitis.  Similar changes seen on the prior exam.  Some colonic wall thickening seen in the distal transverse and the descending colon possibly representing colitis.  ED vitals:  Temp 98.6° F, pulse 88, resp 20, /82, SpO2 98% on RA.  While in the ED, she became markedly hypertensive  Potassium was replaced, she was given flagyl and Cipro, and transferred to [facility]. She was admitted to hospital medicine for management.      Interval Hx:   Patient is awake and sitting up in bed.  Patient reports diarrhea continues to subside and stool was now becoming more formed.  She she takes an antiemetic prior to eating which seems to her intake.  She consumes dietary supplements.   Patient remains with some nausea however abdominal pain seems to be controlled at present.  Patient is afebrile, on room air, hemodynamically stable.  One family member is present in the room at this time but she did not have any questions or comments.    Objective/physical exam:  General:  Elderly  female in no acute distress  HENT: normocephalic, atraumatic  Eye:  PERRL, EOMI, clear conjunctiva  Neck: full ROM, no thyromegaly, no JVD  Respiratory: clear to auscultation bilaterally  Cardiovascular: regular rate and rhythm  Gastrointestinal: non-distended, positive bowel sounds, non-tender  Musculoskeletal:  Generalized muscular atrophy  Integumentary: warm, dry, intact, no rashes  Neurological: cranial nerves grossly intact, no focal neurological deficit  Psychiatric: cooperative, flat affect      VITAL SIGNS: 24 HRS MIN & MAX LAST   Temp  Min: 98.4 °F (36.9 °C)  Max: 99.1 °F (37.3 °C) 98.8 °F (37.1 °C)   BP  Min: 143/82  Max: 161/70 (!) 161/70   Pulse  Min: 60  Max: 66  66   Resp  Min: 18  Max: 20 20   SpO2  Min: 97 %  Max: 100 % 100 %     I have reviewed the following labs:    Recent Labs   Lab 06/21/23  1530 06/22/23  0639 06/23/23  0447   WBC 12.89* 7.16 6.74   RBC 4.26 3.34* 3.39*   HGB 13.2 10.2* 10.3*   HCT 37.8 30.2* 31.4*   MCV 88.7 90.4 92.6   MCH 31.0 30.5 30.4   MCHC 34.9 33.8 32.8*   RDW 13.1 13.6 13.7   * 359 345   MPV 9.3 9.1 9.4       Recent Labs   Lab 06/21/23  1530 06/21/23  1624 06/22/23  0445 06/23/23  0447 06/24/23  0709     --  137 136 137   K 2.6*  --  3.5 3.0* 3.9   CO2 25  --  27 26 26   BUN 15.4  --  11.8 17.9 19.5   CREATININE 1.73*  --  1.07* 0.92 0.80   CALCIUM 10.1  --  8.6 8.8 9.1   MG  --  1.40* 1.90  --   --    ALBUMIN 3.5  --  2.9*  --   --    ALKPHOS 115  --  106  --   --    ALT 36  --  31  --   --    AST 46*  --  39*  --   --    BILITOT 0.6  --  0.6  --   --           Microbiology Results (last 7 days)       Procedure Component Value Units Date/Time    Blood Culture [[id number]]  (Normal) Collected: 06/22/23 0639    Order Status: Completed Specimen: Blood Updated: 06/25/23 0800     CULTURE, BLOOD ([facility]) No Growth At 72 Hours    Blood Culture [[id number]]  (Normal) Collected: 06/22/23 0121    Order Status: Completed Specimen: Blood Updated: 06/25/23 0500     CULTURE, BLOOD ([facility]) No Growth At 72 Hours    Blood Culture [[id number]]   (Normal) Collected: 06/22/23 1606    Order Status: Completed Specimen: Blood from Arm, Right Updated: 06/24/23 1700     CULTURE, BLOOD ([facility]) No Growth At 48 Hours    Blood Culture [[id number]]  (Normal) Collected: 06/22/23 1606    Order Status: Completed Specimen: Blood from Arm, Left Updated: 06/24/23 1700     CULTURE, BLOOD ([facility]) No Growth At 48 Hours    Urine culture [[id number]]  (Abnormal) Collected: 06/21/23 1704    Order Status: Completed Specimen: Urine Updated: 06/23/23 0705     Urine Culture Multiple organisms present indicate probable contamination. Recommend recollection.      >/= 100,000 colonies/ml - Three different species of Gram-negative Rods             See below for Radiology    Scheduled Med:   enoxparin  40 mg Subcutaneous Q24H (prophylaxis, 1700)    fidaxomicin  200 mg Oral BID        Continuous Infusions:   Amino acid 4.25% - dextrose 5% (CLINIMIX-E) solution (1L provides 42.5 gm AA, 50 gm CHO (170 kcal/L dextrose), Na 35, K 30, Mg 5, Ca 4.5, Acetate 70, Cl 39, Phos 15)          PRN Meds:  acetaminophen, acetaminophen, aluminum-magnesium hydroxide-simethicone, cloNIDine, hydrALAZINE, HYDROmorphone, labetalol, melatonin, ondansetron, prochlorperazine, sodium chloride 0.9%       Assessment/Plan:  Failure to thrive  Anorexia   Unresolved Clostridium difficile colitis, noncompliant with oral vancomycin  Recently diagnosed stage III pancreatic cancer  Hypertensive urgency, resolved   Anemia of chronic disease  Acute kidney injury, resolved  Crohn's disease on Entyvio every 6 weeks status post right hemicolectomy  Bacteriuria and pyuria due to contaminated urine culture      Plan:  Continue Clinimix. Case was discussed with registered dietitian yesterday.    Appreciate Gastroenterology evaluation.  Recommendations noted to continue oral Dificid     Appreciate medical oncology evaluation.  Patient is not a surgical candidate per Surgical Oncology.  She may be a candidate for palliative chemotherapy and  radiation after she clears her current infection.      VTE prophylaxis:  Lovenox    Patient condition:  Stable    Anticipated discharge and Disposition:     TBD    All diagnosis and differential diagnosis have been reviewed; assessment and plan has been documented; I have personally reviewed the labs and test results that are presently available; I have reviewed the patients medication list; I have reviewed the consulting providers response and recommendations. I have reviewed or attempted to review medical records based upon their availability    All of the patient's questions have been  addressed and answered. Patient's is agreeable to the above stated plan. I will continue to monitor closely and make adjustments to medical management as needed.  _____________________________________________________________________      Radiology:  I have personally reviewed the following imaging and agree with the radiologist.     CT Abdomen Pelvis  Without Contrast  Narrative: EXAMINATION:  CT ABDOMEN PELVIS WITHOUT CONTRAST    CLINICAL HISTORY:  Epigastric pain;    TECHNIQUE:  Low dose axial images, sagittal and coronal reformations were obtained from the lung bases to the pubic symphysis.  No contrast was administered.  Automatic exposure control is utilized to reduce patient radiation exposure.    COMPARISON:  06/09/2023    FINDINGS:  The lung bases are clear.    The liver appears normal.  No obvious liver mass or lesion is seen.    The patient is status post cholecystectomy.  The pancreas is seen in the pancreatic duct appears to be dilated.  This was seen on the prior examination as well.  Mild peripancreatic inflammatory changes are seen.  Similar changes were seen on the prior examination.    Spleen appears normal and adrenal glands appear normal.  No adrenal nodule is seen.    No nephrolithiasis is seen.  No hydronephrosis is seen.  No hydroureter is seen.  No ureteral stone is seen.    The colonic wall is thickened and  the transverse colon and the descending colon and there is some inflammatory changes around it.  Colitis cannot be excluded.  Postsurgical changes are seen in the transverse colon.    No free air seen.  No free fluid is seen.  The urinary bladder appears normal.    Bones show no acute abnormality.  Impression: Dilated pancreatic duct and some inflammatory changes around the pancreas possibly representing pancreatitis.  Similar changes were seen on the prior examination    Some colonic wall thickening seen in the distal transverse and the descending colon possibly representing a colitis.    Electronically signed by: Mary Cool  Date:    06/21/2023  Time:    16:57  X-Ray Chest AP Portable  Narrative: EXAMINATION:  XR CHEST AP PORTABLE    CLINICAL HISTORY:  epigastric pain;    TECHNIQUE:  Single view of the chest    COMPARISON:  06/09/2023    FINDINGS:  No focal opacification, pleural effusion, or pneumothorax.    The cardiomediastinal silhouette is within normal limits.    Left-sided MediPort remains.    No acute osseous abnormality.  Impression: No acute cardiopulmonary process.    Electronically signed by: Darren Montoya  Date:    06/21/2023  Time:    15:33      Trevor Joyce MD   06/25/2023

## 2023-06-26 LAB
ANION GAP SERPL CALC-SCNC: 7 MEQ/L
BASOPHILS # BLD AUTO: 0.02 X10(3)/MCL
BASOPHILS NFR BLD AUTO: 0.4 %
BUN SERPL-MCNC: 15.3 MG/DL (ref 9.8–20.1)
CALCIUM SERPL-MCNC: 9.1 MG/DL (ref 8.4–10.2)
CHLORIDE SERPL-SCNC: 105 MMOL/L (ref 98–107)
CO2 SERPL-SCNC: 24 MMOL/L (ref 23–31)
CREAT SERPL-MCNC: 0.69 MG/DL (ref 0.55–1.02)
CREAT/UREA NIT SERPL: 22
EOSINOPHIL # BLD AUTO: 0.13 X10(3)/MCL (ref 0–0.9)
EOSINOPHIL NFR BLD AUTO: 2.4 %
ERYTHROCYTE [DISTWIDTH] IN BLOOD BY AUTOMATED COUNT: 13.7 % (ref 11.5–17)
GFR SERPLBLD CREATININE-BSD FMLA CKD-EPI: >60 MLS/MIN/1.73/M2
GLUCOSE SERPL-MCNC: 118 MG/DL (ref 82–115)
HCT VFR BLD AUTO: 29.3 % (ref 37–47)
HGB BLD-MCNC: 9.5 G/DL (ref 12–16)
IMM GRANULOCYTES # BLD AUTO: 0.02 X10(3)/MCL (ref 0–0.04)
IMM GRANULOCYTES NFR BLD AUTO: 0.4 %
LYMPHOCYTES # BLD AUTO: 1.08 X10(3)/MCL (ref 0.6–4.6)
LYMPHOCYTES NFR BLD AUTO: 19.8 %
MCH RBC QN AUTO: 30.2 PG (ref 27–31)
MCHC RBC AUTO-ENTMCNC: 32.4 G/DL (ref 33–36)
MCV RBC AUTO: 93 FL (ref 80–94)
MONOCYTES # BLD AUTO: 0.65 X10(3)/MCL (ref 0.1–1.3)
MONOCYTES NFR BLD AUTO: 11.9 %
NEUTROPHILS # BLD AUTO: 3.56 X10(3)/MCL (ref 2.1–9.2)
NEUTROPHILS NFR BLD AUTO: 65.1 %
NRBC BLD AUTO-RTO: 0 %
PLATELET # BLD AUTO: 330 X10(3)/MCL (ref 130–400)
PMV BLD AUTO: 9.6 FL (ref 7.4–10.4)
POTASSIUM SERPL-SCNC: 3.5 MMOL/L (ref 3.5–5.1)
RBC # BLD AUTO: 3.15 X10(6)/MCL (ref 4.2–5.4)
SODIUM SERPL-SCNC: 136 MMOL/L (ref 136–145)
WBC # SPEC AUTO: 5.46 X10(3)/MCL (ref 4.5–11.5)

## 2023-06-26 PROCEDURE — 25000003 PHARM REV CODE 250: Performed by: INTERNAL MEDICINE

## 2023-06-26 PROCEDURE — 63600175 PHARM REV CODE 636 W HCPCS: Performed by: INTERNAL MEDICINE

## 2023-06-26 PROCEDURE — 97161 PT EVAL LOW COMPLEX 20 MIN: CPT

## 2023-06-26 PROCEDURE — 25000003 PHARM REV CODE 250: Performed by: FAMILY MEDICINE

## 2023-06-26 PROCEDURE — 21400001 HC TELEMETRY ROOM

## 2023-06-26 PROCEDURE — 85025 COMPLETE CBC W/AUTO DIFF WBC: CPT | Performed by: INTERNAL MEDICINE

## 2023-06-26 PROCEDURE — 80048 BASIC METABOLIC PNL TOTAL CA: CPT | Performed by: INTERNAL MEDICINE

## 2023-06-26 RX ORDER — MUPIROCIN 20 MG/G
OINTMENT TOPICAL 2 TIMES DAILY
Status: DISCONTINUED | OUTPATIENT
Start: 2023-06-26 | End: 2023-06-29 | Stop reason: HOSPADM

## 2023-06-26 RX ADMIN — FIDAXOMICIN 200 MG: 200 TABLET, FILM COATED ORAL at 08:06

## 2023-06-26 RX ADMIN — HYDROMORPHONE HYDROCHLORIDE 0.5 MG: 2 INJECTION INTRAMUSCULAR; INTRAVENOUS; SUBCUTANEOUS at 09:06

## 2023-06-26 RX ADMIN — LEUCINE, PHENYLALANINE, LYSINE, METHIONINE, ISOLEUCINE, VALINE, HISTIDINE, THREONINE, TRYPTOPHAN, ALANINE, GLYCINE, ARGININE, PROLINE, SERINE, TYROSINE, SODIUM ACETATE, DIBASIC POTASSIUM PHOSPHATE, MAGNESIUM CHLORIDE, SODIUM CHLORIDE, CALCIUM CHLORIDE, DEXTROSE
311; 238; 247; 170; 255; 247; 204; 179; 77; 880; 438; 489; 289; 213; 17; 297; 261; 51; 77; 33; 5 INJECTION INTRAVENOUS at 08:06

## 2023-06-26 RX ADMIN — ENOXAPARIN SODIUM 40 MG: 40 INJECTION SUBCUTANEOUS at 07:06

## 2023-06-26 RX ADMIN — ONDANSETRON 4 MG: 2 INJECTION INTRAMUSCULAR; INTRAVENOUS at 01:06

## 2023-06-26 RX ADMIN — Medication 6 MG: at 08:06

## 2023-06-26 RX ADMIN — HYDROMORPHONE HYDROCHLORIDE 0.5 MG: 2 INJECTION INTRAMUSCULAR; INTRAVENOUS; SUBCUTANEOUS at 07:06

## 2023-06-26 RX ADMIN — LEUCINE, PHENYLALANINE, LYSINE, METHIONINE, ISOLEUCINE, VALINE, HISTIDINE, THREONINE, TRYPTOPHAN, ALANINE, GLYCINE, ARGININE, PROLINE, SERINE, TYROSINE, SODIUM ACETATE, DIBASIC POTASSIUM PHOSPHATE, MAGNESIUM CHLORIDE, SODIUM CHLORIDE, CALCIUM CHLORIDE, DEXTROSE
311; 238; 247; 170; 255; 247; 204; 179; 77; 880; 438; 489; 289; 213; 17; 297; 261; 51; 77; 33; 5 INJECTION INTRAVENOUS at 02:06

## 2023-06-26 RX ADMIN — FIDAXOMICIN 200 MG: 200 TABLET, FILM COATED ORAL at 12:06

## 2023-06-26 RX ADMIN — ONDANSETRON 4 MG: 2 INJECTION INTRAMUSCULAR; INTRAVENOUS at 09:06

## 2023-06-26 RX ADMIN — ONDANSETRON 4 MG: 2 INJECTION INTRAMUSCULAR; INTRAVENOUS at 07:06

## 2023-06-26 RX ADMIN — HYDROMORPHONE HYDROCHLORIDE 0.5 MG: 2 INJECTION INTRAMUSCULAR; INTRAVENOUS; SUBCUTANEOUS at 01:06

## 2023-06-26 NOTE — PT/OT/SLP EVAL
Physical Therapy Evaluation and Discharge Note    Patient Name:  Evelyn Porctor   MRN:  [id number]    Recommendations:     Discharge Recommendations: home  Discharge Equipment Recommendations: none   Barriers to discharge: None    Assessment:     Evleyn Proctor is a 82 y.o. female admitted with a medical diagnosis of Clostridium difficile infection, pancreatic CA, failure to thrive, anorexia, crohns disease.  At this time, patient is functioning at their prior level of function and does not require further acute PT services.     Recent Surgery: * No surgery found *      Plan:     During this hospitalization, patient does not require further acute PT services.  Please re-consult if situation changes.      Subjective     Chief Complaint: none  Patient/Family Comments/goals: to go home  Pain/Comfort:  Pain Rating 1: 0/10    Patients cultural, spiritual, Oriental orthodox conflicts given the current situation: no    Living Environment:  Lives with  in single level home, no steps to enter.   Prior to admission, patients level of function was indepedent.  Equipment used at home:  .  DME owned (not currently used): none.  Upon discharge, patient will have assistance from family.    Objective:     Communicated with nurse prior to session.  Patient found supine with pulse ox (continuous), telemetry upon PT entry to room.    General Precautions: Standard, contact    Orthopedic Precautions:    Braces: N/A  Respiratory Status: Room air  Blood Pressure:     Exams:  Cognitive Exam:  Patient is oriented to Person, Place, Time, and Situation  Sensation:    -       Intact  RLE ROM: WNL  RLE Strength: WNL  LLE ROM: WNL  LLE Strength: WNL    Functional Mobility:  Bed Mobility:     Supine to Sit: independence  Sit to Supine: independence  Transfers:     Sit to Stand:  independence with no AD  Gait: 200ft with no AD, independently.     AM-PAC 6 CLICK MOBILITY  Total Score:24       Treatment and Education:      Patient provided with  verbal education regarding continued ambulation in hallways during her stay here.  Understanding was verbalized.     Patient left supine with all lines intact and call button in reach.    GOALS:   Multidisciplinary Problems       Physical Therapy Goals       Not on file                    History:     Past Medical History:   Diagnosis Date    Acute pancreatitis, unspecified complication status, unspecified pancreatitis type     Arthritis     Carpal tunnel syndrome, bilateral     Cervical cancer     Cervical radiculopathy     Crohn disease     Heart attack     HLD (hyperlipidemia)     Low back pain     Sleep apnea, unspecified     Spinal stenosis of lumbar region with neurogenic claudication        Past Surgical History:   Procedure Laterality Date    bilateral L4-5, L5-S1 decompression, repair L5-S1 dural tear  10/01/2021    Dr. Marin    CARPAL TUNNEL RELEASE Right 09/06/2019    Dr. Marin    CARPAL TUNNEL RELEASE Left 12/2/2022    Procedure: RELEASE, CARPAL TUNNEL;  Surgeon: Jesse Marin MD;  Location: [facility];  Service: Neurosurgery;  Laterality: Left;    CHOLECYSTECTOMY      COLECTOMY  07/2011    partial x3    ESOPHAGOGASTRODUODENOSCOPY N/A 6/8/2023    Procedure: EGD (ESOPHAGOGASTRODUODENOSCOPY);  Surgeon: Jose Alberto Max MD;  Location: [facility];  Service: Gastroenterology;  Laterality: N/A;    HYSTERECTOMY      total    REPAIR OF EYELID Bilateral 11/21/2022    Procedure: BILATERAL ECTROPION REPAIR;  Surgeon: Justin Flores MD;  Location: [facility] OR;  Service: ENT;  Laterality: Bilateral;    ULTRASOUND, ENDOSCOPIC, WITH FINE NEEDLE ASPIRATION N/A 6/8/2023    Procedure: UPPER EUS  W/  FNA;  Surgeon: Jose Alberto Max MD;  Location: [facility];  Service: Gastroenterology;  Laterality: N/A;  Dangelo, pt w/ CD on entyvio recently admitted to [facility] w/ acute pancreatitis. Possibly drug induced from entyvio? Althoughshe does have dilated PD and a poss focal stricture in the HOP.CA 19-9 nml       Time Tracking:     PT  Received On: 06/26/23  PT Start Time: 1450     PT Stop Time: 1504  PT Total Time (min): 14 min     Billable Minutes: Evaluation 14      06/26/2023

## 2023-06-26 NOTE — PROGRESS NOTES
[facility] Hospital Medicine Progress Note        Chief Complaint: Inpatient follow-up on Clostridium difficile colitis    HPI:   Ms. Proctor is an 82 year old female with a pmh of CAD, HLD, Crohn's disease RA, cervical cancer, DAQUAN who presented to [facility] ED with complaints of abdominal pain, nausea, vomiting.  Daughter at the bedside states this has been going on for months, and was just recently diagnosed with pancreatic cancer.  She also states that the patient was diagnosed with C diff and has not been compliant with her oral vancomycin.     Today's ED lab work revealed WBC 12.89, K 2.6, creatinine 1.73, glucose 161, Mag 1.4.  UA was contaminated.  CXR showed no acute cardiopulmonary process.  CT abdomen pelvis without contrast showed dilated pancreatic duct and some inflammatory changes around the pancreas possibly representing pancreatitis.  Similar changes seen on the prior exam.  Some colonic wall thickening seen in the distal transverse and the descending colon possibly representing colitis.  ED vitals:  Temp 98.6° F, pulse 88, resp 20, /82, SpO2 98% on RA.  While in the ED, she became markedly hypertensive  Potassium was replaced, she was given flagyl and Cipro, and transferred to [facility]. She was admitted to hospital medicine for management.      Interval Hx:   Patient is awake and sitting up in bed.  Patient reports diarrhea continues to subside and stool is now becoming more formed.  She reports that she takes an antiemetic prior to eating which seems to help with her oral intake of solid food.  She continues to consume dietary supplements.  Patient remains with some nausea however abdominal pain seems to be controlled at present.  Patient is afebrile, on room air, hemodynamically stable.  No family is present at this time.    Objective/physical exam:  General:  Elderly  female in no acute distress  HENT: normocephalic, atraumatic  Eye: PERRL, EOMI, clear  conjunctiva  Neck: full ROM, no thyromegaly, no JVD  Respiratory: clear to auscultation bilaterally  Cardiovascular: regular rate and rhythm  Gastrointestinal: non-distended, positive bowel sounds, non-tender  Musculoskeletal:  Generalized muscular atrophy  Integumentary: warm, dry, intact, no rashes  Neurological: cranial nerves grossly intact, no focal neurological deficit  Psychiatric: cooperative, flat affect      VITAL SIGNS: 24 HRS MIN & MAX LAST   Temp  Min: 97.4 °F (36.3 °C)  Max: 99.2 °F (37.3 °C) 99.2 °F (37.3 °C)   BP  Min: 131/66  Max: 154/75 (!) 154/75   Pulse  Min: 66  Max: 69  69   Resp  Min: 18  Max: 19 18   SpO2  Min: 96 %  Max: 98 % 98 %     I have reviewed the following labs:    Recent Labs   Lab 06/22/23  0639 06/23/23  0447 06/26/23  0629   WBC 7.16 6.74 5.46   RBC 3.34* 3.39* 3.15*   HGB 10.2* 10.3* 9.5*   HCT 30.2* 31.4* 29.3*   MCV 90.4 92.6 93.0   MCH 30.5 30.4 30.2   MCHC 33.8 32.8* 32.4*   RDW 13.6 13.7 13.7    345 330   MPV 9.1 9.4 9.6       Recent Labs   Lab 06/21/23  1530 06/21/23  1624 06/22/23  0445 06/23/23  0447 06/24/23  0709 06/26/23  0629     --  137 136 137 136   K 2.6*  --  3.5 3.0* 3.9 3.5   CO2 25  --  27 26 26 24   BUN 15.4  --  11.8 17.9 19.5 15.3   CREATININE 1.73*  --  1.07* 0.92 0.80 0.69   CALCIUM 10.1  --  8.6 8.8 9.1 9.1   MG  --  1.40* 1.90  --   --   --    ALBUMIN 3.5  --  2.9*  --   --   --    ALKPHOS 115  --  106  --   --   --    ALT 36  --  31  --   --   --    AST 46*  --  39*  --   --   --    BILITOT 0.6  --  0.6  --   --   --           Microbiology Results (last 7 days)       Procedure Component Value Units Date/Time    Blood Culture [[id number]]  (Normal) Collected: 06/22/23 0639    Order Status: Completed Specimen: Blood Updated: 06/26/23 0801     CULTURE, BLOOD ([facility]) No Growth At 96 Hours    Blood Culture [[id number]]  (Normal) Collected: 06/22/23 0121    Order Status: Completed Specimen: Blood Updated: 06/26/23 0500     CULTURE, BLOOD ([facility]) No  Growth At 96 Hours    Blood Culture [[id number]]  (Normal) Collected: 06/22/23 1606    Order Status: Completed Specimen: Blood from Arm, Right Updated: 06/25/23 1700     CULTURE, BLOOD ([facility]) No Growth At 72 Hours    Blood Culture [[id number]]  (Normal) Collected: 06/22/23 1606    Order Status: Completed Specimen: Blood from Arm, Left Updated: 06/25/23 1700     CULTURE, BLOOD ([facility]) No Growth At 72 Hours    Urine culture [[id number]]  (Abnormal) Collected: 06/21/23 1704    Order Status: Completed Specimen: Urine Updated: 06/23/23 0705     Urine Culture Multiple organisms present indicate probable contamination. Recommend recollection.      >/= 100,000 colonies/ml - Three different species of Gram-negative Rods             See below for Radiology    Scheduled Med:   enoxparin  40 mg Subcutaneous Q24H (prophylaxis, 1700)    fidaxomicin  200 mg Oral BID        Continuous Infusions:   Amino acid 4.25% - dextrose 5% (CLINIMIX-E) solution (1L provides 42.5 gm AA, 50 gm CHO (170 kcal/L dextrose), Na 35, K 30, Mg 5, Ca 4.5, Acetate 70, Cl 39, Phos 15) 85 mL/hr at 06/26/23 0246        PRN Meds:  acetaminophen, acetaminophen, aluminum-magnesium hydroxide-simethicone, cloNIDine, hydrALAZINE, HYDROmorphone, labetalol, melatonin, ondansetron, prochlorperazine, sodium chloride 0.9%       Assessment/Plan:  Failure to thrive  Anorexia   Unresolved Clostridium difficile colitis, noncompliant with oral vancomycin  Recently diagnosed stage III pancreatic cancer  Hypertensive urgency, resolved   Anemia of chronic disease  Acute kidney injury, resolved  Crohn's disease on Entyvio every 6 weeks status post right hemicolectomy  Bacteriuria and pyuria due to contaminated urine culture      Plan:  Continue Clinimix. Case was discussed with registered dietitian couple of days ago.    Appreciate Gastroenterology evaluation.  Recommendations noted to continue oral Dificid     Appreciate medical oncology evaluation.  Patient is not a surgical  candidate per Surgical Oncology.  She may be a candidate for palliative chemotherapy and radiation after she clears her current infection.    Request physical therapy evaluation to help determine disposition      VTE prophylaxis:  Lovenox    Patient condition:  Stable    Anticipated discharge and Disposition:     TBD    All diagnosis and differential diagnosis have been reviewed; assessment and plan has been documented; I have personally reviewed the labs and test results that are presently available; I have reviewed the patients medication list; I have reviewed the consulting providers response and recommendations. I have reviewed or attempted to review medical records based upon their availability    All of the patient's questions have been  addressed and answered. Patient's is agreeable to the above stated plan. I will continue to monitor closely and make adjustments to medical management as needed.  _____________________________________________________________________      Radiology:  I have personally reviewed the following imaging and agree with the radiologist.     CT Abdomen Pelvis  Without Contrast  Narrative: EXAMINATION:  CT ABDOMEN PELVIS WITHOUT CONTRAST    CLINICAL HISTORY:  Epigastric pain;    TECHNIQUE:  Low dose axial images, sagittal and coronal reformations were obtained from the lung bases to the pubic symphysis.  No contrast was administered.  Automatic exposure control is utilized to reduce patient radiation exposure.    COMPARISON:  06/09/2023    FINDINGS:  The lung bases are clear.    The liver appears normal.  No obvious liver mass or lesion is seen.    The patient is status post cholecystectomy.  The pancreas is seen in the pancreatic duct appears to be dilated.  This was seen on the prior examination as well.  Mild peripancreatic inflammatory changes are seen.  Similar changes were seen on the prior examination.    Spleen appears normal and adrenal glands appear normal.  No adrenal nodule  is seen.    No nephrolithiasis is seen.  No hydronephrosis is seen.  No hydroureter is seen.  No ureteral stone is seen.    The colonic wall is thickened and the transverse colon and the descending colon and there is some inflammatory changes around it.  Colitis cannot be excluded.  Postsurgical changes are seen in the transverse colon.    No free air seen.  No free fluid is seen.  The urinary bladder appears normal.    Bones show no acute abnormality.  Impression: Dilated pancreatic duct and some inflammatory changes around the pancreas possibly representing pancreatitis.  Similar changes were seen on the prior examination    Some colonic wall thickening seen in the distal transverse and the descending colon possibly representing a colitis.    Electronically signed by: Mary Cool  Date:    06/21/2023  Time:    16:57  X-Ray Chest AP Portable  Narrative: EXAMINATION:  XR CHEST AP PORTABLE    CLINICAL HISTORY:  epigastric pain;    TECHNIQUE:  Single view of the chest    COMPARISON:  06/09/2023    FINDINGS:  No focal opacification, pleural effusion, or pneumothorax.    The cardiomediastinal silhouette is within normal limits.    Left-sided MediPort remains.    No acute osseous abnormality.  Impression: No acute cardiopulmonary process.    Electronically signed by: Darren Montoya  Date:    06/21/2023  Time:    15:33      Trevor Joyce MD   06/26/2023

## 2023-06-26 NOTE — PROGRESS NOTES
Infectious Diseases Progress Note  83 y/o female with Hx of CAD/MI, HLD, Crohn's disease and recently diagnosed pancreatic CA who presented to ER on 6/21 with abdominal pain with vomiting and diarrhea. On admit she was afebrile with leukocytosis, WBC 12.8. She was noted to be in GERBER with Crea 1.73, U/A with 21-50 WBC, 0-2 RBC, large LE, moderate bacteria, negative nitrites. Urine culture with >100k GNR, 3 different isolates. CXR unremarkable. CT abdomen/pelvis without contrast showed dilated pancreatic duct and some inflammatory changes around the pancreas possibly representing pancreatitis, similar changes were seen on the prior examination, some colonic wall thickening seen in the distal transverse and the descending colon possibly representing a colitis. Blood cultures negative thus far. ESR 63 and CRP 45.4. Of note she was recently admitted here with C-Diff colitis. She was discharged on oral Vancomycin.   She is currently on Dificid     Subjective:  No new complaints, no fevers, doing about the same.  Lying in bed in nonacute distress      Past Medical History:   Diagnosis Date    Acute pancreatitis, unspecified complication status, unspecified pancreatitis type     Arthritis     Carpal tunnel syndrome, bilateral     Cervical cancer     Cervical radiculopathy     Crohn disease     Heart attack     HLD (hyperlipidemia)     Low back pain     Sleep apnea, unspecified     Spinal stenosis of lumbar region with neurogenic claudication      Past Surgical History:   Procedure Laterality Date    bilateral L4-5, L5-S1 decompression, repair L5-S1 dural tear  10/01/2021    Dr. Marin    CARPAL TUNNEL RELEASE Right 09/06/2019    Dr. Marin    CARPAL TUNNEL RELEASE Left 12/2/2022    Procedure: RELEASE, CARPAL TUNNEL;  Surgeon: Jesse Marin MD;  Location: [facility];  Service: Neurosurgery;  Laterality: Left;    CHOLECYSTECTOMY      COLECTOMY  07/2011    partial x3    ESOPHAGOGASTRODUODENOSCOPY N/A 6/8/2023    Procedure: EGD  (ESOPHAGOGASTRODUODENOSCOPY);  Surgeon: Jose Alberto Max MD;  Location: [facility] OR;  Service: Gastroenterology;  Laterality: N/A;    HYSTERECTOMY      total    REPAIR OF EYELID Bilateral 11/21/2022    Procedure: BILATERAL ECTROPION REPAIR;  Surgeon: Justin Flores MD;  Location: [facility] OR;  Service: ENT;  Laterality: Bilateral;    ULTRASOUND, ENDOSCOPIC, WITH FINE NEEDLE ASPIRATION N/A 6/8/2023    Procedure: UPPER EUS  W/  FNA;  Surgeon: Jose Alberto Max MD;  Location: [facility] OR;  Service: Gastroenterology;  Laterality: N/A;  Dangelo, pt w/ CD on entyvio recently admitted to [facility] w/ acute pancreatitis. Possibly drug induced from entyvio? Marisa does have dilated PD and a poss focal stricture in the HOP.CA 19-9 nml     Social History     Socioeconomic History    Marital status:    Tobacco Use    Smoking status: Former     Types: Cigarettes    Smokeless tobacco: Never   Substance and Sexual Activity    Alcohol use: Yes     Comment: rarely    Drug use: Never    Sexual activity: Not Currently     Social Determinants of Health     Social Connections: Unknown    Attends Scientologist Services: More than 4 times per year    Active Member of Clubs or Organizations: Yes    Attends Club or Organization Meetings: More than 4 times per year    Marital Status:        ROS  Constitutional:  Positive for malaise/fatigue.   HENT: Negative.     Eyes: Negative.    Respiratory: Negative.     Cardiovascular: Negative.    Gastrointestinal:  Positive for abdominal pain  Genitourinary: Negative.    Musculoskeletal: Negative.    Skin: Negative.    Neurological: Negative.    All other Systems review done and negative.    Review of patient's allergies indicates:  No Known Allergies      Scheduled Meds:   enoxparin  40 mg Subcutaneous Q24H (prophylaxis, 1700)    fidaxomicin  200 mg Oral BID     Continuous Infusions:   Amino acid 4.25% - dextrose 5% (CLINIMIX-E) solution (1L provides 42.5 gm AA, 50 gm CHO (170 kcal/L dextrose), Na 35,  "K 30, Mg 5, Ca 4.5, Acetate 70, Cl 39, Phos 15) 85 mL/hr at 06/25/23 1305     PRN Meds:acetaminophen, acetaminophen, aluminum-magnesium hydroxide-simethicone, cloNIDine, hydrALAZINE, HYDROmorphone, labetalol, melatonin, ondansetron, prochlorperazine, sodium chloride 0.9%    Objective:  /66   Pulse 66   Temp 97.4 °F (36.3 °C) (Oral)   Resp 18   Ht 5' 7" (1.702 m)   Wt 65.8 kg (145 lb)   SpO2 96%   BMI 22.71 kg/m²     Physical Exam:   Physical Exam  Vitals reviewed.   HENT:      Head: Normocephalic.   Cardiovascular:      Rate and Rhythm: Normal rate and regular rhythm.   Pulmonary:      Effort: Pulmonary effort is normal. No respiratory distress.      Breath sounds: Normal breath sounds. No wheezing.   Abdominal:      General: Bowel sounds are normal. There is no distension.      Palpations: Abdomen is soft.      Tenderness: There is no abdominal tenderness.   Musculoskeletal:         General: Normal range of motion.      Cervical back: Normal range of motion.   Skin:     Findings: No rash   Neurological:      Mental Status: She is alert and oriented to person, place, and time.   Psychiatric:         Mood and Affect: Mood normal.         Behavior: Behavior normal.     Imaging  Imaging Results              CT Abdomen Pelvis  Without Contrast (Final result)  Result time 06/21/23 16:57:59      Final result by Mary Cool MD (06/21/23 16:57:59)                   Impression:      Dilated pancreatic duct and some inflammatory changes around the pancreas possibly representing pancreatitis.  Similar changes were seen on the prior examination    Some colonic wall thickening seen in the distal transverse and the descending colon possibly representing a colitis.      Electronically signed by: Mary Cool  Date:    06/21/2023  Time:    16:57               Narrative:    EXAMINATION:  CT ABDOMEN PELVIS WITHOUT CONTRAST    CLINICAL HISTORY:  Epigastric pain;    TECHNIQUE:  Low dose axial images, " sagittal and coronal reformations were obtained from the lung bases to the pubic symphysis.  No contrast was administered.  Automatic exposure control is utilized to reduce patient radiation exposure.    COMPARISON:  06/09/2023    FINDINGS:  The lung bases are clear.    The liver appears normal.  No obvious liver mass or lesion is seen.    The patient is status post cholecystectomy.  The pancreas is seen in the pancreatic duct appears to be dilated.  This was seen on the prior examination as well.  Mild peripancreatic inflammatory changes are seen.  Similar changes were seen on the prior examination.    Spleen appears normal and adrenal glands appear normal.  No adrenal nodule is seen.    No nephrolithiasis is seen.  No hydronephrosis is seen.  No hydroureter is seen.  No ureteral stone is seen.    The colonic wall is thickened and the transverse colon and the descending colon and there is some inflammatory changes around it.  Colitis cannot be excluded.  Postsurgical changes are seen in the transverse colon.    No free air seen.  No free fluid is seen.  The urinary bladder appears normal.    Bones show no acute abnormality.                                       X-Ray Chest AP Portable (Final result)  Result time 06/21/23 15:33:14      Final result by Darren Montoya MD (06/21/23 15:33:14)                   Impression:      No acute cardiopulmonary process.      Electronically signed by: Darren Montoya  Date:    06/21/2023  Time:    15:33               Narrative:    EXAMINATION:  XR CHEST AP PORTABLE    CLINICAL HISTORY:  epigastric pain;    TECHNIQUE:  Single view of the chest    COMPARISON:  06/09/2023    FINDINGS:  No focal opacification, pleural effusion, or pneumothorax.    The cardiomediastinal silhouette is within normal limits.    Left-sided MediPort remains.    No acute osseous abnormality.                                       Lab Review   No results found for this or any previous visit (from the past 24  hour(s)).          Assessment/Plan:  C-Diff colitis  Pyuria / bacteriuria  Leukocytosis  GERBER  Stage III Pancreatic CA  Crohn's disease with R hemicolectomy  Anemia     -Continue Dificid #4. Plan a 10 day course  -No fevers and no leukocytosis  -6/22 Blood cultures negative   -U/A abnormal with urine culture with >100k GNR, 3 different isolates. Likely a contaminant  -Oncology on board, inputs noted including plans for possible chemotherapy once infection cleared  -Maintain strict contact isolation for c-diff  -Discussed with patient and nursing

## 2023-06-27 LAB
BACTERIA BLD CULT: NORMAL

## 2023-06-27 PROCEDURE — 63600175 PHARM REV CODE 636 W HCPCS: Performed by: INTERNAL MEDICINE

## 2023-06-27 PROCEDURE — 25000003 PHARM REV CODE 250: Performed by: INTERNAL MEDICINE

## 2023-06-27 PROCEDURE — 25000003 PHARM REV CODE 250: Performed by: FAMILY MEDICINE

## 2023-06-27 PROCEDURE — 63600175 PHARM REV CODE 636 W HCPCS: Performed by: NURSE PRACTITIONER

## 2023-06-27 PROCEDURE — 21400001 HC TELEMETRY ROOM

## 2023-06-27 RX ORDER — OXYCODONE AND ACETAMINOPHEN 10; 325 MG/1; MG/1
1 TABLET ORAL EVERY 4 HOURS PRN
Status: DISCONTINUED | OUTPATIENT
Start: 2023-06-27 | End: 2023-06-29 | Stop reason: HOSPADM

## 2023-06-27 RX ORDER — HYDROMORPHONE HYDROCHLORIDE 2 MG/ML
0.5 INJECTION, SOLUTION INTRAMUSCULAR; INTRAVENOUS; SUBCUTANEOUS EVERY 4 HOURS PRN
Status: DISCONTINUED | OUTPATIENT
Start: 2023-06-27 | End: 2023-06-29

## 2023-06-27 RX ADMIN — ONDANSETRON 4 MG: 2 INJECTION INTRAMUSCULAR; INTRAVENOUS at 01:06

## 2023-06-27 RX ADMIN — FIDAXOMICIN 200 MG: 200 TABLET, FILM COATED ORAL at 07:06

## 2023-06-27 RX ADMIN — HYDROMORPHONE HYDROCHLORIDE 0.5 MG: 2 INJECTION INTRAMUSCULAR; INTRAVENOUS; SUBCUTANEOUS at 08:06

## 2023-06-27 RX ADMIN — MUPIROCIN: 20 OINTMENT TOPICAL at 08:06

## 2023-06-27 RX ADMIN — FIDAXOMICIN 200 MG: 200 TABLET, FILM COATED ORAL at 08:06

## 2023-06-27 RX ADMIN — ONDANSETRON 4 MG: 2 INJECTION INTRAMUSCULAR; INTRAVENOUS at 07:06

## 2023-06-27 RX ADMIN — ONDANSETRON 4 MG: 2 INJECTION INTRAMUSCULAR; INTRAVENOUS at 08:06

## 2023-06-27 RX ADMIN — HYDROMORPHONE HYDROCHLORIDE 0.5 MG: 2 INJECTION INTRAMUSCULAR; INTRAVENOUS; SUBCUTANEOUS at 07:06

## 2023-06-27 RX ADMIN — ONDANSETRON 4 MG: 2 INJECTION INTRAMUSCULAR; INTRAVENOUS at 05:06

## 2023-06-27 RX ADMIN — Medication 6 MG: at 10:06

## 2023-06-27 RX ADMIN — ENOXAPARIN SODIUM 40 MG: 40 INJECTION SUBCUTANEOUS at 05:06

## 2023-06-27 RX ADMIN — LEUCINE, PHENYLALANINE, LYSINE, METHIONINE, ISOLEUCINE, VALINE, HISTIDINE, THREONINE, TRYPTOPHAN, ALANINE, GLYCINE, ARGININE, PROLINE, SERINE, TYROSINE, SODIUM ACETATE, DIBASIC POTASSIUM PHOSPHATE, MAGNESIUM CHLORIDE, SODIUM CHLORIDE, CALCIUM CHLORIDE, DEXTROSE
311; 238; 247; 170; 255; 247; 204; 179; 77; 880; 438; 489; 289; 213; 17; 297; 261; 51; 77; 33; 5 INJECTION INTRAVENOUS at 01:06

## 2023-06-27 RX ADMIN — LEUCINE, PHENYLALANINE, LYSINE, METHIONINE, ISOLEUCINE, VALINE, HISTIDINE, THREONINE, TRYPTOPHAN, ALANINE, GLYCINE, ARGININE, PROLINE, SERINE, TYROSINE, SODIUM ACETATE, DIBASIC POTASSIUM PHOSPHATE, MAGNESIUM CHLORIDE, SODIUM CHLORIDE, CALCIUM CHLORIDE, DEXTROSE
311; 238; 247; 170; 255; 247; 204; 179; 77; 880; 438; 489; 289; 213; 17; 297; 261; 51; 77; 33; 5 INJECTION INTRAVENOUS at 10:06

## 2023-06-27 NOTE — PROGRESS NOTES
Inpatient Nutrition Assessment    Admit Date: 6/21/2023   Total duration of encounter: 6 days     Nutrition Recommendation/Prescription     Continue regular diet as tolerated.    Add Boost Original PO daily to provide 240kcal and 10g protein per serving.     Continue Clinimix E 4.25/5% at 85mL/hr to provide 694kcal, 87g protein, 102g dextrose and 2040mL fluid daily. May d/c once intake consistently >50% of meals and supplements    Monitor oral intakes and tolerance.       Communication of Recommendations: reviewed with provider and reviewed with patient    Nutrition Assessment     Malnutrition Assessment/Nutrition-Focused Physical Exam    Malnutrition Context: acute illness or injury  Malnutrition Level: moderate  Energy Intake (Malnutrition): less than 75% for greater than 7 days  Weight Loss (Malnutrition): greater than 7.5% in 3 months  Subcutaneous Fat (Malnutrition): mild depletion     Upper Arm Region (Subcutaneous Fat Loss): mild depletion     Muscle Mass (Malnutrition): mild depletion     Clavicle Bone Region (Muscle Loss): mild depletion  Clavicle and Acromion Bone Region (Muscle Loss): mild depletion                 Fluid Accumulation (Malnutrition): other (see comments) (does not meet criteria)        A minimum of two characteristics is recommended for diagnosis of either severe or non-severe malnutrition.    Chart Review    Reason Seen: malnutrition screening tool (MST)    Malnutrition Screening Tool Results   Have you recently lost weight without trying?: Yes: 24-33 lbs  Have you been eating poorly because of a decreased appetite?: Yes   MST Score: 4     Diagnosis:  Colitis  Recent diagnosis of CDI, likely contributing to above  Acute kidney injury  Hypokalemia  Elevated blood glucose without history of DM  Accelerated blood pressure    Relevant Medical History:  Crohn's disease, right hemicolectomy, CAD, HLD, cervical cancer, recently diagnosed pancreatic cancer, C diff colitis     Nutrition-Related  "Medications: Clinimix 4.25/5 @ 185mL/hr  Calorie Containing IV Medications: Clinimix    Nutrition-Related Labs:  : HgbA1c 5.5  : K 3.0, Gluc 131  : Glu 118    Diet/PN Order: Diet Adult Regular  Amino acid 4.25% - dextrose 5% (CLINIMIX-E) solution (1L provides 42.5 gm AA, 50 gm CHO (170 kcal/L dextrose), Na 35, K 30, Mg 5, Ca 4.5, Acetate 70, Cl 39, Phos 15)  Oral Supplement Order: none  Tube Feeding Order: none  Appetite/Oral Intake: fair/50-75% of meals  Factors Affecting Nutritional Intake:  persistent abdominal pain, nausea, vomiting and diarrhea   Food/Church/Cultural Preferences: none reported  Food Allergies: no known food allergies       Wound(s):   none noted    Comments    23: Patient reports persistent abdominal pain, nausea, vomiting and diarrhea since 2023. Reports fair appetite this AM, willing to try oral nutrition supplement in chocolate flavor. Clinimix running as ordered during rounds. Discussed changes to PPN with MD to better meet patient est needs. MD to correct depressed  K orally today.       pt sleeping, nurse reports tolerating oral diet, eating pretty good, unsure if she is drinking the boost, PPN running    Anthropometrics    Height: 5' 7.01" (170.2 cm) Height Method: Stated  Last Weight: 65.8 kg (145 lb) (23 1456) Weight Method: Bed Scale  BMI (Calculated): 22.7  BMI Classification: normal (BMI 18.5-24.9)     Ideal Body Weight (IBW), Female: 135.05 lb     % Ideal Body Weight, Female (lb): 107.37 %                    Usual Body Weight (UBW), k.45 kg  % Usual Body Weight: 93.56  % Weight Change From Usual Weight: -6.64 %  Usual Weight Provided By: patient and EMR weight history, patient reports UBW about 155-160lbs about 2-3 months ago.    Wt Readings from Last 5 Encounters:   23 65.8 kg (145 lb)   23 63.4 kg (139 lb 12.4 oz)   23 65.3 kg (143 lb 15.4 oz)   23 65.3 kg (144 lb)   23 66.2 kg (145 lb 14.4 oz)     Weight " Change(s) Since Admission:  Admit Weight: 65.8 kg (145 lb) (23 1500)  : 65.8kg; weight stable    Estimated Needs    Weight Used For Calorie Calculations: 65.8 kg (145 lb 1 oz)  Energy Calorie Requirements (kcal): 1500 kcal/day (mifflin st jeor x 1.3 SF)  Energy Need Method: Bond-St Jeor  Weight Used For Protein Calculations: 65.8 kg (145 lb 1 oz)  Protein Requirements: 79-86 g/day (1.2-1.3g/kg/d)  Fluid Requirements (mL): 1500mL/day (1mL/kcal)  Temp (24hrs), Av.5 °F (36.9 °C), Min:98.2 °F (36.8 °C), Max:99 °F (37.2 °C)       Enteral Nutrition    Patient not receiving enteral nutrition at this time.    Parenteral Nutrition    Standard Formula: Clinimix 4.25/5  Custom Formula: not applicable  Additives: none  Rate/Volume: 85mL/hr  Lipids: none  Total Nutrition Provided by Parenteral Nutrition:  Calories Provided  694 kcal/d, 46% needs   Protein Provided  87 g/d, 101% needs   Dextrose Provided  102 g/d   Fluid Provided  2040 ml/d, 136% needs       Evaluation of Received Nutrient Intake    Calories: not meeting estimated needs  Protein: not meeting estimated needs    Patient Education    Not applicable.    Nutrition Diagnosis     PES: Suboptimal protein/energy intake related to altered GI function as evidenced by persistent abdominal pain, nausea, vomiting and diarrhea. (improving)  PES: Unintended weight loss related to catabolic illness as evidenced by 8.8% wt loss over past 3 months. (continues)     Interventions/Goals     Intervention(s): modified composition of parenteral nutrition and collaboration with other providers  Goal: Meet greater than 75% of nutritional needs by follow-up. (goal progressing)    Monitoring & Evaluation     Dietitian will monitor energy intake, weight, electrolyte/renal panel, and glucose/endocrine profile.  Nutrition Risk/Follow-Up: high (follow-up in 1-4 days)   Please consult if re-assessment needed sooner.

## 2023-06-27 NOTE — PROGRESS NOTES
Infectious Diseases Progress Note  81 y/o female with Hx of CAD/MI, HLD, Crohn's disease and recently diagnosed pancreatic CA who presented to ER on 6/21 with abdominal pain with vomiting and diarrhea. On admit she was afebrile with leukocytosis, WBC 12.8. She was noted to be in GERBER with Crea 1.73, U/A with 21-50 WBC, 0-2 RBC, large LE, moderate bacteria, negative nitrites. Urine culture with >100k GNR, 3 different isolates. CXR unremarkable. CT abdomen/pelvis without contrast showed dilated pancreatic duct and some inflammatory changes around the pancreas possibly representing pancreatitis, similar changes were seen on the prior examination, some colonic wall thickening seen in the distal transverse and the descending colon possibly representing a colitis. Blood cultures negative thus far. ESR 63 and CRP 45.4. Of note she was recently admitted here with C-Diff colitis. She was discharged on oral Vancomycin.   She is currently on Dificid     Subjective:  No new complaints, no fevers, doing about the same.  Lying in bed in no acute distress      Past Medical History:   Diagnosis Date    Acute pancreatitis, unspecified complication status, unspecified pancreatitis type     Arthritis     Carpal tunnel syndrome, bilateral     Cervical cancer     Cervical radiculopathy     Crohn disease     Heart attack     HLD (hyperlipidemia)     Low back pain     Sleep apnea, unspecified     Spinal stenosis of lumbar region with neurogenic claudication      Past Surgical History:   Procedure Laterality Date    bilateral L4-5, L5-S1 decompression, repair L5-S1 dural tear  10/01/2021    Dr. Marin    CARPAL TUNNEL RELEASE Right 09/06/2019    Dr. Marin    CARPAL TUNNEL RELEASE Left 12/2/2022    Procedure: RELEASE, CARPAL TUNNEL;  Surgeon: Jesse Marin MD;  Location: [facility];  Service: Neurosurgery;  Laterality: Left;    CHOLECYSTECTOMY      COLECTOMY  07/2011    partial x3    ESOPHAGOGASTRODUODENOSCOPY N/A 6/8/2023    Procedure: EGD  (ESOPHAGOGASTRODUODENOSCOPY);  Surgeon: Jose Alberto Max MD;  Location: [facility] OR;  Service: Gastroenterology;  Laterality: N/A;    HYSTERECTOMY      total    REPAIR OF EYELID Bilateral 11/21/2022    Procedure: BILATERAL ECTROPION REPAIR;  Surgeon: Justin Flores MD;  Location: [facility] OR;  Service: ENT;  Laterality: Bilateral;    ULTRASOUND, ENDOSCOPIC, WITH FINE NEEDLE ASPIRATION N/A 6/8/2023    Procedure: UPPER EUS  W/  FNA;  Surgeon: Jose Alberto Max MD;  Location: [facility] OR;  Service: Gastroenterology;  Laterality: N/A;  Dangelo, pt w/ CD on entyvio recently admitted to [facility] w/ acute pancreatitis. Possibly drug induced from entyvio? Marisa does have dilated PD and a poss focal stricture in the HOP.CA 19-9 nml     Social History     Socioeconomic History    Marital status:    Tobacco Use    Smoking status: Former     Types: Cigarettes    Smokeless tobacco: Never   Substance and Sexual Activity    Alcohol use: Yes     Comment: rarely    Drug use: Never    Sexual activity: Not Currently     Social Determinants of Health     Social Connections: Unknown    Attends Jew Services: More than 4 times per year    Active Member of Clubs or Organizations: Yes    Attends Club or Organization Meetings: More than 4 times per year    Marital Status:        ROS  Constitutional:  Positive for malaise/fatigue.   HENT: Negative.     Eyes: Negative.    Respiratory: Negative.     Cardiovascular: Negative.    Gastrointestinal:  Positive for abdominal pain  Genitourinary: Negative.    Musculoskeletal: Negative.    Skin: Negative.    Neurological: Negative.    All other Systems review done and negative.    Review of patient's allergies indicates:  No Known Allergies      Scheduled Meds:   enoxparin  40 mg Subcutaneous Q24H (prophylaxis, 1700)    fidaxomicin  200 mg Oral BID    mupirocin   Nasal BID     Continuous Infusions:   Amino acid 4.25% - dextrose 5% (CLINIMIX-E) solution (1L provides 42.5 gm AA, 50 gm CHO (170  "kcal/L dextrose), Na 35, K 30, Mg 5, Ca 4.5, Acetate 70, Cl 39, Phos 15) 88 mL/hr at 06/26/23 2052     PRN Meds:acetaminophen, acetaminophen, aluminum-magnesium hydroxide-simethicone, cloNIDine, hydrALAZINE, HYDROmorphone, labetalol, melatonin, ondansetron, prochlorperazine, sodium chloride 0.9%    Objective:  BP (!) 152/58   Pulse 71   Temp 98.6 °F (37 °C) (Oral)   Resp 19   Ht 5' 7" (1.702 m)   Wt 65.8 kg (145 lb)   SpO2 96%   BMI 22.71 kg/m²     Physical Exam:   Physical Exam  Vitals reviewed.   HENT:      Head: Normocephalic.   Cardiovascular:      Rate and Rhythm: Normal rate and regular rhythm.   Pulmonary:      Effort: Pulmonary effort is normal. No respiratory distress.      Breath sounds: Normal breath sounds. No wheezing.   Abdominal:      General: Bowel sounds are normal. There is no distension.      Palpations: Abdomen is soft.      Tenderness: There is no abdominal tenderness.   Musculoskeletal:         General: Normal range of motion.      Cervical back: Normal range of motion.   Skin:     Findings: No rash   Neurological:      Mental Status: She is alert and oriented to person, place, and time.   Psychiatric:         Mood and Affect: Mood normal.         Behavior: Behavior normal.     Imaging  Imaging Results              CT Abdomen Pelvis  Without Contrast (Final result)  Result time 06/21/23 16:57:59      Final result by Mary Cool MD (06/21/23 16:57:59)                   Impression:      Dilated pancreatic duct and some inflammatory changes around the pancreas possibly representing pancreatitis.  Similar changes were seen on the prior examination    Some colonic wall thickening seen in the distal transverse and the descending colon possibly representing a colitis.      Electronically signed by: Mary Cool  Date:    06/21/2023  Time:    16:57               Narrative:    EXAMINATION:  CT ABDOMEN PELVIS WITHOUT CONTRAST    CLINICAL HISTORY:  Epigastric " pain;    TECHNIQUE:  Low dose axial images, sagittal and coronal reformations were obtained from the lung bases to the pubic symphysis.  No contrast was administered.  Automatic exposure control is utilized to reduce patient radiation exposure.    COMPARISON:  06/09/2023    FINDINGS:  The lung bases are clear.    The liver appears normal.  No obvious liver mass or lesion is seen.    The patient is status post cholecystectomy.  The pancreas is seen in the pancreatic duct appears to be dilated.  This was seen on the prior examination as well.  Mild peripancreatic inflammatory changes are seen.  Similar changes were seen on the prior examination.    Spleen appears normal and adrenal glands appear normal.  No adrenal nodule is seen.    No nephrolithiasis is seen.  No hydronephrosis is seen.  No hydroureter is seen.  No ureteral stone is seen.    The colonic wall is thickened and the transverse colon and the descending colon and there is some inflammatory changes around it.  Colitis cannot be excluded.  Postsurgical changes are seen in the transverse colon.    No free air seen.  No free fluid is seen.  The urinary bladder appears normal.    Bones show no acute abnormality.                                       X-Ray Chest AP Portable (Final result)  Result time 06/21/23 15:33:14      Final result by Draren Montoya MD (06/21/23 15:33:14)                   Impression:      No acute cardiopulmonary process.      Electronically signed by: Darren Montoya  Date:    06/21/2023  Time:    15:33               Narrative:    EXAMINATION:  XR CHEST AP PORTABLE    CLINICAL HISTORY:  epigastric pain;    TECHNIQUE:  Single view of the chest    COMPARISON:  06/09/2023    FINDINGS:  No focal opacification, pleural effusion, or pneumothorax.    The cardiomediastinal silhouette is within normal limits.    Left-sided MediPort remains.    No acute osseous abnormality.                                       Lab Review   Recent Results (from  the past 24 hour(s))   Basic Metabolic Panel    Collection Time: 06/26/23  6:29 AM   Result Value Ref Range    Sodium Level 136 136 - 145 mmol/L    Potassium Level 3.5 3.5 - 5.1 mmol/L    Chloride 105 98 - 107 mmol/L    Carbon Dioxide 24 23 - 31 mmol/L    Glucose Level 118 (H) 82 - 115 mg/dL    Blood Urea Nitrogen 15.3 9.8 - 20.1 mg/dL    Creatinine 0.69 0.55 - 1.02 mg/dL    BUN/Creatinine Ratio 22     Calcium Level Total 9.1 8.4 - 10.2 mg/dL    Anion Gap 7.0 mEq/L    eGFR >60 mls/min/1.73/m2   CBC with Differential    Collection Time: 06/26/23  6:29 AM   Result Value Ref Range    WBC 5.46 4.50 - 11.50 x10(3)/mcL    RBC 3.15 (L) 4.20 - 5.40 x10(6)/mcL    Hgb 9.5 (L) 12.0 - 16.0 g/dL    Hct 29.3 (L) 37.0 - 47.0 %    MCV 93.0 80.0 - 94.0 fL    MCH 30.2 27.0 - 31.0 pg    MCHC 32.4 (L) 33.0 - 36.0 g/dL    RDW 13.7 11.5 - 17.0 %    Platelet 330 130 - 400 x10(3)/mcL    MPV 9.6 7.4 - 10.4 fL    Neut % 65.1 %    Lymph % 19.8 %    Mono % 11.9 %    Eos % 2.4 %    Basophil % 0.4 %    Lymph # 1.08 0.6 - 4.6 x10(3)/mcL    Neut # 3.56 2.1 - 9.2 x10(3)/mcL    Mono # 0.65 0.1 - 1.3 x10(3)/mcL    Eos # 0.13 0 - 0.9 x10(3)/mcL    Baso # 0.02 <=0.2 x10(3)/mcL    IG# 0.02 0 - 0.04 x10(3)/mcL    IG% 0.4 %    NRBC% 0.0 %             Assessment/Plan:  C-Diff colitis  Pyuria / bacteriuria  Leukocytosis  GERBER  Stage III Pancreatic CA  Crohn's disease with R hemicolectomy  Anemia     -Continue Dificid #5/10  -No fevers and no leukocytosis  -6/22 Blood cultures negative   -U/A abnormal with urine culture with >100k GNR, 3 different isolates. Likely a contaminant  -Oncology on board, inputs noted including plans for possible chemotherapy once infection cleared  -Maintain strict contact isolation for c-diff  -Discussed with patient and nursing.  Disposition per primary team

## 2023-06-27 NOTE — PHYSICIAN QUERY
PT Name: Evelyn Proctor  MR #: [id number]    DOCUMENTATION CLARIFICATION     CDS/: Carole Gallagher RN, CDI            Contact information:[email or web address]     This form is a permanent document in the medical record.     Query Date: 2023    By submitting this query, we are merely seeking further clarification of documentation.. Please utilize your independent clinical judgment when addressing the question(s) below.    The medical record contains the following:   Indicators  Supporting Clinical Findings Location in Medical Record   x Energy Intake Energy Intake (Malnutrition): less than 75% for greater than 7 days   RD consult    x Weight Loss Weight Loss (Malnutrition): greater than 7.5% in 3 months   RD consult    x Fat Loss Subcutaneous Fat (Malnutrition): mild depletion  Upper Arm Region (Subcutaneous Fat Loss): mild depletion   RD consult    x Muscle Loss Muscle Mass (Malnutrition): mild depletion  Clavicle Bone Region (Muscle Loss): mild depletion  Clavicle and Acromion Bone Region (Muscle Loss): mild depletion   RD consult     Edema/Fluid Accumulation      Reduced  Strength (by dynamometer)     x Weight, BMI, Usual Body Weight Last Weight: 65.8 kg (145 lb)  BMI (Calculated): 22.7  Usual Body Weight (UBW), k.45 kg  Usual Weight Provided By: patient and EMR weight history, patient reports UBW about 155-160lbs about 2-3 months ago.    RD consult     Delayed Wound Healing     x Registered Dietician Diagnosis Malnutrition Level: moderate    23: Patient reports persistent abdominal pain, nausea, vomiting and diarrhea since 2023. Reports fair appetite this AM, willing to try oral nutrition supplement in chocolate flavor. Clinimix running as ordered during rounds. Discussed changes to PPN with MD to better meet patient est needs. MD to correct depressed  K orally today.     RD consult    x Acute or Chronic Illness Relevant Medical History:  Crohn's disease,  right hemicolectomy, CAD, HLD, cervical cancer, recently diagnosed pancreatic cancer, C diff colitis      Failure to thrive  Anorexia   Unresolved Clostridium difficile colitis, noncompliant with oral vancomycin  Recently diagnosed stage III pancreatic cancer  Hypertensive urgency, resolved   Anemia of chronic disease  Acute kidney injury, resolved  Crohn's disease on Entyvio every 6 weeks status post right hemicolectomy  Bacteriuria and pyuria due to contaminated urine culture RD consult 6/23         PN 6/25    Social or Environmental Circumstances     x Treatment Continue regular diet as tolerated.     Add Boost Original PO daily to provide 240kcal and 10g protein per serving.      Changing to Clinimix E 4.25/5 at 85mL/hr to provide 694kcal, 87g protein, 102g dextrose and 2040mL fluid daily.      Monitor K, Phos, Mag, Na and gluc daily while on PPN.      Monitor oral intakes and tolerance.     Plan:  Continue Clinimix. Case was discussed with registered dietitian yesterday.   RD consult 6/23                           PN 6/25         x Other  She she takes an antiemetic prior to eating which seems to her intake.  She consumes dietary supplements.   Patient remains with some nausea however abdominal pain seems to be controlled at present      PN 6/25     Academy of Nutrition and Dietetics (Academy) and the American Society for Parenteral and Enteral Nutrition (A.S.P.E.N.) Clinical Characteristics to support Malnutrition   Malnutrition in the Context of Acute Illness or Injury Malnutrition in the Context of Chronic Illness or Injury Malnutrition in the Context of Social or Environmental Circumstances   Malnutrition Level Moderate Severe Moderate Severe   Moderate   Severe   Energy Intake <75%                   >7 days <50%                 >5 days <75%           >1 month <75%                      >1 month   <75% for >3 months   <50% for >1 month   Weight Loss   1-2% in 1 week >2% in 1 week 5% in 1 month >5% in 1  month 5% in 1 month >5% in 1 month    5% in 1 month >5% in 1 month 7.5% in 3 months >7.5% in 3 months 7.5% in 3 months >7.5% in 3 months    7.5% in 3 months >7.5% in 3 months 10% in 6 months >10% in 6 months 10% in 6 months >10% in 6 months        20% in 1 year                    >20% in 1 year                                                                  20% in 1 year                            >20% in 1 year                                                  Subcutaneous Fat Loss Mild  Moderate  Mild  Severe    Mild   Severe   Muscle Loss Mild  Moderate  Mild  Severe    Mild   Severe   Edema/Fluid Accumulation Mild Moderate to severe  Mild  Severe   Mild   Severe   Reduced  Strength         (based on standards supplied by  of dynamometer) N/A Measurably reduced N/A Measurably reduced N/A Measurably reduced     Criteria for mild malnutrition is defined as 1 characteristic outlined above within the established moderate or severe parameters.  A minimum of 2 out of the 6 characteristics noted above are recommended for a diagnosis of moderate or severe malnutrition.  Chronic illness/injury is a disease/condition lasting 3 months or longer.    The noted clinical guidelines are only system guidelines and do not replace the providers clinical judgment.    Provider, please clarify diagnosis or diagnoses associated with above clinical findings.    [  ] Mild Malnutrition - 1 characteristic outlined above within the established moderate or severe parameters   [ X ] Moderate Malnutrition - a minimum of 2 of the 6 moderate malnutrition characteristics noted above    [  ] Malnutrition, Unspecified degree   [  ] Failure to Thrive   [  ] Anorexia   [  ] Other Nutritional Diagnosis (please specify): _______   [  ] Malnutrition ruled out     Please document in your progress notes daily for the duration of treatment until resolved and  include in your discharge summary.      References:    ELLE Leos, & MAIKEL Lance  (2022, April). Assessment and management of anorexia and cachexia in palliative care. Retrieved May 23, 2022, from [email or web address]-[email or web address]-management-[email or web address]-[email or web address]-cachexia-[email or web address]-[email or web address]-care?[email or web address]_link     CHALO Muro, PhD, RD, BROOKS, LUPE Vicente, PhD, RN, JAYDA Spears MD, PhD, Seng DUMONT A., MS, RD, [facility], KRISTINE Neff, MS, RD, The Academy Malnutrition Work Group, The A.S.P.E.N. Board of Directors. (2012). Consensus Statement: Academy of Nutrition and Dietetics and American Society for Parenteral and Enteral Nutrition: Characteristics Recommended for the Identification and Documentation of Adult Malnutrition (Undernutrition). Journal of Parenteral and Enteral Nutrition, 36(3), 275-283. doi:10.1177/6392385546214577     Form No. [id number]

## 2023-06-27 NOTE — PROGRESS NOTES
[facility] Hospital Medicine Progress Note        Chief Complaint: Inpatient follow-up on Clostridium difficile colitis    HPI:   Ms. Proctor is an 82 year old female with a pmh of CAD, HLD, Crohn's disease RA, cervical cancer, DAQUAN who presented to [facility] ED with complaints of abdominal pain, nausea, vomiting.  Daughter at the bedside states this has been going on for months, and was just recently diagnosed with pancreatic cancer.  She also states that the patient was diagnosed with C diff and has not been compliant with her oral vancomycin.     Today's ED lab work revealed WBC 12.89, K 2.6, creatinine 1.73, glucose 161, Mag 1.4.  UA was contaminated.  CXR showed no acute cardiopulmonary process.  CT abdomen pelvis without contrast showed dilated pancreatic duct and some inflammatory changes around the pancreas possibly representing pancreatitis.  Similar changes seen on the prior exam.  Some colonic wall thickening seen in the distal transverse and the descending colon possibly representing colitis.  ED vitals:  Temp 98.6° F, pulse 88, resp 20, /82, SpO2 98% on RA.  While in the ED, she became markedly hypertensive  Potassium was replaced, she was given Flagyl and Cipro, and transferred to [facility]. She was admitted to hospital medicine for management.      Interval Hx:   Patient is awake and sitting up in bed.  She seems to be continually improving.  Case was discussed with her nurse and we will try to transition her from intravenous Dilaudid to Percocet.  Patient is afebrile, on room air, hemodynamically stable.  No family is present at this time.    Objective/physical exam:  General:  Elderly  female in no acute distress  HENT: normocephalic, atraumatic  Eye: PERRL, EOMI, clear conjunctiva  Neck: full ROM, no thyromegaly, no JVD  Respiratory: clear to auscultation bilaterally  Cardiovascular: regular rate and rhythm  Gastrointestinal: non-distended, positive bowel  sounds, non-tender  Musculoskeletal:  Generalized muscular atrophy  Integumentary: warm, dry, intact, no rashes  Neurological: cranial nerves grossly intact, no focal neurological deficit  Psychiatric: cooperative, flat affect      VITAL SIGNS: 24 HRS MIN & MAX LAST   Temp  Min: 98.2 °F (36.8 °C)  Max: 99 °F (37.2 °C) 98.2 °F (36.8 °C)   BP  Min: 143/63  Max: 171/54 (!) 162/73   Pulse  Min: 65  Max: 74  69   Resp  Min: 16  Max: 22 (!) 22   SpO2  Min: 96 %  Max: 100 % 99 %     I have reviewed the following labs:    Recent Labs   Lab 06/22/23  0639 06/23/23  0447 06/26/23  0629   WBC 7.16 6.74 5.46   RBC 3.34* 3.39* 3.15*   HGB 10.2* 10.3* 9.5*   HCT 30.2* 31.4* 29.3*   MCV 90.4 92.6 93.0   MCH 30.5 30.4 30.2   MCHC 33.8 32.8* 32.4*   RDW 13.6 13.7 13.7    345 330   MPV 9.1 9.4 9.6       Recent Labs   Lab 06/21/23  1530 06/21/23  1624 06/22/23  0445 06/23/23  0447 06/24/23  0709 06/26/23  0629     --  137 136 137 136   K 2.6*  --  3.5 3.0* 3.9 3.5   CO2 25  --  27 26 26 24   BUN 15.4  --  11.8 17.9 19.5 15.3   CREATININE 1.73*  --  1.07* 0.92 0.80 0.69   CALCIUM 10.1  --  8.6 8.8 9.1 9.1   MG  --  1.40* 1.90  --   --   --    ALBUMIN 3.5  --  2.9*  --   --   --    ALKPHOS 115  --  106  --   --   --    ALT 36  --  31  --   --   --    AST 46*  --  39*  --   --   --    BILITOT 0.6  --  0.6  --   --   --           Microbiology Results (last 7 days)       Procedure Component Value Units Date/Time    Blood Culture [[id number]]  (Normal) Collected: 06/22/23 0639    Order Status: Completed Specimen: Blood Updated: 06/27/23 0800     CULTURE, BLOOD ([facility]) No Growth at 5 days    Blood Culture [[id number]]  (Normal) Collected: 06/22/23 0121    Order Status: Completed Specimen: Blood Updated: 06/27/23 0500     CULTURE, BLOOD ([facility]) No Growth at 5 days    Blood Culture [[id number]]  (Normal) Collected: 06/22/23 1606    Order Status: Completed Specimen: Blood from Arm, Right Updated: 06/26/23 1700     CULTURE, BLOOD ([facility]) No  Growth At 96 Hours    Blood Culture [[id number]]  (Normal) Collected: 06/22/23 1606    Order Status: Completed Specimen: Blood from Arm, Left Updated: 06/26/23 1700     CULTURE, BLOOD ([facility]) No Growth At 96 Hours    Urine culture [[id number]]  (Abnormal) Collected: 06/21/23 1704    Order Status: Completed Specimen: Urine Updated: 06/23/23 0705     Urine Culture Multiple organisms present indicate probable contamination. Recommend recollection.      >/= 100,000 colonies/ml - Three different species of Gram-negative Rods             See below for Radiology    Scheduled Med:   enoxparin  40 mg Subcutaneous Q24H (prophylaxis, 1700)    fidaxomicin  200 mg Oral BID    mupirocin   Nasal BID        Continuous Infusions:   Amino acid 4.25% - dextrose 5% (CLINIMIX-E) solution (1L provides 42.5 gm AA, 50 gm CHO (170 kcal/L dextrose), Na 35, K 30, Mg 5, Ca 4.5, Acetate 70, Cl 39, Phos 15) 88 mL/hr at 06/26/23 2052    Amino acid 4.25% - dextrose 5% (CLINIMIX-E) solution (1L provides 42.5 gm AA, 50 gm CHO (170 kcal/L dextrose), Na 35, K 30, Mg 5, Ca 4.5, Acetate 70, Cl 39, Phos 15)          PRN Meds:  acetaminophen, acetaminophen, aluminum-magnesium hydroxide-simethicone, cloNIDine, hydrALAZINE, HYDROmorphone, labetalol, melatonin, ondansetron, prochlorperazine, sodium chloride 0.9%       Assessment/Plan:  Failure to thrive  Anorexia   Unresolved Clostridium difficile colitis, noncompliant with oral vancomycin  Recently diagnosed stage III pancreatic cancer  Hypertensive urgency, resolved   Anemia of chronic disease  Acute kidney injury, resolved  Crohn's disease on Entyvio every 6 weeks status post right hemicolectomy  Bacteriuria and pyuria due to contaminated urine culture      Plan:  Continue Clinimix. Case was discussed with registered dietitian couple of days ago.    Appreciate Gastroenterology evaluation.  Recommendations noted to continue oral Dificid     Appreciate medical oncology evaluation.  Patient is not a surgical  candidate per Surgical Oncology.  She may be a candidate for palliative chemotherapy and radiation after she clears her current infection.    Requested physical therapy evaluation to help determine disposition      VTE prophylaxis:  Lovenox  Blood  Patient condition:  Stable    Anticipated discharge and Disposition:     Home tomorrow    All diagnosis and differential diagnosis have been reviewed; assessment and plan has been documented; I have personally reviewed the labs and test results that are presently available; I have reviewed the patients medication list; I have reviewed the consulting providers response and recommendations. I have reviewed or attempted to review medical records based upon their availability    All of the patient's questions have been  addressed and answered. Patient's is agreeable to the above stated plan. I will continue to monitor closely and make adjustments to medical management as needed.  _____________________________________________________________________      Radiology:  I have personally reviewed the following imaging and agree with the radiologist.     CT Abdomen Pelvis  Without Contrast  Narrative: EXAMINATION:  CT ABDOMEN PELVIS WITHOUT CONTRAST    CLINICAL HISTORY:  Epigastric pain;    TECHNIQUE:  Low dose axial images, sagittal and coronal reformations were obtained from the lung bases to the pubic symphysis.  No contrast was administered.  Automatic exposure control is utilized to reduce patient radiation exposure.    COMPARISON:  06/09/2023    FINDINGS:  The lung bases are clear.    The liver appears normal.  No obvious liver mass or lesion is seen.    The patient is status post cholecystectomy.  The pancreas is seen in the pancreatic duct appears to be dilated.  This was seen on the prior examination as well.  Mild peripancreatic inflammatory changes are seen.  Similar changes were seen on the prior examination.    Spleen appears normal and adrenal glands appear normal.   No adrenal nodule is seen.    No nephrolithiasis is seen.  No hydronephrosis is seen.  No hydroureter is seen.  No ureteral stone is seen.    The colonic wall is thickened and the transverse colon and the descending colon and there is some inflammatory changes around it.  Colitis cannot be excluded.  Postsurgical changes are seen in the transverse colon.    No free air seen.  No free fluid is seen.  The urinary bladder appears normal.    Bones show no acute abnormality.  Impression: Dilated pancreatic duct and some inflammatory changes around the pancreas possibly representing pancreatitis.  Similar changes were seen on the prior examination    Some colonic wall thickening seen in the distal transverse and the descending colon possibly representing a colitis.    Electronically signed by: Mary Cool  Date:    06/21/2023  Time:    16:57  X-Ray Chest AP Portable  Narrative: EXAMINATION:  XR CHEST AP PORTABLE    CLINICAL HISTORY:  epigastric pain;    TECHNIQUE:  Single view of the chest    COMPARISON:  06/09/2023    FINDINGS:  No focal opacification, pleural effusion, or pneumothorax.    The cardiomediastinal silhouette is within normal limits.    Left-sided MediPort remains.    No acute osseous abnormality.  Impression: No acute cardiopulmonary process.    Electronically signed by: Darren Montoya  Date:    06/21/2023  Time:    15:33      Trevor Joyce MD   06/27/2023

## 2023-06-28 PROCEDURE — 63600175 PHARM REV CODE 636 W HCPCS: Performed by: INTERNAL MEDICINE

## 2023-06-28 PROCEDURE — 25000003 PHARM REV CODE 250: Performed by: FAMILY MEDICINE

## 2023-06-28 PROCEDURE — 21400001 HC TELEMETRY ROOM

## 2023-06-28 PROCEDURE — 63600175 PHARM REV CODE 636 W HCPCS: Performed by: NURSE PRACTITIONER

## 2023-06-28 PROCEDURE — 25000003 PHARM REV CODE 250: Performed by: INTERNAL MEDICINE

## 2023-06-28 PROCEDURE — 94761 N-INVAS EAR/PLS OXIMETRY MLT: CPT

## 2023-06-28 RX ORDER — OXYCODONE AND ACETAMINOPHEN 10; 325 MG/1; MG/1
1 TABLET ORAL EVERY 6 HOURS PRN
Qty: 28 TABLET | Refills: 0 | Status: SHIPPED | OUTPATIENT
Start: 2023-06-28 | End: 2023-07-05

## 2023-06-28 RX ADMIN — HYDROMORPHONE HYDROCHLORIDE 0.5 MG: 2 INJECTION INTRAMUSCULAR; INTRAVENOUS; SUBCUTANEOUS at 08:06

## 2023-06-28 RX ADMIN — ENOXAPARIN SODIUM 40 MG: 40 INJECTION SUBCUTANEOUS at 05:06

## 2023-06-28 RX ADMIN — HYDROMORPHONE HYDROCHLORIDE 0.5 MG: 2 INJECTION INTRAMUSCULAR; INTRAVENOUS; SUBCUTANEOUS at 05:06

## 2023-06-28 RX ADMIN — FIDAXOMICIN 200 MG: 200 TABLET, FILM COATED ORAL at 09:06

## 2023-06-28 RX ADMIN — ONDANSETRON 4 MG: 2 INJECTION INTRAMUSCULAR; INTRAVENOUS at 11:06

## 2023-06-28 RX ADMIN — MUPIROCIN: 20 OINTMENT TOPICAL at 08:06

## 2023-06-28 RX ADMIN — HYDROMORPHONE HYDROCHLORIDE 0.5 MG: 2 INJECTION INTRAMUSCULAR; INTRAVENOUS; SUBCUTANEOUS at 01:06

## 2023-06-28 RX ADMIN — FIDAXOMICIN 200 MG: 200 TABLET, FILM COATED ORAL at 08:06

## 2023-06-28 RX ADMIN — ONDANSETRON 4 MG: 2 INJECTION INTRAMUSCULAR; INTRAVENOUS at 06:06

## 2023-06-28 RX ADMIN — HYDROMORPHONE HYDROCHLORIDE 0.5 MG: 2 INJECTION INTRAMUSCULAR; INTRAVENOUS; SUBCUTANEOUS at 11:06

## 2023-06-28 RX ADMIN — HYDROMORPHONE HYDROCHLORIDE 0.5 MG: 2 INJECTION INTRAMUSCULAR; INTRAVENOUS; SUBCUTANEOUS at 06:06

## 2023-06-28 RX ADMIN — ONDANSETRON 4 MG: 2 INJECTION INTRAMUSCULAR; INTRAVENOUS at 05:06

## 2023-06-28 RX ADMIN — Medication 6 MG: at 09:06

## 2023-06-28 NOTE — PLAN OF CARE
CM consult received to F/U on the following medication before patient is discharged: Dificid 200 mg PO bid x10 days with end date 7/2. Called Dr. Tuan Jolley's office ([phone]) to request that the medication be called in to [facility] to ensure coverage by patient's insurance.Spoke to SHEILA Noel who requests that the [facility] be faxed [facility]

## 2023-06-28 NOTE — PROGRESS NOTES
Infectious Diseases Progress Note  83 y/o female with Hx of CAD/MI, HLD, Crohn's disease and recently diagnosed pancreatic CA who presented to ER on 6/21 with abdominal pain with vomiting and diarrhea. On admit she was afebrile with leukocytosis, WBC 12.8. She was noted to be in GERBER with Crea 1.73, U/A with 21-50 WBC, 0-2 RBC, large LE, moderate bacteria, negative nitrites. Urine culture with >100k GNR, 3 different isolates. CXR unremarkable. CT abdomen/pelvis without contrast showed dilated pancreatic duct and some inflammatory changes around the pancreas possibly representing pancreatitis, similar changes were seen on the prior examination, some colonic wall thickening seen in the distal transverse and the descending colon possibly representing a colitis. Blood cultures negative thus far. ESR 63 and CRP 45.4. Of note she was recently admitted here with C-Diff colitis. She was discharged on oral Vancomycin.   She is currently on Dificid        Subjective:  Lying in bed in no acute distress. No new complaints voiced. Low grade temp. Granddaughter present    ROS  Constitutional:  Positive for malaise/fatigue.   HENT: Negative.     Eyes: Negative.    Respiratory: Negative.     Cardiovascular: Negative.    Gastrointestinal:  Positive for abdominal pain  Genitourinary: Negative.    Musculoskeletal: Negative.    Skin: Negative.    Neurological: Negative.    All other Systems review done and negative.    Review of patient's allergies indicates:  No Known Allergies    Past Medical History:   Diagnosis Date    Acute pancreatitis, unspecified complication status, unspecified pancreatitis type     Arthritis     Carpal tunnel syndrome, bilateral     Cervical cancer     Cervical radiculopathy     Crohn disease     Heart attack     HLD (hyperlipidemia)     Low back pain     Sleep apnea, unspecified     Spinal stenosis of lumbar region with neurogenic claudication        Past Surgical History:   Procedure Laterality Date     bilateral L4-5, L5-S1 decompression, repair L5-S1 dural tear  10/01/2021    Dr. Marin    CARPAL TUNNEL RELEASE Right 09/06/2019    Dr. Marin    CARPAL TUNNEL RELEASE Left 12/2/2022    Procedure: RELEASE, CARPAL TUNNEL;  Surgeon: Jesse Marin MD;  Location: [facility] OR;  Service: Neurosurgery;  Laterality: Left;    CHOLECYSTECTOMY      COLECTOMY  07/2011    partial x3    ESOPHAGOGASTRODUODENOSCOPY N/A 6/8/2023    Procedure: EGD (ESOPHAGOGASTRODUODENOSCOPY);  Surgeon: Jose Alberto Max MD;  Location: [facility] OR;  Service: Gastroenterology;  Laterality: N/A;    HYSTERECTOMY      total    REPAIR OF EYELID Bilateral 11/21/2022    Procedure: BILATERAL ECTROPION REPAIR;  Surgeon: Justin Flores MD;  Location: [facility] OR;  Service: ENT;  Laterality: Bilateral;    ULTRASOUND, ENDOSCOPIC, WITH FINE NEEDLE ASPIRATION N/A 6/8/2023    Procedure: UPPER EUS  W/  FNA;  Surgeon: Jose Alberto Max MD;  Location: [facility] OR;  Service: Gastroenterology;  Laterality: N/A;  Dangelo, pt w/ CD on entyvio recently admitted to [facility] w/ acute pancreatitis. Possibly drug induced from entyvio? Althoughshe does have dilated PD and a poss focal stricture in the HOP.CA 19-9 nml       Social History     Socioeconomic History    Marital status:    Tobacco Use    Smoking status: Former     Types: Cigarettes    Smokeless tobacco: Never   Substance and Sexual Activity    Alcohol use: Yes     Comment: rarely    Drug use: Never    Sexual activity: Not Currently     Social Determinants of Health     Social Connections: Unknown    Attends [employer] Services: More than 4 times per year    Active Member of Clubs or Organizations: Yes    Attends Club or Organization Meetings: More than 4 times per year    Marital Status:          Scheduled Meds:   enoxparin  40 mg Subcutaneous Q24H (prophylaxis, 1700)    fidaxomicin  200 mg Oral BID    mupirocin   Nasal BID     Continuous Infusions:  PRN Meds:acetaminophen, acetaminophen, aluminum-magnesium  "hydroxide-simethicone, cloNIDine, hydrALAZINE, HYDROmorphone, labetalol, melatonin, ondansetron, oxyCODONE-acetaminophen, prochlorperazine, sodium chloride 0.9%    Objective:  BP (!) 173/73   Pulse 72   Temp 98.6 °F (37 °C) (Oral)   Resp (!) 22   Ht 5' 7.01" (1.702 m)   Wt 65.8 kg (145 lb)   SpO2 99%   BMI 22.70 kg/m²     Physical Exam:   Physical Exam  Vitals reviewed.   HENT:      Head: Normocephalic.   Cardiovascular:      Rate and Rhythm: Normal rate and regular rhythm.   Pulmonary:      Effort: Pulmonary effort is normal. No respiratory distress.      Breath sounds: Normal breath sounds. No wheezing.   Abdominal:      General: Bowel sounds are normal. There is no distension.      Palpations: Abdomen is soft.      Tenderness: There is no abdominal tenderness.   Musculoskeletal:         General: Normal range of motion.      Cervical back: Normal range of motion.   Skin:     Findings: No rash   Neurological:      Mental Status: She is alert and oriented to person, place, and time.   Psychiatric:         Mood and Affect: Mood normal.         Behavior: Behavior normal.     Imaging      Lab Review   No results found for this or any previous visit (from the past 24 hour(s)).      Assessment/Plan:  C-Diff colitis  Pyuria / bacteriuria  Leukocytosis  GERBER  Stage III Pancreatic CA  Crohn's disease with R hemicolectomy  Anemia     -Continue Dificid #6/10  -Low grade fevers and no leukocytosis  -6/22 Blood cultures negative   -U/A abnormal with urine culture with >100k GNR, 3 different isolates. Likely a contaminant  -Oncology on board, inputs noted including plans for possible chemotherapy once infection cleared  -Maintain strict contact isolation for c-diff  -Discussed with patient and nursing. CM to ensure medication is covered prior to discharge. Disposition per primary team        "

## 2023-06-28 NOTE — PLAN OF CARE
Informed patient of an $80.00 co-pay through her insurance and she states she had to pay her insurance premiums this month and that she cannot afford to pay the co-pay. I questioned whether a family member could assists with the costs of the co-pay however, she states that there is no one to pay the 80.00 co-pay. June Henson RN (Director of Case Management) notified of the above and she has granted approval for the costs of the $80.00 co-pay by [facility].

## 2023-06-28 NOTE — PROGRESS NOTES
[facility] Hospital Medicine Progress Note        Chief Complaint: Inpatient follow-up on Clostridium difficile colitis    HPI:   Ms. Proctor is an 82 year old female with a pmh of CAD, HLD, Crohn's disease RA, cervical cancer, DAQUAN who presented to [facility] ED with complaints of abdominal pain, nausea, vomiting.  Daughter at the bedside states this has been going on for months, and was just recently diagnosed with pancreatic cancer.  She also states that the patient was diagnosed with C diff and has not been compliant with her oral vancomycin.     Today's ED lab work revealed WBC 12.89, K 2.6, creatinine 1.73, glucose 161, Mag 1.4.  UA was contaminated.  CXR showed no acute cardiopulmonary process.  CT abdomen pelvis without contrast showed dilated pancreatic duct and some inflammatory changes around the pancreas possibly representing pancreatitis.  Similar changes seen on the prior exam.  Some colonic wall thickening seen in the distal transverse and the descending colon possibly representing colitis.  ED vitals:  Temp 98.6° F, pulse 88, resp 20, /82, SpO2 98% on RA.  While in the ED, she became markedly hypertensive  Potassium was replaced, she was given Flagyl and Cipro, and transferred to [facility]. She was admitted to hospital medicine for management.      Interval Hx:   Patient is awake and sitting up in bed.  She seems to be continually improving.  Case was discussed with her nurse and we will try to transition her from intravenous Dilaudid to Percocet.  Patient is afebrile, on room air, hemodynamically stable.  No family is present at this time.    Objective/physical exam:  General:  Elderly  female in no acute distress  HENT: normocephalic, atraumatic  Eye: PERRL, EOMI, clear conjunctiva  Neck: full ROM, no thyromegaly, no JVD  Respiratory: clear to auscultation bilaterally  Cardiovascular: regular rate and rhythm  Gastrointestinal: non-distended, positive bowel  sounds, non-tender  Musculoskeletal:  Generalized muscular atrophy  Integumentary: warm, dry, intact, no rashes  Neurological: cranial nerves grossly intact, no focal neurological deficit  Psychiatric: cooperative, flat affect      VITAL SIGNS: 24 HRS MIN & MAX LAST   Temp  Min: 98.1 °F (36.7 °C)  Max: 99.3 °F (37.4 °C) 98.4 °F (36.9 °C)   BP  Min: 114/69  Max: 175/74 126/62   Pulse  Min: 60  Max: 77  60   Resp  Min: 13  Max: 20 18   SpO2  Min: 96 %  Max: 99 % 98 %     I have reviewed the following labs:    Recent Labs   Lab 06/22/23  0639 06/23/23  0447 06/26/23  0629   WBC 7.16 6.74 5.46   RBC 3.34* 3.39* 3.15*   HGB 10.2* 10.3* 9.5*   HCT 30.2* 31.4* 29.3*   MCV 90.4 92.6 93.0   MCH 30.5 30.4 30.2   MCHC 33.8 32.8* 32.4*   RDW 13.6 13.7 13.7    345 330   MPV 9.1 9.4 9.6       Recent Labs   Lab 06/21/23  1530 06/21/23  1624 06/22/23  0445 06/23/23  0447 06/24/23  0709 06/26/23  0629     --  137 136 137 136   K 2.6*  --  3.5 3.0* 3.9 3.5   CO2 25  --  27 26 26 24   BUN 15.4  --  11.8 17.9 19.5 15.3   CREATININE 1.73*  --  1.07* 0.92 0.80 0.69   CALCIUM 10.1  --  8.6 8.8 9.1 9.1   MG  --  1.40* 1.90  --   --   --    ALBUMIN 3.5  --  2.9*  --   --   --    ALKPHOS 115  --  106  --   --   --    ALT 36  --  31  --   --   --    AST 46*  --  39*  --   --   --    BILITOT 0.6  --  0.6  --   --   --           Microbiology Results (last 7 days)       Procedure Component Value Units Date/Time    Blood Culture [[id number]]  (Normal) Collected: 06/22/23 1606    Order Status: Completed Specimen: Blood from Arm, Right Updated: 06/27/23 1701     CULTURE, BLOOD ([facility]) No Growth at 5 days    Blood Culture [[id number]]  (Normal) Collected: 06/22/23 1606    Order Status: Completed Specimen: Blood from Arm, Left Updated: 06/27/23 1701     CULTURE, BLOOD ([facility]) No Growth at 5 days    Blood Culture [[id number]]  (Normal) Collected: 06/22/23 0639    Order Status: Completed Specimen: Blood Updated: 06/27/23 0800     CULTURE, BLOOD  ([facility]) No Growth at 5 days    Blood Culture [[id number]]  (Normal) Collected: 06/22/23 0121    Order Status: Completed Specimen: Blood Updated: 06/27/23 0500     CULTURE, BLOOD ([facility]) No Growth at 5 days    Urine culture [[id number]]  (Abnormal) Collected: 06/21/23 1704    Order Status: Completed Specimen: Urine Updated: 06/23/23 0705     Urine Culture Multiple organisms present indicate probable contamination. Recommend recollection.      >/= 100,000 colonies/ml - Three different species of Gram-negative Rods             See below for Radiology    Scheduled Med:   enoxparin  40 mg Subcutaneous Q24H (prophylaxis, 1700)    fidaxomicin  200 mg Oral BID    mupirocin   Nasal BID        Continuous Infusions:         PRN Meds:  acetaminophen, acetaminophen, aluminum-magnesium hydroxide-simethicone, cloNIDine, hydrALAZINE, HYDROmorphone, labetalol, melatonin, ondansetron, oxyCODONE-acetaminophen, prochlorperazine, sodium chloride 0.9%       Assessment/Plan:  Failure to thrive  Anorexia   Unresolved Clostridium difficile colitis, noncompliant with oral vancomycin, currently on Dificid  Recently diagnosed stage III pancreatic cancer  Hypertensive urgency, resolved   Anemia of chronic disease  Acute kidney injury, resolved  Crohn's disease status post right hemicolectomy, on Entyvio every 6 weeks   Bacteriuria and pyuria due to contaminated urine culture      Plan:  Patient was slated to be discharged home today however nursing staff later contacted me reporting the patient is slated for some sort of GI procedure tomorrow    Continue oral Dificid per gastroenterology recommendations.      Appreciate medical oncology evaluation.  Patient is not a surgical candidate per Surgical Oncology.  She may be a candidate for palliative chemotherapy and radiation after she clears her current infection.    Continue Clinimix and other supportive care      VTE prophylaxis:  Lovenox    Patient condition:  Stable    Anticipated discharge and  Disposition:     Possibly home tomorrow after patient's GI procedure    All diagnosis and differential diagnosis have been reviewed; assessment and plan has been documented; I have personally reviewed the labs and test results that are presently available; I have reviewed the patients medication list; I have reviewed the consulting providers response and recommendations. I have reviewed or attempted to review medical records based upon their availability    All of the patient's questions have been  addressed and answered. Patient's is agreeable to the above stated plan. I will continue to monitor closely and make adjustments to medical management as needed.  _____________________________________________________________________      Radiology:  I have personally reviewed the following imaging and agree with the radiologist.     CT Abdomen Pelvis  Without Contrast  Narrative: EXAMINATION:  CT ABDOMEN PELVIS WITHOUT CONTRAST    CLINICAL HISTORY:  Epigastric pain;    TECHNIQUE:  Low dose axial images, sagittal and coronal reformations were obtained from the lung bases to the pubic symphysis.  No contrast was administered.  Automatic exposure control is utilized to reduce patient radiation exposure.    COMPARISON:  06/09/2023    FINDINGS:  The lung bases are clear.    The liver appears normal.  No obvious liver mass or lesion is seen.    The patient is status post cholecystectomy.  The pancreas is seen in the pancreatic duct appears to be dilated.  This was seen on the prior examination as well.  Mild peripancreatic inflammatory changes are seen.  Similar changes were seen on the prior examination.    Spleen appears normal and adrenal glands appear normal.  No adrenal nodule is seen.    No nephrolithiasis is seen.  No hydronephrosis is seen.  No hydroureter is seen.  No ureteral stone is seen.    The colonic wall is thickened and the transverse colon and the descending colon and there is some inflammatory changes around  it.  Colitis cannot be excluded.  Postsurgical changes are seen in the transverse colon.    No free air seen.  No free fluid is seen.  The urinary bladder appears normal.    Bones show no acute abnormality.  Impression: Dilated pancreatic duct and some inflammatory changes around the pancreas possibly representing pancreatitis.  Similar changes were seen on the prior examination    Some colonic wall thickening seen in the distal transverse and the descending colon possibly representing a colitis.    Electronically signed by: Mary Cool  Date:    06/21/2023  Time:    16:57  X-Ray Chest AP Portable  Narrative: EXAMINATION:  XR CHEST AP PORTABLE    CLINICAL HISTORY:  epigastric pain;    TECHNIQUE:  Single view of the chest    COMPARISON:  06/09/2023    FINDINGS:  No focal opacification, pleural effusion, or pneumothorax.    The cardiomediastinal silhouette is within normal limits.    Left-sided MediPort remains.    No acute osseous abnormality.  Impression: No acute cardiopulmonary process.    Electronically signed by: Darren Montoya  Date:    06/21/2023  Time:    15:33      Trevor Joyce MD   06/28/2023

## 2023-06-28 NOTE — PLAN OF CARE
06/28/23 0908   Final Note   Assessment Type Final Discharge Note   Anticipated Discharge Disposition Home-Health  (Patient will be followed by ([facility]) for () services. All clinical notes and updates were sent via [email or web address] for review.)   Hospital Resources/Appts/Education Provided Appointments scheduled and added to AVS   Post-Acute Status   Post-Acute Authorization Home Health   Home Health Status Set-up Complete/Auth obtained   Coverage Payor:  MEDICARE - MEDICARE PART A & B   Patient choice form signed by patient/caregiver List with quality metrics by geographic area provided   Discharge Delays None known at this time     Patient will be D/C'd to her private residence with () services through [facility] as she was active with this agency prior to admission here at [facility]. She will continue with Dificid 200 mg (PO) BID x10 days; end date: 7/2/2023. The $80.00 co-pay will be covered by [facility] through our retail pharmacy as patient is unable to afford the costs of her co-pay. This was approved by June Henson RN (Care Manager Director).Her daughter: Campbell Proctor was notified of the above and states she has work today but can  her Mom after 2:00 PM.

## 2023-06-29 ENCOUNTER — TELEPHONE (OUTPATIENT)
Dept: HEMATOLOGY/ONCOLOGY | Facility: CLINIC | Age: 83
End: 2023-06-29
Payer: MEDICARE

## 2023-06-29 ENCOUNTER — ANESTHESIA EVENT (OUTPATIENT)
Dept: SURGERY | Facility: HOSPITAL | Age: 83
DRG: 372 | End: 2023-06-29
Payer: MEDICARE

## 2023-06-29 ENCOUNTER — ANESTHESIA (OUTPATIENT)
Dept: SURGERY | Facility: HOSPITAL | Age: 83
DRG: 372 | End: 2023-06-29
Payer: MEDICARE

## 2023-06-29 VITALS
RESPIRATION RATE: 14 BRPM | TEMPERATURE: 98 F | OXYGEN SATURATION: 95 % | WEIGHT: 145 LBS | SYSTOLIC BLOOD PRESSURE: 150 MMHG | BODY MASS INDEX: 22.76 KG/M2 | HEART RATE: 62 BPM | DIASTOLIC BLOOD PRESSURE: 70 MMHG | HEIGHT: 67 IN

## 2023-06-29 LAB
ALBUMIN SERPL-MCNC: 2.9 G/DL (ref 3.4–4.8)
ALBUMIN/GLOB SERPL: 1 RATIO (ref 1.1–2)
ALP SERPL-CCNC: 115 UNIT/L (ref 40–150)
ALT SERPL-CCNC: 24 UNIT/L (ref 0–55)
AST SERPL-CCNC: 24 UNIT/L (ref 5–34)
BASOPHILS # BLD AUTO: 0.04 X10(3)/MCL
BASOPHILS NFR BLD AUTO: 0.7 %
BILIRUBIN DIRECT+TOT PNL SERPL-MCNC: 0.3 MG/DL
BUN SERPL-MCNC: 17.7 MG/DL (ref 9.8–20.1)
CALCIUM SERPL-MCNC: 8.9 MG/DL (ref 8.4–10.2)
CHLORIDE SERPL-SCNC: 107 MMOL/L (ref 98–107)
CO2 SERPL-SCNC: 24 MMOL/L (ref 23–31)
CREAT SERPL-MCNC: 0.73 MG/DL (ref 0.55–1.02)
EOSINOPHIL # BLD AUTO: 0.15 X10(3)/MCL (ref 0–0.9)
EOSINOPHIL NFR BLD AUTO: 2.7 %
ERYTHROCYTE [DISTWIDTH] IN BLOOD BY AUTOMATED COUNT: 14 % (ref 11.5–17)
GFR SERPLBLD CREATININE-BSD FMLA CKD-EPI: >60 MLS/MIN/1.73/M2
GLOBULIN SER-MCNC: 3 GM/DL (ref 2.4–3.5)
GLUCOSE SERPL-MCNC: 89 MG/DL (ref 82–115)
HCT VFR BLD AUTO: 32.3 % (ref 37–47)
HGB BLD-MCNC: 10.2 G/DL (ref 12–16)
IMM GRANULOCYTES # BLD AUTO: 0.02 X10(3)/MCL (ref 0–0.04)
IMM GRANULOCYTES NFR BLD AUTO: 0.4 %
LYMPHOCYTES # BLD AUTO: 1.59 X10(3)/MCL (ref 0.6–4.6)
LYMPHOCYTES NFR BLD AUTO: 28.2 %
MCH RBC QN AUTO: 30.6 PG (ref 27–31)
MCHC RBC AUTO-ENTMCNC: 31.6 G/DL (ref 33–36)
MCV RBC AUTO: 97 FL (ref 80–94)
MONOCYTES # BLD AUTO: 0.55 X10(3)/MCL (ref 0.1–1.3)
MONOCYTES NFR BLD AUTO: 9.8 %
NEUTROPHILS # BLD AUTO: 3.29 X10(3)/MCL (ref 2.1–9.2)
NEUTROPHILS NFR BLD AUTO: 58.2 %
NRBC BLD AUTO-RTO: 0 %
PLATELET # BLD AUTO: 247 X10(3)/MCL (ref 130–400)
PMV BLD AUTO: 11.2 FL (ref 7.4–10.4)
POTASSIUM SERPL-SCNC: 4.2 MMOL/L (ref 3.5–5.1)
PROT SERPL-MCNC: 5.9 GM/DL (ref 5.8–7.6)
RBC # BLD AUTO: 3.33 X10(6)/MCL (ref 4.2–5.4)
SODIUM SERPL-SCNC: 138 MMOL/L (ref 136–145)
WBC # SPEC AUTO: 5.64 X10(3)/MCL (ref 4.5–11.5)

## 2023-06-29 PROCEDURE — 43253 EGD US TRANSMURAL INJXN/MARK: CPT | Performed by: INTERNAL MEDICINE

## 2023-06-29 PROCEDURE — 63600175 PHARM REV CODE 636 W HCPCS: Performed by: NURSE ANESTHETIST, CERTIFIED REGISTERED

## 2023-06-29 PROCEDURE — C1726 CATH, BAL DIL, NON-VASCULAR: HCPCS | Performed by: INTERNAL MEDICINE

## 2023-06-29 PROCEDURE — 25000003 PHARM REV CODE 250: Performed by: NURSE ANESTHETIST, CERTIFIED REGISTERED

## 2023-06-29 PROCEDURE — 37000009 HC ANESTHESIA EA ADD 15 MINS: Performed by: INTERNAL MEDICINE

## 2023-06-29 PROCEDURE — 25000003 PHARM REV CODE 250: Performed by: NURSE PRACTITIONER

## 2023-06-29 PROCEDURE — 80053 COMPREHEN METABOLIC PANEL: CPT | Performed by: INTERNAL MEDICINE

## 2023-06-29 PROCEDURE — 63600175 PHARM REV CODE 636 W HCPCS: Performed by: INTERNAL MEDICINE

## 2023-06-29 PROCEDURE — D9220A PRA ANESTHESIA: Mod: ANES,,, | Performed by: ANESTHESIOLOGY

## 2023-06-29 PROCEDURE — D9220A PRA ANESTHESIA: Mod: CRNA,,, | Performed by: NURSE ANESTHETIST, CERTIFIED REGISTERED

## 2023-06-29 PROCEDURE — D9220A PRA ANESTHESIA: ICD-10-PCS | Mod: CRNA,,, | Performed by: NURSE ANESTHETIST, CERTIFIED REGISTERED

## 2023-06-29 PROCEDURE — 85025 COMPLETE CBC W/AUTO DIFF WBC: CPT | Performed by: INTERNAL MEDICINE

## 2023-06-29 PROCEDURE — D9220A PRA ANESTHESIA: ICD-10-PCS | Mod: ANES,,, | Performed by: ANESTHESIOLOGY

## 2023-06-29 PROCEDURE — 63600175 PHARM REV CODE 636 W HCPCS: Performed by: NURSE PRACTITIONER

## 2023-06-29 PROCEDURE — 37000008 HC ANESTHESIA 1ST 15 MINUTES: Performed by: INTERNAL MEDICINE

## 2023-06-29 RX ORDER — GLYCOPYRROLATE 0.2 MG/ML
INJECTION INTRAMUSCULAR; INTRAVENOUS
Status: DISCONTINUED | OUTPATIENT
Start: 2023-06-29 | End: 2023-06-29

## 2023-06-29 RX ORDER — ALPRAZOLAM 0.25 MG/1
0.25 TABLET ORAL ONCE
Status: COMPLETED | OUTPATIENT
Start: 2023-06-29 | End: 2023-06-29

## 2023-06-29 RX ORDER — OXYCODONE AND ACETAMINOPHEN 10; 325 MG/1; MG/1
1 TABLET ORAL EVERY 4 HOURS PRN
Status: DISCONTINUED | OUTPATIENT
Start: 2023-06-29 | End: 2023-06-29 | Stop reason: HOSPADM

## 2023-06-29 RX ORDER — LIDOCAINE HYDROCHLORIDE 10 MG/ML
1 INJECTION, SOLUTION EPIDURAL; INFILTRATION; INTRACAUDAL; PERINEURAL ONCE
Status: DISCONTINUED | OUTPATIENT
Start: 2023-06-29 | End: 2023-06-29 | Stop reason: HOSPADM

## 2023-06-29 RX ORDER — LIDOCAINE HYDROCHLORIDE 20 MG/ML
INJECTION, SOLUTION EPIDURAL; INFILTRATION; INTRACAUDAL; PERINEURAL
Status: DISCONTINUED | OUTPATIENT
Start: 2023-06-29 | End: 2023-06-29

## 2023-06-29 RX ORDER — HEPARIN 100 UNIT/ML
5 SYRINGE INTRAVENOUS ONCE
Status: COMPLETED | OUTPATIENT
Start: 2023-06-29 | End: 2023-06-29

## 2023-06-29 RX ORDER — PHENYLEPHRINE HYDROCHLORIDE 10 MG/ML
INJECTION INTRAVENOUS
Status: DISCONTINUED | OUTPATIENT
Start: 2023-06-29 | End: 2023-06-29

## 2023-06-29 RX ORDER — HYDROMORPHONE HYDROCHLORIDE 2 MG/ML
0.5 INJECTION, SOLUTION INTRAMUSCULAR; INTRAVENOUS; SUBCUTANEOUS EVERY 8 HOURS PRN
Status: DISCONTINUED | OUTPATIENT
Start: 2023-06-29 | End: 2023-06-29

## 2023-06-29 RX ORDER — METHYLPREDNISOLONE ACETATE 80 MG/ML
INJECTION, SUSPENSION INTRA-ARTICULAR; INTRALESIONAL; INTRAMUSCULAR; SOFT TISSUE
Status: DISCONTINUED
Start: 2023-06-29 | End: 2023-06-29 | Stop reason: HOSPADM

## 2023-06-29 RX ORDER — PROPOFOL 10 MG/ML
VIAL (ML) INTRAVENOUS CONTINUOUS PRN
Status: DISCONTINUED | OUTPATIENT
Start: 2023-06-29 | End: 2023-06-29

## 2023-06-29 RX ORDER — ONDANSETRON 2 MG/ML
INJECTION INTRAMUSCULAR; INTRAVENOUS
Status: DISCONTINUED | OUTPATIENT
Start: 2023-06-29 | End: 2023-06-29

## 2023-06-29 RX ORDER — SODIUM CHLORIDE, SODIUM GLUCONATE, SODIUM ACETATE, POTASSIUM CHLORIDE AND MAGNESIUM CHLORIDE 30; 37; 368; 526; 502 MG/100ML; MG/100ML; MG/100ML; MG/100ML; MG/100ML
INJECTION, SOLUTION INTRAVENOUS CONTINUOUS
Status: DISCONTINUED | OUTPATIENT
Start: 2023-06-29 | End: 2023-06-29 | Stop reason: HOSPADM

## 2023-06-29 RX ORDER — PROPOFOL 10 MG/ML
VIAL (ML) INTRAVENOUS
Status: DISCONTINUED | OUTPATIENT
Start: 2023-06-29 | End: 2023-06-29

## 2023-06-29 RX ORDER — HYDROMORPHONE HYDROCHLORIDE 2 MG/ML
0.5 INJECTION, SOLUTION INTRAMUSCULAR; INTRAVENOUS; SUBCUTANEOUS EVERY 8 HOURS PRN
Status: DISCONTINUED | OUTPATIENT
Start: 2023-06-29 | End: 2023-06-29 | Stop reason: HOSPADM

## 2023-06-29 RX ORDER — BUPIVACAINE HYDROCHLORIDE 2.5 MG/ML
20 INJECTION, SOLUTION EPIDURAL; INFILTRATION; INTRACAUDAL ONCE
Status: DISCONTINUED | OUTPATIENT
Start: 2023-06-29 | End: 2023-06-29 | Stop reason: HOSPADM

## 2023-06-29 RX ORDER — METHYLPREDNISOLONE ACETATE 80 MG/ML
80 INJECTION, SUSPENSION INTRA-ARTICULAR; INTRALESIONAL; INTRAMUSCULAR; SOFT TISSUE ONCE
Status: DISCONTINUED | OUTPATIENT
Start: 2023-06-29 | End: 2023-06-29 | Stop reason: HOSPADM

## 2023-06-29 RX ADMIN — PHENYLEPHRINE HYDROCHLORIDE 100 MCG: 10 INJECTION INTRAVENOUS at 12:06

## 2023-06-29 RX ADMIN — REMIFENTANIL HYDROCHLORIDE 0.04 MCG/KG/MIN: 1 INJECTION, POWDER, LYOPHILIZED, FOR SOLUTION INTRAVENOUS at 12:06

## 2023-06-29 RX ADMIN — LIDOCAINE HYDROCHLORIDE 40 MG: 20 INJECTION, SOLUTION EPIDURAL; INFILTRATION; INTRACAUDAL; PERINEURAL at 12:06

## 2023-06-29 RX ADMIN — PROPOFOL 100 MG: 10 INJECTION, EMULSION INTRAVENOUS at 12:06

## 2023-06-29 RX ADMIN — SODIUM CHLORIDE: 9 INJECTION, SOLUTION INTRAVENOUS at 12:06

## 2023-06-29 RX ADMIN — GLYCOPYRROLATE 0.1 MG: 0.2 INJECTION INTRAMUSCULAR; INTRAVENOUS at 12:06

## 2023-06-29 RX ADMIN — PROPOFOL 20 MG: 10 INJECTION, EMULSION INTRAVENOUS at 12:06

## 2023-06-29 RX ADMIN — ALPRAZOLAM 0.25 MG: 0.25 TABLET ORAL at 01:06

## 2023-06-29 RX ADMIN — ONDANSETRON 4 MG: 2 INJECTION INTRAMUSCULAR; INTRAVENOUS at 12:06

## 2023-06-29 RX ADMIN — HYDROMORPHONE HYDROCHLORIDE 0.5 MG: 2 INJECTION INTRAMUSCULAR; INTRAVENOUS; SUBCUTANEOUS at 01:06

## 2023-06-29 RX ADMIN — PROPOFOL 75 MCG/KG/MIN: 10 INJECTION, EMULSION INTRAVENOUS at 12:06

## 2023-06-29 RX ADMIN — PROPOFOL 10 MG: 10 INJECTION, EMULSION INTRAVENOUS at 12:06

## 2023-06-29 RX ADMIN — HEPARIN 500 UNITS: 100 SYRINGE at 02:06

## 2023-06-29 RX ADMIN — HYDROMORPHONE HYDROCHLORIDE 0.5 MG: 2 INJECTION INTRAMUSCULAR; INTRAVENOUS; SUBCUTANEOUS at 06:06

## 2023-06-29 NOTE — ANESTHESIA POSTPROCEDURE EVALUATION
Anesthesia Post Evaluation    Patient: Evelyn Proctor    Procedure(s) Performed: Procedure(s) (LRB):  UPPER EUS W/ POSS FNA W/ CELIAC BLOCK (N/A)  EGD (ESOPHAGOGASTRODUODENOSCOPY) (N/A)    Final Anesthesia Type: general      Patient location during evaluation: PACU  Patient participation: Yes- Able to Participate  Level of consciousness: awake and alert  Post-procedure vital signs: reviewed and stable  Pain management: adequate  Airway patency: patent    PONV status at discharge: No PONV, nausea (controlled) and vomiting (controlled)  Anesthetic complications: no      Cardiovascular status: blood pressure returned to baseline and stable  Respiratory status: unassisted  Hydration status: euvolemic  Follow-up not needed.          Vitals Value Taken Time   /70 06/29/23 1331   Temp 36.4 °C (97.5 °F) 06/29/23 1300   Pulse 65 06/29/23 1331   Resp 17 06/29/23 1331   SpO2 95 % 06/29/23 1315   Vitals shown include unvalidated device data.      Event Time   Out of Recovery 06/29/2023 13:31:00         Pain/Natalia Score: Pain Rating Prior to Med Admin: 3 (6/29/2023  7:58 AM)  Pain Rating Post Med Admin: 3 (6/29/2023  7:21 AM)  Natalia Score: 10 (6/29/2023  1:30 PM)

## 2023-06-29 NOTE — PROVATION PATIENT INSTRUCTIONS
Discharge Summary/Instructions after an Endoscopic Procedure  Patient Name: Evelyn Talavera  Patient MRN: [id number]  Patient YOB: 1940 Thursday, June 29, 2023  Jose Alberto Max MD  Dear patient,  As a result of recent federal legislation (The Federal Cures Act), you may   receive lab or pathology results from your procedure in your [facility]   account before your physician is able to contact you. Your physician or   their representative will relay the results to you with their   recommendations at their soonest availability.  Thank you,  RESTRICTIONS:  During your procedure today, you received medications for sedation.  These   medications may affect your judgment, balance and coordination.  Therefore,   for 24 hours, you have the following restrictions:   - DO NOT drive a car, operate machinery, make legal/financial decisions,   sign important papers or drink alcohol.    ACTIVITY:  Today: no heavy lifting, straining or running due to procedural   sedation/anesthesia.  The following day: return to full activity including work.  DIET:  Eat and drink normally unless instructed otherwise.     TREATMENT FOR COMMON SIDE EFFECTS:  - Mild abdominal pain, nausea, belching, bloating or excessive gas:  rest,   eat lightly and use a heating pad.  - Sore Throat: treat with throat lozenges and/or gargle with warm salt   water.  - Because air was used during the procedure, expelling large amounts of air   from your rectum or belching is normal.  - If a bowel prep was taken, you may not have a bowel movement for 1-3 days.    This is normal.  SYMPTOMS TO WATCH FOR AND REPORT TO YOUR PHYSICIAN:  1. Abdominal pain or bloating, other than gas cramps.  2. Chest pain.  3. Back pain.  4. Signs of infection such as: chills or fever occurring within 24 hours   after the procedure.  5. Rectal bleeding, which would show as bright red, maroon, or black stools.   (A tablespoon of blood from the rectum is not serious, especially if    hemorrhoids are present.)  6. Vomiting.  7. Weakness or dizziness.  GO DIRECTLY TO THE NEAREST EMERGENCY ROOM IF YOU HAVE ANY OF THE FOLLOWING:      Difficulty breathing              Chills and/or fever over 101 F   Persistent vomiting and/or vomiting blood   Severe abdominal pain   Severe chest pain   Black, tarry stools   Bleeding- more than one tablespoon   Any other symptom or condition that you feel may need urgent attention  Your doctor recommends these additional instructions:  If any biopsies were taken, your doctors clinic will contact you in 1 to 2   weeks with any results.  - Return patient to hospital du for possible discharge same day.   - Resume previous diet today.   - Continue present medications.   - Observe patient's clinical course.   - Return to referring physician as previously scheduled.   - The findings and recommendations were discussed with the patient and their   family.  For questions, problems or results please call your physician - Jose Alberto Max MD at Work:  [phone].  [facility], EMERGENCY ROOM PHONE NUMBER: [phone]  IF A COMPLICATION OR EMERGENCY SITUATION ARISES AND YOU ARE UNABLE TO REACH   YOUR PHYSICIAN - GO DIRECTLY TO THE EMERGENCY ROOM.  Jose Alberto Max MD  6/29/2023 6:02:32 PM  This report has been verified and signed electronically.  Dear patient,  As a result of recent federal legislation (The Federal Cures Act), you may   receive lab or pathology results from your procedure in your [facility]   account before your physician is able to contact you. Your physician or   their representative will relay the results to you with their   recommendations at their soonest availability.  Thank you,  PROVATION

## 2023-06-29 NOTE — DISCHARGE SUMMARY
[facility]  Discharge Note  Short Stay    Procedure(s) (LRB):  UPPER EUS W/ POSS FNA W/ CELIAC BLOCK (N/A)  EGD (ESOPHAGOGASTRODUODENOSCOPY) (N/A)      OUTCOME: Condition has improved and patient is now ready for discharge.    DISPOSITION: Admitted as an Inpatient; ok for D/C home today    FINAL DIAGNOSIS:  Clostridium difficile infection  -Pancreatic Cancer  -Epigastric pain    FOLLOWUP:  Oncology clinic this afternoon as scheduled.    DISCHARGE INSTRUCTIONS:    Discharge Procedure Orders   SUBSEQUENT HOME HEALTH ORDERS   Order Comments: Arrange Home Health services; eval and treat for all disciplines   PCP:   Marina Morales     Order Specific Question Answer Comments   What Home Health Agency is the patient currently using? Other/External

## 2023-06-29 NOTE — TELEPHONE ENCOUNTER
Patient's daughter requesting call to reschedule missed visit this afternoon. She is a new patient.

## 2023-06-29 NOTE — DISCHARGE SUMMARY
[facility] Medicine Discharge Summary    Admit Date: 6/21/2023  Discharge Date and Time: 6/29/202311:11 AM  Admitting Physician: MEMO Team  Discharging Physician: Caro Guillory MD.  Primary Care Physician: Marina Morales MD      Discharge Diagnoses:  Acute C diff colitis   Recently diagnosed pancreatic cancer   History of CAD   HLD  Crohn's disease   Rheumatoid arthritis   Cervical cancer   Obstructive sleep apnea    Hospital Course:   82-year-old female with significant history of CAD, HLD, Crohn's disease, rheumatoid arthritis, cervical cancer, obstructive sleep apnea was admitted hospitalist medicine service with complaints of abdominal pain, nausea, vomiting.  Symptoms ongoing for months and she was just recently diagnosed with pancreatic cancer.  She was also recently diagnosed with C diff, but was noncompliant with oral vancomycin.  Lab significant for hypokalemia, renal insufficiency, leukocytosis, hypo magnesemia.  Chest x-ray negative.  CT abdomen concerning for pancreatitis on top of pancreatic cancer.  Also concerns for colitis.  Patient was admitted hospitalist medicine service.  Initiated empiric Cipro and Flagyl.  Patient noted to have failure to thrive, anorexia.  Poor oral intake.  Placed on Clinimix.  Gastroenterology evaluated.  Cause of colitis most likely unresolved C diff infection.  Recommended Dificid.  Evaluated by Medical Oncology.  Patient not a surgical candidate per Surgical Oncology.  Medical oncology might consider palliative chemotherapy and radiation once she clears her current infection.  Symptomatically improving on oral Dificid.  Abdominal symptoms improved.  Oral intake better.  Patient was cleared by GI, Medical Oncology for DC.  Infectious Disease also cleared the patient for DC.  They recommended to complete the course for dificid.  Case management was able to arrange rest of the course of Dificid at home.  Affordable for the  patient.  Patient re-evaluated 6/29.  Symptomatically stable.  Oral intake much better.  Since the patient was symptomatically better and was cleared by all specialist was decided to discharge the patient home on fidaxomicin to complete the course for C diff colitis.  Treatment plans discussed with the patient and she voiced understanding everything explained.  Follow-up with Medical Oncology, PCP.      Vitals:  VITAL SIGNS: 24 HRS MIN & MAX LAST   Temp  Min: 97.9 °F (36.6 °C)  Max: 98.7 °F (37.1 °C) 98.7 °F (37.1 °C)   BP  Min: 126/62  Max: 158/73 137/76   Pulse  Min: 60  Max: 67  66   Resp  Min: 15  Max: 20 15   SpO2  Min: 95 %  Max: 98 % 96 %       Physical Exam:  General appearance:  No acute distress  HENT: Atraumatic   Lungs: Clear to auscultation bilaterally.   Heart: RRR,No edema  Abdomen: Soft, non tender   Extremities: warm  Neuro:  Awake, alert, oriented x4  Psych/mental status: Appropriate mood and affect.      Procedures Performed: No admission procedures for hospital encounter.     Significant Diagnostic Studies: See Full reports for all details    Recent Labs   Lab 06/23/23  0447 06/26/23  0629 06/29/23  0532   WBC 6.74 5.46 5.64   RBC 3.39* 3.15* 3.33*   HGB 10.3* 9.5* 10.2*   HCT 31.4* 29.3* 32.3*   MCV 92.6 93.0 97.0*   MCH 30.4 30.2 30.6   MCHC 32.8* 32.4* 31.6*   RDW 13.7 13.7 14.0    330 247   MPV 9.4 9.6 11.2*       Recent Labs   Lab 06/24/23  0709 06/26/23  0629 06/29/23  0532    136 138   K 3.9 3.5 4.2   CO2 26 24 24   BUN 19.5 15.3 17.7   CREATININE 0.80 0.69 0.73   CALCIUM 9.1 9.1 8.9   ALBUMIN  --   --  2.9*   ALKPHOS  --   --  115   ALT  --   --  24   AST  --   --  24   BILITOT  --   --  0.3        Microbiology Results (last 7 days)       Procedure Component Value Units Date/Time    Blood Culture [[id number]]  (Normal) Collected: 06/22/23 1606    Order Status: Completed Specimen: Blood from Arm, Right Updated: 06/27/23 1701     CULTURE, BLOOD ([facility]) No Growth at 5 days    Blood  Culture [[id number]]  (Normal) Collected: 06/22/23 1606    Order Status: Completed Specimen: Blood from Arm, Left Updated: 06/27/23 1701     CULTURE, BLOOD ([facility]) No Growth at 5 days    Blood Culture [[id number]]  (Normal) Collected: 06/22/23 0639    Order Status: Completed Specimen: Blood Updated: 06/27/23 0800     CULTURE, BLOOD ([facility]) No Growth at 5 days    Blood Culture [[id number]]  (Normal) Collected: 06/22/23 0121    Order Status: Completed Specimen: Blood Updated: 06/27/23 0500     CULTURE, BLOOD ([facility]) No Growth at 5 days    Urine culture [[id number]]  (Abnormal) Collected: 06/21/23 1704    Order Status: Completed Specimen: Urine Updated: 06/23/23 0705     Urine Culture Multiple organisms present indicate probable contamination. Recommend recollection.      >/= 100,000 colonies/ml - Three different species of Gram-negative Rods             CT Abdomen Pelvis  Without Contrast  Narrative: EXAMINATION:  CT ABDOMEN PELVIS WITHOUT CONTRAST    CLINICAL HISTORY:  Epigastric pain;    TECHNIQUE:  Low dose axial images, sagittal and coronal reformations were obtained from the lung bases to the pubic symphysis.  No contrast was administered.  Automatic exposure control is utilized to reduce patient radiation exposure.    COMPARISON:  06/09/2023    FINDINGS:  The lung bases are clear.    The liver appears normal.  No obvious liver mass or lesion is seen.    The patient is status post cholecystectomy.  The pancreas is seen in the pancreatic duct appears to be dilated.  This was seen on the prior examination as well.  Mild peripancreatic inflammatory changes are seen.  Similar changes were seen on the prior examination.    Spleen appears normal and adrenal glands appear normal.  No adrenal nodule is seen.    No nephrolithiasis is seen.  No hydronephrosis is seen.  No hydroureter is seen.  No ureteral stone is seen.    The colonic wall is thickened and the transverse colon and the descending colon and there is some inflammatory  changes around it.  Colitis cannot be excluded.  Postsurgical changes are seen in the transverse colon.    No free air seen.  No free fluid is seen.  The urinary bladder appears normal.    Bones show no acute abnormality.  Impression: Dilated pancreatic duct and some inflammatory changes around the pancreas possibly representing pancreatitis.  Similar changes were seen on the prior examination    Some colonic wall thickening seen in the distal transverse and the descending colon possibly representing a colitis.    Electronically signed by: Mary Cool  Date:    06/21/2023  Time:    16:57  X-Ray Chest AP Portable  Narrative: EXAMINATION:  XR CHEST AP PORTABLE    CLINICAL HISTORY:  epigastric pain;    TECHNIQUE:  Single view of the chest    COMPARISON:  06/09/2023    FINDINGS:  No focal opacification, pleural effusion, or pneumothorax.    The cardiomediastinal silhouette is within normal limits.    Left-sided MediPort remains.    No acute osseous abnormality.  Impression: No acute cardiopulmonary process.    Electronically signed by: Darren Montoya  Date:    06/21/2023  Time:    15:33         Medication List        START taking these medications      fidaxomicin 200 mg Tab  Commonly known as: DIFICID  Take 1 tablet (200 mg total) by mouth 2 (two) times daily. for 10 days     oxyCODONE-acetaminophen  mg per tablet  Commonly known as: PERCOCET  Take 1 tablet by mouth every 6 (six) hours as needed for Pain.            CONTINUE taking these medications      cholecalciferol (vitamin D3) 1,250 mcg (50,000 unit) capsule     pantoprazole 40 MG tablet  Commonly known as: PROTONIX     simvastatin 20 MG tablet  Commonly known as: ZOCOR     sucralfate 1 gram tablet  Commonly known as: CARAFATE  Take 1 tablet (1 g total) by mouth 4 (four) times daily before meals and nightly.            STOP taking these medications      vancomycin 125 mg/5 mL Soln               Where to Get Your Medications        These medications  were sent to [facility] - [place], [place] - [address]  [address], [place] [facility] [address]      Phone: [phone]   oxyCODONE-acetaminophen  mg per tablet       You can get these medications from any pharmacy    Bring a paper prescription for each of these medications  fidaxomicin 200 mg Tab          Explained in detail to the patient about the discharge plan, medications, and follow-up visits. Pt understands and agrees with the treatment plan  Discharge Disposition: Home-Health Care [facility]   Discharged Condition: stable  Diet-   Dietary Orders (From admission, onward)       Start     Ordered    06/29/23 0001  Diet NPO  Diet effective midnight         06/28/23 1649    06/23/23 1023  Dietary nutrition supplements Boost Chocolate; Daily  Continuous        Question Answer Comment   Select PO Supplement: Boost Chocolate    Frequency: Daily        06/23/23 1023                   Medications Per DC med rec  Activities as tolerated   Contact information for follow-up providers       Marina Morales MD Follow up in 1 week(s).    Specialty: Family Medicine  Contact information:  [address]  [address]  [place] [id number] [phone]               Ronan Burns MD Follow up in 1 day(s).    Specialties: Oncology, Hematology and Oncology  Contact information:  [address]  [address]  [facility] [address] [phone]               Jose Alberto Max MD Follow up in 1 week(s).    Specialty: Gastroenterology  Contact information:  [address]  [address]  [place] [address]  [phone]                       Contact information for after-discharge care       Home Medical Care       [facility] .    Service: Home Health Services  Contact information:  [address]  [facility] [address]  [phone]                                 For further questions contact hospitalist office    Discharge time 33  minutes    For worsening symptoms, chest pain, shortness of breath, increased abdominal pain, high grade fever, stroke or stroke like symptoms, immediately go to the nearest Emergency Room or call 911 as soon as possible.      Caro Hidalgo M.D, on 6/29/2023. at 11:11 AM.

## 2023-06-29 NOTE — TRANSFER OF CARE
"Anesthesia Transfer of Care Note    Patient: Evelyn Proctor    Procedure(s) Performed: Procedure(s) (LRB):  UPPER EUS W/ POSS FNA W/ CELIAC BLOCK (N/A)  EGD (ESOPHAGOGASTRODUODENOSCOPY) (N/A)    Patient location: PACU    Anesthesia Type: general    Transport from OR: Transported from OR on room air with adequate spontaneous ventilation    Post pain: adequate analgesia    Post vital signs: stable    Level of consciousness: sedated and awake    Nausea/Vomiting: no nausea/vomiting    Complications: none    Transfer of care protocol was followed      Last vitals:   Visit Vitals  BP (!) 147/60   Pulse 69   Temp 36 °C (96.8 °F)   Resp 14   Ht 5' 7.01" (1.702 m)   Wt 65.8 kg (145 lb)   SpO2 100%   BMI 22.70 kg/m²     "

## 2023-06-29 NOTE — PROGRESS NOTES
Infectious Diseases Progress Note  83 y/o female with Hx of CAD/MI, HLD, Crohn's disease and recently diagnosed pancreatic CA who presented to ER on 6/21 with abdominal pain with vomiting and diarrhea. On admit she was afebrile with leukocytosis, WBC 12.8. She was noted to be in GERBER with Crea 1.73, U/A with 21-50 WBC, 0-2 RBC, large LE, moderate bacteria, negative nitrites. Urine culture with >100k GNR, 3 different isolates. CXR unremarkable. CT abdomen/pelvis without contrast showed dilated pancreatic duct and some inflammatory changes around the pancreas possibly representing pancreatitis, similar changes were seen on the prior examination, some colonic wall thickening seen in the distal transverse and the descending colon possibly representing a colitis. Blood cultures negative thus far. ESR 63 and CRP 45.4. Of note she was recently admitted here with C-Diff colitis. She was discharged on oral Vancomycin.   She is currently on Dificid     Subjective:  No new complaints, no fevers, doing about the same.  In no acute distress.  Daughter, who worked in this facility as a nurse present      Past Medical History:   Diagnosis Date    Acute pancreatitis, unspecified complication status, unspecified pancreatitis type     Arthritis     Carpal tunnel syndrome, bilateral     Cervical cancer     Cervical radiculopathy     Crohn disease     Heart attack     HLD (hyperlipidemia)     Low back pain     Sleep apnea, unspecified     Spinal stenosis of lumbar region with neurogenic claudication      Past Surgical History:   Procedure Laterality Date    bilateral L4-5, L5-S1 decompression, repair L5-S1 dural tear  10/01/2021    Dr. Marin    CARPAL TUNNEL RELEASE Right 09/06/2019    Dr. Marin    CARPAL TUNNEL RELEASE Left 12/2/2022    Procedure: RELEASE, CARPAL TUNNEL;  Surgeon: Jesse Marin MD;  Location: [facility];  Service: Neurosurgery;  Laterality: Left;    CHOLECYSTECTOMY      COLECTOMY  07/2011    partial x3     ESOPHAGOGASTRODUODENOSCOPY N/A 6/8/2023    Procedure: EGD (ESOPHAGOGASTRODUODENOSCOPY);  Surgeon: Jose Alberto Max MD;  Location: [facility] OR;  Service: Gastroenterology;  Laterality: N/A;    HYSTERECTOMY      total    REPAIR OF EYELID Bilateral 11/21/2022    Procedure: BILATERAL ECTROPION REPAIR;  Surgeon: Justin Flores MD;  Location: [facility] OR;  Service: ENT;  Laterality: Bilateral;    ULTRASOUND, ENDOSCOPIC, WITH FINE NEEDLE ASPIRATION N/A 6/8/2023    Procedure: UPPER EUS  W/  FNA;  Surgeon: Jose Alberto Max MD;  Location: [facility] OR;  Service: Gastroenterology;  Laterality: N/A;  Dangelo, pt w/ CD on entyvio recently admitted to [facility] w/ acute pancreatitis. Possibly drug induced from entyvio? Marisa does have dilated PD and a poss focal stricture in the HOP.CA 19-9 nml     Social History     Socioeconomic History    Marital status:    Tobacco Use    Smoking status: Former     Types: Cigarettes    Smokeless tobacco: Never   Substance and Sexual Activity    Alcohol use: Yes     Comment: rarely    Drug use: Never    Sexual activity: Not Currently     Social Determinants of Health     Social Connections: Unknown    Attends [employer] Services: More than 4 times per year    Active Member of Clubs or Organizations: Yes    Attends Club or Organization Meetings: More than 4 times per year    Marital Status:        ROS  Constitutional:  Positive for malaise/fatigue.   HENT: Negative.     Eyes: Negative.    Respiratory: Negative.     Cardiovascular: Negative.    Gastrointestinal:  Negative   Genitourinary: Negative.    Musculoskeletal: Negative.    Skin: Negative.    Neurological: Negative.    All other Systems review done and negative.    Review of patient's allergies indicates:  No Known Allergies      Scheduled Meds:   enoxparin  40 mg Subcutaneous Q24H (prophylaxis, 1700)    fidaxomicin  200 mg Oral BID    mupirocin   Nasal BID     Continuous Infusions:  PRN Meds:acetaminophen, acetaminophen,  "aluminum-magnesium hydroxide-simethicone, cloNIDine, hydrALAZINE, HYDROmorphone, labetalol, melatonin, ondansetron, oxyCODONE-acetaminophen, prochlorperazine, sodium chloride 0.9%    Objective:  BP (!) 158/69   Pulse 67   Temp 97.9 °F (36.6 °C) (Oral)   Resp 19   Ht 5' 7.01" (1.702 m)   Wt 65.8 kg (145 lb)   SpO2 97%   BMI 22.70 kg/m²     Physical Exam:   Physical Exam  Vitals reviewed.   HENT:      Head: Normocephalic.   Cardiovascular:      Rate and Rhythm: Normal rate and regular rhythm.   Pulmonary:      Effort: Pulmonary effort is normal. No respiratory distress.      Breath sounds: Normal breath sounds. No wheezing.   Abdominal:      General: Bowel sounds are normal. There is no distension.      Palpations: Abdomen is soft.      Tenderness: There is no abdominal tenderness.   Musculoskeletal:         General: Normal range of motion.      Cervical back: Normal range of motion.   Skin:     Findings: No rash   Neurological:      Mental Status: She is alert and oriented to person, place, and time.   Psychiatric:         Mood and Affect: Mood normal.         Behavior: Behavior normal.        Imaging  Imaging Results              CT Abdomen Pelvis  Without Contrast (Final result)  Result time 06/21/23 16:57:59      Final result by Mary Cool MD (06/21/23 16:57:59)                   Impression:      Dilated pancreatic duct and some inflammatory changes around the pancreas possibly representing pancreatitis.  Similar changes were seen on the prior examination    Some colonic wall thickening seen in the distal transverse and the descending colon possibly representing a colitis.      Electronically signed by: Mary Cool  Date:    06/21/2023  Time:    16:57               Narrative:    EXAMINATION:  CT ABDOMEN PELVIS WITHOUT CONTRAST    CLINICAL HISTORY:  Epigastric pain;    TECHNIQUE:  Low dose axial images, sagittal and coronal reformations were obtained from the lung bases to the pubic " symphysis.  No contrast was administered.  Automatic exposure control is utilized to reduce patient radiation exposure.    COMPARISON:  06/09/2023    FINDINGS:  The lung bases are clear.    The liver appears normal.  No obvious liver mass or lesion is seen.    The patient is status post cholecystectomy.  The pancreas is seen in the pancreatic duct appears to be dilated.  This was seen on the prior examination as well.  Mild peripancreatic inflammatory changes are seen.  Similar changes were seen on the prior examination.    Spleen appears normal and adrenal glands appear normal.  No adrenal nodule is seen.    No nephrolithiasis is seen.  No hydronephrosis is seen.  No hydroureter is seen.  No ureteral stone is seen.    The colonic wall is thickened and the transverse colon and the descending colon and there is some inflammatory changes around it.  Colitis cannot be excluded.  Postsurgical changes are seen in the transverse colon.    No free air seen.  No free fluid is seen.  The urinary bladder appears normal.    Bones show no acute abnormality.                                       X-Ray Chest AP Portable (Final result)  Result time 06/21/23 15:33:14      Final result by Darren Montoya MD (06/21/23 15:33:14)                   Impression:      No acute cardiopulmonary process.      Electronically signed by: Darren Montoya  Date:    06/21/2023  Time:    15:33               Narrative:    EXAMINATION:  XR CHEST AP PORTABLE    CLINICAL HISTORY:  epigastric pain;    TECHNIQUE:  Single view of the chest    COMPARISON:  06/09/2023    FINDINGS:  No focal opacification, pleural effusion, or pneumothorax.    The cardiomediastinal silhouette is within normal limits.    Left-sided MediPort remains.    No acute osseous abnormality.                                       Lab Review   No results found for this or any previous visit (from the past 24 hour(s)).          Assessment/Plan:  C-Diff colitis  Pyuria /  bacteriuria  Leukocytosis  GERBER  Stage III Pancreatic CA  Crohn's disease with R hemicolectomy  Anemia     -Continue Dificid #7/10  -No fevers and no leukocytosis  -6/22 Blood cultures negative   -U/A abnormal with urine culture with >100k GNR, 3 different isolates. Likely a contaminant  -Oncology on board, inputs noted including plans for possible chemotherapy once infection cleared  -Maintain strict contact isolation for c-diff  -Discussed with patient, daughter and nursing. CM worked to ensure medication is covered prior to discharge.  Plan to discharge to complete course of Dificid at home noted.

## 2023-06-29 NOTE — PLAN OF CARE
06/29/23 1508   Final Note   Assessment Type Final Discharge Note   Anticipated Discharge Disposition Home-Health  (FOC: clinical updates were sent to [facility] for () services upon D/C.)   Hospital Resources/Appts/Education Provided Appointments scheduled and added to AVS   Post-Acute Status   Post-Acute Authorization Home Health  (FOC: sent clinical updates & notes via [facility] for () services. The patient was presently active with this agency prior to hospital admission.)   Home Health Status Referrals Sent   Coverage Payor:  MEDICARE - MEDICARE PART A & B   Patient choice form signed by patient/caregiver List with quality metrics by geographic area provided   Discharge Delays None known at this time     Patient will be D/C'd to her private residence with () services through [facility] to follow. All clinical updates and notes were uploaded and sent via [facility]. She will D/C to home via family member's private vehicle.

## 2023-06-29 NOTE — ANESTHESIA PREPROCEDURE EVALUATION
06/29/2023  Evelyn Proctor is a 82 y.o., female who presents with Adenocarcinoma of the Pancreatic Neck.      The pt. comes to [facility] endoscopy for the noted procedure under GA/TIVA.    PMHx:  Low back pain Spinal stenosis of lumbar region with neurogenic claudication   Heart attack    Other Medical History   Carpal tunnel syndrome, bilateral Cervical radiculopathy   HLD (hyperlipidemia) Arthritis   Crohn disease Sleep apnea, unspecified   Cervical cancer Acute pancreatitis, unspecified complication status, unspecified pancreatitis type     Surgical History:  bilateral L4-5, L5-S1 decompression, repair L5-S1 dural tear CARPAL TUNNEL RELEASE   COLECTOMY CHOLECYSTECTOMY   HYSTERECTOMY REPAIR OF EYELID   CARPAL TUNNEL RELEASE ULTRASOUND, ENDOSCOPIC, WITH FINE NEEDLE ASPIRATION   ESOPHAGOGASTRODUODENOSCOPY            Vital signs:        Lab Data:      EKG:    Pre-op Assessment          Review of Systems      Physical Exam  General: Alert, Oriented, Well nourished and Cooperative    Airway:  Mallampati: II   Mouth Opening: Normal  TM Distance: Normal  Tongue: Normal  Neck ROM: Normal ROM    Dental:  Intact    Chest/Lungs:  Clear to auscultation, Normal Respiratory Rate    Heart:  Rate: Normal  Rhythm: Regular Rhythm        Anesthesia Plan  Type of Anesthesia, risks & benefits discussed:    Anesthesia Type: Gen Natural Airway  Intra-op Monitoring Plan: Standard ASA Monitors  Post Op Pain Control Plan: IV/PO Opioids PRN  Induction:  IV  Informed Consent: Informed consent signed with the Patient and all parties understand the risks and agree with anesthesia plan.  All questions answered.   ASA Score: 3  Day of Surgery Review of History & Physical: H&P Update referred to the surgeon/provider.    Ready For Surgery From Anesthesia Perspective.     .

## 2023-07-05 ENCOUNTER — OFFICE VISIT (OUTPATIENT)
Dept: FAMILY MEDICINE | Facility: CLINIC | Age: 83
End: 2023-07-05
Payer: MEDICARE

## 2023-07-05 VITALS — WEIGHT: 132.5 LBS | OXYGEN SATURATION: 99 % | BODY MASS INDEX: 20.75 KG/M2 | HEART RATE: 71 BPM

## 2023-07-05 DIAGNOSIS — Z09 HOSPITAL DISCHARGE FOLLOW-UP: ICD-10-CM

## 2023-07-05 DIAGNOSIS — R53.81 PHYSICAL DECONDITIONING: ICD-10-CM

## 2023-07-05 DIAGNOSIS — R26.89 BALANCE PROBLEM: ICD-10-CM

## 2023-07-05 DIAGNOSIS — F32.0 CURRENT MILD EPISODE OF MAJOR DEPRESSIVE DISORDER WITHOUT PRIOR EPISODE: ICD-10-CM

## 2023-07-05 DIAGNOSIS — C25.9 MALIGNANT NEOPLASM OF PANCREAS, UNSPECIFIED LOCATION OF MALIGNANCY: Primary | ICD-10-CM

## 2023-07-05 PROCEDURE — 99214 OFFICE O/P EST MOD 30 MIN: CPT | Mod: ,,, | Performed by: FAMILY MEDICINE

## 2023-07-05 PROCEDURE — 99214 PR OFFICE/OUTPT VISIT, EST, LEVL IV, 30-39 MIN: ICD-10-PCS | Mod: ,,, | Performed by: FAMILY MEDICINE

## 2023-07-05 RX ORDER — OXYCODONE HYDROCHLORIDE 30 MG/1
5 TABLET ORAL EVERY 6 HOURS PRN
COMMUNITY
End: 2023-08-23

## 2023-07-05 RX ORDER — TRAZODONE HYDROCHLORIDE 50 MG/1
25 TABLET ORAL NIGHTLY PRN
Qty: 15 TABLET | Refills: 1 | Status: SHIPPED | OUTPATIENT
Start: 2023-07-05 | End: 2023-08-09

## 2023-07-05 NOTE — PROGRESS NOTES
Patient ID: [id number]     Chief Complaint: Hospital Follow Up        HPI:     Evelyn Proctor is a 82 y.o. female here today for a follow up s/p hospital discharge due to Stage 3 pancreatic cancer from 06/21/2023-06/29/2023.  Transitional Care Note    Family and/or Caretaker present at visit?  Yes, her daughter is present.  Diagnostic tests reviewed/disposition: I have reviewed all completed as well as pending diagnostic tests at the time of discharge.  Disease/illness education: Yes, for pancreatic cancer  Home health/community services discussion/referrals: Patient has home health established at [facility] .   Establishment or re-establishment of referral orders for community resources: No other necessary community resources.   Discussion with other health care providers: No discussion with other health care providers necessary.      - Patient was previously diagnosed with Crohn's disease and her medications were switched from Remicade to Optiva and then started having GI symptoms with nausea, abdominal pain, weight loss and was diagnosed with pancreatic cancer on 06/08/2023. She has been seeing Surgery (Dr. Schneider), GI (Dr. Max), and Oncology (Dr. Burns). She was having increased abdominal pain that caused her to present to [facility] ER on 06/21/2023. She was treated with medication and a nerve block with Dr. Max with resolution of pain. She has a followup appointment scheduled with Oncology (Dr. Burns) scheduled for 07/11/2023 to discuss chemo and radiation to shrink her tumor so that excision can be planned. She denies nausea, vomiting, abdominal pain, bloody stool, or constipations. Reports that she always has diarrhea that is stable. She has some weakness and balance issues, was doing well with PT with home health, needs new referral as she cannot drive.  - She does reports depressed mood since her diagnosis, would like referral for outpatient counseling and trial of Rx to take at night to  help her sleep. She denies anxiety, SI/HI, or AH/VH. She denies history of mental health problems or family history of mental health problems.   - Patient is without any other complaints today.         ----------------------------  Acute pancreatitis, unspecified complication status, unspecified   pancreatitis type  Arthritis  Carpal tunnel syndrome, bilateral  Cervical cancer  Cervical radiculopathy  Crohn disease  Heart attack  HLD (hyperlipidemia)  Low back pain  Pancreatic cancer  Sleep apnea, unspecified  Spinal stenosis of lumbar region with neurogenic claudication     Past Surgical History:   Procedure Laterality Date    bilateral L4-5, L5-S1 decompression, repair L5-S1 dural tear  10/01/2021    Dr. Marin    CARPAL TUNNEL RELEASE Right 09/06/2019    Dr. Marin    CARPAL TUNNEL RELEASE Left 12/2/2022    Procedure: RELEASE, CARPAL TUNNEL;  Surgeon: Jesse Marin MD;  Location: [facility];  Service: Neurosurgery;  Laterality: Left;    CHOLECYSTECTOMY      COLECTOMY  07/2011    partial x3    ESOPHAGOGASTRODUODENOSCOPY N/A 6/8/2023    Procedure: EGD (ESOPHAGOGASTRODUODENOSCOPY);  Surgeon: Jose Alberto Max MD;  Location: [facility] OR;  Service: Gastroenterology;  Laterality: N/A;    ESOPHAGOGASTRODUODENOSCOPY N/A 6/29/2023    Procedure: EGD (ESOPHAGOGASTRODUODENOSCOPY);  Surgeon: Jose Alberto Max MD;  Location: [facility] OR;  Service: Gastroenterology;  Laterality: N/A;    HYSTERECTOMY      total    REPAIR OF EYELID Bilateral 11/21/2022    Procedure: BILATERAL ECTROPION REPAIR;  Surgeon: Justin Flores MD;  Location: [facility] OR;  Service: ENT;  Laterality: Bilateral;    ULTRASOUND, ENDOSCOPIC, WITH FINE NEEDLE ASPIRATION N/A 6/8/2023    Procedure: UPPER EUS  W/  FNA;  Surgeon: Jose Alberto Max MD;  Location: [facility] OR;  Service: Gastroenterology;  Laterality: N/A;  Dangelo, pt w/ CD on entyvio recently admitted to [facility] w/ acute pancreatitis. Possibly drug induced from entyvio? Althoughshe does have dilated PD and a poss focal  stricture in the HOP.CA 19-9 nml    ULTRASOUND, UPPER GI TRACT, ENDOSCOPIC, WITH CELIAC PLEXUS BLOCK N/A 6/29/2023    Procedure: UPPER EUS W/ POSS FNA W/ CELIAC BLOCK;  Surgeon: Jose Alberto Max MD;  Location: [facility];  Service: Gastroenterology;  Laterality: N/A;  Celiac Plexus injection planned.Pt should receive 1L NS bolus upon arrival to Outpt Surgery and 1L NS during the procedure. Pt will need to be in PACU for 30 min post procedure to assess for any potential hypotension. NO LABS       Review of patient's allergies indicates:  No Known Allergies    Outpatient Medications Marked as Taking for the 7/5/23 encounter (Office Visit) with Marina Morales MD   Medication Sig Dispense Refill    oxyCODONE (ROXICODONE) 30 MG Tab Take 5 mg by mouth every 6 (six) hours as needed.         Social History     Socioeconomic History    Marital status:    Tobacco Use    Smoking status: Former     Types: Cigarettes    Smokeless tobacco: Never   Substance and Sexual Activity    Alcohol use: Not Currently     Comment: rarely    Drug use: Never    Sexual activity: Not Currently     Social Determinants of Health     Social Connections: Unknown    Attends [employer] Services: More than 4 times per year    Active Member of Clubs or Organizations: Yes    Attends Club or Organization Meetings: More than 4 times per year    Marital Status:         Family History   Problem Relation Age of Onset    Diabetes Mother     Hypertension Father     Hyperlipidemia Father     Cancer Father     Hyperlipidemia Sister     Hyperlipidemia Brother     Hyperlipidemia Daughter         Subjective:       Review of Systems:    See HPI for details    Constitutional: Denies Change in appetite. Denies Chills. Denies Fever. Denies Night sweats.  Eye: Denies Blurred vision. Denies Discharge. Denies Eye pain.  ENT: Denies Decreased hearing. Denies Sore throat. Denies Swollen glands.  Respiratory: Denies Cough. Denies Shortness of breath. Denies  Shortness of breath with exertion. Denies Wheezing.  Cardiovascular: Denies Chest pain at rest. Denies Chest pain with exertion. Denies Irregular heartbeat. Denies Palpitations.  Gastrointestinal: As per HPI. Denies Abdominal pain. Denies Nausea. Denies Vomiting. Denies Hematemesis or Hematochezia.  Genitourinary: Denies Dysuria. Denies Urinary frequency. Denies Urinary urgency. Denies Blood in urine.  Endocrine: Denies Cold intolerance. Denies Excessive thirst. Denies Heat intolerance. Denies Weight loss. Denies Weight gain.  Musculoskeletal: Denies Painful joints. Denies Weakness.  Integumentary: Denies Rash. Denies Itching. Denies Dry skin.  Neurologic: Denies Dizziness. Denies Fainting. Denies Headache.  Psychiatric: Reports Depression. Denies Anxiety. Denies Suicidal/Homicidal ideations.    All Other ROS: Negative except as stated in HPI.       Objective:     Pulse 71   Wt 60.1 kg (132 lb 8 oz)   SpO2 99%   BMI 20.75 kg/m²     Physical Exam    General: Alert and oriented, No acute distress.  Head: Normocephalic, Atraumatic.  Eye: Pupils are equal, round and reactive to light, Extraocular movements are intact, Sclera non-icteric.  Ears/Nose/Throat: Normal, Mucosa moist,Clear.  Neck/Thyroid: Supple, Non-tender, No carotid bruit, No palpable thyromegaly or thyroid nodule, No lymphadenopathy, No JVD, Full range of motion.  Respiratory: Clear to auscultation bilaterally; No wheezes, rales or rhonchi,Non-labored respirations, Symmetrical chest wall expansion.  Cardiovascular: Regular rate and rhythm, S1/S2 normal, No murmurs, rubs or gallops.  Gastrointestinal: Soft, Non-tender, Non-distended, Normal bowel sounds, No palpable organomegaly.  Musculoskeletal: Normal range of motion.  Integumentary: Warm, Dry, Intact, No suspicious lesions or rashes.  Extremities: No clubbing, cyanosis or edema  Neurologic: No focal deficits, Cranial Nerves II-XII are grossly intact, Motor strength normal upper and lower extremities,  Sensory exam intact.  Psychiatric: Normal interaction, Coherent speech, Euthymic mood, Appropriate affect, Sad, tearful.     *Records from hospital discharge on 06/29/2023 were reviewed and discussed with patient and patient voices understanding.*    Assessment:       ICD-10-CM ICD-9-CM   1. Malignant neoplasm of pancreas, unspecified location of malignancy  C25.9 157.9   2. Physical deconditioning  R53.81 799.3   3. Balance problem  R26.89 781.99   4. Hospital discharge follow-up  Z09 V67.59   5. Current mild episode of major depressive disorder without prior episode  F32.0 296.21        Plan:     Problem List Items Addressed This Visit          Oncology    Pancreatic cancer - Primary    Relevant Orders    Ambulatory referral/consult to Home Health     Other Visit Diagnoses       Physical deconditioning        Relevant Orders    Ambulatory referral/consult to Home Health    Balance problem        Relevant Orders    Ambulatory referral/consult to Home Health    Hospital discharge follow-up        Current mild episode of major depressive disorder without prior episode        Relevant Medications    traZODone (DESYREL) 50 MG tablet    Other Relevant Orders    Ambulatory referral/consult to Psychiatry         1. Malignant neoplasm of pancreas, unspecified location of malignancy  - Ambulatory referral/consult to Home Health; Future for skilled nursing and physical therapy.  - Continue current treatment plan. Continue followup with Oncology, GI, and Surgery as scheduled. Notify M.D. or ER if symptoms persist or worsen, temp >100.4, or any acute illness.      2. Physical deconditioning  - Ambulatory referral/consult to Home Health; Future  - Same as #1.     3. Balance problem  - Ambulatory referral/consult to Home Health; Future  - Fall precautions encouraged. Same as #1.     4. Hospital discharge follow-up  - Same as #1.   5. Current mild episode of major depressive disorder without prior episode  - Rx trial of traZODone  (DESYREL) 50 MG tablet; Take 0.5 tablets (25 mg total) by mouth nightly as needed for Insomnia or Depression.  Dispense: 15 tablet; Refill: 1  - Ambulatory referral/consult to Psychiatry; Future for evaluation, treatment, outpatient counseling.  Continue  Read positive daily meditations, avoid negative media, set healthy boundaries.  Exercise daily, keep consistent sleep pattern, eat a healthy diet.  Establish good social support, make changes to reduce stress.  Reports any symptoms of suicidal/homicidal ideations or self harm immediately, if clinic is closed go to nearest emergency room.          Evelyn was seen today for hospital follow up.    Diagnoses and all orders for this visit:    Malignant neoplasm of pancreas, unspecified location of malignancy  -     Ambulatory referral/consult to Home Health; Future    Physical deconditioning  -     Ambulatory referral/consult to Home Health; Future    Balance problem  -     Ambulatory referral/consult to Home Health; Future    Hospital discharge follow-up    Current mild episode of major depressive disorder without prior episode  -     traZODone (DESYREL) 50 MG tablet; Take 0.5 tablets (25 mg total) by mouth nightly as needed for Insomnia or Depression.  -     Ambulatory referral/consult to Psychiatry; Future          Medication List with Changes/Refills   New Medications    TRAZODONE (DESYREL) 50 MG TABLET    Take 0.5 tablets (25 mg total) by mouth nightly as needed for Insomnia or Depression.       Start Date: 7/5/2023  End Date: 9/3/2023   Current Medications    CHOLECALCIFEROL, VITAMIN D3, 1,250 MCG (50,000 UNIT) CAPSULE    Take 1,250 mcg by mouth once a week.       Start Date: 9/30/2021 End Date: --    OXYCODONE (ROXICODONE) 30 MG TAB    Take 5 mg by mouth every 6 (six) hours as needed.       Start Date: --        End Date: --    PANTOPRAZOLE (PROTONIX) 40 MG TABLET    Take 1 tablet by mouth every morning.       Start Date: 5/22/2023 End Date: 5/21/2024     SIMVASTATIN (ZOCOR) 20 MG TABLET    Take 20 mg by mouth every evening.       Start Date: 4/28/2022 End Date: --   Discontinued Medications    OXYCODONE-ACETAMINOPHEN (PERCOCET)  MG PER TABLET    Take 1 tablet by mouth every 6 (six) hours as needed for Pain.       Start Date: 6/28/2023 End Date: 7/5/2023    SUCRALFATE (CARAFATE) 1 GRAM TABLET    Take 1 tablet (1 g total) by mouth 4 (four) times daily before meals and nightly.       Start Date: 6/12/2023 End Date: 7/5/2023          Follow up in about 6 weeks (around 8/16/2023) for Wellness, followup depression/insomnia.

## 2023-07-07 NOTE — PROGRESS NOTES
Subjective:        PATIENT: Evelyn Proctor  MRN: [id number]  DATE: 7/11/2023  Chief Complaint: hospital fu (Appetite is poor and taste/Requesting suppository for nausea)    Current Therapy: ENTYVIO GI q6w   by GI  Current Treatment: OP PANC GEMCITABINE QW   Oncology History Overview Note   This is a 82 y.o. female known to Dr. Pierson with PMH of recently diagnosed pancreatic ductal adenocarcinoma, hyperlipidemia, coronary artery disease, Crohn's disease on Entyvio every 6 weeks status post right hemicolectomy, hepatic steatosis, rheumatoid arthritis, osteopenia and cervical cancer. She presented to the [facility] ED 06/09/23 with worsening generalized abd pain, n/v/d that she reports is c/w Crohn's.     Upon arrival, workup included: CT abd pel with interval development of some colonic wall thickening in left colon especially at splenic flexure and proximal descending colon concerning for developing colitis, mild peripancreatic edema and inflammatory changes seen mainly in the body and tail c/w pancreatitis, persistent pancreatic duct dilation in the body and tail; labs revealed WBC 5.97, H&H 12.6/37.1, plt 252, INR 0.99, K 2.8, Cl 94, Tbili 0.7, AST 18, ALT 14, lipase 13. IVF initiated and electrolytes replenished. GI panel pending. GI consulted for Crohn's flare/pancreatic adenocarcinoma.     Of note, patient was recently admitted 06/02/23 - 06/04/23 for acute pancreatitis and was noted to have a high grade pancreatic duct stricture at pancreatic neck with upstream pancreatic duct dilation. Dr. Max performed EUS 06/08/23 that revealed a mass in the pancreatic neck that was determined to be ductal adenocarcinoma staged T3N0Mx by endosonographic criteria. He recommended oncology referral and PET scan. Patient and family requested to see Dr. Nuñez and Dr. Philip for further care.     Pancreatic cancer   6/8/2023 Initial Diagnosis    Pancreatic cancer  FINAL DIAGNOSIS       PANCREATIC NECK MASS, FINE NEEDLE  ASPIRATION AND CELL BLOCK:       PANCREATIC DUCTAL ADENOCARCINOMA.          6/8/2023 Procedure    EUS Impression:            - Normal esophagus.                          - Normal stomach.                          - Normal examined duodenum.                          - There was no sign of significant pathology in                          the ampulla.                          - There was no sign of significant pathology in                          the common bile duct.                          - There was no evidence of significant pathology                          in the left lobe of the liver.                          - A mass was identified in the pancreatic neck.                          Tissue was obtained from this exam and is of                          adenocarcinoma. This was staged T3 N0 Mx by                          endosonographic criteria. Fine needle biopsy                          performed.      6/9/2023 Imaging Significant Findings    Impression:     Interval development of some colonic wall thickening in the left colon especially at the splenic flexure and proximal descending colon concerning for developing colitis     Mild peripancreatic edema and inflammatory changes seen mainly in the body and tail the pancreas consistent with pancreatitis     Persistent pancreatic duct dilatation in the body and tail which was seen and described on multiple previous examinations     6/10/2023 Cancer Staged    Staging form: Exocrine Pancreas, AJCC 8th Edition  - Clinical stage from 6/10/2023: Stage IIA (cT3, cN0, cM0)     6/20/2023 Imaging Significant Findings    PET:Impression: Focal activity at the junction of the body and head of the pancreas concerning for neoplasm. There is no definite discrete mass on the low-dose CT scan. There is dilatation of the distal pancreatic duct.       Hospital Admission    Most Recent Admission Details  Admit Date: 6/21/23  Admitted for:    Clostridium difficile  infection  -Pancreatic Cancer  -Epigastric pain     Discharge date:6/29/23  Discharged from: [facility] 9W MEDICAL TELEMETRY at [facility]  Discharge disposition: Home-Health Care Svc       Malignant neoplasm of pancreas   7/11/2023 Initial Diagnosis    Malignant neoplasm of pancreas     7/26/2023 -  Chemotherapy    Treatment Summary   Plan Name: OP PANC GEMCITABINE QW  Treatment Goal: Control  Status: Active  Start Date: 7/26/2023 (Planned)  End Date: 9/13/2023 (Planned)  Provider: Ronan Burns MD  Chemotherapy: gemcitabine (GEMZAR) 1,141 mg in sodium chloride 0.9% SolP 250 mL chemo infusion, 675 mg/m2 = 1,141 mg (original dose ), Intravenous, Clinic/HOD 1 time, 0 of 2 cycles  Dose modification: 675 mg/m2 (Cycle 1, Reason: MD Discretion)         07/11/2023  I last saw this patient in the hospital.  Since her hospitalization, she is undergone a celiac block.  She is completed her treatment for her C diff colitis.  She still has crampy abdominal pain associated more with eating.  She is not had much of an appetite.  Her weight has been stable, she has lost of taste.  She is no difficulty with swallowing.  She denies any cardiac or respiratory symptoms.  She still concerned about her weight and her eating.    Her pain is better after she had her nerve block but she still has some.        Past Medical History:   Diagnosis Date    Acute pancreatitis, unspecified complication status, unspecified pancreatitis type     Arthritis     Carpal tunnel syndrome, bilateral     Cervical cancer     Cervical radiculopathy     Crohn disease     Heart attack     HLD (hyperlipidemia)     Low back pain     Pancreatic cancer     Sleep apnea, unspecified     Spinal stenosis of lumbar region with neurogenic claudication       .  Past Surgical History:   Procedure Laterality Date    bilateral L4-5, L5-S1 decompression, repair L5-S1 dural tear  10/01/2021    Dr. Marin    CARPAL TUNNEL RELEASE Right 09/06/2019     Dr. Marin    CARPAL TUNNEL RELEASE Left 12/2/2022    Procedure: RELEASE, CARPAL TUNNEL;  Surgeon: Jesse Marin MD;  Location: [facility] OR;  Service: Neurosurgery;  Laterality: Left;    CHOLECYSTECTOMY      COLECTOMY  07/2011    partial x3    ESOPHAGOGASTRODUODENOSCOPY N/A 6/8/2023    Procedure: EGD (ESOPHAGOGASTRODUODENOSCOPY);  Surgeon: Jose Alberto Max MD;  Location: [facility] OR;  Service: Gastroenterology;  Laterality: N/A;    ESOPHAGOGASTRODUODENOSCOPY N/A 6/29/2023    Procedure: EGD (ESOPHAGOGASTRODUODENOSCOPY);  Surgeon: Jose Alberto Max MD;  Location: [facility] OR;  Service: Gastroenterology;  Laterality: N/A;    HYSTERECTOMY      total    REPAIR OF EYELID Bilateral 11/21/2022    Procedure: BILATERAL ECTROPION REPAIR;  Surgeon: Justin Flores MD;  Location: [facility] OR;  Service: ENT;  Laterality: Bilateral;    ULTRASOUND, ENDOSCOPIC, WITH FINE NEEDLE ASPIRATION N/A 6/8/2023    Procedure: UPPER EUS  W/  FNA;  Surgeon: Jose Alberto Max MD;  Location: [facility] OR;  Service: Gastroenterology;  Laterality: N/A;  Dangelo, pt w/ CD on entyvio recently admitted to [facility] w/ acute pancreatitis. Possibly drug induced from entyvio? Althoughshe does have dilated PD and a poss focal stricture in the HOP.CA 19-9 nml    ULTRASOUND, UPPER GI TRACT, ENDOSCOPIC, WITH CELIAC PLEXUS BLOCK N/A 6/29/2023    Procedure: UPPER EUS W/ POSS FNA W/ CELIAC BLOCK;  Surgeon: Jose Alberto Max MD;  Location: [facility] OR;  Service: Gastroenterology;  Laterality: N/A;  Celiac Plexus injection planned.Pt should receive 1L NS bolus upon arrival to Outpt Surgery and 1L NS during the procedure. Pt will need to be in PACU for 30 min post procedure to assess for any potential hypotension. NO LABS      .  Family History   Problem Relation Age of Onset    Diabetes Mother     Hypertension Father     Hyperlipidemia Father     Cancer Father     Hyperlipidemia Sister     Hyperlipidemia Brother     Hyperlipidemia Daughter       Social History     Socioeconomic History     Marital status:    Tobacco Use    Smoking status: Former     Types: Cigarettes    Smokeless tobacco: Never   Substance and Sexual Activity    Alcohol use: Not Currently     Comment: rarely    Drug use: Never    Sexual activity: Not Currently     Social Determinants of Health     Social Connections: Unknown    Attends [employer] Services: More than 4 times per year    Active Member of Clubs or Organizations: Yes    Attends Club or Organization Meetings: More than 4 times per year    Marital Status:       .Review of patient's allergies indicates:  No Known Allergies     Current Outpatient Medications:     ondansetron (ZOFRAN) 4 MG tablet, Take 4 mg by mouth every 6 (six) hours as needed for Nausea., Disp: , Rfl:     oxyCODONE (ROXICODONE) 30 MG Tab, Take 5 mg by mouth every 6 (six) hours as needed., Disp: , Rfl:     traZODone (DESYREL) 50 MG tablet, Take 0.5 tablets (25 mg total) by mouth nightly as needed for Insomnia or Depression., Disp: 15 tablet, Rfl: 1    cholecalciferol, vitamin D3, 1,250 mcg (50,000 unit) capsule, Take 1,250 mcg by mouth once a week., Disp: , Rfl:     lipase-protease-amylase (CREON) 36,000-114,000- 180,000 unit CpDR, Take 1 capsule by mouth 3 (three) times daily with meals., Disp: 90 capsule, Rfl: 1    pantoprazole (PROTONIX) 40 MG tablet, Take 1 tablet by mouth every morning., Disp: , Rfl:     simvastatin (ZOCOR) 20 MG tablet, Take 20 mg by mouth every evening., Disp: , Rfl:   No current facility-administered medications for this visit.   Review of Systems       Objective:     Vitals:    07/11/23 1301   BP: (!) 173/82   Pulse: 86   Temp: 99.5 °F (37.5 °C)         Physical Exam  Vitals reviewed.   Constitutional:       General: She is awake.      Appearance: Normal appearance.   HENT:      Head: Normocephalic and atraumatic.      Mouth/Throat:      Mouth: Mucous membranes are moist.      Pharynx: Oropharynx is clear.   Eyes:      Extraocular Movements: Extraocular movements  intact.      Conjunctiva/sclera: Conjunctivae normal.      Pupils: Pupils are equal, round, and reactive to light.   Cardiovascular:      Rate and Rhythm: Normal rate and regular rhythm.      Pulses: Normal pulses.      Heart sounds: Normal heart sounds.   Pulmonary:      Effort: Pulmonary effort is normal.      Breath sounds: Normal breath sounds and air entry.   Chest:      Comments: Left-sided Port-A-Cath, right-sided dilated chest wall veins  Abdominal:      General: Abdomen is flat. Bowel sounds are normal.      Palpations: Abdomen is soft.      Tenderness: There is abdominal tenderness. There is no guarding or rebound.      Comments: She was tender to palpation in the epigastric area however she was not tender on deep auscultation, no rebound no guarding.    Previous scars   Musculoskeletal:      Cervical back: Normal range of motion.      Right lower leg: No edema.      Left lower leg: No edema.   Lymphadenopathy:      Cervical: No cervical adenopathy.      Upper Body:      Right upper body: No axillary adenopathy.      Left upper body: No axillary adenopathy.      Lower Body: No right inguinal adenopathy. No left inguinal adenopathy.   Skin:     General: Skin is warm and dry.   Neurological:      General: No focal deficit present.      Mental Status: She is alert and oriented to person, place, and time. Mental status is at baseline.   Psychiatric:         Attention and Perception: Attention normal.         Mood and Affect: Mood and affect normal.         Behavior: Behavior is cooperative.       ECOG SCORE            .Lab Review:  CBC:   Recent Labs     07/09/23  0506 07/08/23  1037 06/29/23  0532   WBC 8.22 10.13 5.64   RBC 3.32* 4.18* 3.33*   HGB 10.3* 12.7 10.2*   HCT 29.8* 37.0 32.3*    271 247     CMP:   Recent Labs     07/09/23  0506 07/08/23  1037 06/29/23  0532 06/23/23  0447 06/22/23  0445    141 138   < > 137   K 3.3* 2.8* 4.2   < > 3.5   CO2 22* 20* 24   < > 27   BUN 11.6 10.0 17.7   <  > 11.8   CREATININE 0.74 0.85 0.73   < > 1.07*   CALCIUM 9.0 10.0 8.9   < > 8.6   ALBUMIN  --  4.0 2.9*  --  2.9*   BILITOT  --  0.8 0.3  --  0.6   ALKPHOS  --  142 115  --  106   AST  --  24 24  --  39*   ALT  --  37 24  --  31    < > = values in this interval not displayed.     Tumor Markers:   Recent Labs     04/19/23  0353    <2.06      Assessment/Plan:     Problem List Items Addressed This Visit          Oncology    Pancreatic cancer - Primary    Relevant Medications    lipase-protease-amylase (CREON) 36,000-114,000- 180,000 unit CpDR    Other Relevant Orders    Ambulatory referral/consult to Hematology/Oncology/Nutrition    CBC Auto Differential    Comprehensive Metabolic Panel    Ambulatory referral/consult to Chemo School   82-year-old with recent diagnosis of T3 N0 pancreatic --adenocarcinoma.      Recent C diff colitis   History inflammatory bowel disease  Weight loss,   Concern for pancreatic insufficiency    Reviewed with the patient and her family the PET-CT,   the diagnosis    the fact that she is not a surgical candidate.--Dr. Palacio's note    We discussed chemotherapy  -  chemotherapy radiation as an option.      Options: Not a surgical candidate, side effects of radiation discussed, single agent chemotherapy with Gemzar versus doublets, genetic testing and testing of pancreatic carcinoma discussed .    Referral for study,   Patient family declined referral for study at this  Side effects and benefits of Creon   Hold off Megace  Control of disease   Risk of metastases and spread,      Chemo side effects  The side effects of chemotherapy were discussed.  Including but not limited to: Nausea, vomiting, alopecia, neuropathy, neutropenia, blood transfusion, nephropathy, secondary malignancies, congestive heart failure, allergic reactions to name a few.patient with the opportunity to have questions answered.     Plan   nutrition consultation   S/p  Celiac plexus nerve block   Add Creon  Gemzar 675  mg per m2 weekly consider escalating to 800 mg per m2  Eight weeks total, followed by repeat imaging  Genetic counseling   Genetics on tumor  Chemo education  Chemo approval       Follow up in about 3 weeks (around 8/1/2023) for Treatment visit.   Orders Placed This Encounter    CBC Auto Differential    Comprehensive Metabolic Panel    Ambulatory referral/consult to Hematology/Oncology/Nutrition    Ambulatory referral/consult to Chemo School    lipase-protease-amylase (CREON) 36,000-114,000- 180,000 unit CpDR      Ronan Burns MD

## 2023-07-07 NOTE — PHYSICIAN QUERY
PT Name: Evelyn Proctor  MR #: [id number]     DOCUMENTATION CLARIFICATION      CDS/: Carole Gallagher RN, CDI            Contact information:[email or web address]     This form is a permanent document in the medical record.     Query Date: July 7, 2023    Dear Provider,  By submitting this query, we are merely seeking further clarification of documentation.  Please utilize your independent clinical judgment when addressing the question(s) below.     The Medical Record contains the following:    Supporting Clinical Findings Location in Medical Record   82-year-old female with history of pancreatitis, pancreatic cancer, and Crohn's disease status post colon resection who presents for diffuse abdominal pain, nausea, and vomiting.  Symptoms started several days ago.  Gradually worsening and worse after eating.  Zofran at home is not helping nausea.  Daughter at bedside reports recent admission with diagnosis of pancreatic cancer.    Discussed findings of colitis, UTI, chronic pancreatitis, acute kidney injury, hypokalemia, hypomagnesemia, and hypertension.  Discussed need for admission for IV antibiotics and rehydration as well as replacement of electrolytes.    Clinical Impression:  Final diagnoses:  Acute on chronic pancreatitis    Lipase 37   ED provider note 6/21    She also states that the patient was diagnosed with C diff and has not been compliant with her oral vancomycin.    CT abdomen pelvis without contrast showed dilated pancreatic duct and some inflammatory changes around the pancreas possibly representing pancreatitis.  Similar changes seen on the prior exam.  Some colonic wall thickening seen in the distal transverse and the descending colon possibly representing colitis.    Assessment & Plan  1. Colitis  2. Recent diagnosis of CDI, likely contributing to above    Plan:  -Fidaxomicin 200 mg BID  -LR @ 125 ml/hr   H&P 6/21   Of note, patient was recently admitted 06/02/23 - 06/04/23 for acute pancreatitis  and was noted to have a high grade pancreatic duct stricture at pancreatic neck with upstream pancreatic duct dilation. Dr. Max performed EUS 06/08/23 that revealed a mass in the pancreatic neck that was determined to be ductal adenocarcinoma staged T3N0Mx by endosonographic criteria.    C diff colitis  N/v  - supportive care: IVF, antiemetics as needed. ADAT  - defer nutritional management to primary team - family requesting TPN  Pancreatic adenocarcinoma  - recently diagnosed via EUS 06/08/23  - not a surgical candidate per Dr. Nuñez  - oncology consulted per family request, appreciate reccs  Crohn's disease of small and large intestine, in remission - s/p resection x2   GI consult 6/22   FINAL DIAGNOSIS:  Clostridium difficile infection  -Pancreatic Cancer  -Epigastric pain     FOLLOWUP:  Oncology clinic this afternoon as scheduled. GI discharge summary 6/29         Please clarify if the ___Acute on Chronic Pancreatitis_____ diagnosis has been:    [ X ] Ruled Out   [  ] Chronic Pancreatitis ruled in   [  ] Acute Pancreatitis ruled in   [  ] Acute on Chronic Pancreatitis ruled in   [  ] Other/Clarification of findings (please specify): _______________    [   ] Clinically undetermined       Please document in your progress notes daily for the duration of treatment, until resolved, and include in your discharge summary.    Form No. [id number]

## 2023-07-08 ENCOUNTER — HOSPITAL ENCOUNTER (INPATIENT)
Facility: HOSPITAL | Age: 83
LOS: 2 days | Discharge: HOME | DRG: 641 | End: 2023-07-10
Attending: EMERGENCY MEDICINE | Admitting: INTERNAL MEDICINE
Payer: MEDICARE

## 2023-07-08 DIAGNOSIS — I10 HYPERTENSION, UNSPECIFIED TYPE: ICD-10-CM

## 2023-07-08 DIAGNOSIS — E87.6 HYPOKALEMIA: ICD-10-CM

## 2023-07-08 DIAGNOSIS — R11.2 INTRACTABLE NAUSEA AND VOMITING: ICD-10-CM

## 2023-07-08 DIAGNOSIS — R10.9 ABDOMINAL PAIN, UNSPECIFIED ABDOMINAL LOCATION: Primary | ICD-10-CM

## 2023-07-08 DIAGNOSIS — C25.9 MALIGNANT NEOPLASM OF PANCREAS, UNSPECIFIED LOCATION OF MALIGNANCY: ICD-10-CM

## 2023-07-08 DIAGNOSIS — R07.9 CHEST PAIN: ICD-10-CM

## 2023-07-08 DIAGNOSIS — E83.42 HYPOMAGNESEMIA: ICD-10-CM

## 2023-07-08 LAB
ALBUMIN SERPL-MCNC: 4 G/DL (ref 3.4–4.8)
ALBUMIN/GLOB SERPL: 0.9 RATIO (ref 1.1–2)
ALP SERPL-CCNC: 142 UNIT/L (ref 40–150)
ALT SERPL-CCNC: 37 UNIT/L (ref 0–55)
APPEARANCE UR: CLEAR
AST SERPL-CCNC: 24 UNIT/L (ref 5–34)
BACTERIA #/AREA URNS AUTO: NORMAL /HPF
BASOPHILS # BLD AUTO: 0.02 X10(3)/MCL
BASOPHILS NFR BLD AUTO: 0.2 %
BILIRUB UR QL STRIP.AUTO: NEGATIVE MG/DL
BILIRUBIN DIRECT+TOT PNL SERPL-MCNC: 0.8 MG/DL
BUN SERPL-MCNC: 10 MG/DL (ref 9.8–20.1)
CALCIUM SERPL-MCNC: 10 MG/DL (ref 8.4–10.2)
CHLORIDE SERPL-SCNC: 109 MMOL/L (ref 98–107)
CO2 SERPL-SCNC: 20 MMOL/L (ref 23–31)
COLOR UR: YELLOW
CREAT SERPL-MCNC: 0.85 MG/DL (ref 0.55–1.02)
EOSINOPHIL # BLD AUTO: 0.01 X10(3)/MCL (ref 0–0.9)
EOSINOPHIL NFR BLD AUTO: 0.1 %
ERYTHROCYTE [DISTWIDTH] IN BLOOD BY AUTOMATED COUNT: 13.8 % (ref 11.5–17)
GFR SERPLBLD CREATININE-BSD FMLA CKD-EPI: >60 MLS/MIN/1.73/M2
GLOBULIN SER-MCNC: 4.4 GM/DL (ref 2.4–3.5)
GLUCOSE SERPL-MCNC: 158 MG/DL (ref 82–115)
GLUCOSE UR QL STRIP.AUTO: NEGATIVE MG/DL
HCT VFR BLD AUTO: 37 % (ref 37–47)
HGB BLD-MCNC: 12.7 G/DL (ref 12–16)
IMM GRANULOCYTES # BLD AUTO: 0.04 X10(3)/MCL (ref 0–0.04)
IMM GRANULOCYTES NFR BLD AUTO: 0.4 %
KETONES UR QL STRIP.AUTO: NEGATIVE MG/DL
LACTATE SERPL-SCNC: 2.3 MMOL/L (ref 0.5–2.2)
LACTATE SERPL-SCNC: 2.9 MMOL/L (ref 0.5–2.2)
LEUKOCYTE ESTERASE UR QL STRIP.AUTO: ABNORMAL UNIT/L
LIPASE SERPL-CCNC: 94 U/L
LYMPHOCYTES # BLD AUTO: 0.87 X10(3)/MCL (ref 0.6–4.6)
LYMPHOCYTES NFR BLD AUTO: 8.6 %
MAGNESIUM SERPL-MCNC: 1.4 MG/DL (ref 1.6–2.6)
MCH RBC QN AUTO: 30.4 PG (ref 27–31)
MCHC RBC AUTO-ENTMCNC: 34.3 G/DL (ref 33–36)
MCV RBC AUTO: 88.5 FL (ref 80–94)
MONOCYTES # BLD AUTO: 0.41 X10(3)/MCL (ref 0.1–1.3)
MONOCYTES NFR BLD AUTO: 4 %
NEUTROPHILS # BLD AUTO: 8.78 X10(3)/MCL (ref 2.1–9.2)
NEUTROPHILS NFR BLD AUTO: 86.7 %
NITRITE UR QL STRIP.AUTO: NEGATIVE
NRBC BLD AUTO-RTO: 0 %
PH UR STRIP.AUTO: 5.5 [PH]
PLATELET # BLD AUTO: 271 X10(3)/MCL (ref 130–400)
PMV BLD AUTO: 9.4 FL (ref 7.4–10.4)
POTASSIUM SERPL-SCNC: 2.8 MMOL/L (ref 3.5–5.1)
PROT SERPL-MCNC: 8.4 GM/DL (ref 5.8–7.6)
PROT UR QL STRIP.AUTO: ABNORMAL MG/DL
RBC # BLD AUTO: 4.18 X10(6)/MCL (ref 4.2–5.4)
RBC #/AREA URNS AUTO: <5 /HPF
RBC UR QL AUTO: NEGATIVE UNIT/L
SODIUM SERPL-SCNC: 141 MMOL/L (ref 136–145)
SP GR UR STRIP.AUTO: >=1.04 (ref 1–1.03)
SQUAMOUS #/AREA URNS AUTO: <5 /HPF
TROPONIN I SERPL-MCNC: <0.01 NG/ML (ref 0–0.04)
UROBILINOGEN UR STRIP-ACNC: 0.2 MG/DL
WBC # SPEC AUTO: 10.13 X10(3)/MCL (ref 4.5–11.5)
WBC #/AREA URNS AUTO: 5 /HPF

## 2023-07-08 PROCEDURE — 96375 TX/PRO/DX INJ NEW DRUG ADDON: CPT

## 2023-07-08 PROCEDURE — 25500020 PHARM REV CODE 255: Performed by: EMERGENCY MEDICINE

## 2023-07-08 PROCEDURE — 11000001 HC ACUTE MED/SURG PRIVATE ROOM

## 2023-07-08 PROCEDURE — 99285 EMERGENCY DEPT VISIT HI MDM: CPT | Mod: 25

## 2023-07-08 PROCEDURE — 81001 URINALYSIS AUTO W/SCOPE: CPT | Performed by: EMERGENCY MEDICINE

## 2023-07-08 PROCEDURE — 27000221 HC OXYGEN, UP TO 24 HOURS

## 2023-07-08 PROCEDURE — 85025 COMPLETE CBC W/AUTO DIFF WBC: CPT | Performed by: EMERGENCY MEDICINE

## 2023-07-08 PROCEDURE — 63600175 PHARM REV CODE 636 W HCPCS: Performed by: INTERNAL MEDICINE

## 2023-07-08 PROCEDURE — 83605 ASSAY OF LACTIC ACID: CPT | Performed by: EMERGENCY MEDICINE

## 2023-07-08 PROCEDURE — 80053 COMPREHEN METABOLIC PANEL: CPT | Performed by: EMERGENCY MEDICINE

## 2023-07-08 PROCEDURE — 83690 ASSAY OF LIPASE: CPT | Performed by: EMERGENCY MEDICINE

## 2023-07-08 PROCEDURE — 84484 ASSAY OF TROPONIN QUANT: CPT | Performed by: EMERGENCY MEDICINE

## 2023-07-08 PROCEDURE — 21400001 HC TELEMETRY ROOM

## 2023-07-08 PROCEDURE — 63600175 PHARM REV CODE 636 W HCPCS: Performed by: EMERGENCY MEDICINE

## 2023-07-08 PROCEDURE — 93010 ELECTROCARDIOGRAM REPORT: CPT | Mod: 76,,, | Performed by: INTERNAL MEDICINE

## 2023-07-08 PROCEDURE — 96361 HYDRATE IV INFUSION ADD-ON: CPT

## 2023-07-08 PROCEDURE — 93005 ELECTROCARDIOGRAM TRACING: CPT

## 2023-07-08 PROCEDURE — 25000003 PHARM REV CODE 250: Performed by: INTERNAL MEDICINE

## 2023-07-08 PROCEDURE — 96365 THER/PROPH/DIAG IV INF INIT: CPT

## 2023-07-08 PROCEDURE — 83735 ASSAY OF MAGNESIUM: CPT | Performed by: EMERGENCY MEDICINE

## 2023-07-08 PROCEDURE — 93010 EKG 12-LEAD: ICD-10-PCS | Mod: 76,,, | Performed by: INTERNAL MEDICINE

## 2023-07-08 RX ORDER — DROPERIDOL 2.5 MG/ML
1.25 INJECTION, SOLUTION INTRAMUSCULAR; INTRAVENOUS ONCE
Status: COMPLETED | OUTPATIENT
Start: 2023-07-08 | End: 2023-07-08

## 2023-07-08 RX ORDER — HYDROMORPHONE HYDROCHLORIDE 2 MG/ML
1 INJECTION, SOLUTION INTRAMUSCULAR; INTRAVENOUS; SUBCUTANEOUS
Status: COMPLETED | OUTPATIENT
Start: 2023-07-08 | End: 2023-07-08

## 2023-07-08 RX ORDER — MORPHINE SULFATE 4 MG/ML
2 INJECTION, SOLUTION INTRAMUSCULAR; INTRAVENOUS EVERY 4 HOURS PRN
Status: DISCONTINUED | OUTPATIENT
Start: 2023-07-08 | End: 2023-07-10

## 2023-07-08 RX ORDER — SODIUM CHLORIDE 9 MG/ML
INJECTION, SOLUTION INTRAVENOUS CONTINUOUS
Status: DISCONTINUED | OUTPATIENT
Start: 2023-07-08 | End: 2023-07-10 | Stop reason: HOSPADM

## 2023-07-08 RX ORDER — MORPHINE SULFATE 4 MG/ML
4 INJECTION, SOLUTION INTRAMUSCULAR; INTRAVENOUS
Status: ACTIVE | OUTPATIENT
Start: 2023-07-08 | End: 2023-07-08

## 2023-07-08 RX ORDER — LABETALOL HYDROCHLORIDE 5 MG/ML
20 INJECTION, SOLUTION INTRAVENOUS
Status: DISCONTINUED | OUTPATIENT
Start: 2023-07-08 | End: 2023-07-10 | Stop reason: HOSPADM

## 2023-07-08 RX ORDER — ONDANSETRON 4 MG/1
4 TABLET, FILM COATED ORAL EVERY 6 HOURS PRN
COMMUNITY
End: 2023-08-01

## 2023-07-08 RX ORDER — HYDRALAZINE HYDROCHLORIDE 20 MG/ML
20 INJECTION INTRAMUSCULAR; INTRAVENOUS
Status: COMPLETED | OUTPATIENT
Start: 2023-07-08 | End: 2023-07-08

## 2023-07-08 RX ORDER — NALOXONE HCL 0.4 MG/ML
0.02 VIAL (ML) INJECTION
Status: DISCONTINUED | OUTPATIENT
Start: 2023-07-08 | End: 2023-07-10 | Stop reason: HOSPADM

## 2023-07-08 RX ORDER — HYDROMORPHONE HYDROCHLORIDE 2 MG/ML
1 INJECTION, SOLUTION INTRAMUSCULAR; INTRAVENOUS; SUBCUTANEOUS EVERY 4 HOURS PRN
Status: DISCONTINUED | OUTPATIENT
Start: 2023-07-08 | End: 2023-07-09

## 2023-07-08 RX ORDER — ONDANSETRON 2 MG/ML
4 INJECTION INTRAMUSCULAR; INTRAVENOUS EVERY 4 HOURS PRN
Status: DISCONTINUED | OUTPATIENT
Start: 2023-07-08 | End: 2023-07-10 | Stop reason: HOSPADM

## 2023-07-08 RX ORDER — GLUCAGON 1 MG
1 KIT INJECTION
Status: DISCONTINUED | OUTPATIENT
Start: 2023-07-08 | End: 2023-07-10 | Stop reason: HOSPADM

## 2023-07-08 RX ORDER — ENOXAPARIN SODIUM 100 MG/ML
40 INJECTION SUBCUTANEOUS EVERY 24 HOURS
Status: DISCONTINUED | OUTPATIENT
Start: 2023-07-08 | End: 2023-07-10 | Stop reason: HOSPADM

## 2023-07-08 RX ORDER — ACETAMINOPHEN 500 MG
1000 TABLET ORAL EVERY 6 HOURS PRN
Status: DISCONTINUED | OUTPATIENT
Start: 2023-07-08 | End: 2023-07-10 | Stop reason: HOSPADM

## 2023-07-08 RX ORDER — POTASSIUM CHLORIDE 14.9 MG/ML
20 INJECTION INTRAVENOUS
Status: COMPLETED | OUTPATIENT
Start: 2023-07-08 | End: 2023-07-08

## 2023-07-08 RX ORDER — HYDRALAZINE HYDROCHLORIDE 20 MG/ML
20 INJECTION INTRAMUSCULAR; INTRAVENOUS
Status: DISCONTINUED | OUTPATIENT
Start: 2023-07-08 | End: 2023-07-10 | Stop reason: HOSPADM

## 2023-07-08 RX ORDER — SODIUM CHLORIDE 0.9 % (FLUSH) 0.9 %
10 SYRINGE (ML) INJECTION EVERY 12 HOURS PRN
Status: DISCONTINUED | OUTPATIENT
Start: 2023-07-08 | End: 2023-07-10 | Stop reason: HOSPADM

## 2023-07-08 RX ORDER — ONDANSETRON 2 MG/ML
4 INJECTION INTRAMUSCULAR; INTRAVENOUS
Status: COMPLETED | OUTPATIENT
Start: 2023-07-08 | End: 2023-07-08

## 2023-07-08 RX ORDER — MAGNESIUM SULFATE 1 G/100ML
1 INJECTION INTRAVENOUS ONCE
Status: COMPLETED | OUTPATIENT
Start: 2023-07-08 | End: 2023-07-08

## 2023-07-08 RX ORDER — ACETAMINOPHEN 325 MG/1
650 TABLET ORAL EVERY 8 HOURS PRN
Status: DISCONTINUED | OUTPATIENT
Start: 2023-07-08 | End: 2023-07-10 | Stop reason: HOSPADM

## 2023-07-08 RX ORDER — IBUPROFEN 200 MG
24 TABLET ORAL
Status: DISCONTINUED | OUTPATIENT
Start: 2023-07-08 | End: 2023-07-10 | Stop reason: HOSPADM

## 2023-07-08 RX ORDER — IBUPROFEN 200 MG
16 TABLET ORAL
Status: DISCONTINUED | OUTPATIENT
Start: 2023-07-08 | End: 2023-07-10 | Stop reason: HOSPADM

## 2023-07-08 RX ORDER — PROCHLORPERAZINE EDISYLATE 5 MG/ML
5 INJECTION INTRAMUSCULAR; INTRAVENOUS EVERY 4 HOURS PRN
Status: DISCONTINUED | OUTPATIENT
Start: 2023-07-08 | End: 2023-07-10 | Stop reason: HOSPADM

## 2023-07-08 RX ADMIN — POTASSIUM CHLORIDE 20 MEQ: 14.9 INJECTION, SOLUTION INTRAVENOUS at 01:07

## 2023-07-08 RX ADMIN — IOPAMIDOL 100 ML: 755 INJECTION, SOLUTION INTRAVENOUS at 01:07

## 2023-07-08 RX ADMIN — HYDROMORPHONE HYDROCHLORIDE 1 MG: 2 INJECTION INTRAMUSCULAR; INTRAVENOUS; SUBCUTANEOUS at 03:07

## 2023-07-08 RX ADMIN — HYDROMORPHONE HYDROCHLORIDE 1 MG: 2 INJECTION INTRAMUSCULAR; INTRAVENOUS; SUBCUTANEOUS at 10:07

## 2023-07-08 RX ADMIN — HYDROMORPHONE HYDROCHLORIDE 1 MG: 2 INJECTION INTRAMUSCULAR; INTRAVENOUS; SUBCUTANEOUS at 08:07

## 2023-07-08 RX ADMIN — HYDRALAZINE HYDROCHLORIDE 20 MG: 20 INJECTION INTRAMUSCULAR; INTRAVENOUS at 11:07

## 2023-07-08 RX ADMIN — DROPERIDOL 1.25 MG: 2.5 INJECTION, SOLUTION INTRAMUSCULAR; INTRAVENOUS at 01:07

## 2023-07-08 RX ADMIN — SODIUM CHLORIDE: 9 INJECTION, SOLUTION INTRAVENOUS at 04:07

## 2023-07-08 RX ADMIN — ONDANSETRON 4 MG: 2 INJECTION INTRAMUSCULAR; INTRAVENOUS at 10:07

## 2023-07-08 RX ADMIN — SODIUM CHLORIDE, POTASSIUM CHLORIDE, SODIUM LACTATE AND CALCIUM CHLORIDE 1000 ML: 600; 310; 30; 20 INJECTION, SOLUTION INTRAVENOUS at 10:07

## 2023-07-08 RX ADMIN — ENOXAPARIN SODIUM 40 MG: 40 INJECTION SUBCUTANEOUS at 06:07

## 2023-07-08 RX ADMIN — MAGNESIUM SULFATE IN DEXTROSE 1 G: 10 INJECTION, SOLUTION INTRAVENOUS at 03:07

## 2023-07-08 NOTE — Clinical Note
Diagnosis: Abdominal pain, unspecified abdominal location [[id number]]   Admitting Provider:: ISIDORO MERRITT [[id number]]   Future Attending Provider: ISIDORO MERRITT [[id number]]   Reason for IP Medical Treatment  (Clinical interventions that can only be accomplished in the IP setting? ) :: inpatient services necessary   Estimated Length of Stay:: 2 midnights   I certify that Inpatient services for greater than or equal to 2 midnights are medically necessary:: Yes   Plans for Post-Acute care--if anticipated (pick the single best option):: A. No post acute care anticipated at this time

## 2023-07-08 NOTE — ED PROVIDER NOTES
Encounter Date: 7/8/2023    SCRIBE #1 NOTE: I, Nedra Chapman, am scribing for, and in the presence of,  Juan R Rice MD. I have scribed the following portions of the note - the EKG reading. Other sections scribed: HPI, ROS, PE.     History     Chief Complaint   Patient presents with    Abdominal Pain    Nausea     C/O upper abd pain/NV since last night. Denies fever/diarrhea/sick contacts. Recently dx stage 3 pancreatic cancer, no treatment yet. Hx crohn's      82 year old female with a history of Crohn's disease, cholecystectomy, cervical cancer, and pancreatic cancer presents to ED, via EMS, complaining of abdominal pain, N/V that began yesterday afternoon. Pt denies any sick contacts or diarrhea.  Her diagnosis of pancreatic cancer was recent and she says she is not currently being treated for it.      The history is provided by the patient.   Abdominal Pain  The current episode started yesterday. The problem has not changed since onset.The abdominal pain is located in the epigastric region. The other symptoms of the illness include nausea and vomiting. The other symptoms of the illness do not include fever or diarrhea.   Review of patient's allergies indicates:  No Known Allergies  Past Medical History:   Diagnosis Date    Acute pancreatitis, unspecified complication status, unspecified pancreatitis type     Arthritis     Carpal tunnel syndrome, bilateral     Cervical cancer     Cervical radiculopathy     Crohn disease     Heart attack     HLD (hyperlipidemia)     Low back pain     Pancreatic cancer     Sleep apnea, unspecified     Spinal stenosis of lumbar region with neurogenic claudication      Past Surgical History:   Procedure Laterality Date    bilateral L4-5, L5-S1 decompression, repair L5-S1 dural tear  10/01/2021    Dr. Marin    CARPAL TUNNEL RELEASE Right 09/06/2019    Dr. Marin    CARPAL TUNNEL RELEASE Left 12/2/2022    Procedure: RELEASE, CARPAL TUNNEL;  Surgeon: Jesse Marin MD;   Location: [facility] OR;  Service: Neurosurgery;  Laterality: Left;    CHOLECYSTECTOMY      COLECTOMY  07/2011    partial x3    ESOPHAGOGASTRODUODENOSCOPY N/A 6/8/2023    Procedure: EGD (ESOPHAGOGASTRODUODENOSCOPY);  Surgeon: Jose Alberto Max MD;  Location: [facility] OR;  Service: Gastroenterology;  Laterality: N/A;    ESOPHAGOGASTRODUODENOSCOPY N/A 6/29/2023    Procedure: EGD (ESOPHAGOGASTRODUODENOSCOPY);  Surgeon: Jose Alberto Max MD;  Location: [facility] OR;  Service: Gastroenterology;  Laterality: N/A;    HYSTERECTOMY      total    REPAIR OF EYELID Bilateral 11/21/2022    Procedure: BILATERAL ECTROPION REPAIR;  Surgeon: Justin Flores MD;  Location: [facility] OR;  Service: ENT;  Laterality: Bilateral;    ULTRASOUND, ENDOSCOPIC, WITH FINE NEEDLE ASPIRATION N/A 6/8/2023    Procedure: UPPER EUS  W/  FNA;  Surgeon: Jose Alberto Max MD;  Location: [facility] OR;  Service: Gastroenterology;  Laterality: N/A;  Dangelo, pt w/ CD on entyvio recently admitted to [facility] w/ acute pancreatitis. Possibly drug induced from entyvio? Althoughshe does have dilated PD and a poss focal stricture in the HOP.CA 19-9 nml    ULTRASOUND, UPPER GI TRACT, ENDOSCOPIC, WITH CELIAC PLEXUS BLOCK N/A 6/29/2023    Procedure: UPPER EUS W/ POSS FNA W/ CELIAC BLOCK;  Surgeon: Jose Alberto Max MD;  Location: [facility] OR;  Service: Gastroenterology;  Laterality: N/A;  Celiac Plexus injection planned.Pt should receive 1L NS bolus upon arrival to Outpt Surgery and 1L NS during the procedure. Pt will need to be in PACU for 30 min post procedure to assess for any potential hypotension. NO LABS     Family History   Problem Relation Age of Onset    Diabetes Mother     Hypertension Father     Hyperlipidemia Father     Cancer Father     Hyperlipidemia Sister     Hyperlipidemia Brother     Hyperlipidemia Daughter      Social History     Tobacco Use    Smoking status: Former     Types: Cigarettes    Smokeless tobacco: Never   Substance Use Topics    Alcohol use: Not Currently     Comment:  rarely    Drug use: Never     Review of Systems   Constitutional:  Negative for fever.   Gastrointestinal:  Positive for abdominal pain, nausea and vomiting. Negative for diarrhea.     Physical Exam     Initial Vitals [07/08/23 1004]   BP Pulse Resp Temp SpO2   (!) 178/112 80 20 98.6 °F (37 °C) 98 %      MAP       --         Physical Exam    Nursing note and vitals reviewed.  Constitutional: She appears well-developed. No distress.   Appears mildly uncomfortable   HENT:   Head: Normocephalic and atraumatic.   Mouth/Throat: Oropharynx is clear and moist.   Eyes: Conjunctivae and EOM are normal. Pupils are equal, round, and reactive to light. No scleral icterus.   Neck: Neck supple.   Cardiovascular:  Normal rate and regular rhythm.           No murmur heard.  Pulmonary/Chest: Breath sounds normal. No respiratory distress. She exhibits no tenderness.   Abdominal: Abdomen is soft. Bowel sounds are normal. She exhibits no distension. There is abdominal tenderness (mild) in the epigastric area. There is no rebound and no guarding.   Musculoskeletal:         General: Normal range of motion.      Cervical back: Neck supple.      Lumbar back: Normal. Normal range of motion.     Neurological: She is alert and oriented to person, place, and time. She has normal strength. No cranial nerve deficit or sensory deficit.   Skin:   No jaundice   Psychiatric: She has a normal mood and affect. Judgment normal.       ED Course   Procedures  Labs Reviewed   COMPREHENSIVE METABOLIC PANEL - Abnormal; Notable for the following components:       Result Value    Potassium Level 2.8 (*)     Chloride 109 (*)     Carbon Dioxide 20 (*)     Glucose Level 158 (*)     Protein Total 8.4 (*)     Globulin 4.4 (*)     Albumin/Globulin Ratio 0.9 (*)     All other components within normal limits   URINALYSIS, REFLEX TO URINE CULTURE - Abnormal; Notable for the following components:    Specific Gravity, UA >=1.040 (*)     Protein, UA Trace (*)      Leukocyte Esterase, UA 1+ (*)     All other components within normal limits   LIPASE - Abnormal; Notable for the following components:    Lipase Level 94 (*)     All other components within normal limits   LACTIC ACID, PLASMA - Abnormal; Notable for the following components:    Lactic Acid Level 2.3 (*)     All other components within normal limits   CBC WITH DIFFERENTIAL - Abnormal; Notable for the following components:    RBC 4.18 (*)     All other components within normal limits   MAGNESIUM - Abnormal; Notable for the following components:    Magnesium Level 1.40 (*)     All other components within normal limits   TROPONIN I - Normal   URINALYSIS, MICROSCOPIC - Normal   CBC W/ AUTO DIFFERENTIAL    Narrative:     The following orders were created for panel order CBC auto differential.  Procedure                               Abnormality         Status                     ---------                               -----------         ------                     CBC with Differential[[id number]]        Abnormal            Final result                 Please view results for these tests on the individual orders.   LACTIC ACID, PLASMA     EKG Readings: (Independently Interpreted)   Initial Reading: No STEMI. Rhythm: Normal Sinus Rhythm. Heart Rate: 97. Ectopy: No Ectopy. Conduction: Normal. ST Segments: Normal ST Segments. T Waves: Normal. Clinical Impression: Normal Sinus Rhythm   Time:1012     Imaging Results              CT Abdomen Pelvis With Contrast (Final result)  Result time 07/08/23 13:31:50      Final result by Joshua Dickey MD (07/08/23 13:31:50)                   Impression:      1. Persistent dilatation of the pancreatic duct without evidence for significant interval change.  2. Status post right hemicolectomy without evidence for complication.  No evidence for obstructive bowel gas pattern.  3. Other secondary findings as noted.      Electronically signed by: Joshua Dickey  MD  Date:    07/08/2023  Time:    13:31               Narrative:    EXAMINATION:  CT ABDOMEN PELVIS WITH CONTRAST    CLINICAL HISTORY:  Bowel obstruction suspected;    TECHNIQUE:  Multiple cross-sectional images were obtained from the lung bases the pubic symphysis after the intravenous administration of 100 mL of Isovue 370.  Coronal and sagittal reformatted images were obtained.  An automated dose exposure technique was utilized this limits radiation does the patient.    COMPARISON:  06/21/2023    FINDINGS:  Lung bases are clear.  Heart size within normal limits.    The liver demonstrates intrahepatic ductal dilatation as well as dilatation of the common duct secondary to prior cholecystectomy.  Surgical clips are identified.  The spleen, adrenals, kidneys with Bosniak type 1 cyst are normal.  Pancreatic ductal dilatation is identified and not significantly changed compared to the prior examination.  No definitive mass in the pancreatic head is identified.  No significant atrophy.    Small hiatal hernia.  Small bowel is of normal caliber.  Colon is of normal caliber with scattered colonic diverticula.  No adjacent inflammatory changes.  Status post right hemicolectomy.  The anastomosis is intact.  Likely adhesed loops of bowel to the anterior abdominal wall without obstruction.    Uterus is surgically absent.  No evidence for adnexal mass.  Bladder is under distended normal.  The abdominal aorta is of normal caliber with tortuosity.  Multiple surgical clips are identified in the retroperitoneum.  No free fluid in the abdomen pelvis.  No evidence for adenopathy.    No suspicious osseous lesions.  Spondylotic changes are identified.  The soft tissues are grossly normal.                                       Medications   morphine injection 4 mg (4 mg Intravenous Not Given 7/8/23 1027)   magnesium sulfate in dextrose IVPB (premix) 1 g (has no administration in time range)   ondansetron injection 4 mg (4 mg  Intravenous Given 7/8/23 1031)   lactated ringers bolus 1,000 mL (0 mLs Intravenous Stopped 7/8/23 1133)   HYDROmorphone (PF) injection 1 mg (1 mg Intravenous Given 7/8/23 1033)   hydrALAZINE injection 20 mg (20 mg Intravenous Given 7/8/23 1158)   droPERidol injection 1.25 mg (1.25 mg Intravenous Given 7/8/23 1321)   potassium chloride 20 mEq in 100 mL IVPB (FOR CENTRAL LINE ADMINISTRATION ONLY) (20 mEq Intravenous New Bag 7/8/23 1321)   iopamidoL (ISOVUE-370) injection 100 mL (100 mLs Intravenous Given 7/8/23 1321)   Medical Decision Making  Differential diagnosis includes but is not limited to: bowel obstruction, pancreatitis, perforated viscus    Amount and/or Complexity of Data Reviewed  Labs: ordered.  Radiology: ordered.  ECG/medicine tests: ordered.                Scribe Attestation:   Scribe #1: I performed the above scribed service and the documentation accurately describes the services I performed. I attest to the accuracy of the note.    Attending Attestation:           Physician Attestation for Scribe:  Physician Attestation Statement for Scribe #1: I, Juan R Rice MD, reviewed documentation, as scribed by Nedra Chapman in my presence, and it is both accurate and complete.           ED Course as of 07/08/23 1418   Sat Jul 08, 2023   1417 Hospitalist consulted for admit [NL]      ED Course User Index  [NL] Juan R Rice MD                 Clinical Impression:   Final diagnoses:  [E87.6] Hypokalemia  [E83.42] Hypomagnesemia  [I10] Hypertension, unspecified type  [R11.2] Intractable nausea and vomiting  [C25.9] Malignant neoplasm of pancreas, unspecified location of malignancy        ED Disposition Condition    Admit Stable                Juan R Rice MD  07/08/23 1418

## 2023-07-08 NOTE — NURSING
Nurses Note -- 4 Eyes      7/8/2023   5:02 PM      Skin assessed during: Admit      [x] No Altered Skin Integrity Present    []Prevention Measures Documented      [] Yes- Altered Skin Integrity Present or Discovered   [] LDA Added if Not in Epic (Describe Wound)   [] New Altered Skin Integrity was Present on Admit and Documented in LDA   [] Wound Image Taken    Wound Care Consulted? No    Attending Nurse:  Josie Quijano RN     Second RN/Staff Member:  Jag Gregg LPN

## 2023-07-08 NOTE — H&P
[facility]  Hospital Medicine History & Physical Examination       Patient Name: Evelyn Proctor  MRN: [id number]  Patient Class: IP- Inpatient   Admission Date: 7/8/2023   Admitting Physician:  Service   Length of Stay: 0  Attending Physician: Trevor Joyce MD  Primary Care Provider: Marina Morales MD  Face-to-Face encounter date: 07/08/2023  Code Status:  Full  Chief Complaint: Abdominal Pain and Nausea (C/O upper abd pain/NV since last night. Denies fever/diarrhea/sick contacts. Recently dx stage 3 pancreatic cancer, no treatment yet. Hx crohn's )        Patient information was obtained from patient, patient's family, past medical records and ER records.  ED records were reviewed in detail and documented below    HISTORY OF PRESENT ILLNESS:   Evelyn Proctor is a 82-year-old  female with a past medical history of recently admission for Clostridium difficile colitis treated with Dificid, stage III pancreatic cancer, Crohn's disease status post right hemicolectomy who presented to [facility] on 7/8/2023 with a primary complaint of midepigastric abdominal pain, generalized malaise, intractable nausea and vomiting.  Denies fever, chills, diarrhea, melena, hematochezia.  Patient was recently admitted to the hospital medicine service for similar issues and was treated for Clostridium difficile colitis at that time.  She was discharged home in stable and improved condition.  Workup in our emergency room revealed elevated blood pressure with a maximum reading of 201/99, oxygen saturation of 98% on room air, and a temperature of 98.6° F. laboratory laboratory work was remarkable for potassium 2.8, bicarbonate 20, magnesium 1.4, lactic acid of 2.3, otherwise unremarkable.  CT of the abdomen and pelvis with contrast was negative for acute findings.  Patient was given intravenous potassium in the emergency room and admitted to the hospital medicine service      PAST MEDICAL  HISTORY:     Past Medical History:   Diagnosis Date    Acute pancreatitis, unspecified complication status, unspecified pancreatitis type     Arthritis     Carpal tunnel syndrome, bilateral     Cervical cancer     Cervical radiculopathy     Crohn disease     Heart attack     HLD (hyperlipidemia)     Low back pain     Pancreatic cancer     Sleep apnea, unspecified     Spinal stenosis of lumbar region with neurogenic claudication        PAST SURGICAL HISTORY:     Past Surgical History:   Procedure Laterality Date    bilateral L4-5, L5-S1 decompression, repair L5-S1 dural tear  10/01/2021    Dr. Marin    CARPAL TUNNEL RELEASE Right 09/06/2019    Dr. Marin    CARPAL TUNNEL RELEASE Left 12/2/2022    Procedure: RELEASE, CARPAL TUNNEL;  Surgeon: Jesse Marin MD;  Location: [facility] OR;  Service: Neurosurgery;  Laterality: Left;    CHOLECYSTECTOMY      COLECTOMY  07/2011    partial x3    ESOPHAGOGASTRODUODENOSCOPY N/A 6/8/2023    Procedure: EGD (ESOPHAGOGASTRODUODENOSCOPY);  Surgeon: Jose Alberto Max MD;  Location: [facility] OR;  Service: Gastroenterology;  Laterality: N/A;    ESOPHAGOGASTRODUODENOSCOPY N/A 6/29/2023    Procedure: EGD (ESOPHAGOGASTRODUODENOSCOPY);  Surgeon: Jose Alberto Max MD;  Location: [facility] OR;  Service: Gastroenterology;  Laterality: N/A;    HYSTERECTOMY      total    REPAIR OF EYELID Bilateral 11/21/2022    Procedure: BILATERAL ECTROPION REPAIR;  Surgeon: Justin Flores MD;  Location: [facility] OR;  Service: ENT;  Laterality: Bilateral;    ULTRASOUND, ENDOSCOPIC, WITH FINE NEEDLE ASPIRATION N/A 6/8/2023    Procedure: UPPER EUS  W/  FNA;  Surgeon: Jose Alberto Max MD;  Location: [facility] OR;  Service: Gastroenterology;  Laterality: N/A;  Dangelo, pt w/ CD on entyvio recently admitted to [facility] w/ acute pancreatitis. Possibly drug induced from entyvio? Althoughshe does have dilated PD and a poss focal stricture in the HOP.CA 19-9 nml    ULTRASOUND, UPPER GI TRACT, ENDOSCOPIC, WITH CELIAC PLEXUS BLOCK N/A 6/29/2023     Procedure: UPPER EUS W/ POSS FNA W/ CELIAC BLOCK;  Surgeon: Jose Alberto Max MD;  Location: [facility] OR;  Service: Gastroenterology;  Laterality: N/A;  Celiac Plexus injection planned.Pt should receive 1L NS bolus upon arrival to Outpt Surgery and 1L NS during the procedure. Pt will need to be in PACU for 30 min post procedure to assess for any potential hypotension. NO LABS       ALLERGIES:   Patient has no known allergies.    FAMILY HISTORY:   Reviewed and negative    SOCIAL HISTORY:     Social History     Tobacco Use    Smoking status: Former     Types: Cigarettes    Smokeless tobacco: Never   Substance Use Topics    Alcohol use: Not Currently     Comment: rarely        HOME MEDICATIONS:     Prior to Admission medications    Medication Sig Start Date End Date Taking? Authorizing Provider   cholecalciferol, vitamin D3, 1,250 mcg (50,000 unit) capsule Take 1,250 mcg by mouth once a week. 9/30/21   Historical Provider   oxyCODONE (ROXICODONE) 30 MG Tab Take 5 mg by mouth every 6 (six) hours as needed.    Historical Provider   pantoprazole (PROTONIX) 40 MG tablet Take 1 tablet by mouth every morning. 5/22/23 5/21/24  Historical Provider   simvastatin (ZOCOR) 20 MG tablet Take 20 mg by mouth every evening. 4/28/22   Historical Provider   traZODone (DESYREL) 50 MG tablet Take 0.5 tablets (25 mg total) by mouth nightly as needed for Insomnia or Depression. 7/5/23 9/3/23  Marina Morales MD       REVIEW OF SYSTEMS:   Except as documented, all other systems reviewed and negative     PHYSICAL EXAM:     VITAL SIGNS: 24 HRS MIN & MAX LAST   Temp  Min: 98.6 °F (37 °C)  Max: 98.6 °F (37 °C) 98.6 °F (37 °C)   BP  Min: 136/61  Max: 201/99 (!) 169/78   Pulse  Min: 78  Max: 111  108   Resp  Min: 15  Max: 20 20   SpO2  Min: 91 %  Max: 100 % 98 %       General:  Chronically ill-appearing  female in no acute distress  HENT: normocephalic, atraumatic  Eye: PERRL, EOMI, clear conjunctiva  Neck: full ROM, no  thyromegaly, no JVD  Respiratory: clear to auscultation bilaterally  Cardiovascular: regular rate and rhythm  Gastrointestinal: non-distended, positive bowel sounds, mild epigastric tenderness  Musculoskeletal: no gross deformity  Integumentary: warm, dry, intact, no rashes  Neurological: cranial nerves grossly intact, no focal neurological deficit  Psychiatric: cooperative, flat affect, depressed appearing       LABS AND IMAGING:     Recent Labs   Lab 07/08/23  1037   WBC 10.13   RBC 4.18*   HGB 12.7   HCT 37.0   MCV 88.5   MCH 30.4   MCHC 34.3   RDW 13.8      MPV 9.4       Recent Labs   Lab 07/08/23  1037      K 2.8*   CO2 20*   BUN 10.0   CREATININE 0.85   CALCIUM 10.0   MG 1.40*   ALBUMIN 4.0   ALKPHOS 142   ALT 37   AST 24   BILITOT 0.8       Microbiology Results (last 7 days)       ** No results found for the last 168 hours. **             CT Abdomen Pelvis With Contrast  Narrative: EXAMINATION:  CT ABDOMEN PELVIS WITH CONTRAST    CLINICAL HISTORY:  Bowel obstruction suspected;    TECHNIQUE:  Multiple cross-sectional images were obtained from the lung bases the pubic symphysis after the intravenous administration of 100 mL of Isovue 370.  Coronal and sagittal reformatted images were obtained.  An automated dose exposure technique was utilized this limits radiation does the patient.    COMPARISON:  06/21/2023    FINDINGS:  Lung bases are clear.  Heart size within normal limits.    The liver demonstrates intrahepatic ductal dilatation as well as dilatation of the common duct secondary to prior cholecystectomy.  Surgical clips are identified.  The spleen, adrenals, kidneys with Bosniak type 1 cyst are normal.  Pancreatic ductal dilatation is identified and not significantly changed compared to the prior examination.  No definitive mass in the pancreatic head is identified.  No significant atrophy.    Small hiatal hernia.  Small bowel is of normal caliber.  Colon is of normal caliber with scattered  colonic diverticula.  No adjacent inflammatory changes.  Status post right hemicolectomy.  The anastomosis is intact.  Likely adhesed loops of bowel to the anterior abdominal wall without obstruction.    Uterus is surgically absent.  No evidence for adnexal mass.  Bladder is under distended normal.  The abdominal aorta is of normal caliber with tortuosity.  Multiple surgical clips are identified in the retroperitoneum.  No free fluid in the abdomen pelvis.  No evidence for adenopathy.    No suspicious osseous lesions.  Spondylotic changes are identified.  The soft tissues are grossly normal.  Impression: 1. Persistent dilatation of the pancreatic duct without evidence for significant interval change.  2. Status post right hemicolectomy without evidence for complication.  No evidence for obstructive bowel gas pattern.  3. Other secondary findings as noted.    Electronically signed by: Joshua Dickey MD  Date:    07/08/2023  Time:    13:31        ASSESSMENT & PLAN:   Intractable nausea and vomiting   Hypokalemia  Hypomagnesemia  Hypertensive urgency  Pancreatic cancer, not a candidate for treatment  History of Crohn's disease status post right hemicolectomy  Recent Clostridium difficile colitis treated with Dificid      Plan:  Replace electrolytes  Provide intravenous hydration and pain control and other supportive care  Aggressive blood pressure control with as needed intravenous antihypertensives       Critical Care Diagnosis: Hypertensive Urgency requiring IV antihypertensives  Critical Care Interventions: Hands-on evaluation, review of labs, radiographs, medical records, and discussion with the patient and medical staff in order to assess and manage the high probability of imminent or life-threatening deterioration of cardio-respiratory status requiring vasopressor support and/or intubation and mechanical ventilation.  Critical Care Time Spent: 35 minutes          VTE Prophylaxis:  Lovenox    Patient condition:   Stable    __________________________________________________________________________  INPATIENT LIST OF MEDICATIONS     Scheduled Meds:   magnesium sulfate IVPB  1 g Intravenous Once    morphine  4 mg Intravenous ED 1 Time         Trevor Joyce MD   07/08/2023

## 2023-07-09 LAB
ANION GAP SERPL CALC-SCNC: 8 MEQ/L
BASOPHILS # BLD AUTO: 0.01 X10(3)/MCL
BASOPHILS NFR BLD AUTO: 0.1 %
BUN SERPL-MCNC: 11.6 MG/DL (ref 9.8–20.1)
CALCIUM SERPL-MCNC: 9 MG/DL (ref 8.4–10.2)
CHLORIDE SERPL-SCNC: 110 MMOL/L (ref 98–107)
CO2 SERPL-SCNC: 22 MMOL/L (ref 23–31)
CREAT SERPL-MCNC: 0.74 MG/DL (ref 0.55–1.02)
CREAT/UREA NIT SERPL: 16
EOSINOPHIL # BLD AUTO: 0.04 X10(3)/MCL (ref 0–0.9)
EOSINOPHIL NFR BLD AUTO: 0.5 %
ERYTHROCYTE [DISTWIDTH] IN BLOOD BY AUTOMATED COUNT: 14.5 % (ref 11.5–17)
GFR SERPLBLD CREATININE-BSD FMLA CKD-EPI: >60 MLS/MIN/1.73/M2
GLUCOSE SERPL-MCNC: 115 MG/DL (ref 82–115)
HCT VFR BLD AUTO: 29.8 % (ref 37–47)
HGB BLD-MCNC: 10.3 G/DL (ref 12–16)
IMM GRANULOCYTES # BLD AUTO: 0.03 X10(3)/MCL (ref 0–0.04)
IMM GRANULOCYTES NFR BLD AUTO: 0.4 %
LACTATE SERPL-SCNC: 0.8 MMOL/L (ref 0.5–2.2)
LYMPHOCYTES # BLD AUTO: 1.07 X10(3)/MCL (ref 0.6–4.6)
LYMPHOCYTES NFR BLD AUTO: 13 %
MAGNESIUM SERPL-MCNC: 1.6 MG/DL (ref 1.6–2.6)
MCH RBC QN AUTO: 31 PG (ref 27–31)
MCHC RBC AUTO-ENTMCNC: 34.6 G/DL (ref 33–36)
MCV RBC AUTO: 89.8 FL (ref 80–94)
MONOCYTES # BLD AUTO: 0.67 X10(3)/MCL (ref 0.1–1.3)
MONOCYTES NFR BLD AUTO: 8.2 %
NEUTROPHILS # BLD AUTO: 6.4 X10(3)/MCL (ref 2.1–9.2)
NEUTROPHILS NFR BLD AUTO: 77.8 %
NRBC BLD AUTO-RTO: 0 %
PLATELET # BLD AUTO: 222 X10(3)/MCL (ref 130–400)
PMV BLD AUTO: 9.8 FL (ref 7.4–10.4)
POTASSIUM SERPL-SCNC: 3.3 MMOL/L (ref 3.5–5.1)
RBC # BLD AUTO: 3.32 X10(6)/MCL (ref 4.2–5.4)
SODIUM SERPL-SCNC: 140 MMOL/L (ref 136–145)
WBC # SPEC AUTO: 8.22 X10(3)/MCL (ref 4.5–11.5)

## 2023-07-09 PROCEDURE — 85025 COMPLETE CBC W/AUTO DIFF WBC: CPT | Performed by: INTERNAL MEDICINE

## 2023-07-09 PROCEDURE — 21400001 HC TELEMETRY ROOM

## 2023-07-09 PROCEDURE — 80048 BASIC METABOLIC PNL TOTAL CA: CPT | Performed by: INTERNAL MEDICINE

## 2023-07-09 PROCEDURE — 83605 ASSAY OF LACTIC ACID: CPT | Performed by: STUDENT IN AN ORGANIZED HEALTH CARE EDUCATION/TRAINING PROGRAM

## 2023-07-09 PROCEDURE — 83735 ASSAY OF MAGNESIUM: CPT | Performed by: STUDENT IN AN ORGANIZED HEALTH CARE EDUCATION/TRAINING PROGRAM

## 2023-07-09 PROCEDURE — 63600175 PHARM REV CODE 636 W HCPCS: Performed by: INTERNAL MEDICINE

## 2023-07-09 RX ADMIN — ONDANSETRON 4 MG: 2 INJECTION INTRAMUSCULAR; INTRAVENOUS at 08:07

## 2023-07-09 RX ADMIN — HYDROMORPHONE HYDROCHLORIDE 1 MG: 2 INJECTION INTRAMUSCULAR; INTRAVENOUS; SUBCUTANEOUS at 03:07

## 2023-07-09 RX ADMIN — ONDANSETRON 4 MG: 2 INJECTION INTRAMUSCULAR; INTRAVENOUS at 03:07

## 2023-07-09 RX ADMIN — ENOXAPARIN SODIUM 40 MG: 40 INJECTION SUBCUTANEOUS at 04:07

## 2023-07-09 RX ADMIN — MORPHINE SULFATE 2 MG: 4 INJECTION INTRAVENOUS at 08:07

## 2023-07-09 RX ADMIN — ONDANSETRON 4 MG: 2 INJECTION INTRAMUSCULAR; INTRAVENOUS at 02:07

## 2023-07-09 RX ADMIN — HYDROMORPHONE HYDROCHLORIDE 1 MG: 2 INJECTION INTRAMUSCULAR; INTRAVENOUS; SUBCUTANEOUS at 02:07

## 2023-07-09 NOTE — PLAN OF CARE
Problem: Adult Inpatient Plan of Care  Goal: Plan of Care Review  Outcome: Ongoing, Progressing  Goal: Patient-Specific Goal (Individualized)  Outcome: Ongoing, Progressing  Goal: Absence of Hospital-Acquired Illness or Injury  Outcome: Ongoing, Progressing  Goal: Optimal Comfort and Wellbeing  Outcome: Ongoing, Progressing  Goal: Readiness for Transition of Care  Outcome: Ongoing, Progressing     Problem: Fluid and Electrolyte Imbalance (Acute Kidney Injury/Impairment)  Goal: Fluid and Electrolyte Balance  Outcome: Ongoing, Progressing     Problem: Oral Intake Inadequate (Acute Kidney Injury/Impairment)  Goal: Optimal Nutrition Intake  Outcome: Ongoing, Progressing     Problem: Renal Function Impairment (Acute Kidney Injury/Impairment)  Goal: Effective Renal Function  Outcome: Ongoing, Progressing     Problem: Infection  Goal: Absence of Infection Signs and Symptoms  Outcome: Ongoing, Progressing     Problem: Skin Injury Risk Increased  Goal: Skin Health and Integrity  Outcome: Ongoing, Progressing

## 2023-07-09 NOTE — PROGRESS NOTES
[facility]  Hospital Medicine Progress Note        Chief Complaint:  Nausea and vomiting      HPI:   82-year-old  female with a past medical history of recently admission for Clostridium difficile colitis treated with Dificid, stage III pancreatic cancer, Crohn's disease status post right hemicolectomy who presented to [facility] on 7/8/2023 with a primary complaint of midepigastric abdominal pain, generalized malaise, intractable nausea and vomiting.  Denies fever, chills, diarrhea, melena, hematochezia.  Patient was recently admitted to the hospital medicine service for similar issues and was treated for Clostridium difficile colitis at that time.  She was discharged home in stable and improved condition.  Workup in our emergency room revealed elevated blood pressure with a maximum reading of 201/99, oxygen saturation of 98% on room air, and a temperature of 98.6° F. laboratory laboratory work was remarkable for potassium 2.8, bicarbonate 20, magnesium 1.4, lactic acid of 2.3, otherwise unremarkable.  CT of the abdomen and pelvis with contrast was negative for acute findings.  Patient was given intravenous potassium in the emergency room and admitted to the hospital medicine service    Interval Hx:     Nausea and vomiting resolved, patient tolerating clear liquid diet advanced as tolerated.    No other complaints at this time.    Objective/physical exam:  General: Appears comfortable, no acute distress.  Integumentary: Warm, dry, intact.  Neuro: Alert awake oriented  Musculoskeletal: Purposeful movement noted.   Respiratory: No accessory muscle use. Breath sounds are equal.  Cardiovascular: Regular rate. No peripheral edema.    VITAL SIGNS: 24 HRS MIN & MAX LAST   Temp  Min: 97.9 °F (36.6 °C)  Max: 98.9 °F (37.2 °C) 98.9 °F (37.2 °C)   BP  Min: 107/56  Max: 201/99 137/64   Pulse  Min: 69  Max: 111  72   Resp  Min: 14  Max: 20 17   SpO2  Min: 91 %  Max: 100 % 96 %     CT  Abdomen Pelvis With Contrast  Narrative: EXAMINATION:  CT ABDOMEN PELVIS WITH CONTRAST    CLINICAL HISTORY:  Bowel obstruction suspected;    TECHNIQUE:  Multiple cross-sectional images were obtained from the lung bases the pubic symphysis after the intravenous administration of 100 mL of Isovue 370.  Coronal and sagittal reformatted images were obtained.  An automated dose exposure technique was utilized this limits radiation does the patient.    COMPARISON:  06/21/2023    FINDINGS:  Lung bases are clear.  Heart size within normal limits.    The liver demonstrates intrahepatic ductal dilatation as well as dilatation of the common duct secondary to prior cholecystectomy.  Surgical clips are identified.  The spleen, adrenals, kidneys with Bosniak type 1 cyst are normal.  Pancreatic ductal dilatation is identified and not significantly changed compared to the prior examination.  No definitive mass in the pancreatic head is identified.  No significant atrophy.    Small hiatal hernia.  Small bowel is of normal caliber.  Colon is of normal caliber with scattered colonic diverticula.  No adjacent inflammatory changes.  Status post right hemicolectomy.  The anastomosis is intact.  Likely adhesed loops of bowel to the anterior abdominal wall without obstruction.    Uterus is surgically absent.  No evidence for adnexal mass.  Bladder is under distended normal.  The abdominal aorta is of normal caliber with tortuosity.  Multiple surgical clips are identified in the retroperitoneum.  No free fluid in the abdomen pelvis.  No evidence for adenopathy.    No suspicious osseous lesions.  Spondylotic changes are identified.  The soft tissues are grossly normal.  Impression: 1. Persistent dilatation of the pancreatic duct without evidence for significant interval change.  2. Status post right hemicolectomy without evidence for complication.  No evidence for obstructive bowel gas pattern.  3. Other secondary findings as  noted.    Electronically signed by: Joshua Dickey MD  Date:    07/08/2023  Time:    13:31    Recent Labs   Lab 07/08/23  1037 07/09/23  0506   WBC 10.13 8.22   RBC 4.18* 3.32*   HGB 12.7 10.3*   HCT 37.0 29.8*   MCV 88.5 89.8   MCH 30.4 31.0   MCHC 34.3 34.6   RDW 13.8 14.5    222   MPV 9.4 9.8       Recent Labs   Lab 07/08/23  1037 07/09/23  0506    140   K 2.8* 3.3*   CO2 20* 22*   BUN 10.0 11.6   CREATININE 0.85 0.74   CALCIUM 10.0 9.0   MG 1.40*  --    ALBUMIN 4.0  --    ALKPHOS 142  --    ALT 37  --    AST 24  --    BILITOT 0.8  --           Microbiology Results (last 7 days)       ** No results found for the last 168 hours. **             See below for Radiology    Scheduled Med:   enoxparin  40 mg Subcutaneous Daily        Continuous Infusions:   sodium chloride 0.9% 100 mL/hr at 07/08/23 1615        PRN Meds:  acetaminophen, acetaminophen, dextrose 10%, dextrose 10%, glucagon (human recombinant), glucose, glucose, hydrALAZINE, HYDROmorphone, labetaloL, morphine, naloxone, ondansetron, prochlorperazine, sodium chloride 0.9%     Nutrition Status:      Assessment/Plan:  Intractable nausea and vomiting   Electrolyte disturbances  Pancreatic cancer, not a candidate for treatment  History of Crohn's disease status post right hemicolectomy  Recent Clostridium difficile colitis treated with Dificid    -------------------------------------------------------------------------------    Patient presents for nausea and vomiting--both of which have resolved.    Repeat lactic acid was completed and is within normal limits.    Will continue to monitor electrolytes, replete potassium with oral supplementation.    Discontinue Dilaudid.    Restart patient's home pain regimen.    Anticipate discharge in the a.m..    Anticipated discharge and Disposition:  Discharge in the a.m.    All diagnosis and differential diagnosis have been reviewed,  interpreted and communicated appropriately to care team. assessment and plan  has been documented; I have personally reviewed the labs and test results that are presently available and pertinent to this hospital course; I have reviewed medical records based upon their availability.    All of the patient's questions have been  addressed and answered. Patient's is agreeable to the above stated plan.   I will continue to monitor closely and make adjustments to medical management as needed.          Jennifer Lucio, DO   07/09/2023        This note was created with the assistance of Dragon voice recognition software. There may be transcription errors as a result of using this technology however minimal. Effort has been made to assure accuracy of transcription but any obvious errors or omissions should be clarified with the author of the document.

## 2023-07-10 VITALS
HEART RATE: 80 BPM | RESPIRATION RATE: 20 BRPM | BODY MASS INDEX: 20.8 KG/M2 | WEIGHT: 132.5 LBS | HEIGHT: 67 IN | SYSTOLIC BLOOD PRESSURE: 153 MMHG | TEMPERATURE: 99 F | DIASTOLIC BLOOD PRESSURE: 81 MMHG | OXYGEN SATURATION: 98 %

## 2023-07-10 PROBLEM — R11.2 NAUSEA & VOMITING: Status: ACTIVE | Noted: 2023-07-10

## 2023-07-10 PROCEDURE — 25000003 PHARM REV CODE 250: Performed by: STUDENT IN AN ORGANIZED HEALTH CARE EDUCATION/TRAINING PROGRAM

## 2023-07-10 RX ORDER — OXYCODONE HYDROCHLORIDE 5 MG/1
5 TABLET ORAL EVERY 6 HOURS PRN
Status: DISCONTINUED | OUTPATIENT
Start: 2023-07-10 | End: 2023-07-10 | Stop reason: HOSPADM

## 2023-07-10 RX ADMIN — OXYCODONE HYDROCHLORIDE 5 MG: 5 TABLET ORAL at 08:07

## 2023-07-10 NOTE — NURSING
Patient discharged home independently. Patient in stable condition. Follow up instructions and home care instructions given. All questions answered.

## 2023-07-10 NOTE — DISCHARGE SUMMARY
[facility] Hospital Medicine Discharge Summary    Admit Date: 7/8/2023  Discharge Date and Time: 7/10/446462:13 AM  Admitting Physician: MEMO Team  Discharging Physician: Jennifer Lucio DO.  Primary Care Physician: Marina Morales MD    Discharge Diagnoses:  Intractable nausea and vomiting   Electrolyte disturbances  Pancreatic cancer, not a candidate for treatment  History of Crohn's disease status post right hemicolectomy  Recent Clostridium difficile colitis treated with Dificid       Hospital Course:   82-year-old  female with a past medical history of recently admission for Clostridium difficile colitis treated with Dificid, stage III pancreatic cancer, Crohn's disease status post right hemicolectomy who presented to [facility] on 7/8/2023 with a primary complaint of midepigastric abdominal pain, generalized malaise, intractable nausea and vomiting.  Denies fever, chills, diarrhea, melena, hematochezia.  Patient was recently admitted to the hospital medicine service for similar issues and was treated for Clostridium difficile colitis at that time.  She was discharged home in stable and improved condition.  Workup in our emergency room revealed elevated blood pressure with a maximum reading of 201/99, oxygen saturation of 98% on room air, and a temperature of 98.6° F. laboratory laboratory work was remarkable for potassium 2.8, bicarbonate 20, magnesium 1.4, lactic acid of 2.3, otherwise unremarkable.  CT of the abdomen and pelvis with contrast was negative for acute findings.  Patient was given intravenous potassium in the emergency room and admitted to the hospital medicine service.  Nausea and vomiting resolved, patient tolerating clear liquid diet advanced as tolerated.    Pt was seen and examined on the day of discharge.    Patient is without nausea or vomiting and is tolerating her meals.    She is very teary eyed because of circumstances around her medical  illnesses but otherwise without complaints or concerns.    She has all of her medications and does not need new prescriptions to be written.    She is instructed to follow-up with her PCP and specialist at discharge.  Hospital course and discharge care plan has been discussed with patient, patient voices understanding and agreement with plan. All questions have been answered to the best of my ability. Patient is advised to return to ED or call 911 in case of emergency and or if symptoms worsen.    Vitals:  VITAL SIGNS: 24 HRS MIN & MAX LAST   Temp  Min: 98.6 °F (37 °C)  Max: 99 °F (37.2 °C) 98.6 °F (37 °C)   BP  Min: 134/73  Max: 166/77 (!) 162/79   Pulse  Min: 72  Max: 78  72   Resp  Min: 17  Max: 18 18   SpO2  Min: 93 %  Max: 97 % 96 %       Physical Exam:    General: Appears comfortable, no acute distress.  Integumentary: Warm, dry, intact.  Musculoskeletal: Purposeful movement noted.   Respiratory: No accessory muscle use. Breath sounds are equal.  Cardiovascular: Regular rate. No peripheral edema.    Procedures Performed: No admission procedures for hospital encounter.     Significant Diagnostic Studies: See Full reports for all details    Recent Labs   Lab 07/08/23  1037 07/09/23  0506   WBC 10.13 8.22   RBC 4.18* 3.32*   HGB 12.7 10.3*   HCT 37.0 29.8*   MCV 88.5 89.8   MCH 30.4 31.0   MCHC 34.3 34.6   RDW 13.8 14.5    222   MPV 9.4 9.8       Recent Labs   Lab 07/08/23  1037 07/09/23  0506 07/09/23  1006    140  --    K 2.8* 3.3*  --    CO2 20* 22*  --    BUN 10.0 11.6  --    CREATININE 0.85 0.74  --    CALCIUM 10.0 9.0  --    MG 1.40*  --  1.60   ALBUMIN 4.0  --   --    ALKPHOS 142  --   --    ALT 37  --   --    AST 24  --   --    BILITOT 0.8  --   --         Microbiology Results (last 7 days)       ** No results found for the last 168 hours. **             CT Abdomen Pelvis With Contrast  Narrative: EXAMINATION:  CT ABDOMEN PELVIS WITH CONTRAST    CLINICAL HISTORY:  Bowel obstruction  suspected;    TECHNIQUE:  Multiple cross-sectional images were obtained from the lung bases the pubic symphysis after the intravenous administration of 100 mL of Isovue 370.  Coronal and sagittal reformatted images were obtained.  An automated dose exposure technique was utilized this limits radiation does the patient.    COMPARISON:  06/21/2023    FINDINGS:  Lung bases are clear.  Heart size within normal limits.    The liver demonstrates intrahepatic ductal dilatation as well as dilatation of the common duct secondary to prior cholecystectomy.  Surgical clips are identified.  The spleen, adrenals, kidneys with Bosniak type 1 cyst are normal.  Pancreatic ductal dilatation is identified and not significantly changed compared to the prior examination.  No definitive mass in the pancreatic head is identified.  No significant atrophy.    Small hiatal hernia.  Small bowel is of normal caliber.  Colon is of normal caliber with scattered colonic diverticula.  No adjacent inflammatory changes.  Status post right hemicolectomy.  The anastomosis is intact.  Likely adhesed loops of bowel to the anterior abdominal wall without obstruction.    Uterus is surgically absent.  No evidence for adnexal mass.  Bladder is under distended normal.  The abdominal aorta is of normal caliber with tortuosity.  Multiple surgical clips are identified in the retroperitoneum.  No free fluid in the abdomen pelvis.  No evidence for adenopathy.    No suspicious osseous lesions.  Spondylotic changes are identified.  The soft tissues are grossly normal.  Impression: 1. Persistent dilatation of the pancreatic duct without evidence for significant interval change.  2. Status post right hemicolectomy without evidence for complication.  No evidence for obstructive bowel gas pattern.  3. Other secondary findings as noted.    Electronically signed by: Joshua Dickey MD  Date:    07/08/2023  Time:    13:31         Medication List        CONTINUE taking these  medications      cholecalciferol (vitamin D3) 1,250 mcg (50,000 unit) capsule     ondansetron 4 MG tablet  Commonly known as: ZOFRAN     oxyCODONE 30 MG Tab  Commonly known as: ROXICODONE     pantoprazole 40 MG tablet  Commonly known as: PROTONIX     simvastatin 20 MG tablet  Commonly known as: ZOCOR     traZODone 50 MG tablet  Commonly known as: DESYREL  Take 0.5 tablets (25 mg total) by mouth nightly as needed for Insomnia or Depression.               Explained in detail to the patient about the discharge plan, medications, and follow-up visits. Pt understands and agrees with the treatment plan  [place] [facility] [place] Condition: stable  Diet-   Dietary Orders (From admission, onward)       Start     Ordered    07/10/23 1012  Diet Adult Regular  Diet effective now         07/10/23 1011                   Medications Per DC med rec  Activities as tolerated    For further questions contact hospitalist office    Discharge time 33 minutes    For worsening symptoms, chest pain, shortness of breath, increased abdominal pain, high grade fever, stroke or stroke like symptoms, immediately go to the nearest Emergency Room or call 911 as soon as possible.      Jennifer Fink M.D, on 7/10/2023. at 11:13 AM.

## 2023-07-10 NOTE — PROGRESS NOTES
Inpatient Nutrition Assessment    Admit Date: 7/8/2023   Total duration of encounter: 2 days     Nutrition Recommendation/Prescription     - Continue current diet as tolerated. Encourage small, frequent meals.   - Boost Plus TID for additional nourishment; provides an additional 360 kcal and 14 gm protein per container.   - Monitor wt, labs, and intake     Communication of Recommendations: reviewed with patient    Nutrition Assessment     Malnutrition Assessment/Nutrition-Focused Physical Exam    Malnutrition Context: chronic illness  Malnutrition Level: severe  Energy Intake (Malnutrition): less than or equal to 50% for greater than or equal to 1 month  Weight Loss (Malnutrition): greater than 5% in 1 month  Subcutaneous Fat (Malnutrition): moderate depletion  Orbital Region (Subcutaneous Fat Loss): moderate depletion        Muscle Mass (Malnutrition): moderate depletion  Anabaptist Region (Muscle Loss): moderate depletion  Clavicle Bone Region (Muscle Loss): moderate depletion  Clavicle and Acromion Bone Region (Muscle Loss): moderate depletion                 Fluid Accumulation (Malnutrition): severe  Hand  Strength, Left (Malnutrition): unable to evaluate  Hand  Strength, Right (Malnutrition): unable to evaluate  A minimum of two characteristics is recommended for diagnosis of either severe or non-severe malnutrition.    Chart Review    Reason Seen: continuous nutrition monitoring    Malnutrition Screening Tool Results   Have you recently lost weight without trying?: No  Have you been eating poorly because of a decreased appetite?: No   MST Score: 0     Diagnosis:  Intractable nausea and vomiting   Electrolyte disturbances  Pancreatic cancer, not a candidate for treatment  History of Crohn's disease status post right hemicolectomy  Recent Clostridium difficile colitis treated with Dificid    Relevant Medical History: Clostridium difficile colitis treated with Dificid, stage III pancreatic cancer, Crohn's  "disease status post right hemicolectomy    Nutrition-Related Medications: NS  Calorie Containing IV Medications: no significant kcals from medications at this time    Nutrition-Related Labs:  23: K 3.3, Glu 115, GFR>60    Diet/PN Order: Diet Adult Regular  Oral Supplement Order: none  Tube Feeding Order: none  Appetite/Oral Intake: fair/50-75% of meals  Factors Affecting Nutritional Intake: altered gastrointestinal function and decreased appetite  Food/Judaism/Cultural Preferences: none reported  Food Allergies: no known food allergies       Wound(s):   none noted    Comments    7/10/23: Pt tolerating solids well; fair intake; reports eating smaller portions the last couple of months prior to admit 2/2 GI issues and decreased appetite; pt + for significant wt loss during this time frame.       Anthropometrics    Height: 5' 7.01" (170.2 cm) Height Method: Stated  Last Weight: 60.1 kg (132 lb 7.9 oz) (07/10/23 1301) Weight Method: Standard Scale  BMI (Calculated): 20.7  BMI Classification: normal (BMI 18.5-24.9)     Ideal Body Weight (IBW), Female: 135.05 lb     % Ideal Body Weight, Female (lb): 98.11 %                    Usual Body Weight (UBW), k.45 kg  % Usual Body Weight: 85.49  % Weight Change From Usual Weight: -14.69 %  Usual Weight Provided By: patient    Wt Readings from Last 5 Encounters:   07/10/23 60.1 kg (132 lb 7.9 oz)   23 60.1 kg (132 lb 8 oz)   23 65.8 kg (145 lb)   23 63.4 kg (139 lb 12.4 oz)   23 65.3 kg (143 lb 15.4 oz)     Weight Change(s) Since Admission:  Admit Weight: 60.1 kg (132 lb 7.9 oz) (23 1700)      Estimated Needs    Weight Used For Calorie Calculations: 60.1 kg (132 lb 7.9 oz)  Energy Calorie Requirements (kcal): 1539 kcal (MSJ x 1.4 SF)  Energy Need Method: Ness-St Jeor  Weight Used For Protein Calculations: 60.1 kg (132 lb 7.9 oz)  Protein Requirements: 90 gm (1.5g/kg)  Fluid Requirements (mL): 1539  Temp (24hrs), Av.8 °F (37.1 °C), " Min:98.6 °F (37 °C), Max:99.1 °F (37.3 °C)       Enteral Nutrition    Patient not receiving enteral nutrition at this time.    Parenteral Nutrition    Patient not receiving parenteral nutrition support at this time.    Evaluation of Received Nutrient Intake    Calories: not meeting estimated needs  Protein: not meeting estimated needs    Patient Education    Not applicable.    Nutrition Diagnosis     PES: Malnutrition related to chronic illness as evidenced by </=50% est energy requirements >1 month, >5% wt loss x 1 month, physical evidence of moderate fat and muscle depletion. (new)    Interventions/Goals     Intervention(s): general/healthful diet, commercial beverage, and collaboration with other providers  Goal: Meet greater than 75% of nutritional needs by follow-up. (new)    Monitoring & Evaluation     Dietitian will monitor energy intake and weight change.  Nutrition Risk/Follow-Up: high (follow-up in 1-4 days)   Please consult if re-assessment needed sooner.

## 2023-07-11 ENCOUNTER — OFFICE VISIT (OUTPATIENT)
Dept: HEMATOLOGY/ONCOLOGY | Facility: CLINIC | Age: 83
End: 2023-07-11
Payer: MEDICARE

## 2023-07-11 VITALS
HEART RATE: 86 BPM | BODY MASS INDEX: 20.97 KG/M2 | TEMPERATURE: 100 F | DIASTOLIC BLOOD PRESSURE: 82 MMHG | OXYGEN SATURATION: 97 % | SYSTOLIC BLOOD PRESSURE: 173 MMHG | WEIGHT: 133.63 LBS | HEIGHT: 67 IN

## 2023-07-11 DIAGNOSIS — C25.9 MALIGNANT NEOPLASM OF PANCREAS, UNSPECIFIED LOCATION OF MALIGNANCY: Primary | ICD-10-CM

## 2023-07-11 PROCEDURE — 99999 PR PBB SHADOW E&M-EST. PATIENT-LVL V: ICD-10-PCS | Mod: PBBFAC,,, | Performed by: INTERNAL MEDICINE

## 2023-07-11 PROCEDURE — 99215 OFFICE O/P EST HI 40 MIN: CPT | Mod: PBBFAC | Performed by: INTERNAL MEDICINE

## 2023-07-11 PROCEDURE — 99215 PR OFFICE/OUTPT VISIT, EST, LEVL V, 40-54 MIN: ICD-10-PCS | Mod: S$PBB,,, | Performed by: INTERNAL MEDICINE

## 2023-07-11 PROCEDURE — 99215 OFFICE O/P EST HI 40 MIN: CPT | Mod: S$PBB,,, | Performed by: INTERNAL MEDICINE

## 2023-07-11 PROCEDURE — 99999 PR PBB SHADOW E&M-EST. PATIENT-LVL V: CPT | Mod: PBBFAC,,, | Performed by: INTERNAL MEDICINE

## 2023-07-11 RX ORDER — PANCRELIPASE 36000; 180000; 114000 [USP'U]/1; [USP'U]/1; [USP'U]/1
1 CAPSULE, DELAYED RELEASE PELLETS ORAL
Qty: 90 CAPSULE | Refills: 1 | Status: ON HOLD | OUTPATIENT
Start: 2023-07-11 | End: 2023-08-21 | Stop reason: HOSPADM

## 2023-07-11 NOTE — PLAN OF CARE
START ON PATHWAY REGIMEN - Pancreatic Adenocarcinoma    [id number]        Gemcitabine     **Always confirm dose/schedule in your pharmacy ordering system**    Patient Characteristics:  Locally Advanced, Anatomically Unresectable, M0, First Line, PS ? 2, BRCA1/2 and   PALB2 Mutation Absent/Unknown, Chemotherapy  Therapeutic Status: Locally Advanced, Anatomically Unresectable, M0  Line of Therapy: First Line  ECOG Performance Status: 2  BRCA1/2 Mutation Status: Awaiting Test Results  PALB2 Mutation Status: Awaiting Test Results  Intent of Therapy:  Non-Curative / Palliative Intent, Discussed with Patient

## 2023-07-20 ENCOUNTER — CLINICAL SUPPORT (OUTPATIENT)
Dept: HEMATOLOGY/ONCOLOGY | Facility: CLINIC | Age: 83
DRG: 641 | End: 2023-07-20
Payer: MEDICARE

## 2023-07-20 ENCOUNTER — OFFICE VISIT (OUTPATIENT)
Dept: HEMATOLOGY/ONCOLOGY | Facility: CLINIC | Age: 83
DRG: 641 | End: 2023-07-20
Payer: MEDICARE

## 2023-07-20 VITALS
HEIGHT: 67 IN | TEMPERATURE: 99 F | DIASTOLIC BLOOD PRESSURE: 71 MMHG | SYSTOLIC BLOOD PRESSURE: 146 MMHG | OXYGEN SATURATION: 99 % | BODY MASS INDEX: 21.66 KG/M2 | HEART RATE: 75 BPM | WEIGHT: 138 LBS

## 2023-07-20 DIAGNOSIS — C25.9 MALIGNANT NEOPLASM OF PANCREAS, UNSPECIFIED LOCATION OF MALIGNANCY: ICD-10-CM

## 2023-07-20 DIAGNOSIS — C25.9 MALIGNANT NEOPLASM OF PANCREAS, UNSPECIFIED LOCATION OF MALIGNANCY: Primary | ICD-10-CM

## 2023-07-20 PROCEDURE — 99215 OFFICE O/P EST HI 40 MIN: CPT | Mod: S$PBB,,,

## 2023-07-20 PROCEDURE — 99999 PR PBB SHADOW E&M-EST. PATIENT-LVL IV: CPT | Mod: PBBFAC,,,

## 2023-07-20 PROCEDURE — 99211 OFF/OP EST MAY X REQ PHY/QHP: CPT | Mod: PBBFAC,27

## 2023-07-20 PROCEDURE — 99214 OFFICE O/P EST MOD 30 MIN: CPT | Mod: PBBFAC

## 2023-07-20 PROCEDURE — 99999 PR PBB SHADOW E&M-EST. PATIENT-LVL IV: ICD-10-PCS | Mod: PBBFAC,,,

## 2023-07-20 PROCEDURE — 99999 PR PBB SHADOW E&M-EST. PATIENT-LVL I: CPT | Mod: PBBFAC,,,

## 2023-07-20 PROCEDURE — 99999 PR PBB SHADOW E&M-EST. PATIENT-LVL I: ICD-10-PCS | Mod: PBBFAC,,,

## 2023-07-20 PROCEDURE — 99215 PR OFFICE/OUTPT VISIT, EST, LEVL V, 40-54 MIN: ICD-10-PCS | Mod: S$PBB,,,

## 2023-07-20 RX ORDER — PROCHLORPERAZINE MALEATE 10 MG
10 TABLET ORAL EVERY 6 HOURS PRN
Qty: 30 TABLET | Refills: 1 | Status: SHIPPED | OUTPATIENT
Start: 2023-07-20 | End: 2024-07-19

## 2023-07-20 NOTE — PROGRESS NOTES
Oncology Nutrition Assessment for Medical Nutrition Therapy      Evelyn Proctor   1940    Oncology Provider:   Ronan Burns MD    Reason for Visit:  New Treatment Education    Oncology/Hematology Diagnosis:   Pancreatic cancer    Treatment Plan:  GEMCITABINE    Nutrition Recommendations:  1. Continue regular diet as tolerated  2. Continue Creon as rx  3. Antiemetics as rx  4. RD to continue monitoring    Nutrition Assessment    7/20/23: This is a 82 y.o.female with a medical diagnosis of pancreatic CA. She was hospitalized several times in the past couple months. 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 She reports loose stools especially after meals as well as significant wt loss ~25# in 6 months or so. Since her celiac block she gained back 6#. She was also started on Creon recently.     Nutrition Factors Affecting Intake  abdominal pain and decreased appetite    PMHx: MI, HLD, pancreatitis, madai, Crohn's s/p partial colectomy x 3    Allergies: Patient has no known allergies.    Current Medications:    Current Outpatient Medications:     cholecalciferol, vitamin D3, 1,250 mcg (50,000 unit) capsule, Take 1,250 mcg by mouth once a week., Disp: , Rfl:     lipase-protease-amylase (CREON) 36,000-114,000- 180,000 unit CpDR, Take 1 capsule by mouth 3 (three) times daily with meals., Disp: 90 capsule, Rfl: 1    ondansetron (ZOFRAN) 4 MG tablet, Take 4 mg by mouth every 6 (six) hours as needed for Nausea., Disp: , Rfl:     oxyCODONE (ROXICODONE) 30 MG Tab, Take 5 mg by mouth every 6 (six) hours as needed., Disp: , Rfl:     pantoprazole (PROTONIX) 40 MG tablet, Take 1 tablet by mouth every morning., Disp: , Rfl:     prochlorperazine (COMPAZINE) 10 MG tablet, Take 1 tablet (10 mg total) by mouth every 6 (six) hours as needed., Disp: 30 tablet, Rfl: 1    simvastatin (ZOCOR) 20 MG tablet, Take 20 mg by mouth every evening., Disp: , Rfl:     traZODone (DESYREL) 50 MG  "tablet, Take 0.5 tablets (25 mg total) by mouth nightly as needed for Insomnia or Depression., Disp: 15 tablet, Rfl: 1    Labs: no new    Anthropometrics    Height:   Ht Readings from Last 1 Encounters:   07/20/23 5' 7" (1.702 m)      Weight:   Wt Readings from Last 5 Encounters:   07/20/23 62.6 kg (138 lb)   07/11/23 60.6 kg (133 lb 9.6 oz)   07/10/23 60.1 kg (132 lb 7.9 oz)   07/05/23 60.1 kg (132 lb 8 oz)   06/27/23 65.8 kg (145 lb)        Usual Body Weight: 70.4 kg (155 lb)  % Weight Change: -14.8% in 6 months to lowest wt 132#, has gained back 6#    BMI: 21.6 (normal)    Ideal Weight: 61.3 kg (135 lb)  % Ideal Weight: 102%    Malnutrition in the context of chronic illness  Degree of Malnutrition: severe malnutrition  Energy Intake: < 75% of estimated energy requirement for >/= 1 month  Interpretation of Weight Loss: >10% in 6 months  Body Fat: mild depletion  Area of Body Fat Loss: orbital region , upper arm region - triceps / biceps, and thoracic and lumbar region - ribs, lower back, midaxillary line  Muscle Mass Loss: mild depletion  Area of Muscle Mass Loss: temple region - temporalis muscle, clavicle bone region - pectoralis major, deltoid, trapezius muscles, clavicle and acromion bone region - deltoid muscle, scapular bone region - trapezius, supraspinus, infraspinus muscles, dorsal hand - interosseous musle, patellar region - quadricep muscle, anterior thigh region - quadriceps muscles, and posterior calf region - gastrocnemius muscle  Fluid Accumulation: does not meet criteria  Edema: no edema present  Reduced  Strength: unable to obtain  A minimum of two characteristics is recommended for diagnosis of either severe or non-severe malnutrition.    Estimated Needs (using CBW 62.6 kg)  1606 kcal/day  [place] [address]. Jeor x 1.1 activity factor x 1.3 stress factor  81 g protein/day 1.3 g/kg cBW  1606 mL fluid/day 1 mL/kcal    Nutrition Diagnosis    PES: Malnutrition related to chronic illness as evidenced by " <75% intake est needs >1 month, >10% wt loss 6 months, mild fat/muscle wasting. (new)     Nutrition Risk  moderate    Nutrition Intervention    Interventions(treatment strategy):  general/healthful diet, prescription medication, and purpose of nutrition education    Nutrition Education    Education Provided: antimicrobial/neutropenic  Teaching Method: explanation  Comprehension: verbalizes understanding  Barriers to Learning: none evident  Expected Compliance: good  Comments: All questions were answered and dietitian's contact information was provided.    Nutrition Monitoring and Evaluation    Monitor: food and beverage intake, weight change, and electrolyte/renal panel    Follow up at next provider visit.        Tamar Morrell, MS, RD, , LDN

## 2023-07-20 NOTE — LETTER
July 20, 2023    Evelyn Proctor  [address] [address]             [facility]  [address], [address]  [facility] [address]  Phone: [phone] Dear {MR/MRS/MS/DR:30752} Hitesh:    ***      If you have any questions or concerns, please don't hesitate to call.    Sincerely,        SHEILA Parra

## 2023-07-20 NOTE — PROGRESS NOTES
THERAPY EDUCATION: GEMCITABINE     Subjective:        PATIENT: Evelyn Proctor  MRN: [id number]  DATE: 7/20/2023  Chief Complaint: Therapy Education     Current Therapy: ENTYVIO GI q6w   by GI  Current Treatment: OP PANC GEMCITABINE QW TO START ON 7/26/23.  Oncology History Overview Note   This is a 82 y.o. female known to Dr. Pierson with PMH of recently diagnosed pancreatic ductal adenocarcinoma, hyperlipidemia, coronary artery disease, Crohn's disease on Entyvio every 6 weeks status post right hemicolectomy, hepatic steatosis, rheumatoid arthritis, osteopenia and cervical cancer. She presented to the [facility] ED 06/09/23 with worsening generalized abd pain, n/v/d that she reports is c/w Crohn's.     Upon arrival, workup included: CT abd pel with interval development of some colonic wall thickening in left colon especially at splenic flexure and proximal descending colon concerning for developing colitis, mild peripancreatic edema and inflammatory changes seen mainly in the body and tail c/w pancreatitis, persistent pancreatic duct dilation in the body and tail; labs revealed WBC 5.97, H&H 12.6/37.1, plt 252, INR 0.99, K 2.8, Cl 94, Tbili 0.7, AST 18, ALT 14, lipase 13. IVF initiated and electrolytes replenished. GI panel pending. GI consulted for Crohn's flare/pancreatic adenocarcinoma.     Of note, patient was recently admitted 06/02/23 - 06/04/23 for acute pancreatitis and was noted to have a high grade pancreatic duct stricture at pancreatic neck with upstream pancreatic duct dilation. Dr. Max performed EUS 06/08/23 that revealed a mass in the pancreatic neck that was determined to be ductal adenocarcinoma staged T3N0Mx by endosonographic criteria. He recommended oncology referral and PET scan. Patient and family requested to see Dr. Nuñez and Dr. Philip for further care.     Pancreatic cancer   6/8/2023 Initial Diagnosis    Pancreatic cancer  FINAL DIAGNOSIS       PANCREATIC NECK MASS, FINE NEEDLE ASPIRATION  AND CELL BLOCK:       PANCREATIC DUCTAL ADENOCARCINOMA.          6/8/2023 Procedure    EUS Impression:            - Normal esophagus.                          - Normal stomach.                          - Normal examined duodenum.                          - There was no sign of significant pathology in                          the ampulla.                          - There was no sign of significant pathology in                          the common bile duct.                          - There was no evidence of significant pathology                          in the left lobe of the liver.                          - A mass was identified in the pancreatic neck.                          Tissue was obtained from this exam and is of                          adenocarcinoma. This was staged T3 N0 Mx by                          endosonographic criteria. Fine needle biopsy                          performed.      6/9/2023 Imaging Significant Findings    Impression:     Interval development of some colonic wall thickening in the left colon especially at the splenic flexure and proximal descending colon concerning for developing colitis     Mild peripancreatic edema and inflammatory changes seen mainly in the body and tail the pancreas consistent with pancreatitis     Persistent pancreatic duct dilatation in the body and tail which was seen and described on multiple previous examinations     6/10/2023 Cancer Staged    Staging form: Exocrine Pancreas, AJCC 8th Edition  - Clinical stage from 6/10/2023: Stage IIA (cT3, cN0, cM0)     6/20/2023 Imaging Significant Findings    PET:Impression: Focal activity at the junction of the body and head of the pancreas concerning for neoplasm. There is no definite discrete mass on the low-dose CT scan. There is dilatation of the distal pancreatic duct.       Hospital Admission    Most Recent Admission Details  Admit Date: 6/21/23  Admitted for:    Clostridium difficile infection  -Pancreatic  Cancer  -Epigastric pain     Discharge date:6/29/23  Discharged from: [facility] 9W MEDICAL TELEMETRY at [facility]  Discharge disposition: Home-Health Care Svc       Malignant neoplasm of pancreas   7/11/2023 Initial Diagnosis    Malignant neoplasm of pancreas     7/26/2023 -  Chemotherapy    Treatment Summary   Plan Name: OP PANC GEMCITABINE QW  Treatment Goal: Control  Status: Active  Start Date: 7/26/2023 (Planned)  End Date: 9/13/2023 (Planned)  Provider: Ronan Burns MD  Chemotherapy: gemcitabine (GEMZAR) 1,141 mg in sodium chloride 0.9% SolP 250 mL chemo infusion, 675 mg/m2 = 1,141 mg (original dose ), Intravenous, Clinic/HOD 1 time, 0 of 2 cycles  Dose modification: 675 mg/m2 (Cycle 1, Reason: MD Discretion)         Interval History     7/20/23:  presents to the clinic accompanied by family members for therapy education. Patient reports no major issues at today's visit. Denies fever, chills, SOB, N/V/D, constipation, recent infection, chest pain, or unexplained bleeding or bruising.     7/11/23:I last saw this patient in the hospital.  Since her hospitalization, she is undergone a celiac block.  She is completed her treatment for her C diff colitis.  She still has crampy abdominal pain associated more with eating.  She is not had much of an appetite.  Her weight has been stable, she has lost of taste.  She is no difficulty with swallowing.  She denies any cardiac or respiratory symptoms.  She still concerned about her weight and her eating.    Her pain is better after she had her nerve block but she still has some.        Past Medical History:   Diagnosis Date    Acute pancreatitis, unspecified complication status, unspecified pancreatitis type     Arthritis     Carpal tunnel syndrome, bilateral     Cervical cancer     Cervical radiculopathy     Crohn disease     Heart attack     HLD (hyperlipidemia)     Low back pain     Pancreatic cancer     Sleep apnea, unspecified      Spinal stenosis of lumbar region with neurogenic claudication       .  Past Surgical History:   Procedure Laterality Date    bilateral L4-5, L5-S1 decompression, repair L5-S1 dural tear  10/01/2021    Dr. Marin    CARPAL TUNNEL RELEASE Right 09/06/2019    Dr. Marin    CARPAL TUNNEL RELEASE Left 12/2/2022    Procedure: RELEASE, CARPAL TUNNEL;  Surgeon: Jesse Marin MD;  Location: [facility] OR;  Service: Neurosurgery;  Laterality: Left;    CHOLECYSTECTOMY      COLECTOMY  07/2011    partial x3    ESOPHAGOGASTRODUODENOSCOPY N/A 6/8/2023    Procedure: EGD (ESOPHAGOGASTRODUODENOSCOPY);  Surgeon: Jose Alberto Max MD;  Location: [facility] OR;  Service: Gastroenterology;  Laterality: N/A;    ESOPHAGOGASTRODUODENOSCOPY N/A 6/29/2023    Procedure: EGD (ESOPHAGOGASTRODUODENOSCOPY);  Surgeon: Jose Alberto Max MD;  Location: [facility] OR;  Service: Gastroenterology;  Laterality: N/A;    HYSTERECTOMY      total    REPAIR OF EYELID Bilateral 11/21/2022    Procedure: BILATERAL ECTROPION REPAIR;  Surgeon: Justin Flores MD;  Location: [facility] OR;  Service: ENT;  Laterality: Bilateral;    ULTRASOUND, ENDOSCOPIC, WITH FINE NEEDLE ASPIRATION N/A 6/8/2023    Procedure: UPPER EUS  W/  FNA;  Surgeon: Jose Alberto Max MD;  Location: [facility] OR;  Service: Gastroenterology;  Laterality: N/A;  Dangelo, pt w/ CD on entyvio recently admitted to [facility] w/ acute pancreatitis. Possibly drug induced from entyvio? Althoughshe does have dilated PD and a poss focal stricture in the HOP.CA 19-9 nml    ULTRASOUND, UPPER GI TRACT, ENDOSCOPIC, WITH CELIAC PLEXUS BLOCK N/A 6/29/2023    Procedure: UPPER EUS W/ POSS FNA W/ CELIAC BLOCK;  Surgeon: Jose Alberto Max MD;  Location: [facility] OR;  Service: Gastroenterology;  Laterality: N/A;  Celiac Plexus injection planned.Pt should receive 1L NS bolus upon arrival to Outpt Surgery and 1L NS during the procedure. Pt will need to be in PACU for 30 min post procedure to assess for any potential hypotension. NO LABS      .  Family  History   Problem Relation Age of Onset    Diabetes Mother     Hypertension Father     Hyperlipidemia Father     Cancer Father     Hyperlipidemia Sister     Hyperlipidemia Brother     Hyperlipidemia Daughter       Social History     Socioeconomic History    Marital status:    Tobacco Use    Smoking status: Former     Types: Cigarettes    Smokeless tobacco: Never   Substance and Sexual Activity    Alcohol use: Not Currently     Comment: rarely    Drug use: Never    Sexual activity: Not Currently     Social Determinants of Health     Social Connections: Unknown    Attends Mu-ism Services: More than 4 times per year    Active Member of Clubs or Organizations: Yes    Attends Club or Organization Meetings: More than 4 times per year    Marital Status:       .Review of patient's allergies indicates:  No Known Allergies     Current Outpatient Medications:     cholecalciferol, vitamin D3, 1,250 mcg (50,000 unit) capsule, Take 1,250 mcg by mouth once a week., Disp: , Rfl:     lipase-protease-amylase (CREON) 36,000-114,000- 180,000 unit CpDR, Take 1 capsule by mouth 3 (three) times daily with meals., Disp: 90 capsule, Rfl: 1    ondansetron (ZOFRAN) 4 MG tablet, Take 4 mg by mouth every 6 (six) hours as needed for Nausea., Disp: , Rfl:     oxyCODONE (ROXICODONE) 30 MG Tab, Take 5 mg by mouth every 6 (six) hours as needed., Disp: , Rfl:     pantoprazole (PROTONIX) 40 MG tablet, Take 1 tablet by mouth every morning., Disp: , Rfl:     simvastatin (ZOCOR) 20 MG tablet, Take 20 mg by mouth every evening., Disp: , Rfl:     traZODone (DESYREL) 50 MG tablet, Take 0.5 tablets (25 mg total) by mouth nightly as needed for Insomnia or Depression., Disp: 15 tablet, Rfl: 1   Review of Systems       Objective:     Vitals:    07/20/23 1356   BP: (!) 146/71   Pulse: 75   Temp: 98.5 °F (36.9 °C)          .Lab Review:  CBC:   Recent Labs     07/09/23  0506 07/08/23  1037 06/29/23  0532   WBC 8.22 10.13 5.64   RBC 3.32* 4.18*  3.33*   HGB 10.3* 12.7 10.2*   HCT 29.8* 37.0 32.3*    271 247       CMP:   Recent Labs     07/09/23  0506 07/08/23  1037 06/29/23  0532 06/23/23  0447 06/22/23  0445    141 138   < > 137   K 3.3* 2.8* 4.2   < > 3.5   CO2 22* 20* 24   < > 27   BUN 11.6 10.0 17.7   < > 11.8   CREATININE 0.74 0.85 0.73   < > 1.07*   CALCIUM 9.0 10.0 8.9   < > 8.6   ALBUMIN  --  4.0 2.9*  --  2.9*   BILITOT  --  0.8 0.3  --  0.6   ALKPHOS  --  142 115  --  106   AST  --  24 24  --  39*   ALT  --  37 24  --  31    < > = values in this interval not displayed.       Tumor Markers:   No results for input(s): PSA, CEA, , ES6459, LABCA2, ZC463S, ,  in the last 2160 hours.     Assessment/Plan:   T3 N0 pancreatic --adenocarcinoma.       -Therapy education completed on 7/20/23.  -Plans to start C1D1 Gemzar on 7/26/23. Made patient aware that she will receive premedication (Decadron) 30 minutes prior to each treatment.   -Genetic counseling scheduled with Dr. Santamaria for 8/9/23.  -Mediport intact.  Compazine prescribed to be taken every 6 hours as needed.   -OTC Imodium AD recommended for diarrhea (4-6 BMs a day). Take 2 tablets after the first loose bowel movement, and 1 tablet after each loose bowel movement after the first dose has been taken. No more than 4 tablets should be taken in any 24-hour period. If not working, Lomotil can be prescribed as 2nd choice.    -Mouth sore prevention with 1-quart warm water with 1 tsp of baking soda and salt and alcohol-free mouthwash.   -Emphasized adequate hand-hygiene and limited contact with people who are sick.   -Monitor and notify any bleeding in urine, stool, or sputum.  As well as unusual bleeding or bruising and stomach pain.   - Emphasized hydration with 4 16 oz bottles of water a day.    -Importance of moisturizing with fragrance free lotion to prevent skin rash.  -Call clinic if fever >100.4, shakes or chills, shortness of breath, chest pain, uncontrolled vomiting or  diarrhea, pain and tingling in the chest or arm, or just not feeling well.    - RTC ion 8/1/23 for same day labs and toxicity check with BART Garcia.    DISCUSSION:    1.  A total of 60 minutes were spent in counseling today, in which 100% were face-to-face.  At today's therapy teaching session, we discussed the patient's cancer diagnosis as well as planned therapy regimen, protocol, side effects and toxicities.  A handout of each therapeutic agent in the regimen was provided and reviewed in detail.    2.  The following side effects were discussed but not limited to:    The following side effects are common (occurring in more than 30%) for patients taking Gemcitabine:    Flu-like symptoms(muscle pain, fever, headache, chills, fatigue)  Fever  (within 6-12 hours of first dose)  Fatigue  Nausea (mild)  Vomiting  Poor appetite  Skin rash  Low blood counts.  Your white and red blood cells and platelets may temporarily decrease.  This can put you at increased risk for infection, anemia and/or bleeding.  Jonah: Meaning low point, jonah is the point in time between chemotherapy cycles in which you experience low blood counts.    Onset: none noted  Jonah: 10-14 days  Recovery: day 21    Temporary increases in liver enzymes  Blood or protein in the urine    These are less common side effects (occurring in 10-29%) for patients receiving Gemcitabine:    Diarrhea  Weakness  Hair loss  Mouth sores  Difficulty sleeping  Shortness of breath                 a.  Discussed the risk of infection while on therapy related to pancytopenia, specifically a decrease in their white blood cell count.  Instructed to contact our office for temperature >100.4 F, chills, sudden onset cough or shortness of breath, symptoms of a urinary tract infection.                b.  Discussed the risk of anemia. Instructed to contact our office for dizziness, heart palpitations, or extreme or sudden changes in weakness.                c.  Discussed the risk of  thrombocytopenia, which increases the risk of bruising or bleeding.  Instructed the patient to contact our office for spontaneous signs of bleeding, including nose bleeds, bleeding from the gums or mouth, blood in sputum, urine or stool and unusual or excessive bruising or rash.                d.  Discussed GI side effects including weight changes, changes in appetite, altered sense of taste, stomatitis, nausea, vomiting, diarrhea, constipation, and heartburn.                e.  Discussed  side effects including painful urination, changes in the amount of urination, possible urine color changes.  Discussed fertility issues and to prevent  pregnancy if of child bearing age.                f.  Discussed neurological side effects including the risk of peripheral neuropathy, either temporary or permanent.                g.  Discussed the potential for skin, hair, and nail changes.       3.  Instructed to contact our office for discussion of medication changes, the addition of vitamin and/or herbal supplementation as they may interact with some chemotherapy agents.    4.  Discussed dietary modifications and the need to maintain adequate caloric intake and proper oral hydration.  Recommended 64 ounces of fluid per day.    5.  Discussed anti-emetic protocol and bowel regimen protocol.    6.  Office contact information given including after hours number.  Discussed there is an oncologist on call 24/7, 365 days including weekends.  Provided primary nurse's information .    7.  In summary, the patient is in agreement with the plan of care.  Questions appeared to be answered to their satisfaction. Consented the patient to the treatment plan and the patient was educated on the planned duration of the treatment and schedule of the treatment administration. Copy to be scanned into the chart.    All questions answered to the satisfaction of the patient and family.     Follow up appointments given to patient.      SUHAIL SOTO  FNP-C  PATIENT EDUCATOR  [facility]

## 2023-07-20 NOTE — LETTER
Edaliu Proctor accompanied their mother to an appointment at [facility] on 7/20/2023. They may return to work on 7/20/23.      If you have any questions or concerns, please don't hesitate to call.      Ronan Burns MD

## 2023-07-23 ENCOUNTER — HOSPITAL ENCOUNTER (INPATIENT)
Facility: HOSPITAL | Age: 83
LOS: 2 days | Discharge: HOME | DRG: 641 | End: 2023-07-25
Attending: STUDENT IN AN ORGANIZED HEALTH CARE EDUCATION/TRAINING PROGRAM | Admitting: INTERNAL MEDICINE
Payer: MEDICARE

## 2023-07-23 DIAGNOSIS — E87.6 HYPOKALEMIA: ICD-10-CM

## 2023-07-23 DIAGNOSIS — R11.2 INTRACTABLE NAUSEA AND VOMITING: Primary | ICD-10-CM

## 2023-07-23 DIAGNOSIS — E83.42 HYPOMAGNESEMIA: ICD-10-CM

## 2023-07-23 DIAGNOSIS — R11.10 VOMITING: ICD-10-CM

## 2023-07-23 LAB
ALBUMIN SERPL-MCNC: 3.6 G/DL (ref 3.4–4.8)
ALBUMIN/GLOB SERPL: 0.9 RATIO (ref 1.1–2)
ALP SERPL-CCNC: 125 UNIT/L (ref 40–150)
ALT SERPL-CCNC: 27 UNIT/L (ref 0–55)
APPEARANCE UR: CLEAR
APTT PPP: 20 SECONDS (ref 23.2–33.7)
AST SERPL-CCNC: 21 UNIT/L (ref 5–34)
BACTERIA #/AREA URNS AUTO: NORMAL /HPF
BASOPHILS # BLD AUTO: 0.01 X10(3)/MCL
BASOPHILS NFR BLD AUTO: 0.1 %
BILIRUB UR QL STRIP.AUTO: NEGATIVE
BILIRUBIN DIRECT+TOT PNL SERPL-MCNC: 1 MG/DL
BUN SERPL-MCNC: 6.6 MG/DL (ref 9.8–20.1)
CALCIUM SERPL-MCNC: 9.3 MG/DL (ref 8.4–10.2)
CHLORIDE SERPL-SCNC: 105 MMOL/L (ref 98–107)
CO2 SERPL-SCNC: 21 MMOL/L (ref 23–31)
COLOR UR: YELLOW
CREAT SERPL-MCNC: 0.7 MG/DL (ref 0.55–1.02)
EOSINOPHIL # BLD AUTO: 0.01 X10(3)/MCL (ref 0–0.9)
EOSINOPHIL NFR BLD AUTO: 0.1 %
ERYTHROCYTE [DISTWIDTH] IN BLOOD BY AUTOMATED COUNT: 14.6 % (ref 11.5–17)
GFR SERPLBLD CREATININE-BSD FMLA CKD-EPI: >60 MLS/MIN/1.73/M2
GLOBULIN SER-MCNC: 4 GM/DL (ref 2.4–3.5)
GLUCOSE SERPL-MCNC: 145 MG/DL (ref 82–115)
GLUCOSE UR QL STRIP.AUTO: NEGATIVE
HCT VFR BLD AUTO: 33 % (ref 37–47)
HGB BLD-MCNC: 11.7 G/DL (ref 12–16)
IMM GRANULOCYTES # BLD AUTO: 0.02 X10(3)/MCL (ref 0–0.04)
IMM GRANULOCYTES NFR BLD AUTO: 0.3 %
INR BLD: 0.95 (ref 0–1.3)
KETONES UR QL STRIP.AUTO: ABNORMAL
LACTATE SERPL-SCNC: 1.1 MMOL/L (ref 0.5–2.2)
LEUKOCYTE ESTERASE UR QL STRIP.AUTO: ABNORMAL
LIPASE SERPL-CCNC: 31 U/L
LYMPHOCYTES # BLD AUTO: 0.41 X10(3)/MCL (ref 0.6–4.6)
LYMPHOCYTES NFR BLD AUTO: 5.9 %
MAGNESIUM SERPL-MCNC: 1.5 MG/DL (ref 1.6–2.6)
MCH RBC QN AUTO: 31 PG (ref 27–31)
MCHC RBC AUTO-ENTMCNC: 35.5 G/DL (ref 33–36)
MCV RBC AUTO: 87.5 FL (ref 80–94)
MONOCYTES # BLD AUTO: 0.22 X10(3)/MCL (ref 0.1–1.3)
MONOCYTES NFR BLD AUTO: 3.2 %
NEUTROPHILS # BLD AUTO: 6.3 X10(3)/MCL (ref 2.1–9.2)
NEUTROPHILS NFR BLD AUTO: 90.4 %
NITRITE UR QL STRIP.AUTO: NEGATIVE
NRBC BLD AUTO-RTO: 0 %
PH UR STRIP.AUTO: 7 [PH]
PLATELET # BLD AUTO: 235 X10(3)/MCL (ref 130–400)
PLATELET # BLD EST: NORMAL 10*3/UL
PMV BLD AUTO: 9.4 FL (ref 7.4–10.4)
POTASSIUM SERPL-SCNC: 2.9 MMOL/L (ref 3.5–5.1)
PROT SERPL-MCNC: 7.6 GM/DL (ref 5.8–7.6)
PROT UR QL STRIP.AUTO: NEGATIVE
PROTHROMBIN TIME: 12.6 SECONDS (ref 12.5–14.5)
RBC # BLD AUTO: 3.77 X10(6)/MCL (ref 4.2–5.4)
RBC #/AREA URNS AUTO: <5 /HPF
RBC MORPH BLD: NORMAL
RBC UR QL AUTO: NEGATIVE
SODIUM SERPL-SCNC: 139 MMOL/L (ref 136–145)
SP GR UR STRIP.AUTO: 1.03 (ref 1–1.03)
SQUAMOUS #/AREA URNS AUTO: <5 /HPF
UROBILINOGEN UR STRIP-ACNC: 0.2
WBC # SPEC AUTO: 6.97 X10(3)/MCL (ref 4.5–11.5)
WBC #/AREA URNS AUTO: <5 /HPF

## 2023-07-23 PROCEDURE — 96375 TX/PRO/DX INJ NEW DRUG ADDON: CPT

## 2023-07-23 PROCEDURE — 85025 COMPLETE CBC W/AUTO DIFF WBC: CPT | Performed by: NURSE PRACTITIONER

## 2023-07-23 PROCEDURE — 83690 ASSAY OF LIPASE: CPT | Performed by: NURSE PRACTITIONER

## 2023-07-23 PROCEDURE — 11000001 HC ACUTE MED/SURG PRIVATE ROOM

## 2023-07-23 PROCEDURE — 83605 ASSAY OF LACTIC ACID: CPT | Performed by: STUDENT IN AN ORGANIZED HEALTH CARE EDUCATION/TRAINING PROGRAM

## 2023-07-23 PROCEDURE — 85730 THROMBOPLASTIN TIME PARTIAL: CPT | Performed by: NURSE PRACTITIONER

## 2023-07-23 PROCEDURE — 96376 TX/PRO/DX INJ SAME DRUG ADON: CPT

## 2023-07-23 PROCEDURE — 96365 THER/PROPH/DIAG IV INF INIT: CPT

## 2023-07-23 PROCEDURE — 25000003 PHARM REV CODE 250: Performed by: STUDENT IN AN ORGANIZED HEALTH CARE EDUCATION/TRAINING PROGRAM

## 2023-07-23 PROCEDURE — 85610 PROTHROMBIN TIME: CPT | Performed by: NURSE PRACTITIONER

## 2023-07-23 PROCEDURE — 81001 URINALYSIS AUTO W/SCOPE: CPT | Performed by: NURSE PRACTITIONER

## 2023-07-23 PROCEDURE — 80053 COMPREHEN METABOLIC PANEL: CPT | Performed by: NURSE PRACTITIONER

## 2023-07-23 PROCEDURE — 25000003 PHARM REV CODE 250: Performed by: NURSE PRACTITIONER

## 2023-07-23 PROCEDURE — 63600175 PHARM REV CODE 636 W HCPCS: Performed by: NURSE PRACTITIONER

## 2023-07-23 PROCEDURE — 25500020 PHARM REV CODE 255: Performed by: STUDENT IN AN ORGANIZED HEALTH CARE EDUCATION/TRAINING PROGRAM

## 2023-07-23 PROCEDURE — 96361 HYDRATE IV INFUSION ADD-ON: CPT

## 2023-07-23 PROCEDURE — 83735 ASSAY OF MAGNESIUM: CPT | Performed by: NURSE PRACTITIONER

## 2023-07-23 PROCEDURE — 63600175 PHARM REV CODE 636 W HCPCS: Performed by: STUDENT IN AN ORGANIZED HEALTH CARE EDUCATION/TRAINING PROGRAM

## 2023-07-23 PROCEDURE — 99285 EMERGENCY DEPT VISIT HI MDM: CPT | Mod: 25

## 2023-07-23 RX ORDER — HYDROMORPHONE HYDROCHLORIDE 2 MG/ML
1 INJECTION, SOLUTION INTRAMUSCULAR; INTRAVENOUS; SUBCUTANEOUS
Status: COMPLETED | OUTPATIENT
Start: 2023-07-23 | End: 2023-07-23

## 2023-07-23 RX ORDER — MAGNESIUM SULFATE HEPTAHYDRATE 40 MG/ML
2 INJECTION, SOLUTION INTRAVENOUS
Status: COMPLETED | OUTPATIENT
Start: 2023-07-23 | End: 2023-07-24

## 2023-07-23 RX ORDER — MORPHINE SULFATE 4 MG/ML
4 INJECTION, SOLUTION INTRAMUSCULAR; INTRAVENOUS
Status: COMPLETED | OUTPATIENT
Start: 2023-07-23 | End: 2023-07-23

## 2023-07-23 RX ORDER — ONDANSETRON 2 MG/ML
4 INJECTION INTRAMUSCULAR; INTRAVENOUS
Status: COMPLETED | OUTPATIENT
Start: 2023-07-23 | End: 2023-07-23

## 2023-07-23 RX ORDER — POTASSIUM CHLORIDE 20 MEQ/1
40 TABLET, EXTENDED RELEASE ORAL
Status: ACTIVE | OUTPATIENT
Start: 2023-07-23 | End: 2023-07-24

## 2023-07-23 RX ORDER — PROCHLORPERAZINE EDISYLATE 5 MG/ML
5 INJECTION INTRAMUSCULAR; INTRAVENOUS
Status: COMPLETED | OUTPATIENT
Start: 2023-07-23 | End: 2023-07-23

## 2023-07-23 RX ADMIN — MORPHINE SULFATE 4 MG: 4 INJECTION INTRAVENOUS at 11:07

## 2023-07-23 RX ADMIN — PROCHLORPERAZINE EDISYLATE 5 MG: 5 INJECTION INTRAMUSCULAR; INTRAVENOUS at 11:07

## 2023-07-23 RX ADMIN — SODIUM CHLORIDE 500 ML: 9 INJECTION, SOLUTION INTRAVENOUS at 06:07

## 2023-07-23 RX ADMIN — ONDANSETRON 4 MG: 2 INJECTION INTRAMUSCULAR; INTRAVENOUS at 06:07

## 2023-07-23 RX ADMIN — HYDROMORPHONE HYDROCHLORIDE 1 MG: 2 INJECTION INTRAMUSCULAR; INTRAVENOUS; SUBCUTANEOUS at 06:07

## 2023-07-23 RX ADMIN — MORPHINE SULFATE 4 MG: 4 INJECTION INTRAVENOUS at 08:07

## 2023-07-23 RX ADMIN — MAGNESIUM SULFATE HEPTAHYDRATE 2 G: 40 INJECTION, SOLUTION INTRAVENOUS at 10:07

## 2023-07-23 RX ADMIN — IOPAMIDOL 100 ML: 755 INJECTION, SOLUTION INTRAVENOUS at 08:07

## 2023-07-23 NOTE — FIRST PROVIDER EVALUATION
Medical screening examination initiated.  I have conducted a focused provider triage encounter, findings are as follows:    Brief history of present illness:  81 y/o female presents with abdominal pain with nausea and vomiting since yesterday. Hx pancreatic cancer and supposed to be starting chemo this upcoming week. Tried compazine at home without relief. No fever.     There were no vitals filed for this visit.    Pertinent physical exam:  alert, appears uncomfortable, in wheel chair     Brief workup plan:  labs, urine, meds    Preliminary workup initiated; this workup will be continued and followed by the physician or advanced practice provider that is assigned to the patient when roomed.

## 2023-07-24 ENCOUNTER — TELEPHONE (OUTPATIENT)
Dept: HEMATOLOGY/ONCOLOGY | Facility: CLINIC | Age: 83
End: 2023-07-24
Payer: MEDICARE

## 2023-07-24 ENCOUNTER — PATIENT MESSAGE (OUTPATIENT)
Dept: RESEARCH | Facility: HOSPITAL | Age: 83
End: 2023-07-24
Payer: MEDICARE

## 2023-07-24 LAB
ALBUMIN SERPL-MCNC: 3.4 G/DL (ref 3.4–4.8)
ALBUMIN/GLOB SERPL: 1.1 RATIO (ref 1.1–2)
ALP SERPL-CCNC: 105 UNIT/L (ref 40–150)
ALT SERPL-CCNC: 25 UNIT/L (ref 0–55)
ANION GAP SERPL CALC-SCNC: 9 MEQ/L
AST SERPL-CCNC: 21 UNIT/L (ref 5–34)
BASOPHILS # BLD AUTO: 0.01 X10(3)/MCL
BASOPHILS NFR BLD AUTO: 0.1 %
BILIRUBIN DIRECT+TOT PNL SERPL-MCNC: 0.8 MG/DL
BUN SERPL-MCNC: 5.8 MG/DL (ref 9.8–20.1)
BUN SERPL-MCNC: 8.4 MG/DL (ref 9.8–20.1)
CALCIUM SERPL-MCNC: 9 MG/DL (ref 8.4–10.2)
CALCIUM SERPL-MCNC: 9 MG/DL (ref 8.4–10.2)
CHLORIDE SERPL-SCNC: 104 MMOL/L (ref 98–107)
CHLORIDE SERPL-SCNC: 105 MMOL/L (ref 98–107)
CO2 SERPL-SCNC: 25 MMOL/L (ref 23–31)
CO2 SERPL-SCNC: 26 MMOL/L (ref 23–31)
CREAT SERPL-MCNC: 0.71 MG/DL (ref 0.55–1.02)
CREAT SERPL-MCNC: 0.74 MG/DL (ref 0.55–1.02)
CREAT/UREA NIT SERPL: 8
EOSINOPHIL # BLD AUTO: 0.07 X10(3)/MCL (ref 0–0.9)
EOSINOPHIL NFR BLD AUTO: 1 %
ERYTHROCYTE [DISTWIDTH] IN BLOOD BY AUTOMATED COUNT: 14.7 % (ref 11.5–17)
GFR SERPLBLD CREATININE-BSD FMLA CKD-EPI: >60 MLS/MIN/1.73/M2
GFR SERPLBLD CREATININE-BSD FMLA CKD-EPI: >60 MLS/MIN/1.73/M2
GLOBULIN SER-MCNC: 3.1 GM/DL (ref 2.4–3.5)
GLUCOSE SERPL-MCNC: 100 MG/DL (ref 82–115)
GLUCOSE SERPL-MCNC: 119 MG/DL (ref 82–115)
HCT VFR BLD AUTO: 30.3 % (ref 37–47)
HGB BLD-MCNC: 10.5 G/DL (ref 12–16)
IMM GRANULOCYTES # BLD AUTO: 0.02 X10(3)/MCL (ref 0–0.04)
IMM GRANULOCYTES NFR BLD AUTO: 0.3 %
LYMPHOCYTES # BLD AUTO: 1.12 X10(3)/MCL (ref 0.6–4.6)
LYMPHOCYTES NFR BLD AUTO: 16.1 %
MAGNESIUM SERPL-MCNC: 2.7 MG/DL (ref 1.6–2.6)
MCH RBC QN AUTO: 30.4 PG (ref 27–31)
MCHC RBC AUTO-ENTMCNC: 34.7 G/DL (ref 33–36)
MCV RBC AUTO: 87.8 FL (ref 80–94)
MONOCYTES # BLD AUTO: 0.64 X10(3)/MCL (ref 0.1–1.3)
MONOCYTES NFR BLD AUTO: 9.2 %
NEUTROPHILS # BLD AUTO: 5.09 X10(3)/MCL (ref 2.1–9.2)
NEUTROPHILS NFR BLD AUTO: 73.3 %
NRBC BLD AUTO-RTO: 0 %
PLATELET # BLD AUTO: 220 X10(3)/MCL (ref 130–400)
PMV BLD AUTO: 9.6 FL (ref 7.4–10.4)
POTASSIUM SERPL-SCNC: 3.1 MMOL/L (ref 3.5–5.1)
POTASSIUM SERPL-SCNC: 3.7 MMOL/L (ref 3.5–5.1)
PROT SERPL-MCNC: 6.5 GM/DL (ref 5.8–7.6)
RBC # BLD AUTO: 3.45 X10(6)/MCL (ref 4.2–5.4)
SODIUM SERPL-SCNC: 139 MMOL/L (ref 136–145)
SODIUM SERPL-SCNC: 140 MMOL/L (ref 136–145)
TROPONIN I SERPL-MCNC: 0.03 NG/ML (ref 0–0.04)
WBC # SPEC AUTO: 6.95 X10(3)/MCL (ref 4.5–11.5)

## 2023-07-24 PROCEDURE — 25000003 PHARM REV CODE 250: Performed by: INTERNAL MEDICINE

## 2023-07-24 PROCEDURE — 63600175 PHARM REV CODE 636 W HCPCS: Performed by: INTERNAL MEDICINE

## 2023-07-24 PROCEDURE — 84484 ASSAY OF TROPONIN QUANT: CPT | Performed by: STUDENT IN AN ORGANIZED HEALTH CARE EDUCATION/TRAINING PROGRAM

## 2023-07-24 PROCEDURE — 80053 COMPREHEN METABOLIC PANEL: CPT | Performed by: INTERNAL MEDICINE

## 2023-07-24 PROCEDURE — 11000001 HC ACUTE MED/SURG PRIVATE ROOM

## 2023-07-24 PROCEDURE — 83735 ASSAY OF MAGNESIUM: CPT | Performed by: INTERNAL MEDICINE

## 2023-07-24 PROCEDURE — 93005 ELECTROCARDIOGRAM TRACING: CPT

## 2023-07-24 PROCEDURE — 93010 EKG 12-LEAD: ICD-10-PCS | Mod: ,,, | Performed by: STUDENT IN AN ORGANIZED HEALTH CARE EDUCATION/TRAINING PROGRAM

## 2023-07-24 PROCEDURE — 93010 ELECTROCARDIOGRAM REPORT: CPT | Mod: ,,, | Performed by: STUDENT IN AN ORGANIZED HEALTH CARE EDUCATION/TRAINING PROGRAM

## 2023-07-24 PROCEDURE — 85025 COMPLETE CBC W/AUTO DIFF WBC: CPT | Performed by: INTERNAL MEDICINE

## 2023-07-24 RX ORDER — POTASSIUM CHLORIDE 14.9 MG/ML
20 INJECTION INTRAVENOUS ONCE
Status: COMPLETED | OUTPATIENT
Start: 2023-07-24 | End: 2023-07-24

## 2023-07-24 RX ORDER — MORPHINE SULFATE 4 MG/ML
4 INJECTION, SOLUTION INTRAMUSCULAR; INTRAVENOUS EVERY 4 HOURS PRN
Status: DISCONTINUED | OUTPATIENT
Start: 2023-07-24 | End: 2023-07-25 | Stop reason: HOSPADM

## 2023-07-24 RX ORDER — ONDANSETRON 2 MG/ML
4 INJECTION INTRAMUSCULAR; INTRAVENOUS EVERY 4 HOURS PRN
Status: DISCONTINUED | OUTPATIENT
Start: 2023-07-24 | End: 2023-07-25 | Stop reason: HOSPADM

## 2023-07-24 RX ORDER — MUPIROCIN 20 MG/G
OINTMENT TOPICAL 2 TIMES DAILY
Status: DISCONTINUED | OUTPATIENT
Start: 2023-07-24 | End: 2023-07-25 | Stop reason: HOSPADM

## 2023-07-24 RX ORDER — MAGNESIUM SULFATE 1 G/100ML
1 INJECTION INTRAVENOUS ONCE
Status: COMPLETED | OUTPATIENT
Start: 2023-07-24 | End: 2023-07-24

## 2023-07-24 RX ORDER — POTASSIUM CHLORIDE 7.45 MG/ML
10 INJECTION INTRAVENOUS
Status: DISPENSED | OUTPATIENT
Start: 2023-07-24 | End: 2023-07-24

## 2023-07-24 RX ORDER — POTASSIUM CHLORIDE 20 MEQ/1
40 TABLET, EXTENDED RELEASE ORAL ONCE
Status: COMPLETED | OUTPATIENT
Start: 2023-07-24 | End: 2023-07-24

## 2023-07-24 RX ORDER — SODIUM CHLORIDE 9 MG/ML
INJECTION, SOLUTION INTRAVENOUS CONTINUOUS
Status: DISCONTINUED | OUTPATIENT
Start: 2023-07-24 | End: 2023-07-25

## 2023-07-24 RX ORDER — OXYCODONE HYDROCHLORIDE 5 MG/1
5 TABLET ORAL EVERY 6 HOURS PRN
Status: DISCONTINUED | OUTPATIENT
Start: 2023-07-24 | End: 2023-07-25 | Stop reason: HOSPADM

## 2023-07-24 RX ORDER — SODIUM CHLORIDE 0.9 % (FLUSH) 0.9 %
10 SYRINGE (ML) INJECTION
Status: DISCONTINUED | OUTPATIENT
Start: 2023-07-24 | End: 2023-07-25 | Stop reason: HOSPADM

## 2023-07-24 RX ORDER — TALC
6 POWDER (GRAM) TOPICAL NIGHTLY PRN
Status: DISCONTINUED | OUTPATIENT
Start: 2023-07-24 | End: 2023-07-25 | Stop reason: HOSPADM

## 2023-07-24 RX ORDER — ENOXAPARIN SODIUM 100 MG/ML
40 INJECTION SUBCUTANEOUS EVERY 24 HOURS
Status: DISCONTINUED | OUTPATIENT
Start: 2023-07-24 | End: 2023-07-25 | Stop reason: HOSPADM

## 2023-07-24 RX ORDER — ONDANSETRON 2 MG/ML
4 INJECTION INTRAMUSCULAR; INTRAVENOUS
Status: DISCONTINUED | OUTPATIENT
Start: 2023-07-24 | End: 2023-07-24

## 2023-07-24 RX ADMIN — POTASSIUM CHLORIDE 10 MEQ: 7.46 INJECTION, SOLUTION INTRAVENOUS at 02:07

## 2023-07-24 RX ADMIN — MAGNESIUM SULFATE IN DEXTROSE 1 G: 10 INJECTION, SOLUTION INTRAVENOUS at 03:07

## 2023-07-24 RX ADMIN — Medication 6 MG: at 08:07

## 2023-07-24 RX ADMIN — POTASSIUM CHLORIDE 40 MEQ: 1500 TABLET, EXTENDED RELEASE ORAL at 11:07

## 2023-07-24 RX ADMIN — MORPHINE SULFATE 4 MG: 4 INJECTION INTRAVENOUS at 05:07

## 2023-07-24 RX ADMIN — POTASSIUM CHLORIDE 20 MEQ: 200 INJECTION, SOLUTION INTRAVENOUS at 03:07

## 2023-07-24 RX ADMIN — MUPIROCIN: 20 OINTMENT TOPICAL at 08:07

## 2023-07-24 RX ADMIN — OXYCODONE HYDROCHLORIDE 5 MG: 5 TABLET ORAL at 08:07

## 2023-07-24 RX ADMIN — POTASSIUM CHLORIDE 10 MEQ: 7.46 INJECTION, SOLUTION INTRAVENOUS at 11:07

## 2023-07-24 RX ADMIN — ENOXAPARIN SODIUM 40 MG: 40 INJECTION SUBCUTANEOUS at 05:07

## 2023-07-24 RX ADMIN — SODIUM CHLORIDE: 9 INJECTION, SOLUTION INTRAVENOUS at 04:07

## 2023-07-24 RX ADMIN — ONDANSETRON 4 MG: 2 INJECTION INTRAMUSCULAR; INTRAVENOUS at 04:07

## 2023-07-24 RX ADMIN — ONDANSETRON 4 MG: 2 INJECTION INTRAMUSCULAR; INTRAVENOUS at 05:07

## 2023-07-24 RX ADMIN — MORPHINE SULFATE 4 MG: 4 INJECTION INTRAVENOUS at 03:07

## 2023-07-24 NOTE — PLAN OF CARE
Pt lives at [address] [place] [address] with her . No steps or stairs. Per pt, daughter is AJAY Talavera [phone]. PCP Dr. Morales. No agency involvement. Pt has CPAP and rollator. Pt asked for assistance obtaining a cane. Pt fills at [facility] in [place]. Daughter or granddaughter will transport home at [place].    07/24/23 1121   Discharge Assessment   Assessment Type Discharge Planning Assessment   Confirmed/corrected address, phone number and insurance Yes   Confirmed Demographics Correct on Facesheet   Source of Information patient   When was your last doctors appointment?   (PCP Dr. Morales)   Communicated RADHA with patient/caregiver Date not available/Unable to determine   People in Home spouse   Do you expect to return to your current living situation? Yes   Do you have help at home or someone to help you manage your care at home? Yes   Who are your caregiver(s) and their phone number(s)? lives with . Daughter Campbell Talavera also identified as support.   Prior to hospitilization cognitive status: Unable to Assess   Current cognitive status: Alert/Oriented   Walking or Climbing Stairs ambulation difficulty, requires equipment   Mobility Management rollator   Dressing/Bathing   (none)   Home Accessibility wheelchair accessible   Equipment Currently Used at Home CPAP;rollator   Readmission within 30 days? Yes   Patient currently being followed by outpatient case management? No   Do you currently have service(s) that help you manage your care at home? No   Do you take prescription medications? Yes  (Fills with [email or web address] in [place])   Do you have prescription coverage? Yes   Coverage Medicare   Do you have any problems affording any of your prescribed medications? No   Is the patient taking medications as prescribed? yes   Who is going to help you get home at discharge? daughter or granddaughter   How do you get to doctors appointments? car, drives self   Are you on dialysis? No   Do you  take coumadin? No   Discharge Plan A Other  (TBD)   DME Needed Upon Discharge  other (see comments)  (asked for assistance obtaining a cane)   Discharge Plan discussed with: Patient   Transition of Care Barriers None   OTHER   Name(s) of People in Home Lives with . Daughter Campbell Talavera also identified as support [phone]

## 2023-07-24 NOTE — PLAN OF CARE
Problem: Adult Inpatient Plan of Care  Goal: Plan of Care Review  Outcome: Ongoing, Progressing  Flowsheets (Taken 7/24/2023 1814)  Plan of Care Reviewed With: patient  Goal: Patient-Specific Goal (Individualized)  Outcome: Ongoing, Progressing  Goal: Absence of Hospital-Acquired Illness or Injury  Outcome: Ongoing, Progressing  Intervention: Identify and Manage Fall Risk  Flowsheets (Taken 7/24/2023 1814)  Safety Promotion/Fall Prevention:   assistive device/personal item within reach   medications reviewed   nonskid shoes/socks when out of bed   side rails raised x 2  Intervention: Prevent Skin Injury  Flowsheets (Taken 7/24/2023 1814)  Body Position: position changed independently  Skin Protection:   adhesive use limited   incontinence pads utilized   tubing/devices free from skin contact  Intervention: Prevent and Manage VTE (Venous Thromboembolism) Risk  Flowsheets (Taken 7/24/2023 1814)  Activity Management: Ambulated to bathroom - L4  VTE Prevention/Management:   ambulation promoted   bleeding risk assessed   ROM (active) performed  Range of Motion:   active ROM (range of motion) encouraged   ROM (range of motion) performed  Intervention: Prevent Infection  Flowsheets (Taken 7/24/2023 1814)  Infection Prevention:   rest/sleep promoted   single patient room provided  Goal: Optimal Comfort and Wellbeing  Outcome: Ongoing, Progressing  Intervention: Monitor Pain and Promote Comfort  Flowsheets (Taken 7/24/2023 1814)  Pain Management Interventions:   care clustered   medication offered   pain management plan reviewed with patient/caregiver   pillow support provided   position adjusted  Intervention: Provide Person-Centered Care  Flowsheets (Taken 7/24/2023 1814)  Trust Relationship/Rapport: care explained  Goal: Readiness for Transition of Care  Outcome: Ongoing, Progressing     Problem: Fluid and Electrolyte Imbalance (Acute Kidney Injury/Impairment)  Goal: Fluid and Electrolyte Balance  Outcome: Ongoing,  Progressing     Problem: Oral Intake Inadequate (Acute Kidney Injury/Impairment)  Goal: Optimal Nutrition Intake  Outcome: Ongoing, Progressing  Intervention: Promote and Optimize Nutrition  Flowsheets (Taken 7/24/2023 1814)  Oral Nutrition Promotion: rest periods promoted     Problem: Renal Function Impairment (Acute Kidney Injury/Impairment)  Goal: Effective Renal Function  Outcome: Ongoing, Progressing  Intervention: Monitor and Support Renal Function  Flowsheets (Taken 7/24/2023 1814)  Medication Review/Management: medications reviewed     Problem: Infection  Goal: Absence of Infection Signs and Symptoms  Outcome: Ongoing, Progressing  Intervention: Prevent or Manage Infection  Flowsheets (Taken 7/24/2023 1814)  Infection Management: aseptic technique maintained

## 2023-07-24 NOTE — NURSING
Nurses Note -- 4 Eyes      7/24/2023   5:41 PM      Skin assessed during: Admit      [x] No Altered Skin Integrity Present    [x]Prevention Measures Documented      [] Yes- Altered Skin Integrity Present or Discovered   [] LDA Added if Not in Epic (Describe Wound)   [] New Altered Skin Integrity was Present on Admit and Documented in LDA   [] Wound Image Taken    Wound Care Consulted? No    Attending Nurse:  Ryland Hebert RN     Second RN/Staff Member:  Kirk Crowell RN

## 2023-07-24 NOTE — H&P
[facility] Hospital Medicine History & Physical Examination       Patient Name: Evelyn Proctor  MRN: [id number]  Patient Class: IP- Inpatient   Admission Date: 7/23/2023  5:55 PM  Length of Stay: 1  Admitting Service: Hospital Medicine   Attending Physician: Antonio Wood MD   Primary Care Provider: Marina Morales MD  History source: EMR, patient and/or patient's family    CHIEF COMPLAINT   Vomiting (Pt. C/o vomiting started yesterday. Pt. Hx of pancreatic CA starting chemo this week.. unknown fever.. denies dysuria)    HISTORY OF PRESENT ILLNESS:   82-year-old female with a history of recently diagnosed stage III pancreatic cancer awaiting initiation of chemotherapy on Wednesday, prior C diff, Crohn's disease and additional past medical history as below who presents to the ER with intractable nausea and vomiting as well as abdominal pain.  She arrived afebrile hemodynamically stable but laboratory work showed hypokalemia with a potassium of 2.9 and hypomagnesemia with a magnesium of 1.5.  CT abdomen only showed stable changes.  Hospitalist service was consulted for intractable pain and nausea and vomiting.    PAST MEDICAL HISTORY:     Past Medical History:   Diagnosis Date    Acute pancreatitis, unspecified complication status, unspecified pancreatitis type     Arthritis     Carpal tunnel syndrome, bilateral     Cervical cancer     Cervical radiculopathy     Crohn disease     Heart attack     HLD (hyperlipidemia)     Low back pain     Pancreatic cancer     Sleep apnea, unspecified     Spinal stenosis of lumbar region with neurogenic claudication        PAST SURGICAL HISTORY:     Past Surgical History:   Procedure Laterality Date    bilateral L4-5, L5-S1 decompression, repair L5-S1 dural tear  10/01/2021    Dr. Marin    CARPAL TUNNEL RELEASE Right 09/06/2019    Dr. Marin    CARPAL TUNNEL RELEASE Left 12/2/2022    Procedure: RELEASE, CARPAL TUNNEL;  Surgeon: Jesse ESTEVES  MD Tomas;  Location: [facility] OR;  Service: Neurosurgery;  Laterality: Left;    CHOLECYSTECTOMY      COLECTOMY  07/2011    partial x3    ESOPHAGOGASTRODUODENOSCOPY N/A 6/8/2023    Procedure: EGD (ESOPHAGOGASTRODUODENOSCOPY);  Surgeon: Jose Alberto Max MD;  Location: [facility] OR;  Service: Gastroenterology;  Laterality: N/A;    ESOPHAGOGASTRODUODENOSCOPY N/A 6/29/2023    Procedure: EGD (ESOPHAGOGASTRODUODENOSCOPY);  Surgeon: Jose Alberto Max MD;  Location: [facility] OR;  Service: Gastroenterology;  Laterality: N/A;    HYSTERECTOMY      total    REPAIR OF EYELID Bilateral 11/21/2022    Procedure: BILATERAL ECTROPION REPAIR;  Surgeon: Justin Flores MD;  Location: [facility] OR;  Service: ENT;  Laterality: Bilateral;    ULTRASOUND, ENDOSCOPIC, WITH FINE NEEDLE ASPIRATION N/A 6/8/2023    Procedure: UPPER EUS  W/  FNA;  Surgeon: Jose Alberto Max MD;  Location: [facility] OR;  Service: Gastroenterology;  Laterality: N/A;  Dangelo, pt w/ CD on entyvio recently admitted to [facility] w/ acute pancreatitis. Possibly drug induced from entyvio? Althoughshe does have dilated PD and a poss focal stricture in the HOP.CA 19-9 nml    ULTRASOUND, UPPER GI TRACT, ENDOSCOPIC, WITH CELIAC PLEXUS BLOCK N/A 6/29/2023    Procedure: UPPER EUS W/ POSS FNA W/ CELIAC BLOCK;  Surgeon: Jose Alberto Max MD;  Location: [facility] OR;  Service: Gastroenterology;  Laterality: N/A;  Celiac Plexus injection planned.Pt should receive 1L NS bolus upon arrival to Outpt Surgery and 1L NS during the procedure. Pt will need to be in PACU for 30 min post procedure to assess for any potential hypotension. NO LABS       ALLERGIES:   Patient has no known allergies.    FAMILY HISTORY:   Reviewed and non-contributory     SOCIAL HISTORY:     Social History     Tobacco Use    Smoking status: Former     Types: Cigarettes    Smokeless tobacco: Never   Substance Use Topics    Alcohol use: Not Currently     Comment: rarely        HOME MEDICATIONS:     Prior to Admission medications    Medication Sig  Start Date End Date Taking? Authorizing Provider   cholecalciferol, vitamin D3, 1,250 mcg (50,000 unit) capsule Take 1,250 mcg by mouth once a week. 9/30/21   Historical Provider   lipase-protease-amylase (CREON) 36,000-114,000- 180,000 unit CpDR Take 1 capsule by mouth 3 (three) times daily with meals. 7/11/23   Ronan Burns MD   ondansetron (ZOFRAN) 4 MG tablet Take 4 mg by mouth every 6 (six) hours as needed for Nausea.    Historical Provider   oxyCODONE (ROXICODONE) 30 MG Tab Take 5 mg by mouth every 6 (six) hours as needed.    Historical Provider   pantoprazole (PROTONIX) 40 MG tablet Take 1 tablet by mouth every morning. 5/22/23 5/21/24  Historical Provider   prochlorperazine (COMPAZINE) 10 MG tablet Take 1 tablet (10 mg total) by mouth every 6 (six) hours as needed. 7/20/23 7/19/24  SHEILA Parra   simvastatin (ZOCOR) 20 MG tablet Take 20 mg by mouth every evening. 4/28/22   Historical Provider   traZODone (DESYREL) 50 MG tablet Take 0.5 tablets (25 mg total) by mouth nightly as needed for Insomnia or Depression. 7/5/23 9/3/23  Marina Morales MD       REVIEW OF SYSTEMS:   Except as documented, all other systems reviewed and negative     PHYSICAL EXAM:   T 98.8 °F (37.1 °C)   BP (!) 188/90   P 95   RR 18   O2 99 %  GENERAL: awake, alert, oriented and in no acute distress, non-toxic appearing   HEENT: normocephalic atraumatic   NECK: supple   LUNGS: Clear bilaterally, no wheezing or rales, no accessory muscle use   CVS: Regular rate and rhythm, normal peripheral perfusion  ABD: Soft, epigastric tenderness, non-distended, bowel sounds present  EXTREMITIES: no clubbing or cyanosis  SKIN: Warm, dry.   NEURO: alert and oriented, grossly without focal deficits   PSYCHIATRIC: Cooperative    LABS AND IMAGING:     Recent Labs     07/23/23 2016   WBC 6.97   RBC 3.77*   HGB 11.7*   HCT 33.0*   MCV 87.5   MCH 31.0   MCHC 35.5   RDW 14.6        Recent Labs     07/23/23  2016   LACTIC 1.1      Recent Labs     07/23/23  1815   INR 0.95     No results for input(s): HGBA1C, CHOL, TRIG, LDL, VLDL, HDL in the last 72 hours.   Recent Labs     07/23/23 2016      K 2.9*   CHLORIDE 105   CO2 21*   BUN 6.6*   CREATININE 0.70   GLUCOSE 145*   CALCIUM 9.3   MG 1.50*   ALBUMIN 3.6   GLOBULIN 4.0*   ALKPHOS 125   ALT 27   AST 21   BILITOT 1.0   LIPASE 31     Recent Labs     07/24/23  0014   TROPONINI 0.025          CT Abdomen Pelvis With Contrast  START OF REPORT:  Technique: CT of the abdomen and pelvis was performed with axial images as well as sagittal and coronal reconstruction images with intravenous contrast. None available. None.    Comparison: Comparison is with study dated 2023-07-08 13:14:33.    Clinical History: N/V, epigastric pain.    Dosage Information: Automated Exposure Control was utilized 262.05 mGy.cm.    Findings:  Lines and Tubes: None.  Thorax:  Lungs: There is mild nonspecific dependent change at the lung bases.  Pleura: No effusions or thickening.  Heart: The heart size is within normal limits.  Abdomen:  Abdominal Wall: The inter-recti distance is widened at 5.7 cm. This is consistent with diastasis recti.  Liver: Mild intra- and extrahepatic ductal dilatation is again seen in this patient post-cholecystectomy, measuring up to 0.5 and 0.8 cm respectively, not significantly changed when compared to prior.  Gallbladder: Surgical clips are seen in the gallbladder fossa consistent with prior cholecystectomy.  Pancreas: There is stable pronounced pancreatic duct dilatation with the pancreatic duct measuring up to 1.2 cm. The pancreas otherwise appears unremarkable with no focal lesion detected.  Spleen: The spleen appears unremarkable.  Adrenals: The adrenal glands appear unremarkable.  Kidneys: Multiple cysts are again seen in both kidneys, unchanged. The largest in the right is in the midpole measuring 0.7 cm (series 2, image 26), while the largest in the left is in the superior pole,  measuring 0.8 cm (series 2, image 23).  Aorta: Again seen is moderate calcification of the abdominal aorta and its branches, with mild tortuosity.  IVC: Unremarkable.  Bowel:  Esophagus: A stable small hiatal hernia is again seen.  Stomach: The stomach appears unremarkable.  Duodenum: Unremarkable appearing duodenum.  Small Bowel: The small bowel appears unremarkable.  Colon: Post-right hemicolectomy changes are noted. Few scattered diverticula are again seen along the transverse and descending colon. There are no adjacent inflammatory changes to suggest diverticulitis.  Peritoneum: No intraperitoneal free air or ascites is seen.  Retroperitoneal lymph nodes: Multiple surgical clips are again visualized in the retroperitoneum.    Pelvis:  Bladder: The bladder appears unremarkable.  Female:  Uterus: The uterus is not identified.  Ovaries: No adnexal masses are seen.    Bony structures:  Dorsal Spine: There is moderate spondylosis of the visualized dorsal spine.  Bony Pelvis: There is note of vacuum phenomenon in the bilateral sacroiliac joints. Adjacent to left sacroiliac joint and anterior to the sacrum is a small focus of air. This is unchanged since the prior study and likely relates to this process as well.    Impression:  1. Mild intra- and extrahepatic ductal dilatation is again seen in this patient post-cholecystectomy, measuring up to 0.5 and 0.8 cm respectively, not significantly changed when compared to prior.  2. There is stable pronounced pancreatic duct dilatation with the pancreatic duct measuring up to 1.2 cm. Correlate with clinical and laboratory findings as regards additional evaluation and follow-up.  3. No acute intraabdominal or pelvic solid organ or bowel pathology identified. Details and other findings, as above.      ASSESSMENT & PLAN:   Intractable nausea, vomiting and abdominal pain-resolving   Hypomagnesemia, hypokalemia secondary to above   Pancreatic cancer awaiting initiation of  chemotherapy  History of Crohn's disease status post right hemicolectomy   Recent C diff colitis status post treatment    -antiemetics and analgesics as needed  -replete electrolytes as indicated   -hopefully home within the next 24 hours so she can go on to have chemo as planned    DVT prophylaxis: lovenox  Code status: full     If patient was admitted under observational status it is with my approval/permission.     At least 55 min was spent on this history and physical.  Time seen: 3AM 7/24/23  Antonio Wood MD

## 2023-07-24 NOTE — TELEPHONE ENCOUNTER
Daughter left message advising mother/patient has been in ER at [facility] room 5 since yesterday afternoon.

## 2023-07-24 NOTE — ED PROVIDER NOTES
Encounter Date: 7/23/2023    SCRIBE #1 NOTE: I, Nedra Chapman, am scribing for, and in the presence of,  Nam Salinas MD. I have scribed the following portions of the note - Other sections scribed: HPI, ROS, PE, MDM.     History     Chief Complaint   Patient presents with    Vomiting     Pt. C/o vomiting started yesterday. Pt. Hx of pancreatic CA starting chemo this week.. unknown fever.. denies dysuria     82 year old female with a history of pancreatic cancer presents to ED complaining of N/V and abdominal pain.  Pt says that the abdominal cramps started yesterday and then this morning she became nauseous and started vomiting.  She saw her oncologist, Dr. Haile, last Wednesday and is scheduled to begin cancer treatment Wednesday.      The history is provided by the patient.   Review of patient's allergies indicates:   Allergen Reactions    Morpholine analogues Itching     Past Medical History:   Diagnosis Date    Acute pancreatitis, unspecified complication status, unspecified pancreatitis type     Arthritis     Carpal tunnel syndrome, bilateral     Cervical cancer     Cervical radiculopathy     Crohn disease     Heart attack     HLD (hyperlipidemia)     Low back pain     Pancreatic cancer     Sleep apnea, unspecified     Spinal stenosis of lumbar region with neurogenic claudication      Past Surgical History:   Procedure Laterality Date    bilateral L4-5, L5-S1 decompression, repair L5-S1 dural tear  10/01/2021    Dr. Marin    CARPAL TUNNEL RELEASE Right 09/06/2019    Dr. Marin    CARPAL TUNNEL RELEASE Left 12/2/2022    Procedure: RELEASE, CARPAL TUNNEL;  Surgeon: Jesse Marin MD;  Location: [facility];  Service: Neurosurgery;  Laterality: Left;    CHOLECYSTECTOMY      COLECTOMY  07/2011    partial x3    ESOPHAGOGASTRODUODENOSCOPY N/A 6/8/2023    Procedure: EGD (ESOPHAGOGASTRODUODENOSCOPY);  Surgeon: Jose Alberto Max MD;  Location: [facility] OR;  Service: Gastroenterology;  Laterality: N/A;     ESOPHAGOGASTRODUODENOSCOPY N/A 6/29/2023    Procedure: EGD (ESOPHAGOGASTRODUODENOSCOPY);  Surgeon: Jose Alberto Max MD;  Location: [facility] OR;  Service: Gastroenterology;  Laterality: N/A;    HYSTERECTOMY      total    REPAIR OF EYELID Bilateral 11/21/2022    Procedure: BILATERAL ECTROPION REPAIR;  Surgeon: Justin Flores MD;  Location: [facility] OR;  Service: ENT;  Laterality: Bilateral;    ULTRASOUND, ENDOSCOPIC, WITH FINE NEEDLE ASPIRATION N/A 6/8/2023    Procedure: UPPER EUS  W/  FNA;  Surgeon: Jose Alberto Max MD;  Location: [facility] OR;  Service: Gastroenterology;  Laterality: N/A;  Dangelo, pt w/ CD on entyvio recently admitted to [facility] w/ acute pancreatitis. Possibly drug induced from entyvio? Althoughshe does have dilated PD and a poss focal stricture in the HOP.CA 19-9 nml    ULTRASOUND, UPPER GI TRACT, ENDOSCOPIC, WITH CELIAC PLEXUS BLOCK N/A 6/29/2023    Procedure: UPPER EUS W/ POSS FNA W/ CELIAC BLOCK;  Surgeon: Jose Alberto Max MD;  Location: [facility] OR;  Service: Gastroenterology;  Laterality: N/A;  Celiac Plexus injection planned.Pt should receive 1L NS bolus upon arrival to Outpt Surgery and 1L NS during the procedure. Pt will need to be in PACU for 30 min post procedure to assess for any potential hypotension. NO LABS     Family History   Problem Relation Age of Onset    Diabetes Mother     Hypertension Father     Hyperlipidemia Father     Cancer Father     Hyperlipidemia Sister     Hyperlipidemia Brother     Hyperlipidemia Daughter      Social History     Tobacco Use    Smoking status: Former     Types: Cigarettes    Smokeless tobacco: Never   Substance Use Topics    Alcohol use: Not Currently     Comment: rarely    Drug use: Never     Review of Systems   Constitutional:  Negative for chills and fever.   HENT:  Negative for congestion, drooling and sore throat.    Eyes:  Negative for pain and visual disturbance.   Respiratory:  Negative for chest tightness, shortness of breath and wheezing.    Cardiovascular:   Negative for chest pain, palpitations and leg swelling.   Gastrointestinal:  Positive for abdominal pain, nausea and vomiting.   Genitourinary:  Negative for dysuria and hematuria.   Musculoskeletal:  Negative for back pain, neck pain and neck stiffness.   Skin:  Negative for pallor and rash.   Neurological:  Negative for weakness and numbness.   Hematological:  Does not bruise/bleed easily.     Physical Exam     Initial Vitals   BP Pulse Resp Temp SpO2   07/23/23 1753 07/23/23 1749 07/23/23 1749 07/23/23 1749 07/23/23 1749   (!) 188/90 93 (!) 34 98.8 °F (37.1 °C) 98 %      MAP       --                Physical Exam    Nursing note and vitals reviewed.  Constitutional: She appears well-developed and well-nourished. She is not diaphoretic. She appears ill. No distress.   HENT:   Head: Normocephalic and atraumatic.   Nose: Nose normal.   Mouth/Throat: Oropharynx is clear and moist. Mucous membranes are dry.   Eyes: EOM are normal. Pupils are equal, round, and reactive to light.   Neck: Neck supple.   Normal range of motion.  Cardiovascular:  Normal rate and regular rhythm.           No murmur heard.  Pulmonary/Chest: Breath sounds normal. No respiratory distress. She has no wheezes. She has no rales.   Abdominal: Abdomen is soft. She exhibits no distension. There is abdominal tenderness in the epigastric area. There is no rebound and no guarding.   Musculoskeletal:      Cervical back: Normal range of motion and neck supple.     Neurological: She is alert and oriented to person, place, and time. She has normal strength. No cranial nerve deficit or sensory deficit.   Skin: Skin is warm. Capillary refill takes less than 2 seconds. No rash noted.       ED Course   Procedures  Labs Reviewed   CBC WITH DIFFERENTIAL - Abnormal; Notable for the following components:       Result Value    RBC 3.77 (*)     Hgb 11.7 (*)     Hct 33.0 (*)     Lymph # 0.41 (*)     All other components within normal limits    Narrative:     Few  platelet clumps were seen on her slide, but her platelet count was right on par with her previous values.   COMPREHENSIVE METABOLIC PANEL - Abnormal; Notable for the following components:    Potassium Level 2.9 (*)     Carbon Dioxide 21 (*)     Glucose Level 145 (*)     Blood Urea Nitrogen 6.6 (*)     Globulin 4.0 (*)     Albumin/Globulin Ratio 0.9 (*)     All other components within normal limits   MAGNESIUM - Abnormal; Notable for the following components:    Magnesium Level 1.50 (*)     All other components within normal limits   APTT - Abnormal; Notable for the following components:    PTT 20.0 (*)     All other components within normal limits   URINALYSIS, REFLEX TO URINE CULTURE - Abnormal; Notable for the following components:    Ketones, UA 1+ (*)     Leukocyte Esterase, UA Trace (*)     All other components within normal limits   COMPREHENSIVE METABOLIC PANEL - Abnormal; Notable for the following components:    Potassium Level 3.1 (*)     Glucose Level 119 (*)     Blood Urea Nitrogen 8.4 (*)     All other components within normal limits   CBC WITH DIFFERENTIAL - Abnormal; Notable for the following components:    RBC 3.45 (*)     Hgb 10.5 (*)     Hct 30.3 (*)     All other components within normal limits   MAGNESIUM - Abnormal; Notable for the following components:    Magnesium Level 2.70 (*)     All other components within normal limits   BASIC METABOLIC PANEL - Abnormal; Notable for the following components:    Blood Urea Nitrogen 5.8 (*)     All other components within normal limits   LIPASE - Normal   PROTIME-INR - Normal   LACTIC ACID, PLASMA - Normal   BLOOD SMEAR MICROSCOPIC EXAM (OLG) - Normal   URINALYSIS, MICROSCOPIC - Normal   TROPONIN I - Normal   CBC W/ AUTO DIFFERENTIAL    Narrative:     The following orders were created for panel order CBC auto differential.  Procedure                               Abnormality         Status                     ---------                                -----------         ------                     CBC with Differential[[id number]]        Abnormal            Final result                 Please view results for these tests on the individual orders.        ECG Results              EKG 12-lead (Final result)  Result time 07/24/23 08:40:25      Final result by Interface, Lab In [facility] (07/24/23 08:40:25)                   Narrative:    Test Reason : R11.10,    Vent. Rate : 068 BPM     Atrial Rate : 068 BPM     P-R Int : 174 ms          QRS Dur : 090 ms      QT Int : 422 ms       P-R-T Axes : 071 039 007 degrees     QTc Int : 448 ms    Normal sinus rhythm  Nonspecific ST abnormality  Abnormal ECG  Confirmed by Joshua Minaya MD ([id number]) on 7/24/2023 8:40:12 AM    Referred By: FELIPE CALHOUN           Confirmed By:Joshua Minaya MD                                  Imaging Results              CT Abdomen Pelvis With Contrast (Final result)  Result time 07/24/23 09:55:42      Final result by Des Irizarry MD (07/24/23 09:55:42)                   Impression:    Impression:    1. Mild intra- and extrahepatic ductal dilatation is again seen in this patient post-cholecystectomy, measuring up to 0.5 and 0.8 cm respectively, not significantly changed when compared to prior.    2. There is stable pronounced pancreatic duct dilatation with the pancreatic duct measuring up to 1.2 cm. Correlate with clinical and laboratory findings as regards additional evaluation and follow-up.    3. No acute intraabdominal or pelvic solid organ or bowel pathology identified. Details and other findings, as above.    No significant discrepancy with overnight report.      Electronically signed by: Des Irizarry  Date:    07/24/2023  Time:    09:55               Narrative:      Technique:CT of the abdomen and pelvis was performed with axial images as well as sagittal and coronal reconstruction images with intravenous contrast. None available. None.    Comparison:Comparison is with study dated  2023-07-08 13:14:33.    Clinical History:N/V, epigastric pain.    Dosage Information:Automated Exposure Control was utilized 262.05 mGy.cm.    Findings:    Lines and Tubes:None.    Thorax:    Lungs:There is mild nonspecific dependent change at the lung bases.    Pleura:No effusions or thickening.    Heart:The heart size is within normal limits.    Abdomen:    Abdominal Wall:The inter-recti distance is widened at 5.7 cm. This is consistent with diastasis recti.    Liver:Mild intra- and extrahepatic ductal dilatation is again seen in this patient post-cholecystectomy, measuring up to 0.5 and 0.8 cm respectively, not significantly changed when compared to prior.    Gallbladder:Surgical clips are seen in the gallbladder fossa consistent with prior cholecystectomy.    Pancreas:There is stable pronounced pancreatic duct dilatation with the pancreatic duct measuring up to 1.2 cm. The pancreas otherwise appears unremarkable with no focal lesion detected.    Spleen:The spleen appears unremarkable.    Adrenals:The adrenal glands appear unremarkable.    Kidneys:Multiple cysts are again seen in both kidneys, unchanged. The largest in the right is in the midpole measuring 0.7 cm (series 2, image 26), while the largest in the left is in the superior pole, measuring 0.8 cm (series 2, image 23).  Four bilateral kidney cysts no specific follow-up imaging is recommended.    Aorta:Again seen is moderate calcification of the abdominal aorta and its branches, with mild tortuosity.    IVC:Unremarkable.    Bowel:    Esophagus:A stable small hiatal hernia is again seen.    Stomach:The stomach is not distended.    Duodenum:Unremarkable appearing duodenum.    Small Bowel:The small bowel appears unremarkable.    Colon:Post-right hemicolectomy changes are noted. Few scattered diverticula are again seen along the transverse and descending colon. There are no adjacent inflammatory changes to suggest diverticulitis.    Peritoneum:No  intraperitoneal free air or ascites is seen.    Retroperitoneal lymph nodes:Multiple surgical clips are again visualized in the retroperitoneum.    Pelvis:    Bladder:The bladder appears unremarkable.    Female:    Uterus:The uterus is not identified.    Ovaries:No adnexal masses are seen.    Bony structures:    Dorsal Spine:There is moderate spondylosis of the visualized dorsal spine.    Bony Pelvis:There is note of vacuum phenomenon in the bilateral sacroiliac joints. Adjacent to left sacroiliac joint and anterior to the sacrum is a small focus of air. This is unchanged since the prior study and likely relates to this process as well.                        Preliminary result by Des Irizarry MD (07/23/23 20:55:43)                   Narrative:    START OF REPORT:  Technique: CT of the abdomen and pelvis was performed with axial images as well as sagittal and coronal reconstruction images with intravenous contrast. None available. None.    Comparison: Comparison is with study dated 2023-07-08 13:14:33.    Clinical History: N/V, epigastric pain.    Dosage Information: Automated Exposure Control was utilized 262.05 mGy.cm.    Findings:  Lines and Tubes: None.  Thorax:  Lungs: There is mild nonspecific dependent change at the lung bases.  Pleura: No effusions or thickening.  Heart: The heart size is within normal limits.  Abdomen:  Abdominal Wall: The inter-recti distance is widened at 5.7 cm. This is consistent with diastasis recti.  Liver: Mild intra- and extrahepatic ductal dilatation is again seen in this patient post-cholecystectomy, measuring up to 0.5 and 0.8 cm respectively, not significantly changed when compared to prior.  Gallbladder: Surgical clips are seen in the gallbladder fossa consistent with prior cholecystectomy.  Pancreas: There is stable pronounced pancreatic duct dilatation with the pancreatic duct measuring up to 1.2 cm. The pancreas otherwise appears unremarkable with no focal lesion  detected.  Spleen: The spleen appears unremarkable.  Adrenals: The adrenal glands appear unremarkable.  Kidneys: Multiple cysts are again seen in both kidneys, unchanged. The largest in the right is in the midpole measuring 0.7 cm (series 2, image 26), while the largest in the left is in the superior pole, measuring 0.8 cm (series 2, image 23).  Aorta: Again seen is moderate calcification of the abdominal aorta and its branches, with mild tortuosity.  IVC: Unremarkable.  Bowel:  Esophagus: A stable small hiatal hernia is again seen.  Stomach: The stomach appears unremarkable.  Duodenum: Unremarkable appearing duodenum.  Small Bowel: The small bowel appears unremarkable.  Colon: Post-right hemicolectomy changes are noted. Few scattered diverticula are again seen along the transverse and descending colon. There are no adjacent inflammatory changes to suggest diverticulitis.  Peritoneum: No intraperitoneal free air or ascites is seen.  Retroperitoneal lymph nodes: Multiple surgical clips are again visualized in the retroperitoneum.    Pelvis:  Bladder: The bladder appears unremarkable.  Female:  Uterus: The uterus is not identified.  Ovaries: No adnexal masses are seen.    Bony structures:  Dorsal Spine: There is moderate spondylosis of the visualized dorsal spine.  Bony Pelvis: There is note of vacuum phenomenon in the bilateral sacroiliac joints. Adjacent to left sacroiliac joint and anterior to the sacrum is a small focus of air. This is unchanged since the prior study and likely relates to this process as well.      Impression:  1. Mild intra- and extrahepatic ductal dilatation is again seen in this patient post-cholecystectomy, measuring up to 0.5 and 0.8 cm respectively, not significantly changed when compared to prior.  2. There is stable pronounced pancreatic duct dilatation with the pancreatic duct measuring up to 1.2 cm. Correlate with clinical and laboratory findings as regards additional evaluation and  follow-up.  3. No acute intraabdominal or pelvic solid organ or bowel pathology identified. Details and other findings, as above.                                         Medications   potassium chloride SA CR tablet 40 mEq (40 mEq Oral Not Given 7/23/23 2227)   potassium chloride 10 mEq in 100 mL IVPB (10 mEq Intravenous New Bag 7/24/23 1415)   sodium chloride 0.9% bolus 500 mL 500 mL (0 mLs Intravenous Stopped 7/23/23 1950)   ondansetron injection 4 mg (4 mg Intravenous Given 7/23/23 1851)   HYDROmorphone (PF) injection 1 mg (1 mg Intravenous Given 7/23/23 1852)   morphine injection 4 mg (4 mg Intravenous Given 7/23/23 2023)   iopamidoL (ISOVUE-370) injection 100 mL (100 mLs Intravenous Given 7/23/23 2054)   magnesium sulfate 2g in water 50mL IVPB (premix) (0 g Intravenous Stopped 7/24/23 0028)   morphine injection 4 mg (4 mg Intravenous Given 7/23/23 2338)   prochlorperazine injection Soln 5 mg (5 mg Intravenous Given 7/23/23 2338)   magnesium sulfate in dextrose IVPB (premix) 1 g (0 g Intravenous Stopped 7/24/23 0457)   potassium chloride 20 mEq in 100 mL IVPB (FOR CENTRAL LINE ADMINISTRATION ONLY) (20 mEq Intravenous New Bag 7/24/23 0356)   potassium chloride SA CR tablet 40 mEq (40 mEq Oral Given 7/24/23 1112)   Medical Decision Making  Problems Addressed:  Hypokalemia: acute illness or injury  Hypomagnesemia: acute illness or injury  Intractable nausea and vomiting: acute illness or injury  Vomiting: acute illness or injury    Amount and/or Complexity of Data Reviewed  Radiology: ordered.    Risk  Prescription drug management.  Decision regarding hospitalization.        Differential diagnosis (includes but is not limited to):   Pancreatitis, medication side effects, intractable nausea and vomiting, electrolyte abnormalities, dehydration, kidney injury, gastroenteritis    MDM Narrative  82-year-old female presents for evaluation of abdominal pain with persistent intractable nausea and vomiting at home.  She  states she is been unable to keep down food or liquid intake for the past several days.  Labs are pending.  IV fluid rehydration ordered.  Continue pain and nausea control as needed.  Telemetry monitoring independently reviewed and shows a sinus rhythm with heart rate in the 90s.  CT of the abdomen and pelvis pending to evaluate for acute intra-abdominal pathology.    Update:  Labs reviewed.  Hypokalemia and hypomagnesemia noted, will replace.  Patient has required multiple rounds of pain medications and antiemetics without significant improvement in her symptoms.  She was still unable to tolerate oral intake.  CT scan reviewed.  Given the patient's significant symptoms and inability to tolerate p.o., will proceed with hospital admission for further evaluation and management.  Patient agrees with plan for admission and has no questions at this time.  Patient's daughter who is present at the bedside agrees with plan of care at this time as well.    Dispo:  Admit    My independent radiology interpretation:  As above  Point of care US (independently performed and interpreted):   Decision rules/clinical scoring:     Sepsis Perfusion Assessment:     Amount and/or Complexity of Data Reviewed  Independent historian: daughter    Summary of history:  History corroborated by the patient's daughter who is present at the bedside  External data reviewed: notes from previous admissions, notes from previous ED visits, and notes from clinic visits  Summary of data reviewed:  Prior notes reviewed in electronic medical record  Risk and benefits of testing: discussed   Labs: ordered and reviewed  Radiology: ordered and independent interpretation performed (see above or ED course)  ECG/medicine tests: ordered and independent interpretation performed (see above or ED course)  Discussion of management or test interpretation with external provider(s): discussed with hospitalist physician   Summary of discussion:  As  above    Risk  Parenteral controlled substances   Drug therapy requiring intense monitoring for toxicity   Decision regarding hospitalization  Shared decision making     Critical Care  none    Data Reviewed/Counseling: I have personally reviewed the patient's vital signs, nursing notes, and other relevant tests, information, and imaging. I had a detailed discussion regarding the historical points, exam findings, and any diagnostic results supporting the discharge diagnosis. I personally performed the history, PE, MDM and procedures as documented above and agree with the scribe's documentation.    Portions of this note were dictated using voice recognition software. Although it was reviewed for accuracy, some inherent voice recognition errors may have occurred and may be present in this document.            Scribe Attestation:   Scribe #1: I performed the above scribed service and the documentation accurately describes the services I performed. I attest to the accuracy of the note.    Attending Attestation:           Physician Attestation for Scribe:  Physician Attestation Statement for Scribe #1: I, Nam Salinas MD, reviewed documentation, as scribed by Nedra Chapman in my presence, and it is both accurate and complete.                        Clinical Impression:   Final diagnoses:  [R11.2] Intractable nausea and vomiting (Primary)  [E87.6] Hypokalemia  [E83.42] Hypomagnesemia  [R11.10] Vomiting        ED Disposition Condition    Admit Stable                Nam Salinas MD  07/28/23 0928

## 2023-07-25 VITALS
HEIGHT: 63 IN | DIASTOLIC BLOOD PRESSURE: 73 MMHG | TEMPERATURE: 98 F | RESPIRATION RATE: 18 BRPM | HEART RATE: 65 BPM | BODY MASS INDEX: 23.74 KG/M2 | SYSTOLIC BLOOD PRESSURE: 156 MMHG | OXYGEN SATURATION: 98 % | WEIGHT: 134 LBS

## 2023-07-25 PROCEDURE — 25000003 PHARM REV CODE 250: Performed by: INTERNAL MEDICINE

## 2023-07-25 PROCEDURE — 63600175 PHARM REV CODE 636 W HCPCS: Performed by: INTERNAL MEDICINE

## 2023-07-25 RX ORDER — SODIUM CHLORIDE 0.9 % (FLUSH) 0.9 %
10 SYRINGE (ML) INJECTION
Status: CANCELLED | OUTPATIENT
Start: 2023-07-26

## 2023-07-25 RX ORDER — HEPARIN 100 UNIT/ML
500 SYRINGE INTRAVENOUS
Status: CANCELLED | OUTPATIENT
Start: 2023-07-26

## 2023-07-25 RX ADMIN — SODIUM CHLORIDE: 9 INJECTION, SOLUTION INTRAVENOUS at 02:07

## 2023-07-25 RX ADMIN — ONDANSETRON 4 MG: 2 INJECTION INTRAMUSCULAR; INTRAVENOUS at 09:07

## 2023-07-25 RX ADMIN — MORPHINE SULFATE 4 MG: 4 INJECTION INTRAVENOUS at 09:07

## 2023-07-25 RX ADMIN — OXYCODONE HYDROCHLORIDE 5 MG: 5 TABLET ORAL at 12:07

## 2023-07-25 RX ADMIN — MUPIROCIN: 20 OINTMENT TOPICAL at 08:07

## 2023-07-25 RX ADMIN — OXYCODONE HYDROCHLORIDE 5 MG: 5 TABLET ORAL at 05:07

## 2023-07-25 RX ADMIN — MORPHINE SULFATE 4 MG: 4 INJECTION INTRAVENOUS at 12:07

## 2023-07-25 RX ADMIN — ONDANSETRON 4 MG: 2 INJECTION INTRAMUSCULAR; INTRAVENOUS at 12:07

## 2023-07-25 NOTE — PLAN OF CARE
Problem: Adult Inpatient Plan of Care  Goal: Plan of Care Review  Outcome: Ongoing, Progressing  Flowsheets (Taken 7/24/2023 1949)  Plan of Care Reviewed With: patient  Goal: Patient-Specific Goal (Individualized)  Outcome: Ongoing, Progressing  Goal: Absence of Hospital-Acquired Illness or Injury  Outcome: Ongoing, Progressing  Intervention: Identify and Manage Fall Risk  Flowsheets (Taken 7/24/2023 1949)  Safety Promotion/Fall Prevention:   assistive device/personal item within reach   Fall Risk reviewed with patient/family   Fall Risk signage in place   lighting adjusted   nonskid shoes/socks when out of bed   medications reviewed   side rails raised x 2  Intervention: Prevent Skin Injury  Flowsheets (Taken 7/24/2023 1949)  Body Position: supine  Skin Protection:   adhesive use limited   tubing/devices free from skin contact   protective footwear used  Intervention: Prevent and Manage VTE (Venous Thromboembolism) Risk  Flowsheets (Taken 7/24/2023 1949)  Activity Management: Ambulated to bathroom - L4  VTE Prevention/Management:   bleeding precations maintained   bleeding risk assessed   ambulation promoted   intravenous hydration  Range of Motion: active ROM (range of motion) encouraged  Intervention: Prevent Infection  Flowsheets (Taken 7/24/2023 1949)  Infection Prevention:   environmental surveillance performed   hand hygiene promoted   rest/sleep promoted   single patient room provided  Goal: Optimal Comfort and Wellbeing  Outcome: Ongoing, Progressing  Intervention: Monitor Pain and Promote Comfort  Flowsheets (Taken 7/24/2023 1949)  Pain Management Interventions:   care clustered   pain management plan reviewed with patient/caregiver   quiet environment facilitated   medication offered  Intervention: Provide Person-Centered Care  Flowsheets (Taken 7/24/2023 1949)  Trust Relationship/Rapport:   care explained   choices provided   empathic listening provided   questions answered   thoughts/feelings  acknowledged  Goal: Readiness for Transition of Care  Outcome: Ongoing, Progressing     Problem: Fluid and Electrolyte Imbalance (Acute Kidney Injury/Impairment)  Goal: Fluid and Electrolyte Balance  Outcome: Ongoing, Progressing     Problem: Oral Intake Inadequate (Acute Kidney Injury/Impairment)  Goal: Optimal Nutrition Intake  Outcome: Ongoing, Progressing     Problem: Renal Function Impairment (Acute Kidney Injury/Impairment)  Goal: Effective Renal Function  Outcome: Ongoing, Progressing     Problem: Infection  Goal: Absence of Infection Signs and Symptoms  Outcome: Ongoing, Progressing  Intervention: Prevent or Manage Infection  Flowsheets (Taken 7/24/2023 1949)  Infection Management: aseptic technique maintained

## 2023-07-25 NOTE — PLAN OF CARE
Problem: Adult Inpatient Plan of Care  Goal: Plan of Care Review  7/25/2023 0756 by Ryland Hebert RN  Outcome: Ongoing, Progressing  Flowsheets (Taken 7/25/2023 0756)  Plan of Care Reviewed With: patient  7/24/2023 1814 by Ryland Hebert RN  Outcome: Ongoing, Progressing  Flowsheets (Taken 7/24/2023 1814)  Plan of Care Reviewed With: patient  Goal: Patient-Specific Goal (Individualized)  7/25/2023 0756 by Ryland Hebert RN  Outcome: Ongoing, Progressing  7/24/2023 1814 by Ryland Hebert RN  Outcome: Ongoing, Progressing  Goal: Absence of Hospital-Acquired Illness or Injury  7/25/2023 0756 by Ryland Hebert RN  Outcome: Ongoing, Progressing  7/24/2023 1814 by Ryland Hebert RN  Outcome: Ongoing, Progressing  Intervention: Identify and Manage Fall Risk  7/25/2023 0756 by Ryland Hebert RN  Flowsheets (Taken 7/25/2023 0756)  Safety Promotion/Fall Prevention:   assistive device/personal item within reach   medications reviewed   nonskid shoes/socks when out of bed   side rails raised x 2  7/24/2023 1814 by Ryland Hebert RN  Flowsheets (Taken 7/24/2023 1814)  Safety Promotion/Fall Prevention:   assistive device/personal item within reach   medications reviewed   nonskid shoes/socks when out of bed   side rails raised x 2  Intervention: Prevent Skin Injury  7/25/2023 0756 by Ryland Hebert RN  Flowsheets (Taken 7/25/2023 0756)  Body Position: position changed independently  Skin Protection:   adhesive use limited   incontinence pads utilized   tubing/devices free from skin contact  7/24/2023 1814 by Ryland Hebert RN  Flowsheets (Taken 7/24/2023 1814)  Body Position: position changed independently  Skin Protection:   adhesive use limited   incontinence pads utilized   tubing/devices free from skin contact  Intervention: Prevent and Manage VTE (Venous Thromboembolism) Risk  7/25/2023 0756 by Ryland Hebert RN  Flowsheets (Taken 7/25/2023 0756)  Activity Management: Ambulated to bathroom - L4  VTE Prevention/Management:    ambulation promoted   bleeding risk assessed   ROM (active) performed  Range of Motion:   active ROM (range of motion) encouraged   ROM (range of motion) performed  7/24/2023 1814 by Ryland Hebert RN  Flowsheets (Taken 7/24/2023 1814)  Activity Management: Ambulated to bathroom - L4  VTE Prevention/Management:   ambulation promoted   bleeding risk assessed   ROM (active) performed  Range of Motion:   active ROM (range of motion) encouraged   ROM (range of motion) performed  Intervention: Prevent Infection  7/25/2023 0756 by Ryland Hebert RN  Flowsheets (Taken 7/25/2023 0756)  Infection Prevention:   rest/sleep promoted   single patient room provided  7/24/2023 1814 by Ryland Hebert RN  Flowsheets (Taken 7/24/2023 1814)  Infection Prevention:   rest/sleep promoted   single patient room provided  Goal: Optimal Comfort and Wellbeing  7/25/2023 0756 by Ryland Hebert RN  Outcome: Ongoing, Progressing  7/24/2023 1814 by Ryland Hebert RN  Outcome: Ongoing, Progressing  Intervention: Monitor Pain and Promote Comfort  7/25/2023 0756 by Ryland Hebert RN  Flowsheets (Taken 7/25/2023 0756)  Pain Management Interventions:   care clustered   medication offered   pain management plan reviewed with patient/caregiver   pillow support provided   position adjusted  7/24/2023 1814 by Ryland Hebert RN  Flowsheets (Taken 7/24/2023 1814)  Pain Management Interventions:   care clustered   medication offered   pain management plan reviewed with patient/caregiver   pillow support provided   position adjusted  Intervention: Provide Person-Centered Care  7/25/2023 0756 by Rylnad Hebert RN  Flowsheets (Taken 7/25/2023 0756)  Trust Relationship/Rapport: care explained  7/24/2023 1814 by Ryland Hebert RN  Flowsheets (Taken 7/24/2023 1814)  Trust Relationship/Rapport: care explained  Goal: Readiness for Transition of Care  7/25/2023 0756 by Ryland Hebert RN  Outcome: Ongoing, Progressing  7/24/2023 1814 by Ryland Hebert RN  Outcome: Ongoing,  Progressing     Problem: Fluid and Electrolyte Imbalance (Acute Kidney Injury/Impairment)  Goal: Fluid and Electrolyte Balance  7/25/2023 0756 by Ryland Hebert RN  Outcome: Ongoing, Progressing  7/24/2023 1814 by Ryland Hebert RN  Outcome: Ongoing, Progressing     Problem: Oral Intake Inadequate (Acute Kidney Injury/Impairment)  Goal: Optimal Nutrition Intake  7/25/2023 0756 by Ryland Hebert RN  Outcome: Ongoing, Progressing  7/24/2023 1814 by Ryland Hebert RN  Outcome: Ongoing, Progressing  Intervention: Promote and Optimize Nutrition  7/25/2023 0756 by Ryland Hebert RN  Flowsheets (Taken 7/25/2023 0756)  Oral Nutrition Promotion: rest periods promoted  7/24/2023 1814 by Ryland Hebert RN  Flowsheets (Taken 7/24/2023 1814)  Oral Nutrition Promotion: rest periods promoted     Problem: Renal Function Impairment (Acute Kidney Injury/Impairment)  Goal: Effective Renal Function  7/25/2023 0756 by Ryland Hebert RN  Outcome: Ongoing, Progressing  7/24/2023 1814 by Ryland Hebert RN  Outcome: Ongoing, Progressing  Intervention: Monitor and Support Renal Function  7/25/2023 0756 by Ryland Hebert RN  Flowsheets (Taken 7/25/2023 0756)  Medication Review/Management: medications reviewed  7/24/2023 1814 by Ryland Hebert RN  Flowsheets (Taken 7/24/2023 1814)  Medication Review/Management: medications reviewed     Problem: Infection  Goal: Absence of Infection Signs and Symptoms  7/25/2023 0756 by Ryland Hebert RN  Outcome: Ongoing, Progressing  7/24/2023 1814 by Ryland Hebert RN  Outcome: Ongoing, Progressing  Intervention: Prevent or Manage Infection  7/25/2023 0756 by Ryland Hebert RN  Flowsheets (Taken 7/25/2023 0756)  Infection Management: aseptic technique maintained  7/24/2023 1814 by Ryland Hebert RN  Flowsheets (Taken 7/24/2023 1814)  Infection Management: aseptic technique maintained

## 2023-07-25 NOTE — CARE UPDATE
CARE UPDATE    Daughter called to notify us she was in the hospital.    Came by to see patient.  She is getting discharged today.    She is going to drop her Creon down to 2 pills a day to see if that helps with the gas,   She feels better she is hungry.    She presented to the emergency room on 07/23/2023 with abdominal pain nausea and vomiting, Compazine was not working.    She was noted to have hypokalemia at 2.9, hypomagnesemia 1.5, CT was stable, she was admitted to the hospital service electrolytes replaced started on IV fluids    She is scheduled to start palliative chemotherapy tomorrow on 07/26/2023.      C-diff resolved  Lab Review:  CBC:   Recent Labs     07/24/23  0636 07/23/23 2016 07/09/23  0506   WBC 6.95 6.97 8.22   RBC 3.45* 3.77* 3.32*   HGB 10.5* 11.7* 10.3*   HCT 30.3* 33.0* 29.8*    235 222     CMP:   Recent Labs     07/24/23  1503 07/24/23  0636 07/23/23 2016 07/09/23  0506 07/08/23  1037    139 139   < > 141   K 3.7 3.1* 2.9*   < > 2.8*   CO2 26 25 21*   < > 20*   BUN 5.8* 8.4* 6.6*   < > 10.0   CREATININE 0.71 0.74 0.70   < > 0.85   CALCIUM 9.0 9.0 9.3   < > 10.0   ALBUMIN  --  3.4 3.6  --  4.0   BILITOT  --  0.8 1.0  --  0.8   ALKPHOS  --  105 125  --  142   AST  --  21 21  --  24   ALT  --  25 27  --  37    < > = values in this interval not displayed.        Ronan Burns MD

## 2023-07-25 NOTE — DISCHARGE INSTRUCTIONS
Please return to the ER with any worsening of your symptoms. Call your PCP with any further questions or concerns.

## 2023-07-25 NOTE — DISCHARGE SUMMARY
[facility] Hospital Medicine Discharge Summary    Admit Date: 7/23/2023  Discharge Date and Time: 7/25/202311:20 AM  Admitting Physician: MEMO Team  Discharging Physician: Micah Olivo MD.  Primary Care Physician: Marina Morales MD      Discharge Diagnoses:  Intractable nausea, vomiting and abdominal pain-resolved  Hypomagnesemia, hypokalemia secondary to above   Pancreatic cancer awaiting initiation of chemotherapy  History of Crohn's disease status post right hemicolectomy   Recent C diff colitis status post treatment    Hospital Course:   82-year-old female with a history of recently diagnosed stage III pancreatic cancer awaiting initiation of chemotherapy on Wednesday, prior C diff, Crohn's disease and additional past medical history as below who presents to the ER with intractable nausea and vomiting as well as abdominal pain.  She arrived afebrile hemodynamically stable but laboratory work showed hypokalemia with a potassium of 2.9 and hypomagnesemia with a magnesium of 1.5.  CT abdomen only showed stable changes.  Hospitalist service was consulted for intractable pain and nausea and vomiting.  Patient was hydrated with iv NS and electrolytes was replaced. She was stable and started to tolerate po diet. She was back at her baseline and discharged home in a stable condition.     Pt was seen and examined on the day of discharge  Vitals:  VITAL SIGNS: 24 HRS MIN & MAX LAST   Temp  Min: 98.1 °F (36.7 °C)  Max: 99.4 °F (37.4 °C) 98.5 °F (36.9 °C)   BP  Min: 121/95  Max: 168/73 (!) 157/75   Pulse  Min: 60  Max: 80  66   Resp  Min: 13  Max: 26 18   SpO2  Min: 96 %  Max: 98 % 98 %       Physical Exam:  Heart RRR  Lungs clear   Abdomen soft and non tender   Neuro: No FND      Procedures Performed: No admission procedures for hospital encounter.     Significant Diagnostic Studies: See Full reports for all details    Recent Labs   Lab 07/23/23 2016 07/24/23  0636   WBC 6.97  6.95   RBC 3.77* 3.45*   HGB 11.7* 10.5*   HCT 33.0* 30.3*   MCV 87.5 87.8   MCH 31.0 30.4   MCHC 35.5 34.7   RDW 14.6 14.7    220   MPV 9.4 9.6       Recent Labs   Lab 07/23/23 2016 07/24/23  0636 07/24/23  1503    139 140   K 2.9* 3.1* 3.7   CO2 21* 25 26   BUN 6.6* 8.4* 5.8*   CREATININE 0.70 0.74 0.71   CALCIUM 9.3 9.0 9.0   MG 1.50* 2.70*  --    ALBUMIN 3.6 3.4  --    ALKPHOS 125 105  --    ALT 27 25  --    AST 21 21  --    BILITOT 1.0 0.8  --         Microbiology Results (last 7 days)       ** No results found for the last 168 hours. **             CT Abdomen Pelvis With Contrast  Narrative: Technique:CT of the abdomen and pelvis was performed with axial images as well as sagittal and coronal reconstruction images with intravenous contrast. None available. None.    Comparison:Comparison is with study dated 2023-07-08 13:14:33.    Clinical History:N/V, epigastric pain.    Dosage Information:Automated Exposure Control was utilized 262.05 mGy.cm.    Findings:    Lines and Tubes:None.    Thorax:    Lungs:There is mild nonspecific dependent change at the lung bases.    Pleura:No effusions or thickening.    Heart:The heart size is within normal limits.    Abdomen:    Abdominal Wall:The inter-recti distance is widened at 5.7 cm. This is consistent with diastasis recti.    Liver:Mild intra- and extrahepatic ductal dilatation is again seen in this patient post-cholecystectomy, measuring up to 0.5 and 0.8 cm respectively, not significantly changed when compared to prior.    Gallbladder:Surgical clips are seen in the gallbladder fossa consistent with prior cholecystectomy.    Pancreas:There is stable pronounced pancreatic duct dilatation with the pancreatic duct measuring up to 1.2 cm. The pancreas otherwise appears unremarkable with no focal lesion detected.    Spleen:The spleen appears unremarkable.    Adrenals:The adrenal glands appear unremarkable.    Kidneys:Multiple cysts are again seen in both  kidneys, unchanged. The largest in the right is in the midpole measuring 0.7 cm (series 2, image 26), while the largest in the left is in the superior pole, measuring 0.8 cm (series 2, image 23).  Four bilateral kidney cysts no specific follow-up imaging is recommended.    Aorta:Again seen is moderate calcification of the abdominal aorta and its branches, with mild tortuosity.    IVC:Unremarkable.    Bowel:    Esophagus:A stable small hiatal hernia is again seen.    Stomach:The stomach is not distended.    Duodenum:Unremarkable appearing duodenum.    Small Bowel:The small bowel appears unremarkable.    Colon:Post-right hemicolectomy changes are noted. Few scattered diverticula are again seen along the transverse and descending colon. There are no adjacent inflammatory changes to suggest diverticulitis.    Peritoneum:No intraperitoneal free air or ascites is seen.    Retroperitoneal lymph nodes:Multiple surgical clips are again visualized in the retroperitoneum.    Pelvis:    Bladder:The bladder appears unremarkable.    Female:    Uterus:The uterus is not identified.    Ovaries:No adnexal masses are seen.    Bony structures:    Dorsal Spine:There is moderate spondylosis of the visualized dorsal spine.    Bony Pelvis:There is note of vacuum phenomenon in the bilateral sacroiliac joints. Adjacent to left sacroiliac joint and anterior to the sacrum is a small focus of air. This is unchanged since the prior study and likely relates to this process as well.  Impression: Impression:    1. Mild intra- and extrahepatic ductal dilatation is again seen in this patient post-cholecystectomy, measuring up to 0.5 and 0.8 cm respectively, not significantly changed when compared to prior.    2. There is stable pronounced pancreatic duct dilatation with the pancreatic duct measuring up to 1.2 cm. Correlate with clinical and laboratory findings as regards additional evaluation and follow-up.    3. No acute intraabdominal or pelvic  solid organ or bowel pathology identified. Details and other findings, as above.    No significant discrepancy with overnight report.    Electronically signed by: Des Irizarry  Date:    07/24/2023  Time:    09:55         Medication List        CONTINUE taking these medications      CREON 36,000-114,000- 180,000 unit Cpdr  Generic drug: lipase-protease-amylase  Take 1 capsule by mouth 3 (three) times daily with meals.     ondansetron 4 MG tablet  Commonly known as: ZOFRAN     oxyCODONE 30 MG Tab  Commonly known as: ROXICODONE     pantoprazole 40 MG tablet  Commonly known as: PROTONIX     prochlorperazine 10 MG tablet  Commonly known as: COMPAZINE  Take 1 tablet (10 mg total) by mouth every 6 (six) hours as needed.     traZODone 50 MG tablet  Commonly known as: DESYREL  Take 0.5 tablets (25 mg total) by mouth nightly as needed for Insomnia or Depression.            STOP taking these medications      cholecalciferol (vitamin D3) 1,250 mcg (50,000 unit) capsule     simvastatin 20 MG tablet  Commonly known as: ZOCOR               Explained in detail to the patient about the discharge plan, medications, and follow-up visits. Pt understands and agrees with the treatment plan  Discharge Disposition: Home or Self Care   Discharged Condition: stable  Diet-   Dietary Orders (From admission, onward)       Start     Ordered    07/24/23 1308  Diet low fiber/residue  Diet effective now         07/24/23 1307                   Medications Per DC med rec  Activities as tolerated    For further questions contact hospitalist office    Discharge time 33 minutes    For worsening symptoms, chest pain, shortness of breath, increased abdominal pain, high grade fever, stroke or stroke like symptoms, immediately go to the nearest Emergency Room or call 911 as soon as possible.      Micah Mckeon M.D on 7/25/2023. at 11:20 AM.

## 2023-07-26 ENCOUNTER — PATIENT OUTREACH (OUTPATIENT)
Dept: ADMINISTRATIVE | Facility: CLINIC | Age: 83
End: 2023-07-26
Payer: MEDICARE

## 2023-07-26 ENCOUNTER — INFUSION (OUTPATIENT)
Dept: INFUSION THERAPY | Facility: HOSPITAL | Age: 83
End: 2023-07-26
Attending: INTERNAL MEDICINE
Payer: MEDICARE

## 2023-07-26 ENCOUNTER — HOSPITAL ENCOUNTER (INPATIENT)
Facility: HOSPITAL | Age: 83
LOS: 5 days | Discharge: HOME HEALTH | DRG: 392 | End: 2023-07-31
Attending: EMERGENCY MEDICINE | Admitting: INTERNAL MEDICINE
Payer: MEDICARE

## 2023-07-26 VITALS
DIASTOLIC BLOOD PRESSURE: 83 MMHG | RESPIRATION RATE: 18 BRPM | WEIGHT: 133.63 LBS | TEMPERATURE: 100 F | BODY MASS INDEX: 23.67 KG/M2 | HEART RATE: 79 BPM | SYSTOLIC BLOOD PRESSURE: 207 MMHG | OXYGEN SATURATION: 100 %

## 2023-07-26 DIAGNOSIS — R53.1 WEAKNESS GENERALIZED: ICD-10-CM

## 2023-07-26 DIAGNOSIS — K92.0 HEMATEMESIS WITH NAUSEA: Primary | ICD-10-CM

## 2023-07-26 DIAGNOSIS — R06.02 SOB (SHORTNESS OF BREATH): ICD-10-CM

## 2023-07-26 DIAGNOSIS — C25.9 MALIGNANT NEOPLASM OF PANCREAS, UNSPECIFIED LOCATION OF MALIGNANCY: Primary | ICD-10-CM

## 2023-07-26 LAB
ALBUMIN SERPL-MCNC: 3.9 G/DL (ref 3.4–4.8)
ALBUMIN/GLOB SERPL: 0.9 RATIO (ref 1.1–2)
ALP SERPL-CCNC: 117 UNIT/L (ref 40–150)
ALT SERPL-CCNC: 21 UNIT/L (ref 0–55)
AST SERPL-CCNC: 20 UNIT/L (ref 5–34)
BASOPHILS # BLD AUTO: 0.02 X10(3)/MCL
BASOPHILS NFR BLD AUTO: 0.4 %
BILIRUBIN DIRECT+TOT PNL SERPL-MCNC: 0.6 MG/DL
BUN SERPL-MCNC: 7.6 MG/DL (ref 9.8–20.1)
CALCIUM SERPL-MCNC: 9.9 MG/DL (ref 8.4–10.2)
CHLORIDE SERPL-SCNC: 105 MMOL/L (ref 98–107)
CO2 SERPL-SCNC: 22 MMOL/L (ref 23–31)
CREAT SERPL-MCNC: 0.8 MG/DL (ref 0.55–1.02)
EOSINOPHIL # BLD AUTO: 0.05 X10(3)/MCL (ref 0–0.9)
EOSINOPHIL NFR BLD AUTO: 0.9 %
ERYTHROCYTE [DISTWIDTH] IN BLOOD BY AUTOMATED COUNT: 14.6 % (ref 11.5–17)
GFR SERPLBLD CREATININE-BSD FMLA CKD-EPI: >60 MLS/MIN/1.73/M2
GLOBULIN SER-MCNC: 4.4 GM/DL (ref 2.4–3.5)
GLUCOSE SERPL-MCNC: 161 MG/DL (ref 82–115)
HCT VFR BLD AUTO: 36.8 % (ref 37–47)
HGB BLD-MCNC: 12.4 G/DL (ref 12–16)
IMM GRANULOCYTES # BLD AUTO: 0.02 X10(3)/MCL (ref 0–0.04)
IMM GRANULOCYTES NFR BLD AUTO: 0.4 %
LIPASE SERPL-CCNC: 33 U/L
LYMPHOCYTES # BLD AUTO: 0.61 X10(3)/MCL (ref 0.6–4.6)
LYMPHOCYTES NFR BLD AUTO: 11.4 %
MCH RBC QN AUTO: 30.6 PG (ref 27–31)
MCHC RBC AUTO-ENTMCNC: 33.7 G/DL (ref 33–36)
MCV RBC AUTO: 90.9 FL (ref 80–94)
MONOCYTES # BLD AUTO: 0.13 X10(3)/MCL (ref 0.1–1.3)
MONOCYTES NFR BLD AUTO: 2.4 %
NEUTROPHILS # BLD AUTO: 4.5 X10(3)/MCL (ref 2.1–9.2)
NEUTROPHILS NFR BLD AUTO: 84.5 %
NRBC BLD AUTO-RTO: 0 %
PLATELET # BLD AUTO: 303 X10(3)/MCL (ref 130–400)
PMV BLD AUTO: 9.5 FL (ref 7.4–10.4)
POTASSIUM SERPL-SCNC: 2.9 MMOL/L (ref 3.5–5.1)
PROT SERPL-MCNC: 8.3 GM/DL (ref 5.8–7.6)
RBC # BLD AUTO: 4.05 X10(6)/MCL (ref 4.2–5.4)
SODIUM SERPL-SCNC: 140 MMOL/L (ref 136–145)
TROPONIN I SERPL-MCNC: 0.02 NG/ML (ref 0–0.04)
WBC # SPEC AUTO: 5.33 X10(3)/MCL (ref 4.5–11.5)

## 2023-07-26 PROCEDURE — 96375 TX/PRO/DX INJ NEW DRUG ADDON: CPT

## 2023-07-26 PROCEDURE — 96372 THER/PROPH/DIAG INJ SC/IM: CPT | Performed by: EMERGENCY MEDICINE

## 2023-07-26 PROCEDURE — 93010 ELECTROCARDIOGRAM REPORT: CPT | Mod: ,,, | Performed by: INTERNAL MEDICINE

## 2023-07-26 PROCEDURE — 96376 TX/PRO/DX INJ SAME DRUG ADON: CPT

## 2023-07-26 PROCEDURE — 83690 ASSAY OF LIPASE: CPT | Performed by: EMERGENCY MEDICINE

## 2023-07-26 PROCEDURE — 93010 EKG 12-LEAD: ICD-10-PCS | Mod: ,,, | Performed by: INTERNAL MEDICINE

## 2023-07-26 PROCEDURE — 96367 TX/PROPH/DG ADDL SEQ IV INF: CPT

## 2023-07-26 PROCEDURE — 63600175 PHARM REV CODE 636 W HCPCS: Performed by: INTERNAL MEDICINE

## 2023-07-26 PROCEDURE — 85025 COMPLETE CBC W/AUTO DIFF WBC: CPT | Performed by: NURSE PRACTITIONER

## 2023-07-26 PROCEDURE — 63600175 PHARM REV CODE 636 W HCPCS: Performed by: NURSE PRACTITIONER

## 2023-07-26 PROCEDURE — 63600175 PHARM REV CODE 636 W HCPCS: Performed by: EMERGENCY MEDICINE

## 2023-07-26 PROCEDURE — 84484 ASSAY OF TROPONIN QUANT: CPT | Performed by: NURSE PRACTITIONER

## 2023-07-26 PROCEDURE — 96361 HYDRATE IV INFUSION ADD-ON: CPT

## 2023-07-26 PROCEDURE — 96413 CHEMO IV INFUSION 1 HR: CPT

## 2023-07-26 PROCEDURE — 11000001 HC ACUTE MED/SURG PRIVATE ROOM

## 2023-07-26 PROCEDURE — 80053 COMPREHEN METABOLIC PANEL: CPT | Performed by: NURSE PRACTITIONER

## 2023-07-26 PROCEDURE — 63600175 PHARM REV CODE 636 W HCPCS

## 2023-07-26 PROCEDURE — 25000003 PHARM REV CODE 250: Performed by: INTERNAL MEDICINE

## 2023-07-26 PROCEDURE — 99285 EMERGENCY DEPT VISIT HI MDM: CPT | Mod: 25

## 2023-07-26 PROCEDURE — 96374 THER/PROPH/DIAG INJ IV PUSH: CPT

## 2023-07-26 PROCEDURE — 93005 ELECTROCARDIOGRAM TRACING: CPT

## 2023-07-26 RX ORDER — HYDROMORPHONE HYDROCHLORIDE 2 MG/ML
0.5 INJECTION, SOLUTION INTRAMUSCULAR; INTRAVENOUS; SUBCUTANEOUS
Status: COMPLETED | OUTPATIENT
Start: 2023-07-26 | End: 2023-07-26

## 2023-07-26 RX ORDER — ONDANSETRON 2 MG/ML
INJECTION INTRAMUSCULAR; INTRAVENOUS
Status: COMPLETED
Start: 2023-07-26 | End: 2023-07-26

## 2023-07-26 RX ORDER — HEPARIN 100 UNIT/ML
500 SYRINGE INTRAVENOUS
Status: DISCONTINUED | OUTPATIENT
Start: 2023-07-26 | End: 2023-07-26 | Stop reason: HOSPADM

## 2023-07-26 RX ORDER — ONDANSETRON 2 MG/ML
4 INJECTION INTRAMUSCULAR; INTRAVENOUS
Status: COMPLETED | OUTPATIENT
Start: 2023-07-26 | End: 2023-07-26

## 2023-07-26 RX ORDER — ONDANSETRON 2 MG/ML
8 INJECTION INTRAMUSCULAR; INTRAVENOUS
Status: COMPLETED | OUTPATIENT
Start: 2023-07-26 | End: 2023-07-26

## 2023-07-26 RX ORDER — PROMETHAZINE HYDROCHLORIDE 25 MG/ML
12.5 INJECTION, SOLUTION INTRAMUSCULAR; INTRAVENOUS
Status: COMPLETED | OUTPATIENT
Start: 2023-07-26 | End: 2023-07-26

## 2023-07-26 RX ORDER — LABETALOL HYDROCHLORIDE 5 MG/ML
20 INJECTION, SOLUTION INTRAVENOUS ONCE
Status: COMPLETED | OUTPATIENT
Start: 2023-07-26 | End: 2023-07-26

## 2023-07-26 RX ORDER — LABETALOL HYDROCHLORIDE 5 MG/ML
10 INJECTION, SOLUTION INTRAVENOUS
Status: COMPLETED | OUTPATIENT
Start: 2023-07-26 | End: 2023-07-26

## 2023-07-26 RX ORDER — CLONIDINE HYDROCHLORIDE 0.2 MG/1
0.2 TABLET ORAL
Status: ACTIVE | OUTPATIENT
Start: 2023-07-26 | End: 2023-07-27

## 2023-07-26 RX ORDER — HYDROMORPHONE HYDROCHLORIDE 2 MG/ML
1 INJECTION, SOLUTION INTRAMUSCULAR; INTRAVENOUS; SUBCUTANEOUS
Status: COMPLETED | OUTPATIENT
Start: 2023-07-26 | End: 2023-07-26

## 2023-07-26 RX ORDER — SODIUM CHLORIDE 0.9 % (FLUSH) 0.9 %
10 SYRINGE (ML) INJECTION
Status: DISCONTINUED | OUTPATIENT
Start: 2023-07-26 | End: 2023-07-26 | Stop reason: HOSPADM

## 2023-07-26 RX ORDER — LABETALOL HYDROCHLORIDE 5 MG/ML
INJECTION, SOLUTION INTRAVENOUS
Status: DISPENSED
Start: 2023-07-26 | End: 2023-07-27

## 2023-07-26 RX ADMIN — SODIUM CHLORIDE, POTASSIUM CHLORIDE, SODIUM LACTATE AND CALCIUM CHLORIDE 1000 ML: 600; 310; 30; 20 INJECTION, SOLUTION INTRAVENOUS at 10:07

## 2023-07-26 RX ADMIN — LABETALOL HYDROCHLORIDE 10 MG: 5 INJECTION, SOLUTION INTRAVENOUS at 06:07

## 2023-07-26 RX ADMIN — ONDANSETRON 4 MG: 2 INJECTION INTRAMUSCULAR; INTRAVENOUS at 07:07

## 2023-07-26 RX ADMIN — SODIUM CHLORIDE, POTASSIUM CHLORIDE, SODIUM LACTATE AND CALCIUM CHLORIDE 1000 ML: 600; 310; 30; 20 INJECTION, SOLUTION INTRAVENOUS at 05:07

## 2023-07-26 RX ADMIN — HYDROMORPHONE HYDROCHLORIDE 0.5 MG: 2 INJECTION INTRAMUSCULAR; INTRAVENOUS; SUBCUTANEOUS at 06:07

## 2023-07-26 RX ADMIN — PROMETHAZINE HYDROCHLORIDE 12.5 MG: 25 INJECTION INTRAMUSCULAR; INTRAVENOUS at 08:07

## 2023-07-26 RX ADMIN — ONDANSETRON 4 MG: 2 INJECTION INTRAMUSCULAR; INTRAVENOUS at 05:07

## 2023-07-26 RX ADMIN — LABETALOL HYDROCHLORIDE 20 MG: 5 INJECTION, SOLUTION INTRAVENOUS at 09:07

## 2023-07-26 RX ADMIN — HYDROMORPHONE HYDROCHLORIDE 0.5 MG: 2 INJECTION INTRAMUSCULAR; INTRAVENOUS; SUBCUTANEOUS at 05:07

## 2023-07-26 RX ADMIN — SODIUM CHLORIDE 1141 MG: 0.9 INJECTION, SOLUTION INTRAVENOUS at 03:07

## 2023-07-26 RX ADMIN — ONDANSETRON 8 MG: 2 INJECTION INTRAMUSCULAR; INTRAVENOUS at 02:07

## 2023-07-26 RX ADMIN — HYDROMORPHONE HYDROCHLORIDE 1 MG: 2 INJECTION INTRAMUSCULAR; INTRAVENOUS; SUBCUTANEOUS at 08:07

## 2023-07-26 RX ADMIN — DEXAMETHASONE SODIUM PHOSPHATE 10 MG: 4 INJECTION INTRA-ARTICULAR; INTRALESIONAL; INTRAMUSCULAR; INTRAVENOUS; SOFT TISSUE at 02:07

## 2023-07-26 NOTE — NURSING
"1548 Patient noted leaning head towards the side of chair. Ask patient "are you okay?" Stated " I feel funny, my stomach feels funny, and feel like I have to vomit."  V/S 202/82 P-81 T-37.8 O2 sat 100%. Gemzar infusion stopped and 250 ml bolus of NS started. Notified Radha NP and arrived at patient chair side. Stated " sent patient to ER d/t not being relieved of symptoms and blood pressure elevating ." [id number] Vlad RN transported patient to ER via wheelchair with sister present. RUTH Cummings in triage notified.   "

## 2023-07-26 NOTE — FIRST PROVIDER EVALUATION
"Medical screening examination initiated.  I have conducted a focused provider triage encounter, findings are as follows:    Brief history of present illness:  Patient sent from the infusion center due to hypertension. States that she had her first chemo treatment today for pancreatic CA. States "feeling funny."    There were no vitals filed for this visit.    Pertinent physical exam:  Awake, alert, ambulatory      Brief workup plan:  Labs, EKG    Preliminary workup initiated; this workup will be continued and followed by the physician or advanced practice provider that is assigned to the patient when roomed.  "

## 2023-07-26 NOTE — Clinical Note
Diagnosis: Hematemesis with nausea [[id number]]   Admitting Provider:: IZA SOTO [[id number]]   Future Attending Provider: IZA SOTO [[id number]]   Reason for IP Medical Treatment  (Clinical interventions that can only be accomplished in the IP setting? ) :: Needs inpatient managment   Estimated Length of Stay:: 3-4 midnights   I certify that Inpatient services for greater than or equal to 2 midnights are medically necessary:: Yes   Plans for Post-Acute care--if anticipated (pick the single best option):: A. No post acute care anticipated at this time   Special Needs:: No Special Needs [1]

## 2023-07-27 ENCOUNTER — PATIENT OUTREACH (OUTPATIENT)
Dept: ADMINISTRATIVE | Facility: CLINIC | Age: 83
End: 2023-07-27
Payer: MEDICARE

## 2023-07-27 PROBLEM — R10.9 ABDOMINAL PAIN: Status: ACTIVE | Noted: 2023-07-27

## 2023-07-27 PROBLEM — K92.0 HEMATEMESIS WITH NAUSEA: Status: ACTIVE | Noted: 2023-07-27

## 2023-07-27 LAB
ANION GAP SERPL CALC-SCNC: 12 MEQ/L
BASOPHILS # BLD AUTO: 0 X10(3)/MCL
BASOPHILS NFR BLD AUTO: 0 %
BUN SERPL-MCNC: 6.7 MG/DL (ref 9.8–20.1)
CALCIUM SERPL-MCNC: 9.2 MG/DL (ref 8.4–10.2)
CHLORIDE SERPL-SCNC: 102 MMOL/L (ref 98–107)
CO2 SERPL-SCNC: 22 MMOL/L (ref 23–31)
CREAT SERPL-MCNC: 0.67 MG/DL (ref 0.55–1.02)
CREAT/UREA NIT SERPL: 10
EOSINOPHIL # BLD AUTO: 0 X10(3)/MCL (ref 0–0.9)
EOSINOPHIL NFR BLD AUTO: 0 %
ERYTHROCYTE [DISTWIDTH] IN BLOOD BY AUTOMATED COUNT: 14.5 % (ref 11.5–17)
GFR SERPLBLD CREATININE-BSD FMLA CKD-EPI: >60 MLS/MIN/1.73/M2
GLUCOSE SERPL-MCNC: 167 MG/DL (ref 82–115)
HCT VFR BLD AUTO: 36.6 % (ref 37–47)
HGB BLD-MCNC: 12.1 G/DL (ref 12–16)
IMM GRANULOCYTES # BLD AUTO: 0.02 X10(3)/MCL (ref 0–0.04)
IMM GRANULOCYTES NFR BLD AUTO: 0.4 %
LYMPHOCYTES # BLD AUTO: 0.5 X10(3)/MCL (ref 0.6–4.6)
LYMPHOCYTES NFR BLD AUTO: 9 %
MAGNESIUM SERPL-MCNC: 1.5 MG/DL (ref 1.6–2.6)
MAGNESIUM SERPL-MCNC: 2.1 MG/DL (ref 1.6–2.6)
MCH RBC QN AUTO: 30.5 PG (ref 27–31)
MCHC RBC AUTO-ENTMCNC: 33.1 G/DL (ref 33–36)
MCV RBC AUTO: 92.2 FL (ref 80–94)
MONOCYTES # BLD AUTO: 0.24 X10(3)/MCL (ref 0.1–1.3)
MONOCYTES NFR BLD AUTO: 4.3 %
NEUTROPHILS # BLD AUTO: 4.82 X10(3)/MCL (ref 2.1–9.2)
NEUTROPHILS NFR BLD AUTO: 86.3 %
NRBC BLD AUTO-RTO: 0 %
PHOSPHATE SERPL-MCNC: 3.3 MG/DL (ref 2.3–4.7)
PLATELET # BLD AUTO: 251 X10(3)/MCL (ref 130–400)
PMV BLD AUTO: 10.1 FL (ref 7.4–10.4)
POTASSIUM SERPL-SCNC: 3.9 MMOL/L (ref 3.5–5.1)
RBC # BLD AUTO: 3.97 X10(6)/MCL (ref 4.2–5.4)
SODIUM SERPL-SCNC: 136 MMOL/L (ref 136–145)
WBC # SPEC AUTO: 5.58 X10(3)/MCL (ref 4.5–11.5)

## 2023-07-27 PROCEDURE — 83735 ASSAY OF MAGNESIUM: CPT | Performed by: INTERNAL MEDICINE

## 2023-07-27 PROCEDURE — 25000003 PHARM REV CODE 250: Performed by: INTERNAL MEDICINE

## 2023-07-27 PROCEDURE — 63600175 PHARM REV CODE 636 W HCPCS

## 2023-07-27 PROCEDURE — 63600175 PHARM REV CODE 636 W HCPCS: Performed by: INTERNAL MEDICINE

## 2023-07-27 PROCEDURE — 84100 ASSAY OF PHOSPHORUS: CPT | Performed by: INTERNAL MEDICINE

## 2023-07-27 PROCEDURE — 11000001 HC ACUTE MED/SURG PRIVATE ROOM

## 2023-07-27 PROCEDURE — 21400001 HC TELEMETRY ROOM

## 2023-07-27 PROCEDURE — 80048 BASIC METABOLIC PNL TOTAL CA: CPT | Performed by: INTERNAL MEDICINE

## 2023-07-27 PROCEDURE — 85025 COMPLETE CBC W/AUTO DIFF WBC: CPT | Performed by: INTERNAL MEDICINE

## 2023-07-27 RX ORDER — OXYCODONE AND ACETAMINOPHEN 10; 325 MG/1; MG/1
1 TABLET ORAL EVERY 4 HOURS PRN
Status: DISCONTINUED | OUTPATIENT
Start: 2023-07-27 | End: 2023-07-31 | Stop reason: HOSPADM

## 2023-07-27 RX ORDER — ENOXAPARIN SODIUM 100 MG/ML
40 INJECTION SUBCUTANEOUS EVERY 24 HOURS
Status: DISCONTINUED | OUTPATIENT
Start: 2023-07-27 | End: 2023-07-31 | Stop reason: HOSPADM

## 2023-07-27 RX ORDER — HYDRALAZINE HYDROCHLORIDE 20 MG/ML
20 INJECTION INTRAMUSCULAR; INTRAVENOUS EVERY 4 HOURS PRN
Status: DISCONTINUED | OUTPATIENT
Start: 2023-07-27 | End: 2023-07-27

## 2023-07-27 RX ORDER — HYDROMORPHONE HYDROCHLORIDE 2 MG/ML
0.5 INJECTION, SOLUTION INTRAMUSCULAR; INTRAVENOUS; SUBCUTANEOUS EVERY 4 HOURS PRN
Status: DISCONTINUED | OUTPATIENT
Start: 2023-07-27 | End: 2023-07-30

## 2023-07-27 RX ORDER — TALC
6 POWDER (GRAM) TOPICAL NIGHTLY PRN
Status: DISCONTINUED | OUTPATIENT
Start: 2023-07-27 | End: 2023-07-31 | Stop reason: HOSPADM

## 2023-07-27 RX ORDER — PROMETHAZINE HYDROCHLORIDE 25 MG/ML
12.5 INJECTION, SOLUTION INTRAMUSCULAR; INTRAVENOUS EVERY 6 HOURS PRN
Status: DISCONTINUED | OUTPATIENT
Start: 2023-07-27 | End: 2023-07-31 | Stop reason: HOSPADM

## 2023-07-27 RX ORDER — SODIUM CHLORIDE 9 MG/ML
INJECTION, SOLUTION INTRAVENOUS CONTINUOUS
Status: DISCONTINUED | OUTPATIENT
Start: 2023-07-27 | End: 2023-07-30

## 2023-07-27 RX ORDER — HYDRALAZINE HYDROCHLORIDE 20 MG/ML
10 INJECTION INTRAMUSCULAR; INTRAVENOUS EVERY 4 HOURS PRN
Status: DISCONTINUED | OUTPATIENT
Start: 2023-07-27 | End: 2023-07-31 | Stop reason: HOSPADM

## 2023-07-27 RX ORDER — MORPHINE SULFATE 4 MG/ML
4 INJECTION, SOLUTION INTRAMUSCULAR; INTRAVENOUS EVERY 4 HOURS PRN
Status: DISCONTINUED | OUTPATIENT
Start: 2023-07-27 | End: 2023-07-30

## 2023-07-27 RX ORDER — SODIUM CHLORIDE 0.9 % (FLUSH) 0.9 %
10 SYRINGE (ML) INJECTION
Status: DISCONTINUED | OUTPATIENT
Start: 2023-07-27 | End: 2023-07-31 | Stop reason: HOSPADM

## 2023-07-27 RX ORDER — MAGNESIUM SULFATE HEPTAHYDRATE 40 MG/ML
2 INJECTION, SOLUTION INTRAVENOUS ONCE
Status: COMPLETED | OUTPATIENT
Start: 2023-07-27 | End: 2023-07-27

## 2023-07-27 RX ORDER — OXYCODONE HYDROCHLORIDE 5 MG/1
5 TABLET ORAL EVERY 6 HOURS PRN
Status: DISCONTINUED | OUTPATIENT
Start: 2023-07-27 | End: 2023-07-30

## 2023-07-27 RX ORDER — ONDANSETRON 2 MG/ML
4 INJECTION INTRAMUSCULAR; INTRAVENOUS EVERY 4 HOURS PRN
Status: DISCONTINUED | OUTPATIENT
Start: 2023-07-27 | End: 2023-07-31 | Stop reason: HOSPADM

## 2023-07-27 RX ORDER — PROMETHAZINE HYDROCHLORIDE 25 MG/1
25 TABLET ORAL EVERY 6 HOURS PRN
Status: DISCONTINUED | OUTPATIENT
Start: 2023-07-27 | End: 2023-07-27

## 2023-07-27 RX ORDER — HYDRALAZINE HYDROCHLORIDE 20 MG/ML
INJECTION INTRAMUSCULAR; INTRAVENOUS
Status: COMPLETED
Start: 2023-07-27 | End: 2023-07-27

## 2023-07-27 RX ADMIN — ONDANSETRON 4 MG: 2 INJECTION INTRAMUSCULAR; INTRAVENOUS at 04:07

## 2023-07-27 RX ADMIN — SODIUM CHLORIDE: 9 INJECTION, SOLUTION INTRAVENOUS at 08:07

## 2023-07-27 RX ADMIN — HYDROMORPHONE HYDROCHLORIDE 0.5 MG: 2 INJECTION INTRAMUSCULAR; INTRAVENOUS; SUBCUTANEOUS at 06:07

## 2023-07-27 RX ADMIN — HYDRALAZINE HYDROCHLORIDE 20 MG: 20 INJECTION INTRAMUSCULAR; INTRAVENOUS at 04:07

## 2023-07-27 RX ADMIN — HYDROMORPHONE HYDROCHLORIDE 0.5 MG: 2 INJECTION INTRAMUSCULAR; INTRAVENOUS; SUBCUTANEOUS at 04:07

## 2023-07-27 RX ADMIN — PROMETHAZINE HYDROCHLORIDE 12.5 MG: 25 INJECTION INTRAMUSCULAR; INTRAVENOUS at 12:07

## 2023-07-27 RX ADMIN — ONDANSETRON 4 MG: 2 INJECTION INTRAMUSCULAR; INTRAVENOUS at 02:07

## 2023-07-27 RX ADMIN — HYDROMORPHONE HYDROCHLORIDE 0.5 MG: 2 INJECTION INTRAMUSCULAR; INTRAVENOUS; SUBCUTANEOUS at 11:07

## 2023-07-27 RX ADMIN — ONDANSETRON 4 MG: 2 INJECTION INTRAMUSCULAR; INTRAVENOUS at 08:07

## 2023-07-27 RX ADMIN — ENOXAPARIN SODIUM 40 MG: 40 INJECTION SUBCUTANEOUS at 04:07

## 2023-07-27 RX ADMIN — MAGNESIUM SULFATE HEPTAHYDRATE 2 G: 40 INJECTION, SOLUTION INTRAVENOUS at 08:07

## 2023-07-27 RX ADMIN — HYDROMORPHONE HYDROCHLORIDE 0.5 MG: 2 INJECTION INTRAMUSCULAR; INTRAVENOUS; SUBCUTANEOUS at 12:07

## 2023-07-27 RX ADMIN — Medication 6 MG: at 09:07

## 2023-07-27 RX ADMIN — HYDROMORPHONE HYDROCHLORIDE 0.5 MG: 2 INJECTION INTRAMUSCULAR; INTRAVENOUS; SUBCUTANEOUS at 02:07

## 2023-07-27 RX ADMIN — HYDROMORPHONE HYDROCHLORIDE 0.5 MG: 2 INJECTION INTRAMUSCULAR; INTRAVENOUS; SUBCUTANEOUS at 08:07

## 2023-07-27 NOTE — H&P
[facility] Hospital Medicine History & Physical Examination       Patient Name: Evelyn Proctor  MRN: [id number]  Patient Class: IP- Inpatient   Admission Date: 7/26/2023  4:36 PM  Length of Stay: 1  Admitting Service: Hospital Medicine   Attending Physician: Antonio Wood MD   Primary Care Provider: Marina Morales MD  History source: EMR, patient and/or patient's family    CHIEF COMPLAINT   Hypertension, Nausea (C/o hypertension while in chemo today. +nausea. Hx pancreatic cancer. Denies fevers. Sees Dr. Burns. ), and Weakness    HISTORY OF PRESENT ILLNESS:   82-year-old female with a history of recently diagnosed stage III pancreatic cancer who underwent her 1st chemotherapy treatment today 07/26/2023, prior C diff, Crohn's disease and additional past medical history as below.  She reported she tolerated the chemotherapy well but her blood pressure did spike during treatment.  She presents to the ER tonight for intractable pain, nausea and vomiting.  Discharged yesterday for similar presentation.    She route to the ER afebrile, hypertensive, maintaining normal sats on room air.  Laboratory work showed hypokalemia with a potassium of 2.9.  CT abdomen and pelvis was unremarkable for acute changes.  She was given analgesics, antiemetics and admitted to the hospitalist service.    PAST MEDICAL HISTORY:     Past Medical History:   Diagnosis Date    Acute pancreatitis, unspecified complication status, unspecified pancreatitis type     Arthritis     Carpal tunnel syndrome, bilateral     Cervical cancer     Cervical radiculopathy     Crohn disease     Heart attack     HLD (hyperlipidemia)     Low back pain     Pancreatic cancer     Sleep apnea, unspecified     Spinal stenosis of lumbar region with neurogenic claudication        PAST SURGICAL HISTORY:     Past Surgical History:   Procedure Laterality Date    bilateral L4-5, L5-S1 decompression, repair L5-S1 dural tear   10/01/2021    Dr. Marin    CARPAL TUNNEL RELEASE Right 09/06/2019    Dr. Marin    CARPAL TUNNEL RELEASE Left 12/2/2022    Procedure: RELEASE, CARPAL TUNNEL;  Surgeon: Jesse Marin MD;  Location: [facility] OR;  Service: Neurosurgery;  Laterality: Left;    CHOLECYSTECTOMY      COLECTOMY  07/2011    partial x3    ESOPHAGOGASTRODUODENOSCOPY N/A 6/8/2023    Procedure: EGD (ESOPHAGOGASTRODUODENOSCOPY);  Surgeon: Jose Alberto Max MD;  Location: [facility] OR;  Service: Gastroenterology;  Laterality: N/A;    ESOPHAGOGASTRODUODENOSCOPY N/A 6/29/2023    Procedure: EGD (ESOPHAGOGASTRODUODENOSCOPY);  Surgeon: Jose Alberto Max MD;  Location: [facility] OR;  Service: Gastroenterology;  Laterality: N/A;    HYSTERECTOMY      total    REPAIR OF EYELID Bilateral 11/21/2022    Procedure: BILATERAL ECTROPION REPAIR;  Surgeon: Justin Flores MD;  Location: [facility] OR;  Service: ENT;  Laterality: Bilateral;    ULTRASOUND, ENDOSCOPIC, WITH FINE NEEDLE ASPIRATION N/A 6/8/2023    Procedure: UPPER EUS  W/  FNA;  Surgeon: Jose Alberto Max MD;  Location: [facility] OR;  Service: Gastroenterology;  Laterality: N/A;  Dangelo, pt w/ CD on entyvio recently admitted to [facility] w/ acute pancreatitis. Possibly drug induced from entyvio? Althoughshe does have dilated PD and a poss focal stricture in the HOP.CA 19-9 nml    ULTRASOUND, UPPER GI TRACT, ENDOSCOPIC, WITH CELIAC PLEXUS BLOCK N/A 6/29/2023    Procedure: UPPER EUS W/ POSS FNA W/ CELIAC BLOCK;  Surgeon: Jose Alberto Max MD;  Location: [facility] OR;  Service: Gastroenterology;  Laterality: N/A;  Celiac Plexus injection planned.Pt should receive 1L NS bolus upon arrival to Outpt Surgery and 1L NS during the procedure. Pt will need to be in PACU for 30 min post procedure to assess for any potential hypotension. NO LABS       ALLERGIES:   Morpholine analogues    FAMILY HISTORY:   Reviewed and non-contributory     SOCIAL HISTORY:     Social History     Tobacco Use    Smoking status: Former     Types: Cigarettes    Smokeless  tobacco: Never   Substance Use Topics    Alcohol use: Not Currently     Comment: rarely        HOME MEDICATIONS:     Prior to Admission medications    Medication Sig Start Date End Date Taking? Authorizing Provider   lipase-protease-amylase (CREON) 36,000-114,000- 180,000 unit CpDR Take 1 capsule by mouth 3 (three) times daily with meals. 7/11/23  Yes Ronan Burns MD   oxyCODONE (ROXICODONE) 30 MG Tab Take 5 mg by mouth every 6 (six) hours as needed.   Yes Historical Provider   pantoprazole (PROTONIX) 40 MG tablet Take 1 tablet by mouth every morning. 5/22/23 5/21/24 Yes Historical Provider   prochlorperazine (COMPAZINE) 10 MG tablet Take 1 tablet (10 mg total) by mouth every 6 (six) hours as needed. 7/20/23 7/19/24 Yes SHEILA Parra   traZODone (DESYREL) 50 MG tablet Take 0.5 tablets (25 mg total) by mouth nightly as needed for Insomnia or Depression. 7/5/23 9/3/23 Yes Marina Morales MD   ondansetron (ZOFRAN) 4 MG tablet Take 4 mg by mouth every 6 (six) hours as needed for Nausea.    Historical Provider       REVIEW OF SYSTEMS:   Except as documented, all other systems reviewed and negative     PHYSICAL EXAM:   T 98.3 °F (36.8 °C)   BP (!) 207/98   P 80   RR 14   O2 98 %  GENERAL: awake, alert, oriented and in no acute distress, non-toxic appearing   HEENT: normocephalic atraumatic   NECK: supple   LUNGS: Clear bilaterally, no wheezing or rales, no accessory muscle use   CVS: Regular rate and rhythm, normal peripheral perfusion  ABD: Soft, diffusely tender, non-distended, bowel sounds present  EXTREMITIES: no clubbing or cyanosis  SKIN: Warm, dry.   NEURO: alert and oriented, grossly without focal deficits   PSYCHIATRIC: Cooperative    LABS AND IMAGING:     Recent Labs     07/24/23  0636 07/26/23  1717   WBC 6.95 5.33   RBC 3.45* 4.05*   HGB 10.5* 12.4   HCT 30.3* 36.8*   MCV 87.8 90.9   MCH 30.4 30.6   MCHC 34.7 33.7   RDW 14.7 14.6    303     No results for input(s): LACTIC in the last 72  hours.  No results for input(s): INR, APTT, D-DIMER in the last 72 hours.  No results for input(s): HGBA1C, CHOL, TRIG, LDL, VLDL, HDL in the last 72 hours.   Recent Labs     07/24/23  0636 07/24/23  1503 07/26/23  1717    140 140   K 3.1* 3.7 2.9*   CHLORIDE 104 105 105   CO2 25 26 22*   BUN 8.4* 5.8* 7.6*   CREATININE 0.74 0.71 0.80   GLUCOSE 119* 100 161*   CALCIUM 9.0 9.0 9.9   MG 2.70*  --   --    ALBUMIN 3.4  --  3.9   GLOBULIN 3.1  --  4.4*   ALKPHOS 105  --  117   ALT 25  --  21   AST 21  --  20   BILITOT 0.8  --  0.6   LIPASE  --   --  33     Recent Labs     07/26/23  1717   TROPONINI 0.022          CT Abdomen Pelvis  Without Contrast  Narrative: EXAMINATION:  CT ABDOMEN PELVIS WITHOUT CONTRAST    CLINICAL HISTORY:  Abdominal pain, acute, nonlocalized;Pancreatic cancer acute worsening of abdominal pain;    TECHNIQUE:  CT imaging was performed of the abdomen and pelvis without intravenous contrast. Dose length product is 209 mGycm. Automatic exposure control, adjustment of mA/kV or iterative reconstruction technique was used to limit radiation dose.    COMPARISON:  CT abdomen pelvis dated 07/23/2023    FINDINGS:  Assessment of the visceral organs and vasculature is limited by the lack of IV contrast.    Liver/biliary: No concerning hepatic findings. The gallbladder has been surgically resected.    Pancreas: The pancreas is better visualized on comparison CT, with pancreatic ductal dilatation.    Spleen: Unremarkable    Adrenals: Unremarkable    Kidneys, ureters and bladder: No hydronephrosis.  The bladder is within normal limits.    Reproductive organs: No pelvic masses.    Stomach/bowel: There are postoperative changes of the bowel.  There is no bowel obstruction.    Lymph nodes: No visible lymphadenopathy    Peritoneum: No ascites or free air.    Vessels: Moderate vascular calcifications.    Abdominal wall: Postoperative changes of the anterior abdominal wall.    Lung bases: No consolidation or  pleural effusion.    Bones: No acute osseous findings. Degenerative changes of the spine.  Impression: No new acute abnormality of the abdomen or pelvis on noncontrast CT.    Electronically signed by: Emi Sullivan  Date:    07/26/2023  Time:    19:15      ASSESSMENT & PLAN:   Intractable nausea, vomiting and abdominal pain post chemotherapy  Hypokalemia secondary to above   Pancreatic cancer   History of Crohn's disease status post right hemicolectomy   Recent C diff colitis status post treatment     -antiemetics and analgesics as needed  -replete electrolytes as indicated   -hopefully home within the next 24 hours if she continues to do well     DVT prophylaxis: lovenox  Code status: full     If patient was admitted under observational status it is with my approval/permission.     At least 55 min was spent on this history and physical.  Time seen: 1AM 7/27/23  Antonio Wood MD

## 2023-07-27 NOTE — ED PROVIDER NOTES
Encounter Date: 7/26/2023       History     Chief Complaint   Patient presents with    Hypertension    Nausea     C/o hypertension while in chemo today. +nausea. Hx pancreatic cancer. Denies fevers. Sees Dr. Burns.     Weakness     Patient presented with nausea belly pain unable to keep anything down and hypertension after receiving chemotherapy today is under chemotherapy for pancreatic cancer states been having some difficult time with the unable to control herself with her medications patient states she has blood pressure spikes in his not on blood pressure medication, patient no fever some chills no blood in vomitus or stool    The history is provided by the patient.   Review of patient's allergies indicates:   Allergen Reactions    Morpholine analogues Itching     Past Medical History:   Diagnosis Date    Acute pancreatitis, unspecified complication status, unspecified pancreatitis type     Arthritis     Carpal tunnel syndrome, bilateral     Cervical cancer     Cervical radiculopathy     Crohn disease     Heart attack     HLD (hyperlipidemia)     Low back pain     Pancreatic cancer     Sleep apnea, unspecified     Spinal stenosis of lumbar region with neurogenic claudication      Past Surgical History:   Procedure Laterality Date    bilateral L4-5, L5-S1 decompression, repair L5-S1 dural tear  10/01/2021    Dr. Marin    CARPAL TUNNEL RELEASE Right 09/06/2019    Dr. Marin    CARPAL TUNNEL RELEASE Left 12/2/2022    Procedure: RELEASE, CARPAL TUNNEL;  Surgeon: Jesse Marin MD;  Location: [facility];  Service: Neurosurgery;  Laterality: Left;    CHOLECYSTECTOMY      COLECTOMY  07/2011    partial x3    ESOPHAGOGASTRODUODENOSCOPY N/A 6/8/2023    Procedure: EGD (ESOPHAGOGASTRODUODENOSCOPY);  Surgeon: Jose Alberto Max MD;  Location: [facility] OR;  Service: Gastroenterology;  Laterality: N/A;    ESOPHAGOGASTRODUODENOSCOPY N/A 6/29/2023    Procedure: EGD (ESOPHAGOGASTRODUODENOSCOPY);  Surgeon: Jose Alberto Max MD;  Location:  [facility] OR;  Service: Gastroenterology;  Laterality: N/A;    HYSTERECTOMY      total    REPAIR OF EYELID Bilateral 11/21/2022    Procedure: BILATERAL ECTROPION REPAIR;  Surgeon: Justin Flores MD;  Location: [facility] OR;  Service: ENT;  Laterality: Bilateral;    ULTRASOUND, ENDOSCOPIC, WITH FINE NEEDLE ASPIRATION N/A 6/8/2023    Procedure: UPPER EUS  W/  FNA;  Surgeon: Jose lAberto Max MD;  Location: [facility] OR;  Service: Gastroenterology;  Laterality: N/A;  Dangelo, pt w/ CD on entyvio recently admitted to [facility] w/ acute pancreatitis. Possibly drug induced from entyvio? Marisa does have dilated PD and a poss focal stricture in the HOP.CA 19-9 nml    ULTRASOUND, UPPER GI TRACT, ENDOSCOPIC, WITH CELIAC PLEXUS BLOCK N/A 6/29/2023    Procedure: UPPER EUS W/ POSS FNA W/ CELIAC BLOCK;  Surgeon: Jose Alberto Max MD;  Location: [facility] OR;  Service: Gastroenterology;  Laterality: N/A;  Celiac Plexus injection planned.Pt should receive 1L NS bolus upon arrival to Outpt Surgery and 1L NS during the procedure. Pt will need to be in PACU for 30 min post procedure to assess for any potential hypotension. NO LABS     Family History   Problem Relation Age of Onset    Diabetes Mother     Hypertension Father     Hyperlipidemia Father     Cancer Father     Hyperlipidemia Sister     Hyperlipidemia Brother     Hyperlipidemia Daughter      Social History     Tobacco Use    Smoking status: Former     Types: Cigarettes    Smokeless tobacco: Never   Substance Use Topics    Alcohol use: Not Currently     Comment: rarely    Drug use: Never     Review of Systems   Constitutional:  Positive for activity change, appetite change and fatigue. Negative for fever.   HENT:  Negative for sinus pressure, sneezing and sore throat.    Respiratory:  Negative for cough, choking and shortness of breath.    Cardiovascular:  Negative for chest pain.   Gastrointestinal:  Positive for abdominal pain, nausea and vomiting. Negative for diarrhea.   Genitourinary:   Negative for difficulty urinating, dyspareunia, dysuria, menstrual problem, vaginal bleeding and vaginal discharge.   Musculoskeletal:  Negative for back pain.   Skin:  Negative for rash.   Neurological:  Negative for weakness.   Hematological:  Does not bruise/bleed easily.     Physical Exam     Initial Vitals [07/26/23 1633]   BP Pulse Resp Temp SpO2   (!) 178/97 84 18 99.8 °F (37.7 °C) 100 %      MAP       --         Physical Exam    Constitutional: She appears well-developed and well-nourished. She appears distressed.   Weak frail elderly female appears chronically ill   HENT:   Head: Normocephalic and atraumatic.   Mouth/Throat: Oropharynx is clear and moist.   Eyes: Conjunctivae are normal. Pupils are equal, round, and reactive to light.   Neck: Neck supple.   Normal range of motion.  Cardiovascular:  Normal rate, regular rhythm and normal heart sounds.           Pulmonary/Chest: Breath sounds normal. She has no wheezes. She has no rhonchi. She has no rales.   Abdominal: Abdomen is soft. Bowel sounds are normal. There is abdominal tenderness.   Musculoskeletal:         General: Normal range of motion.      Cervical back: Normal range of motion and neck supple.     Neurological: She is alert and oriented to person, place, and time. GCS score is 15. GCS eye subscore is 4. GCS verbal subscore is 5. GCS motor subscore is 6.   Skin: Skin is warm and dry. Capillary refill takes less than 2 seconds.   Psychiatric: She has a normal mood and affect. Her behavior is normal. Judgment and thought content normal.       ED Course   Procedures  Labs Reviewed   COMPREHENSIVE METABOLIC PANEL - Abnormal; Notable for the following components:       Result Value    Potassium Level 2.9 (*)     Carbon Dioxide 22 (*)     Glucose Level 161 (*)     Blood Urea Nitrogen 7.6 (*)     Protein Total 8.3 (*)     Globulin 4.4 (*)     Albumin/Globulin Ratio 0.9 (*)     All other components within normal limits   CBC WITH DIFFERENTIAL -  Abnormal; Notable for the following components:    RBC 4.05 (*)     Hct 36.8 (*)     All other components within normal limits   TROPONIN I - Normal   LIPASE - Normal   CBC W/ AUTO DIFFERENTIAL    Narrative:     The following orders were created for panel order CBC Auto Differential.  Procedure                               Abnormality         Status                     ---------                               -----------         ------                     CBC with Differential[[id number]]        Abnormal            Final result                 Please view results for these tests on the individual orders.        ECG Results              EKG 12-lead (Final result)  Result time 07/26/23 20:36:40      Final result by Interface, Lab In [facility] (07/26/23 20:36:40)                   Narrative:    Test Reason : R53.1,    Vent. Rate : 089 BPM     Atrial Rate : 089 BPM     P-R Int : 160 ms          QRS Dur : 088 ms      QT Int : 382 ms       P-R-T Axes : 081 020 055 degrees     QTc Int : 464 ms    Sinus rhythm with occasional Premature ventricular complexes  Confirmed by Bay Little MD ([id number]) on 7/26/2023 8:36:32 PM    Referred By:             Confirmed By:Bay Little MD                                  Imaging Results              CT Abdomen Pelvis  Without Contrast (Final result)  Result time 07/26/23 19:15:41      Final result by Emi Sullivan MD (07/26/23 19:15:41)                   Impression:      No new acute abnormality of the abdomen or pelvis on noncontrast CT.      Electronically signed by: Emi Sullivan  Date:    07/26/2023  Time:    19:15               Narrative:    EXAMINATION:  CT ABDOMEN PELVIS WITHOUT CONTRAST    CLINICAL HISTORY:  Abdominal pain, acute, nonlocalized;Pancreatic cancer acute worsening of abdominal pain;    TECHNIQUE:  CT imaging was performed of the abdomen and pelvis without intravenous contrast. Dose length product is 209 mGycm. Automatic exposure control, adjustment of mA/kV or  iterative reconstruction technique was used to limit radiation dose.    COMPARISON:  CT abdomen pelvis dated 07/23/2023    FINDINGS:  Assessment of the visceral organs and vasculature is limited by the lack of IV contrast.    Liver/biliary: No concerning hepatic findings. The gallbladder has been surgically resected.    Pancreas: The pancreas is better visualized on comparison CT, with pancreatic ductal dilatation.    Spleen: Unremarkable    Adrenals: Unremarkable    Kidneys, ureters and bladder: No hydronephrosis.  The bladder is within normal limits.    Reproductive organs: No pelvic masses.    Stomach/bowel: There are postoperative changes of the bowel.  There is no bowel obstruction.    Lymph nodes: No visible lymphadenopathy    Peritoneum: No ascites or free air.    Vessels: Moderate vascular calcifications.    Abdominal wall: Postoperative changes of the anterior abdominal wall.    Lung bases: No consolidation or pleural effusion.    Bones: No acute osseous findings. Degenerative changes of the spine.                                       Medications   cloNIDine tablet 0.2 mg (0.2 mg Oral Not Given 7/26/23 1645)   lactated ringers bolus 1,000 mL (has no administration in time range)   ondansetron injection 4 mg (4 mg Intravenous Given 7/26/23 1746)   HYDROmorphone (PF) injection 0.5 mg (0.5 mg Intravenous Given 7/26/23 1746)   labetaloL injection 10 mg (10 mg Intravenous Given 7/26/23 1842)   lactated ringers bolus 1,000 mL (1,000 mLs Intravenous New Bag 7/26/23 1758)   HYDROmorphone (PF) injection 0.5 mg (0.5 mg Intravenous Given 7/26/23 1842)   ondansetron injection 4 mg (4 mg Intravenous Given 7/26/23 1928)   ondansetron 4 mg/2 mL injection (4 mg  Given 7/26/23 1928)   labetaloL injection 20 mg (20 mg Intravenous Given 7/26/23 2115)   HYDROmorphone (PF) injection 1 mg (1 mg Intravenous Given 7/26/23 2011)   promethazine injection 12.5 mg (12.5 mg Intramuscular Given 7/26/23 2025)     Medical Decision  Making:   Differential Diagnosis:   Pancreatitis chemotherapy reaction nausea vomiting of chemotherapy bowel obstruction  Clinical Tests:   Lab Tests: Ordered and Reviewed  The following lab test(s) were unremarkable: CBC, CMP and Urinalysis  ED Management:  Patient despite multiple doses of IV and antiemetics and pain medication unable to control patient's pain, will admit to medicine           ED Course as of 07/26/23 2219 Wed Jul 26, 2023   [address] [facility] medicine [ED]      ED Course User Index  [ED] Jyothi Blanton                 Clinical Impression:   Final diagnoses:  [R53.1] Weakness generalized  [K92.0] Hematemesis with nausea (Primary)        ED Disposition Condition    Admit Stable                Casey Hassan III, MD  07/26/23 2219

## 2023-07-27 NOTE — NURSING
Nurses Note -- 4 Eyes      7/27/2023   5:25 PM      Skin assessed during: Admit      [x] No Altered Skin Integrity Present    []Prevention Measures Documented      [] Yes- Altered Skin Integrity Present or Discovered   [] LDA Added if Not in Epic (Describe Wound)   [] New Altered Skin Integrity was Present on Admit and Documented in LDA   [] Wound Image Taken    Wound Care Consulted? No    Attending Nurse:  Flakito Barnhart RN     Second RN/Staff Member:  Coral Longoria CNA

## 2023-07-27 NOTE — CARE UPDATE
I, Dr. Lugo, have seen this patient today, patient complaining of inability to tolerate the diet, been nauseous, looks dehydrated.  Denies any diarrhea. Denies any symptoms of UTI. CTA abdomen pelvis without contrast-no new acute abnormality of the abdomen or pelvis.  Lipase normal.  Hemodynamically stable, no fevers or white count elevation, potassium improved, however magnesium is low, recommend IV magnesium, Follow up on phosphorus.  Consider gentle hydration, antiemetics p.r.n. Diet as tolerated Patient was noted to be on oxygen in the ER-1-2L, Troponin normal, follow up on chest x-ray, hold fluids if chest x-ray positive for congestion. Follow up on a.m electrolytes.  Continue appropriate home medications, consider PT evaluation once improving for discharge planning

## 2023-07-28 ENCOUNTER — DOCUMENT SCAN (OUTPATIENT)
Dept: HOME HEALTH SERVICES | Facility: HOSPITAL | Age: 83
End: 2023-07-28
Payer: MEDICARE

## 2023-07-28 LAB
ANION GAP SERPL CALC-SCNC: 11 MEQ/L
AV INDEX (PROSTH): 0.92
AV MEAN GRADIENT: 6 MMHG
AV PEAK GRADIENT: 11 MMHG
AV VALVE AREA: 2.6 CM2
AV VELOCITY RATIO: 0.88
BASOPHILS # BLD AUTO: 0.02 X10(3)/MCL
BASOPHILS NFR BLD AUTO: 0.3 %
BSA FOR ECHO PROCEDURE: 1.64 M2
BUN SERPL-MCNC: 18.2 MG/DL (ref 9.8–20.1)
CALCIUM SERPL-MCNC: 8.8 MG/DL (ref 8.4–10.2)
CHLORIDE SERPL-SCNC: 103 MMOL/L (ref 98–107)
CO2 SERPL-SCNC: 25 MMOL/L (ref 23–31)
CREAT SERPL-MCNC: 1.01 MG/DL (ref 0.55–1.02)
CREAT/UREA NIT SERPL: 18
CV ECHO LV RWT: 0.44 CM
DOP CALC AO PEAK VEL: 1.69 M/S
DOP CALC AO VTI: 32.2 CM
DOP CALC LVOT AREA: 2.8 CM2
DOP CALC LVOT DIAMETER: 1.9 CM
DOP CALC LVOT PEAK VEL: 1.48 M/S
DOP CALC LVOT STROKE VOLUME: 83.6 CM3
DOP CALC MV VTI: 28 CM
DOP CALCLVOT PEAK VEL VTI: 29.5 CM
E WAVE DECELERATION TIME: 235 MSEC
E/A RATIO: 0.81
E/E' RATIO: 9.38 M/S
ECHO LV POSTERIOR WALL: 0.88 CM (ref 0.6–1.1)
EJECTION FRACTION: 58 %
EOSINOPHIL # BLD AUTO: 0.12 X10(3)/MCL (ref 0–0.9)
EOSINOPHIL NFR BLD AUTO: 1.9 %
ERYTHROCYTE [DISTWIDTH] IN BLOOD BY AUTOMATED COUNT: 14.9 % (ref 11.5–17)
FRACTIONAL SHORTENING: 38 % (ref 28–44)
GFR SERPLBLD CREATININE-BSD FMLA CKD-EPI: 56 MLS/MIN/1.73/M2
GLUCOSE SERPL-MCNC: 98 MG/DL (ref 82–115)
HCT VFR BLD AUTO: 31.7 % (ref 37–47)
HGB BLD-MCNC: 10.7 G/DL (ref 12–16)
IMM GRANULOCYTES # BLD AUTO: 0.01 X10(3)/MCL (ref 0–0.04)
IMM GRANULOCYTES NFR BLD AUTO: 0.2 %
INTERVENTRICULAR SEPTUM: 0.88 CM (ref 0.6–1.1)
LEFT ATRIUM SIZE: 3.1 CM
LEFT ATRIUM VOLUME INDEX MOD: 15.5 ML/M2
LEFT ATRIUM VOLUME MOD: 25.2 CM3
LEFT INTERNAL DIMENSION IN SYSTOLE: 2.47 CM (ref 2.1–4)
LEFT VENTRICLE DIASTOLIC VOLUME INDEX: 42.7 ML/M2
LEFT VENTRICLE DIASTOLIC VOLUME: 69.6 ML
LEFT VENTRICLE MASS INDEX: 65 G/M2
LEFT VENTRICLE SYSTOLIC VOLUME INDEX: 13.3 ML/M2
LEFT VENTRICLE SYSTOLIC VOLUME: 21.7 ML
LEFT VENTRICULAR INTERNAL DIMENSION IN DIASTOLE: 3.99 CM (ref 3.5–6)
LEFT VENTRICULAR MASS: 105.92 G
LV LATERAL E/E' RATIO: 7.5 M/S
LV SEPTAL E/E' RATIO: 12.5 M/S
LVOT MG: 5 MMHG
LVOT MV: 1.01 CM/S
LYMPHOCYTES # BLD AUTO: 1.61 X10(3)/MCL (ref 0.6–4.6)
LYMPHOCYTES NFR BLD AUTO: 25.7 %
MCH RBC QN AUTO: 30.4 PG (ref 27–31)
MCHC RBC AUTO-ENTMCNC: 33.8 G/DL (ref 33–36)
MCV RBC AUTO: 90.1 FL (ref 80–94)
MONOCYTES # BLD AUTO: 0.32 X10(3)/MCL (ref 0.1–1.3)
MONOCYTES NFR BLD AUTO: 5.1 %
MV MEAN GRADIENT: 2 MMHG
MV PEAK A VEL: 0.93 M/S
MV PEAK E VEL: 0.75 M/S
MV PEAK GRADIENT: 4 MMHG
MV STENOSIS PRESSURE HALF TIME: 97 MS
MV VALVE AREA BY CONTINUITY EQUATION: 2.99 CM2
MV VALVE AREA P 1/2 METHOD: 2.27 CM2
NEUTROPHILS # BLD AUTO: 4.19 X10(3)/MCL (ref 2.1–9.2)
NEUTROPHILS NFR BLD AUTO: 66.8 %
NRBC BLD AUTO-RTO: 0 %
OHS LV EJECTION FRACTION SIMPSONS BIPLANE MOD: 6 %
PHOSPHATE SERPL-MCNC: 4.3 MG/DL (ref 2.3–4.7)
PISA TR MAX VEL: 2.34 M/S
PLATELET # BLD AUTO: 252 X10(3)/MCL (ref 130–400)
PMV BLD AUTO: 9.7 FL (ref 7.4–10.4)
POTASSIUM SERPL-SCNC: 3.2 MMOL/L (ref 3.5–5.1)
PV PEAK VELOCITY: 1.27 CM/S
RA PRESSURE: 3 MMHG
RBC # BLD AUTO: 3.52 X10(6)/MCL (ref 4.2–5.4)
SODIUM SERPL-SCNC: 139 MMOL/L (ref 136–145)
TDI LATERAL: 0.1 M/S
TDI SEPTAL: 0.06 M/S
TDI: 0.08 M/S
TR MAX PG: 22 MMHG
TRICUSPID ANNULAR PLANE SYSTOLIC EXCURSION: 2.04 CM
TV REST PULMONARY ARTERY PRESSURE: 25 MMHG
WBC # SPEC AUTO: 6.27 X10(3)/MCL (ref 4.5–11.5)

## 2023-07-28 PROCEDURE — 25000003 PHARM REV CODE 250: Performed by: INTERNAL MEDICINE

## 2023-07-28 PROCEDURE — 80048 BASIC METABOLIC PNL TOTAL CA: CPT | Performed by: INTERNAL MEDICINE

## 2023-07-28 PROCEDURE — 85025 COMPLETE CBC W/AUTO DIFF WBC: CPT | Performed by: INTERNAL MEDICINE

## 2023-07-28 PROCEDURE — 63600175 PHARM REV CODE 636 W HCPCS: Performed by: INTERNAL MEDICINE

## 2023-07-28 PROCEDURE — 84100 ASSAY OF PHOSPHORUS: CPT | Performed by: INTERNAL MEDICINE

## 2023-07-28 PROCEDURE — 21400001 HC TELEMETRY ROOM

## 2023-07-28 RX ORDER — POTASSIUM CHLORIDE 14.9 MG/ML
40 INJECTION INTRAVENOUS ONCE
Status: DISCONTINUED | OUTPATIENT
Start: 2023-07-28 | End: 2023-07-28

## 2023-07-28 RX ORDER — POTASSIUM CHLORIDE 14.9 MG/ML
40 INJECTION INTRAVENOUS ONCE
Status: COMPLETED | OUTPATIENT
Start: 2023-07-28 | End: 2023-07-28

## 2023-07-28 RX ADMIN — ONDANSETRON 4 MG: 2 INJECTION INTRAMUSCULAR; INTRAVENOUS at 12:07

## 2023-07-28 RX ADMIN — Medication 6 MG: at 11:07

## 2023-07-28 RX ADMIN — POTASSIUM CHLORIDE 40 MEQ: 14.9 INJECTION, SOLUTION INTRAVENOUS at 09:07

## 2023-07-28 RX ADMIN — HYDROMORPHONE HYDROCHLORIDE 0.5 MG: 2 INJECTION INTRAMUSCULAR; INTRAVENOUS; SUBCUTANEOUS at 01:07

## 2023-07-28 RX ADMIN — HYDROMORPHONE HYDROCHLORIDE 0.5 MG: 2 INJECTION INTRAMUSCULAR; INTRAVENOUS; SUBCUTANEOUS at 08:07

## 2023-07-28 RX ADMIN — ENOXAPARIN SODIUM 40 MG: 40 INJECTION SUBCUTANEOUS at 04:07

## 2023-07-28 NOTE — PROGRESS NOTES
[facility]  Hospital Medicine Progress Note        Chief Complaint: Inpatient Follow-up for     HPI: 82-year-old female with a history of recently diagnosed stage III pancreatic cancer who underwent her 1st chemotherapy treatment today 07/26/2023, prior C diff, Crohn's disease and additional past medical history as below.  She reported she tolerated the chemotherapy well but her blood pressure did spike during treatment.  She presents to the ER tonight for intractable pain, nausea and vomiting.  Discharged yesterday for similar presentation.     She route to the ER afebrile, hypertensive, maintaining normal sats on room air.  Laboratory work showed hypokalemia with a potassium of 2.9.  CT abdomen and pelvis was unremarkable for acute changes.  She was given analgesics, antiemetics and admitted to the hospitalist service.    Interval Hx:   Seen and examined. No acute events overnight. Reports nausea is better . Npo now . Will start clear liquids   Vss    Objective/physical exam:  General: In no acute distress, afebrile  Chest: Clear to auscultation bilaterally  Heart: RRR, +S1, S2, no appreciable murmur  Abdomen: Soft, nontender, BS +  MSK: Warm, no lower extremity edema, no clubbing or cyanosis  Neurologic: Alert and oriented x4, Cranial nerve II-XII intact, Strength 5/5 in all 4 extremities    VITAL SIGNS: 24 HRS MIN & MAX LAST   Temp  Min: 97.7 °F (36.5 °C)  Max: 99.3 °F (37.4 °C) 99.3 °F (37.4 °C)   BP  Min: 104/48  Max: 138/53 126/60   Pulse  Min: 63  Max: 94  68   Resp  Min: 16  Max: 20 18   SpO2  Min: 96 %  Max: 98 % 98 %     I have reviewed the following labs:    Recent Labs   Lab 07/26/23  1717 07/27/23  0440 07/28/23  0435   WBC 5.33 5.58 6.27   RBC 4.05* 3.97* 3.52*   HGB 12.4 12.1 10.7*   HCT 36.8* 36.6* 31.7*   MCV 90.9 92.2 90.1   MCH 30.6 30.5 30.4   MCHC 33.7 33.1 33.8   RDW 14.6 14.5 14.9    251 252   MPV 9.5 10.1 9.7       Recent Labs   Lab 07/23/23 2016  07/24/23  0636 07/24/23  1503 07/26/23  1717 07/27/23  0440 07/27/23 2028 07/28/23  0435    139   < > 140 136  --  139   K 2.9* 3.1*   < > 2.9* 3.9  --  3.2*   CO2 21* 25   < > 22* 22*  --  25   BUN 6.6* 8.4*   < > 7.6* 6.7*  --  18.2   CREATININE 0.70 0.74   < > 0.80 0.67  --  1.01   CALCIUM 9.3 9.0   < > 9.9 9.2  --  8.8   MG 1.50* 2.70*  --   --  1.50* 2.10  --    ALBUMIN 3.6 3.4  --  3.9  --   --   --    ALKPHOS 125 105  --  117  --   --   --    ALT 27 25  --  21  --   --   --    AST 21 21  --  20  --   --   --    BILITOT 1.0 0.8  --  0.6  --   --   --     < > = values in this interval not displayed.          Microbiology Results (last 7 days)       ** No results found for the last 168 hours. **             See below for Radiology    Scheduled Med:   enoxparin  40 mg Subcutaneous Daily        Continuous Infusions:   sodium chloride 0.9% 50 mL/hr at 07/27/23 2035        PRN Meds:  hydrALAZINE, HYDROmorphone, melatonin, melatonin, morphine, ondansetron, oxyCODONE, oxyCODONE-acetaminophen, promethazine, sodium chloride 0.9%, traZODone       Assessment/Plan:  Intractable nausea, vomiting and abdominal pain post chemotherapy  Hypokalemia secondary to above   Pancreatic cancer   History of Crohn's disease status post right hemicolectomy   Recent C diff colitis status post treatment     -antiemetics and analgesics as needed  -replete potassium   -start clear liquids , advance as tolerated      DVT prophylaxis: lovenox  Code status: full         All diagnosis and differential diagnosis have been reviewed; assessment and plan has been documented; I have personally reviewed the labs and test results that are presently available; I have reviewed the patients medication list; I have reviewed the consulting providers response and recommendations. I have reviewed or attempted to review medical records based upon their availability    All of the patient's questions have been  addressed and answered. Patient's is  agreeable to the above stated plan. I will continue to monitor closely and make adjustments to medical management as needed.  _____________________________________________________________________    Nutrition Status:    Radiology:  I have personally reviewed the following imaging and agree with the radiologist.     X-Ray Chest 1 View  Narrative: EXAMINATION:  XR CHEST 1 VIEW    CLINICAL HISTORY:  hypoxia;    COMPARISON:  21 June 2023    FINDINGS:  Portable frontal view of the chest was obtained. Stable left Port-A-Cath.  The heart is not enlarged.  There is aortic atherosclerosis.  Grossly clear lungs.  No pneumothorax.  Impression: No acute findings.    Electronically signed by: Jayy Valdovinos  Date:    07/27/2023  Time:    19:53      Angeles Trinidad MD   07/28/2023

## 2023-07-28 NOTE — PLAN OF CARE
Problem: Adult Inpatient Plan of Care  Goal: Plan of Care Review  Outcome: Ongoing, Progressing  Goal: Patient-Specific Goal (Individualized)  Outcome: Ongoing, Progressing  Goal: Absence of Hospital-Acquired Illness or Injury  Outcome: Ongoing, Progressing  Intervention: Identify and Manage Fall Risk  Flowsheets (Taken 7/28/2023 1029)  Safety Promotion/Fall Prevention:   assistive device/personal item within reach   nonskid shoes/socks when out of bed   side rails raised x 2   medications reviewed  Intervention: Prevent Skin Injury  Flowsheets (Taken 7/28/2023 1029)  Body Position: position changed independently  Skin Protection: adhesive use limited  Intervention: Prevent and Manage VTE (Venous Thromboembolism) Risk  Flowsheets (Taken 7/28/2023 1029)  Range of Motion: active ROM (range of motion) encouraged  Intervention: Prevent Infection  Flowsheets (Taken 7/28/2023 1029)  Infection Prevention:   cohorting utilized   hand hygiene promoted   single patient room provided   rest/sleep promoted   environmental surveillance performed  Goal: Optimal Comfort and Wellbeing  Outcome: Ongoing, Progressing  Intervention: Monitor Pain and Promote Comfort  Flowsheets (Taken 7/28/2023 1029)  Pain Management Interventions:   care clustered   pillow support provided   pain management plan reviewed with patient/caregiver   quiet environment facilitated  Goal: Readiness for Transition of Care  Outcome: Ongoing, Progressing

## 2023-07-28 NOTE — PLAN OF CARE
Problem: Adult Inpatient Plan of Care  Goal: Plan of Care Review  Outcome: Ongoing, Progressing  Flowsheets (Taken 7/27/2023 2007)  Plan of Care Reviewed With: patient  Goal: Patient-Specific Goal (Individualized)  Outcome: Ongoing, Progressing  Goal: Absence of Hospital-Acquired Illness or Injury  Outcome: Ongoing, Progressing  Intervention: Identify and Manage Fall Risk  Flowsheets (Taken 7/27/2023 2007)  Safety Promotion/Fall Prevention:   assistive device/personal item within reach   Fall Risk reviewed with patient/family   Fall Risk signage in place   lighting adjusted   nonskid shoes/socks when out of bed   side rails raised x 2   medications reviewed  Intervention: Prevent Skin Injury  Flowsheets (Taken 7/27/2023 2007)  Body Position: sitting up in bed  Skin Protection:   adhesive use limited   tubing/devices free from skin contact  Intervention: Prevent and Manage VTE (Venous Thromboembolism) Risk  Flowsheets (Taken 7/27/2023 2007)  Activity Management: Sitting at edge of bed - L2  VTE Prevention/Management:   ambulation promoted   intravenous hydration  Range of Motion: active ROM (range of motion) encouraged  Intervention: Prevent Infection  Flowsheets (Taken 7/27/2023 2007)  Infection Prevention:   environmental surveillance performed   equipment surfaces disinfected   hand hygiene promoted   personal protective equipment utilized   rest/sleep promoted   single patient room provided  Goal: Optimal Comfort and Wellbeing  Outcome: Ongoing, Progressing  Intervention: Monitor Pain and Promote Comfort  Flowsheets (Taken 7/27/2023 2007)  Pain Management Interventions:   care clustered   pain management plan reviewed with patient/caregiver   quiet environment facilitated   medication offered  Intervention: Provide Person-Centered Care  Flowsheets (Taken 7/27/2023 2007)  Trust Relationship/Rapport:   care explained   emotional support provided   empathic listening provided   questions answered    thoughts/feelings acknowledged   choices provided  Goal: Readiness for Transition of Care  Outcome: Ongoing, Progressing     Problem: Infection  Goal: Absence of Infection Signs and Symptoms  Outcome: Ongoing, Progressing  Intervention: Prevent or Manage Infection  Flowsheets (Taken 7/27/2023 2007)  Infection Management: aseptic technique maintained  Isolation Precautions: precautions maintained     Problem: Fluid and Electrolyte Imbalance (Acute Kidney Injury/Impairment)  Goal: Fluid and Electrolyte Balance  Outcome: Ongoing, Progressing     Problem: Oral Intake Inadequate (Acute Kidney Injury/Impairment)  Goal: Optimal Nutrition Intake  Outcome: Ongoing, Progressing     Problem: Renal Function Impairment (Acute Kidney Injury/Impairment)  Goal: Effective Renal Function  Outcome: Ongoing, Progressing

## 2023-07-29 PROCEDURE — 21400001 HC TELEMETRY ROOM

## 2023-07-29 PROCEDURE — 63600175 PHARM REV CODE 636 W HCPCS: Performed by: INTERNAL MEDICINE

## 2023-07-29 PROCEDURE — 25000003 PHARM REV CODE 250: Performed by: INTERNAL MEDICINE

## 2023-07-29 PROCEDURE — 87040 BLOOD CULTURE FOR BACTERIA: CPT | Performed by: NURSE PRACTITIONER

## 2023-07-29 RX ADMIN — ONDANSETRON 4 MG: 2 INJECTION INTRAMUSCULAR; INTRAVENOUS at 07:07

## 2023-07-29 RX ADMIN — HYDROMORPHONE HYDROCHLORIDE 0.5 MG: 2 INJECTION INTRAMUSCULAR; INTRAVENOUS; SUBCUTANEOUS at 07:07

## 2023-07-29 RX ADMIN — OXYCODONE AND ACETAMINOPHEN 1 TABLET: 10; 325 TABLET ORAL at 02:07

## 2023-07-29 RX ADMIN — HYDROMORPHONE HYDROCHLORIDE 0.5 MG: 2 INJECTION INTRAMUSCULAR; INTRAVENOUS; SUBCUTANEOUS at 04:07

## 2023-07-29 RX ADMIN — Medication 6 MG: at 09:07

## 2023-07-29 RX ADMIN — SODIUM CHLORIDE: 9 INJECTION, SOLUTION INTRAVENOUS at 02:07

## 2023-07-29 RX ADMIN — ENOXAPARIN SODIUM 40 MG: 40 INJECTION SUBCUTANEOUS at 04:07

## 2023-07-29 RX ADMIN — ONDANSETRON 4 MG: 2 INJECTION INTRAMUSCULAR; INTRAVENOUS at 02:07

## 2023-07-29 RX ADMIN — HYDROMORPHONE HYDROCHLORIDE 0.5 MG: 2 INJECTION INTRAMUSCULAR; INTRAVENOUS; SUBCUTANEOUS at 03:07

## 2023-07-29 NOTE — PLAN OF CARE
Problem: Adult Inpatient Plan of Care  Goal: Plan of Care Review  Outcome: Ongoing, Progressing  Flowsheets (Taken 7/28/2023 2213)  Plan of Care Reviewed With: patient  Goal: Patient-Specific Goal (Individualized)  Outcome: Ongoing, Progressing  Flowsheets (Taken 7/28/2023 2213)  Individualized Care Needs: Manage pain, IV fluids, replace electrolytes, assist with ADLs.  Goal: Absence of Hospital-Acquired Illness or Injury  Outcome: Ongoing, Progressing  Intervention: Identify and Manage Fall Risk  Flowsheets (Taken 7/28/2023 2216)  Safety Promotion/Fall Prevention:   assistive device/personal item within reach   Fall Risk reviewed with patient/family   Fall Risk signage in place   medications reviewed   nonskid shoes/socks when out of bed  Intervention: Prevent Skin Injury  Flowsheets (Taken 7/28/2023 2216)  Body Position:   position changed independently   neutral head position   neutral body alignment  Skin Protection:   adhesive use limited   tubing/devices free from skin contact  Intervention: Prevent and Manage VTE (Venous Thromboembolism) Risk  Flowsheets (Taken 7/28/2023 2216)  Activity Management: Arm raise - L1  VTE Prevention/Management:   bleeding risk assessed   ambulation promoted   ROM (active) performed   ROM (passive) performed   intravenous hydration   fluids promoted   dorsiflexion/plantar flexion performed  Range of Motion:   active ROM (range of motion) encouraged   ROM (range of motion) performed  Intervention: Prevent Infection  Flowsheets (Taken 7/28/2023 2216)  Infection Prevention:   rest/sleep promoted   single patient room provided   hand hygiene promoted  Goal: Optimal Comfort and Wellbeing  Outcome: Ongoing, Progressing  Intervention: Monitor Pain and Promote Comfort  Flowsheets (Taken 7/28/2023 2216)  Pain Management Interventions:   care clustered   medication offered   pain management plan reviewed with patient/caregiver   position adjusted   pillow support provided  Intervention:  Provide Person-Centered Care  Flowsheets (Taken 7/28/2023 2216)  Trust Relationship/Rapport:   reassurance provided   questions encouraged   care explained   choices provided   emotional support provided   thoughts/feelings acknowledged   empathic listening provided   questions answered  Goal: Readiness for Transition of Care  Outcome: Ongoing, Progressing

## 2023-07-29 NOTE — PROGRESS NOTES
[facility]  Hospital Medicine Progress Note        Chief Complaint: Inpatient Follow-up for     HPI: 82-year-old female with a history of recently diagnosed stage III pancreatic cancer who underwent her 1st chemotherapy treatment today 07/26/2023, prior C diff, Crohn's disease and additional past medical history as below.  She reported she tolerated the chemotherapy well but her blood pressure did spike during treatment.  She presents to the ER tonight for intractable pain, nausea and vomiting.  Discharged yesterday for similar presentation.     She route to the ER afebrile, hypertensive, maintaining normal sats on room air.  Laboratory work showed hypokalemia with a potassium of 2.9.  CT abdomen and pelvis was unremarkable for acute changes.  She was given analgesics, antiemetics and admitted to the hospitalist service.    Interval Hx:   Seen and examined. No acute events overnight. Reports nausea is better . Npo now . Dint tolerate po last night but ate some gellow this morning and did good . Will slowly advance diet.Vss    Objective/physical exam:  General: In no acute distress, afebrile  Chest: Clear to auscultation bilaterally  Heart: RRR, +S1, S2, no appreciable murmur  Abdomen: Soft, nontender, BS +  MSK: Warm, no lower extremity edema, no clubbing or cyanosis  Neurologic: Alert and oriented x4, Cranial nerve II-XII intact, Strength 5/5 in all 4 extremities    VITAL SIGNS: 24 HRS MIN & MAX LAST   Temp  Min: 98.4 °F (36.9 °C)  Max: 99.6 °F (37.6 °C) 99.6 °F (37.6 °C)   BP  Min: 126/60  Max: 143/77 (!) 142/64   Pulse  Min: 68  Max: 82  69   Resp  Min: 16  Max: 20 16   SpO2  Min: 96 %  Max: 98 % 97 %     I have reviewed the following labs:    Recent Labs   Lab 07/26/23  1717 07/27/23  0440 07/28/23  0435   WBC 5.33 5.58 6.27   RBC 4.05* 3.97* 3.52*   HGB 12.4 12.1 10.7*   HCT 36.8* 36.6* 31.7*   MCV 90.9 92.2 90.1   MCH 30.6 30.5 30.4   MCHC 33.7 33.1 33.8   RDW 14.6 14.5 14.9   PLT  303 251 252   MPV 9.5 10.1 9.7         Recent Labs   Lab 07/23/23  2016 07/24/23  0636 07/24/23  1503 07/26/23  1717 07/27/23  0440 07/27/23 2028 07/28/23  0435    139   < > 140 136  --  139   K 2.9* 3.1*   < > 2.9* 3.9  --  3.2*   CO2 21* 25   < > 22* 22*  --  25   BUN 6.6* 8.4*   < > 7.6* 6.7*  --  18.2   CREATININE 0.70 0.74   < > 0.80 0.67  --  1.01   CALCIUM 9.3 9.0   < > 9.9 9.2  --  8.8   MG 1.50* 2.70*  --   --  1.50* 2.10  --    ALBUMIN 3.6 3.4  --  3.9  --   --   --    ALKPHOS 125 105  --  117  --   --   --    ALT 27 25  --  21  --   --   --    AST 21 21  --  20  --   --   --    BILITOT 1.0 0.8  --  0.6  --   --   --     < > = values in this interval not displayed.            Microbiology Results (last 7 days)       Procedure Component Value Units Date/Time    Blood Culture [[id number]] Collected: 07/29/23 1103    Order Status: Resulted Specimen: Blood from Arm, Left Updated: 07/29/23 1131    Blood Culture [[id number]] Collected: 07/29/23 1110    Order Status: Resulted Specimen: Blood from Arm, Right Updated: 07/29/23 1131             See below for Radiology    Scheduled Med:   enoxparin  40 mg Subcutaneous Daily        Continuous Infusions:   sodium chloride 0.9% 50 mL/hr at 07/29/23 0544        PRN Meds:  hydrALAZINE, HYDROmorphone, melatonin, melatonin, morphine, ondansetron, oxyCODONE, oxyCODONE-acetaminophen, promethazine, sodium chloride 0.9%, traZODone       Assessment/Plan:  Intractable nausea, vomiting and abdominal pain post chemotherapy  Hypokalemia secondary to above   Pancreatic cancer   History of Crohn's disease status post right hemicolectomy   Recent C diff colitis status post treatment     -antiemetics and analgesics as needed  -replete potassium   -on clear liquids , advance as tolerated   Soft diet this afternoon, if she tolerates will dc home  later today      DVT prophylaxis: lovenox  Code status: full         All diagnosis and differential diagnosis have been reviewed;  assessment and plan has been documented; I have personally reviewed the labs and test results that are presently available; I have reviewed the patients medication list; I have reviewed the consulting providers response and recommendations. I have reviewed or attempted to review medical records based upon their availability    All of the patient's questions have been  addressed and answered. Patient's is agreeable to the above stated plan. I will continue to monitor closely and make adjustments to medical management as needed.  _____________________________________________________________________    Nutrition Status:    Radiology:  I have personally reviewed the following imaging and agree with the radiologist.     Echo  · Concentric remodeling and normal systolic function.  · Grade I left ventricular diastolic dysfunction.  · The estimated ejection fraction is 58%.  · With normal right ventricular systolic function.  · Mild tricuspid regurgitation.  · Normal central venous pressure (3 mmHg).  · The estimated PA systolic pressure is 25 mmHg.         Angeles Trinidad MD   07/29/2023

## 2023-07-30 PROCEDURE — 63600175 PHARM REV CODE 636 W HCPCS: Performed by: INTERNAL MEDICINE

## 2023-07-30 PROCEDURE — 21400001 HC TELEMETRY ROOM

## 2023-07-30 PROCEDURE — 25000003 PHARM REV CODE 250: Performed by: INTERNAL MEDICINE

## 2023-07-30 RX ORDER — MORPHINE SULFATE 15 MG/1
15 TABLET, FILM COATED, EXTENDED RELEASE ORAL EVERY 12 HOURS
Status: DISCONTINUED | OUTPATIENT
Start: 2023-07-30 | End: 2023-07-31 | Stop reason: HOSPADM

## 2023-07-30 RX ORDER — PANTOPRAZOLE SODIUM 40 MG/1
40 TABLET, DELAYED RELEASE ORAL DAILY
Status: DISCONTINUED | OUTPATIENT
Start: 2023-07-30 | End: 2023-07-31 | Stop reason: HOSPADM

## 2023-07-30 RX ORDER — POLYETHYLENE GLYCOL 3350 17 G/17G
17 POWDER, FOR SOLUTION ORAL DAILY
Status: DISCONTINUED | OUTPATIENT
Start: 2023-07-30 | End: 2023-07-31 | Stop reason: HOSPADM

## 2023-07-30 RX ORDER — DOCUSATE SODIUM 100 MG/1
100 CAPSULE, LIQUID FILLED ORAL 2 TIMES DAILY
Status: DISCONTINUED | OUTPATIENT
Start: 2023-07-30 | End: 2023-07-31 | Stop reason: HOSPADM

## 2023-07-30 RX ADMIN — MORPHINE SULFATE 15 MG: 15 TABLET, EXTENDED RELEASE ORAL at 11:07

## 2023-07-30 RX ADMIN — PANTOPRAZOLE SODIUM 40 MG: 40 TABLET, DELAYED RELEASE ORAL at 11:07

## 2023-07-30 RX ADMIN — HYDROMORPHONE HYDROCHLORIDE 0.5 MG: 2 INJECTION INTRAMUSCULAR; INTRAVENOUS; SUBCUTANEOUS at 10:07

## 2023-07-30 RX ADMIN — ONDANSETRON 4 MG: 2 INJECTION INTRAMUSCULAR; INTRAVENOUS at 07:07

## 2023-07-30 RX ADMIN — POLYETHYLENE GLYCOL 3350 17 G: 17 POWDER, FOR SOLUTION ORAL at 11:07

## 2023-07-30 RX ADMIN — ENOXAPARIN SODIUM 40 MG: 40 INJECTION SUBCUTANEOUS at 04:07

## 2023-07-30 RX ADMIN — HYDROMORPHONE HYDROCHLORIDE 0.5 MG: 2 INJECTION INTRAMUSCULAR; INTRAVENOUS; SUBCUTANEOUS at 12:07

## 2023-07-30 RX ADMIN — ONDANSETRON 4 MG: 2 INJECTION INTRAMUSCULAR; INTRAVENOUS at 10:07

## 2023-07-30 RX ADMIN — OXYCODONE AND ACETAMINOPHEN 1 TABLET: 10; 325 TABLET ORAL at 01:07

## 2023-07-30 RX ADMIN — DOCUSATE SODIUM 100 MG: 100 CAPSULE, LIQUID FILLED ORAL at 11:07

## 2023-07-30 NOTE — PROGRESS NOTES
[facility] Hospital Medicine Progress Note        Chief Complaint: Inpatient Follow-up for abdominal pain, Pancreatic CA    HPI:    82-year-old female with a history of recently diagnosed stage III pancreatic cancer who underwent her 1st chemotherapy treatment today 07/26/2023, prior C diff, Crohn's disease and additional past medical history as below.  She reported she tolerated the chemotherapy well but her blood pressure did spike during treatment.  She presents to the ER tonight for intractable pain, nausea and vomiting.  Discharged yesterday for similar presentation.     She route to the ER afebrile, hypertensive, maintaining normal sats on room air.  Laboratory work showed hypokalemia with a potassium of 2.9.  CT abdomen and pelvis was unremarkable for acute changes.  She was given analgesics, antiemetics and admitted to the hospitalist service.    Patient continued to have abdominal pain and was started on long acting MS contin and continued on q 4 prn oxycodone. Palliative care team was also consulted.     Interval Hx:   Patient today awake but lethargic. States her abdomen still hurst and she is having nausea. She is not having vomiting. She has poor appetite.   Has been afebrile.    No family at bedside.     Case was discussed with patient's nurse.    Objective/physical exam:  General: In no acute distress  Chest: Clear to auscultation bilaterally  Heart: RRR, +S1, S2, no appreciable murmur  Abdomen: Soft, + epigastric tenderness   MSK: Warm, no lower extremity edema, no clubbing or cyanosis  Neurologic: Cranial nerve II-XII intact, Strength 5/5 in all 4 extremities    VITAL SIGNS: 24 HRS MIN & MAX LAST   Temp  Min: 98.3 °F (36.8 °C)  Max: 99.6 °F (37.6 °C) 98.6 °F (37 °C)   BP  Min: 128/66  Max: 154/72 129/72   Pulse  Min: 69  Max: 80  74   Resp  Min: 16  Max: 20 18   SpO2  Min: 97 %  Max: 98 % 98 %     I have reviewed the following labs:    Recent Labs   Lab 07/26/23  0776  07/27/23 0440 07/28/23 0435   WBC 5.33 5.58 6.27   RBC 4.05* 3.97* 3.52*   HGB 12.4 12.1 10.7*   HCT 36.8* 36.6* 31.7*   MCV 90.9 92.2 90.1   MCH 30.6 30.5 30.4   MCHC 33.7 33.1 33.8   RDW 14.6 14.5 14.9    251 252   MPV 9.5 10.1 9.7       Recent Labs   Lab 07/23/23 2016 07/24/23  0636 07/24/23  1503 07/26/23  1717 07/27/23 0440 07/27/23 2028 07/28/23 0435    139   < > 140 136  --  139   K 2.9* 3.1*   < > 2.9* 3.9  --  3.2*   CO2 21* 25   < > 22* 22*  --  25   BUN 6.6* 8.4*   < > 7.6* 6.7*  --  18.2   CREATININE 0.70 0.74   < > 0.80 0.67  --  1.01   CALCIUM 9.3 9.0   < > 9.9 9.2  --  8.8   MG 1.50* 2.70*  --   --  1.50* 2.10  --    ALBUMIN 3.6 3.4  --  3.9  --   --   --    ALKPHOS 125 105  --  117  --   --   --    ALT 27 25  --  21  --   --   --    AST 21 21  --  20  --   --   --    BILITOT 1.0 0.8  --  0.6  --   --   --     < > = values in this interval not displayed.          Microbiology Results (last 7 days)       Procedure Component Value Units Date/Time    Blood Culture [[id number]] Collected: 07/29/23 1103    Order Status: Resulted Specimen: Blood from Arm, Left Updated: 07/29/23 1131    Blood Culture [[id number]] Collected: 07/29/23 1110    Order Status: Resulted Specimen: Blood from Arm, Right Updated: 07/29/23 1131             See below for Radiology    Scheduled Med:   docusate sodium  100 mg Oral BID    enoxparin  40 mg Subcutaneous Daily    morphine  15 mg Oral Q12H    pantoprazole  40 mg Oral Daily    polyethylene glycol  17 g Oral Daily        Continuous Infusions:       PRN Meds:  hydrALAZINE, melatonin, melatonin, ondansetron, oxyCODONE-acetaminophen, promethazine, sodium chloride 0.9%, traZODone       Assessment/Plan:  Intractable nausea, vomiting and abdominal pain post chemotherapy  Hypokalemia secondary to above   Pancreatic cancer   History of Crohn's disease status post right hemicolectomy   Recent C diff colitis status post treatment    Plan:  Patient still in pain and has  poor appetite  Abdomen exam + epigastric tenderness   Will start on MS Contin 15 mg po BID and cont po prn oxycodone   Added PO PPI  Added colace and miralax  Cont prn antiemetic   Added protein shakes with each meal     Cont soft diet     Consult palliative care in am     Labs in am     VTE prophylaxis: Lovenox     Patient condition:  Fair    Anticipated discharge and Disposition:   Home       All diagnosis and differential diagnosis have been reviewed; assessment and plan has been documented; I have personally reviewed the labs and test results that are presently available; I have reviewed the patients medication list; I have reviewed the consulting providers response and recommendations. I have reviewed or attempted to review medical records based upon their availability    All of the patient's questions have been  addressed and answered. Patient's is agreeable to the above stated plan. I will continue to monitor closely and make adjustments to medical management as needed.  _____________________________________________________________________    Nutrition Status:    Radiology:  I have personally reviewed the following imaging and agree with the radiologist.     Echo  · Concentric remodeling and normal systolic function.  · Grade I left ventricular diastolic dysfunction.  · The estimated ejection fraction is 58%.  · With normal right ventricular systolic function.  · Mild tricuspid regurgitation.  · Normal central venous pressure (3 mmHg).  · The estimated PA systolic pressure is 25 mmHg.         Micah Olivo MD   07/30/2023

## 2023-07-31 ENCOUNTER — PATIENT MESSAGE (OUTPATIENT)
Dept: RESEARCH | Facility: HOSPITAL | Age: 83
End: 2023-07-31
Payer: MEDICARE

## 2023-07-31 VITALS
HEART RATE: 76 BPM | TEMPERATURE: 100 F | RESPIRATION RATE: 18 BRPM | HEIGHT: 63 IN | OXYGEN SATURATION: 98 % | SYSTOLIC BLOOD PRESSURE: 131 MMHG | DIASTOLIC BLOOD PRESSURE: 62 MMHG | BODY MASS INDEX: 23.55 KG/M2 | WEIGHT: 132.94 LBS

## 2023-07-31 DIAGNOSIS — C25.9 MALIGNANT NEOPLASM OF PANCREAS, UNSPECIFIED LOCATION OF MALIGNANCY: Primary | ICD-10-CM

## 2023-07-31 LAB
ANION GAP SERPL CALC-SCNC: 9 MEQ/L
BASOPHILS # BLD AUTO: 0.01 X10(3)/MCL
BASOPHILS NFR BLD AUTO: 0.2 %
BUN SERPL-MCNC: 5 MG/DL (ref 9.8–20.1)
CALCIUM SERPL-MCNC: 8.7 MG/DL (ref 8.4–10.2)
CHLORIDE SERPL-SCNC: 103 MMOL/L (ref 98–107)
CO2 SERPL-SCNC: 27 MMOL/L (ref 23–31)
CREAT SERPL-MCNC: 0.76 MG/DL (ref 0.55–1.02)
CREAT/UREA NIT SERPL: 7
EOSINOPHIL # BLD AUTO: 0.14 X10(3)/MCL (ref 0–0.9)
EOSINOPHIL NFR BLD AUTO: 3.1 %
ERYTHROCYTE [DISTWIDTH] IN BLOOD BY AUTOMATED COUNT: 14.1 % (ref 11.5–17)
GFR SERPLBLD CREATININE-BSD FMLA CKD-EPI: >60 MLS/MIN/1.73/M2
GLUCOSE SERPL-MCNC: 104 MG/DL (ref 82–115)
HCT VFR BLD AUTO: 29.9 % (ref 37–47)
HGB BLD-MCNC: 10.3 G/DL (ref 12–16)
IMM GRANULOCYTES # BLD AUTO: 0.02 X10(3)/MCL (ref 0–0.04)
IMM GRANULOCYTES NFR BLD AUTO: 0.4 %
LYMPHOCYTES # BLD AUTO: 1.34 X10(3)/MCL (ref 0.6–4.6)
LYMPHOCYTES NFR BLD AUTO: 29.6 %
MCH RBC QN AUTO: 31.1 PG (ref 27–31)
MCHC RBC AUTO-ENTMCNC: 34.4 G/DL (ref 33–36)
MCV RBC AUTO: 90.3 FL (ref 80–94)
MONOCYTES # BLD AUTO: 0.13 X10(3)/MCL (ref 0.1–1.3)
MONOCYTES NFR BLD AUTO: 2.9 %
NEUTROPHILS # BLD AUTO: 2.89 X10(3)/MCL (ref 2.1–9.2)
NEUTROPHILS NFR BLD AUTO: 63.8 %
NRBC BLD AUTO-RTO: 0 %
PLATELET # BLD AUTO: 215 X10(3)/MCL (ref 130–400)
PMV BLD AUTO: 9.7 FL (ref 7.4–10.4)
POTASSIUM SERPL-SCNC: 3.5 MMOL/L (ref 3.5–5.1)
RBC # BLD AUTO: 3.31 X10(6)/MCL (ref 4.2–5.4)
SODIUM SERPL-SCNC: 139 MMOL/L (ref 136–145)
WBC # SPEC AUTO: 4.53 X10(3)/MCL (ref 4.5–11.5)

## 2023-07-31 PROCEDURE — 85025 COMPLETE CBC W/AUTO DIFF WBC: CPT | Performed by: INTERNAL MEDICINE

## 2023-07-31 PROCEDURE — 80048 BASIC METABOLIC PNL TOTAL CA: CPT | Performed by: INTERNAL MEDICINE

## 2023-07-31 PROCEDURE — 25000003 PHARM REV CODE 250: Performed by: INTERNAL MEDICINE

## 2023-07-31 PROCEDURE — 63600175 PHARM REV CODE 636 W HCPCS: Performed by: INTERNAL MEDICINE

## 2023-07-31 RX ORDER — MORPHINE SULFATE 15 MG/1
15 TABLET, FILM COATED, EXTENDED RELEASE ORAL EVERY 12 HOURS
Qty: 60 TABLET | Refills: 0 | Status: SHIPPED | OUTPATIENT
Start: 2023-07-31 | End: 2023-08-30

## 2023-07-31 RX ORDER — HEPARIN 100 UNIT/ML
500 SYRINGE INTRAVENOUS ONCE
Status: COMPLETED | OUTPATIENT
Start: 2023-07-31 | End: 2023-07-31

## 2023-07-31 RX ORDER — DOCUSATE SODIUM 100 MG/1
100 CAPSULE, LIQUID FILLED ORAL 2 TIMES DAILY
Refills: 0 | Status: ON HOLD
Start: 2023-07-31 | End: 2023-08-21 | Stop reason: HOSPADM

## 2023-07-31 RX ORDER — POLYETHYLENE GLYCOL 3350 17 G/17G
17 POWDER, FOR SOLUTION ORAL DAILY
Refills: 0 | Status: ON HOLD
Start: 2023-08-01 | End: 2023-08-21 | Stop reason: HOSPADM

## 2023-07-31 RX ADMIN — ONDANSETRON 4 MG: 2 INJECTION INTRAMUSCULAR; INTRAVENOUS at 11:07

## 2023-07-31 RX ADMIN — ENOXAPARIN SODIUM 40 MG: 40 INJECTION SUBCUTANEOUS at 06:07

## 2023-07-31 RX ADMIN — PANTOPRAZOLE SODIUM 40 MG: 40 TABLET, DELAYED RELEASE ORAL at 09:07

## 2023-07-31 RX ADMIN — HEPARIN 500 UNITS: 100 SYRINGE at 06:07

## 2023-07-31 RX ADMIN — MORPHINE SULFATE 15 MG: 15 TABLET, EXTENDED RELEASE ORAL at 11:07

## 2023-07-31 NOTE — PLAN OF CARE
07/31/23 0904   Discharge Assessment   Assessment Type Discharge Planning Assessment   Confirmed/corrected address, phone number and insurance Yes   Confirmed Demographics Correct on Facesheet   Source of Information patient   When was your last doctors appointment?   (Patient reports 2-3 weeks ago.)   Communicated RDAHA with patient/caregiver Yes   Reason For Admission Weakness, generalized.   People in Home spouse   Do you expect to return to your current living situation? Yes   Do you have help at home or someone to help you manage your care at home? Yes   Who are your caregiver(s) and their phone number(s)? Daughter: Campbell Talavera: [phone]   Prior to hospitilization cognitive status: Unable to Assess   Current cognitive status: Alert/Oriented   Home Accessibility wheelchair accessible   Home Layout Able to live on 1st floor   Equipment Currently Used at Home CPAP;rollator   Readmission within 30 days? Yes   Patient currently being followed by outpatient case management? No   Do you currently have service(s) that help you manage your care at home? No   Do you take prescription medications? Yes   Do you have prescription coverage? Yes   Coverage Medicare Part A & B   Do you have any problems affording any of your prescribed medications? No   Is the patient taking medications as prescribed? yes   Who is going to help you get home at discharge? Patient reports her daughter, Campbell.   How do you get to doctors appointments? family or friend will provide   Are you on dialysis? No   Do you take coumadin? No   Discharge Plan A Home with family   Discharge Plan B Home Health   DME Needed Upon Discharge  none   Discharge Plan discussed with: Patient   Transition of Care Barriers None   OTHER   Name(s) of People in Home Patient resides with her spouse. Patient reports her granddaughter and daughter helps out when needed.     Patient's PCP is Marina Morales.  Patient reports she is current with [facility]. SW is verifying this information. SW was informed patient is current with [facility].  No barriers to discharge at this time.

## 2023-07-31 NOTE — PROGRESS NOTES
Subjective:        PATIENT: Evelyn Proctor  MRN: [id number]  DATE: 8/1/2023  Chief Complaint: Follow-up (Not feeling good today. Feels nauseated since this AM) and Nausea    Current Therapy: ENTYVIO GI q6w   by GI  Current Treatment: OP PANC GEMCITABINE QW   Oncology History Overview Note   This is a 82 y.o. female known to Dr. Pierson with PMH of recently diagnosed pancreatic ductal adenocarcinoma, hyperlipidemia, coronary artery disease, Crohn's disease on Entyvio every 6 weeks status post right hemicolectomy, hepatic steatosis, rheumatoid arthritis, osteopenia and cervical cancer. She presented to the [facility] ED 06/09/23 with worsening generalized abd pain, n/v/d that she reports is c/w Crohn's.     Upon arrival, workup included: CT abd pel with interval development of some colonic wall thickening in left colon especially at splenic flexure and proximal descending colon concerning for developing colitis, mild peripancreatic edema and inflammatory changes seen mainly in the body and tail c/w pancreatitis, persistent pancreatic duct dilation in the body and tail; labs revealed WBC 5.97, H&H 12.6/37.1, plt 252, INR 0.99, K 2.8, Cl 94, Tbili 0.7, AST 18, ALT 14, lipase 13. IVF initiated and electrolytes replenished. GI panel pending. GI consulted for Crohn's flare/pancreatic adenocarcinoma.     Of note, patient was recently admitted 06/02/23 - 06/04/23 for acute pancreatitis and was noted to have a high grade pancreatic duct stricture at pancreatic neck with upstream pancreatic duct dilation. Dr. Max performed EUS 06/08/23 that revealed a mass in the pancreatic neck that was determined to be ductal adenocarcinoma staged T3N0Mx by endosonographic criteria. He recommended oncology referral and PET scan. Patient and family requested to see Dr. Nuñez and Dr. Philip for further care.     Pancreatic cancer   6/8/2023 Initial Diagnosis    Pancreatic cancer  FINAL DIAGNOSIS       PANCREATIC NECK MASS, FINE NEEDLE  ASPIRATION AND CELL BLOCK:       PANCREATIC DUCTAL ADENOCARCINOMA.          6/8/2023 Procedure    EUS Impression:            - Normal esophagus.                          - Normal stomach.                          - Normal examined duodenum.                          - There was no sign of significant pathology in                          the ampulla.                          - There was no sign of significant pathology in                          the common bile duct.                          - There was no evidence of significant pathology                          in the left lobe of the liver.                          - A mass was identified in the pancreatic neck.                          Tissue was obtained from this exam and is of                          adenocarcinoma. This was staged T3 N0 Mx by                          endosonographic criteria. Fine needle biopsy                          performed.      6/9/2023 Imaging Significant Findings    Impression:     Interval development of some colonic wall thickening in the left colon especially at the splenic flexure and proximal descending colon concerning for developing colitis     Mild peripancreatic edema and inflammatory changes seen mainly in the body and tail the pancreas consistent with pancreatitis     Persistent pancreatic duct dilatation in the body and tail which was seen and described on multiple previous examinations     6/10/2023 Cancer Staged    Staging form: Exocrine Pancreas, AJCC 8th Edition  - Clinical stage from 6/10/2023: Stage IIA (cT3, cN0, cM0)     6/20/2023 Imaging Significant Findings    PET:Impression: Focal activity at the junction of the body and head of the pancreas concerning for neoplasm. There is no definite discrete mass on the low-dose CT scan. There is dilatation of the distal pancreatic duct.       Hospital Admission    Most Recent Admission Details  Admit Date: 6/21/23  Admitted for:    Clostridium difficile  "infection  -Pancreatic Cancer  -Epigastric pain     Discharge date:6/29/23  Discharged from: [facility] 9W MEDICAL TELEMETRY at [facility]  Discharge disposition: Home-Health Care Svc       Malignant neoplasm of pancreas   7/11/2023 Initial Diagnosis    Malignant neoplasm of pancreas     7/26/2023 -  Chemotherapy    Treatment Summary   Plan Name: OP PANC GEMCITABINE QW  Treatment Goal: Control  Status: Active  Start Date: 7/26/2023  End Date: 9/20/2023 (Planned)  Provider: Roann Burns MD  Chemotherapy: gemcitabine (GEMZAR) 1,141 mg in sodium chloride 0.9% SolP 315 mL chemo infusion, 675 mg/m2 = 1,141 mg (100 % of original dose 675 mg/m2), Intravenous, Clinic/HOD 1 time, 1 of 2 cycles  Dose modification: 675 mg/m2 (original dose 675 mg/m2, Cycle 1, Reason: MD Discretion)  Administration: 1,141 mg (7/26/2023)         08/01/2023  Patient presents for follow up. She started her first cycle of gemcitabine on 7/26/23. She started to have nausea and vomiting and increased BP 200s/100s. These symptoms predate her chemotherapy. She has had multiple hospital admissions. That day she was sent to the ED as well. She was discharged 7/31/23. She started to have increasing abdominal pain and was started on LA MS Contin and oxycodone for BTP.   Today she follows up after discharge. She says "I dont feel good today" "I almost cancelled" She continues to have epigastric pain, worse on empty stomach. She has not been on her protonix.   Past Medical History:   Diagnosis Date    Acute pancreatitis, unspecified complication status, unspecified pancreatitis type     Arthritis     Carpal tunnel syndrome, bilateral     Cervical cancer     Cervical radiculopathy     Crohn disease     Heart attack     HLD (hyperlipidemia)     Low back pain     Pancreatic cancer     Sleep apnea, unspecified     Spinal stenosis of lumbar region with neurogenic claudication       .  Past Surgical History:   Procedure Laterality " Date    bilateral L4-5, L5-S1 decompression, repair L5-S1 dural tear  10/01/2021    Dr. Marin    CARPAL TUNNEL RELEASE Right 09/06/2019    Dr. Marin    CARPAL TUNNEL RELEASE Left 12/2/2022    Procedure: RELEASE, CARPAL TUNNEL;  Surgeon: Jesse Marin MD;  Location: [facility] OR;  Service: Neurosurgery;  Laterality: Left;    CHOLECYSTECTOMY      COLECTOMY  07/2011    partial x3    ESOPHAGOGASTRODUODENOSCOPY N/A 6/8/2023    Procedure: EGD (ESOPHAGOGASTRODUODENOSCOPY);  Surgeon: Jose Alberto Max MD;  Location: [facility] OR;  Service: Gastroenterology;  Laterality: N/A;    ESOPHAGOGASTRODUODENOSCOPY N/A 6/29/2023    Procedure: EGD (ESOPHAGOGASTRODUODENOSCOPY);  Surgeon: Jose Alberto Max MD;  Location: [facility] OR;  Service: Gastroenterology;  Laterality: N/A;    HYSTERECTOMY      total    REPAIR OF EYELID Bilateral 11/21/2022    Procedure: BILATERAL ECTROPION REPAIR;  Surgeon: Justin Flores MD;  Location: [facility] OR;  Service: ENT;  Laterality: Bilateral;    ULTRASOUND, ENDOSCOPIC, WITH FINE NEEDLE ASPIRATION N/A 6/8/2023    Procedure: UPPER EUS  W/  FNA;  Surgeon: Jose Alberto Max MD;  Location: [facility] OR;  Service: Gastroenterology;  Laterality: N/A;  Dangelo, pt w/ CD on entyvio recently admitted to [facility] w/ acute pancreatitis. Possibly drug induced from entyvio? Althoughshe does have dilated PD and a poss focal stricture in the HOP.CA 19-9 nml    ULTRASOUND, UPPER GI TRACT, ENDOSCOPIC, WITH CELIAC PLEXUS BLOCK N/A 6/29/2023    Procedure: UPPER EUS W/ POSS FNA W/ CELIAC BLOCK;  Surgeon: Jose Alberto Max MD;  Location: [facility] OR;  Service: Gastroenterology;  Laterality: N/A;  Celiac Plexus injection planned.Pt should receive 1L NS bolus upon arrival to Outpt Surgery and 1L NS during the procedure. Pt will need to be in PACU for 30 min post procedure to assess for any potential hypotension. NO LABS      .  Family History   Problem Relation Age of Onset    Diabetes Mother     Hypertension Father     Hyperlipidemia Father     Cancer Father      Hyperlipidemia Sister     Hyperlipidemia Brother     Hyperlipidemia Daughter       Social History     Socioeconomic History    Marital status:    Tobacco Use    Smoking status: Former     Current packs/day: 0.00     Types: Cigarettes    Smokeless tobacco: Never   Substance and Sexual Activity    Alcohol use: Not Currently     Comment: rarely    Drug use: Never    Sexual activity: Not Currently     Social Determinants of Health     Social Connections: Unknown (6/22/2023)    Social Connection and Isolation Panel [NHANES]     Attends [employer] Services: More than 4 times per year     Active Member of Clubs or Organizations: Yes     Attends Club or Organization Meetings: More than 4 times per year     Marital Status:       .  Review of patient's allergies indicates:   Allergen Reactions    Morpholine analogues Itching          Current Outpatient Medications:     morphine (MS CONTIN) 15 MG 12 hr tablet, Take 1 tablet (15 mg total) by mouth every 12 (twelve) hours., Disp: 60 tablet, Rfl: 0    ondansetron (ZOFRAN) 4 MG tablet, Take 4 mg by mouth every 6 (six) hours as needed for Nausea., Disp: , Rfl:     prochlorperazine (COMPAZINE) 10 MG tablet, Take 1 tablet (10 mg total) by mouth every 6 (six) hours as needed., Disp: 30 tablet, Rfl: 1    docusate sodium (COLACE) 100 MG capsule, Take 1 capsule (100 mg total) by mouth 2 (two) times daily. (Patient not taking: Reported on 8/1/2023), Disp: , Rfl: 0    lipase-protease-amylase (CREON) 36,000-114,000- 180,000 unit CpDR, Take 1 capsule by mouth 3 (three) times daily with meals. (Patient not taking: Reported on 8/1/2023), Disp: 90 capsule, Rfl: 1    oxyCODONE (ROXICODONE) 30 MG Tab, Take 5 mg by mouth every 6 (six) hours as needed., Disp: , Rfl:     pantoprazole (PROTONIX) 40 MG tablet, Take 1 tablet by mouth every morning., Disp: , Rfl:     polyethylene glycol (GLYCOLAX) 17 gram PwPk, Take 17 g by mouth once daily. (Patient not taking: Reported on 8/1/2023),  Disp: , Rfl: 0    traZODone (DESYREL) 50 MG tablet, Take 0.5 tablets (25 mg total) by mouth nightly as needed for Insomnia or Depression. (Patient not taking: Reported on 8/1/2023), Disp: 15 tablet, Rfl: 1  No current facility-administered medications for this visit.   Review of Systems       Objective:     Vitals:    08/01/23 1435   BP: 133/77   Pulse: 83   Temp: 98.6 °F (37 °C)        Physical Exam  Vitals reviewed.   Constitutional:       General: She is awake.      Comments: Grimacing, anxious   HENT:      Head: Normocephalic and atraumatic.      Mouth/Throat:      Mouth: Mucous membranes are moist.      Pharynx: Oropharynx is clear.   Eyes:      Extraocular Movements: Extraocular movements intact.      Conjunctiva/sclera: Conjunctivae normal.      Pupils: Pupils are equal, round, and reactive to light.   Cardiovascular:      Rate and Rhythm: Normal rate and regular rhythm.      Pulses: Normal pulses.      Heart sounds: Normal heart sounds.   Pulmonary:      Effort: Pulmonary effort is normal.      Breath sounds: Normal breath sounds and air entry.   Chest:      Comments: Left-sided Port-A-Cath, right-sided dilated chest wall veins  Abdominal:      General: Abdomen is flat. Bowel sounds are normal.      Palpations: Abdomen is soft.      Tenderness: There is abdominal tenderness in the epigastric area. There is no guarding or rebound.   Musculoskeletal:      Cervical back: Normal range of motion.      Right lower leg: No edema.      Left lower leg: No edema.   Lymphadenopathy:      Cervical: No cervical adenopathy.      Upper Body:      Right upper body: No axillary adenopathy.      Left upper body: No axillary adenopathy.      Lower Body: No right inguinal adenopathy. No left inguinal adenopathy.   Skin:     General: Skin is warm and dry.   Neurological:      General: No focal deficit present.      Mental Status: She is alert and oriented to person, place, and time. Mental status is at baseline.   Psychiatric:          Attention and Perception: Attention normal.         Mood and Affect: Mood and affect normal.         Behavior: Behavior is cooperative.         ECOG SCORE              Assessment/Plan:   Stage IIA adenocarcinoma of pancreas  Recent C diff colitis   History inflammatory bowel disease  Weight loss  Concern for pancreatic insufficiency  Mild Neutropenia, no fever        PLAN:  Gemzar 675 mg per m2 weekly consider escalating to 800 mg per m2  Eight weeks total, followed by repeat imaging. She received 80% of week 1 on 7/26/23 and started having hypertensive crisis, intractable nausea and vomiting requiring admission  Hold C2 tomorrow  Restart protonix with GI symptoms.  MS Contin 15mg BID with Oxycodone 5mg prn BTP  Genetic counseling   Genetics on tumor  Continue Creon with meals and snack       Follow up in about 1 week (around 8/8/2023) for Labs, TD visit with Dr. Burns, next day infusion.         Orders Placed This Encounter    Cancer Antigen 19-9    Amylase    Lipase    ondansetron (ZOFRAN) 8 MG tablet    pantoprazole (PROTONIX) 40 MG tablet

## 2023-07-31 NOTE — PLAN OF CARE
Problem: Adult Inpatient Plan of Care  Goal: Plan of Care Review  Outcome: Ongoing, Progressing  Flowsheets (Taken 7/30/2023 1938)  Plan of Care Reviewed With:   patient   daughter  Goal: Patient-Specific Goal (Individualized)  Outcome: Ongoing, Progressing  Flowsheets (Taken 7/30/2023 1938)  Individualized Care Needs: monitor for nausea and vomiting, assist with adls  Goal: Absence of Hospital-Acquired Illness or Injury  Outcome: Ongoing, Progressing  Intervention: Identify and Manage Fall Risk  Flowsheets (Taken 7/30/2023 1938)  Safety Promotion/Fall Prevention:   assistive device/personal item within reach   instructed to call staff for mobility   side rails raised x 3  Intervention: Prevent Skin Injury  Flowsheets (Taken 7/30/2023 1938)  Body Position:   position changed independently   weight shifting  Skin Protection: adhesive use limited  Intervention: Prevent and Manage VTE (Venous Thromboembolism) Risk  Flowsheets (Taken 7/30/2023 1938)  Activity Management: Ambulated to bathroom - L4  VTE Prevention/Management: (lovenox)   ambulation promoted   other (see comments)  Range of Motion: active ROM (range of motion) encouraged  Intervention: Prevent Infection  Flowsheets (Taken 7/30/2023 1938)  Infection Prevention:   rest/sleep promoted   hand hygiene promoted   single patient room provided  Goal: Optimal Comfort and Wellbeing  Outcome: Ongoing, Progressing  Intervention: Provide Person-Centered Care  Flowsheets (Taken 7/30/2023 1938)  Trust Relationship/Rapport:   questions encouraged   care explained   choices provided   questions answered   emotional support provided   reassurance provided   empathic listening provided   thoughts/feelings acknowledged  Goal: Readiness for Transition of Care  Outcome: Ongoing, Progressing

## 2023-07-31 NOTE — DISCHARGE SUMMARY
[facility] Hospital Medicine Discharge Summary    Admit Date: 7/26/2023  Discharge Date and Time: 7/31/202310:12 AM  Admitting Physician:  Team  Discharging Physician: Micah Olivo MD.  Primary Care Physician: Marina Morales MD  Consults: Palliative care     Discharge Diagnoses:  Intractable nausea, vomiting and abdominal pain post chemotherapy  Hypokalemia secondary to above   Pancreatic cancer   History of Crohn's disease status post right hemicolectomy   Recent C diff colitis status post treatment       Hospital Course:   82-year-old female with a history of recently diagnosed stage III pancreatic cancer who underwent her 1st chemotherapy treatment today 07/26/2023, prior C diff, Crohn's disease and additional past medical history as below.  She reported she tolerated the chemotherapy well but her blood pressure did spike during treatment.  She presents to the ER tonight for intractable pain, nausea and vomiting.  Discharged yesterday for similar presentation.     She route to the ER afebrile, hypertensive, maintaining normal sats on room air.  Laboratory work showed hypokalemia with a potassium of 2.9.  CT abdomen and pelvis was unremarkable for acute changes.  She was given analgesics, antiemetics and admitted to the hospitalist service.     Patient continued to have abdominal pain and was started on long acting MS contin and continued on q 4 prn oxycodone. Palliative care team was also consulted. Patients pain was well controlled. She was able to tolerate po soft diet. We added colace and miralax to prevent constipation. Potassium was low and was replaced.   Patient was worried about her change in life style. She has good family support. We can assist with setting up HH. She will also discuss goals of care with our palliative care team. Will dc home after seen by palliative care team.   Informed nurse the dc plan.     Pt was seen and examined on the day of  discharge  Vitals:  VITAL SIGNS: 24 HRS MIN & MAX LAST   Temp  Min: 98.7 °F (37.1 °C)  Max: 99.6 °F (37.6 °C) 99.5 °F (37.5 °C)   BP  Min: 127/57  Max: 152/72 137/67   Pulse  Min: 71  Max: 96  76   Resp  Min: 18  Max: 20 20   SpO2  Min: 96 %  Max: 99 % 98 %       Physical Exam:  Heart RRR  Lungs clear   Abdomen soft and non tender   Neuro: No FND      Procedures Performed: No admission procedures for hospital encounter.     Significant Diagnostic Studies: See Full reports for all details    Recent Labs   Lab 07/27/23 0440 07/28/23  0435 07/31/23  0502   WBC 5.58 6.27 4.53   RBC 3.97* 3.52* 3.31*   HGB 12.1 10.7* 10.3*   HCT 36.6* 31.7* 29.9*   MCV 92.2 90.1 90.3   MCH 30.5 30.4 31.1*   MCHC 33.1 33.8 34.4   RDW 14.5 14.9 14.1    252 215   MPV 10.1 9.7 9.7       Recent Labs   Lab 07/26/23  1717 07/27/23 0440 07/27/23 2028 07/28/23  0435 07/31/23  0502    136  --  139 139   K 2.9* 3.9  --  3.2* 3.5   CO2 22* 22*  --  25 27   BUN 7.6* 6.7*  --  18.2 5.0*   CREATININE 0.80 0.67  --  1.01 0.76   CALCIUM 9.9 9.2  --  8.8 8.7   MG  --  1.50* 2.10  --   --    ALBUMIN 3.9  --   --   --   --    ALKPHOS 117  --   --   --   --    ALT 21  --   --   --   --    AST 20  --   --   --   --    BILITOT 0.6  --   --   --   --         Microbiology Results (last 7 days)       Procedure Component Value Units Date/Time    Blood Culture [[id number]]  (Normal) Collected: 07/29/23 1103    Order Status: Completed Specimen: Blood from Arm, Left Updated: 07/30/23 1200     CULTURE, BLOOD ([facility]) No Growth At 24 Hours    Blood Culture [[id number]]  (Normal) Collected: 07/29/23 1110    Order Status: Completed Specimen: Blood from Arm, Right Updated: 07/30/23 1200     CULTURE, BLOOD ([facility]) No Growth At 24 Hours             Echo  · Concentric remodeling and normal systolic function.  · Grade I left ventricular diastolic dysfunction.  · The estimated ejection fraction is 58%.  · With normal right ventricular systolic function.  · Mild  tricuspid regurgitation.  · Normal central venous pressure (3 mmHg).  · The estimated PA systolic pressure is 25 mmHg.            Medication List        START taking these medications      docusate sodium 100 MG capsule  Commonly known as: COLACE  Take 1 capsule (100 mg total) by mouth 2 (two) times daily.     morphine 15 MG 12 hr tablet  Commonly known as: MS CONTIN  Take 1 tablet (15 mg total) by mouth every 12 (twelve) hours.     polyethylene glycol 17 gram Pwpk  Commonly known as: GLYCOLAX  Take 17 g by mouth once daily.  Start taking on: August 1, 2023            CONTINUE taking these medications      CREON 36,000-114,000- 180,000 unit Cpdr  Generic drug: lipase-protease-amylase  Take 1 capsule by mouth 3 (three) times daily with meals.     ondansetron 4 MG tablet  Commonly known as: ZOFRAN     oxyCODONE 30 MG Tab  Commonly known as: ROXICODONE     pantoprazole 40 MG tablet  Commonly known as: PROTONIX     prochlorperazine 10 MG tablet  Commonly known as: COMPAZINE  Take 1 tablet (10 mg total) by mouth every 6 (six) hours as needed.     traZODone 50 MG tablet  Commonly known as: DESYREL  Take 0.5 tablets (25 mg total) by mouth nightly as needed for Insomnia or Depression.               Where to Get Your Medications        You can get these medications from any pharmacy    Bring a paper prescription for each of these medications  morphine 15 MG 12 hr tablet       Information about where to get these medications is not yet available    Ask your nurse or doctor about these medications  docusate sodium 100 MG capsule  polyethylene glycol 17 gram Pwpk          Explained in detail to the patient about the discharge plan, medications, and follow-up visits. Pt understands and agrees with the treatment plan  Discharge Disposition: Home or Self Care   Discharged Condition: stable  Diet-   Dietary Orders (From admission, onward)       Start     Ordered    07/30/23 1124  Dietary nutrition supplements Boost Plus Chocolate;  All Meals  Continuous        Question Answer Comment   Select PO Supplement: Boost Plus Chocolate    Frequency: All Meals        07/30/23 1124    07/30/23 1108  Diet Soft & Bite Sized (IDDSI Level 6)  Diet effective now         07/30/23 1107                   Medications Per DC med rec  Activities as tolerated   Follow-up Information       Marina Morales MD Follow up in 1 week(s).    Specialty: Family Medicine  Contact information:  [address] [facility]  [address]  [facility] [address] [phone]                           For further questions contact hospitalist office    Discharge time 33 minutes    For worsening symptoms, chest pain, shortness of breath, increased abdominal pain, high grade fever, stroke or stroke like symptoms, immediately go to the nearest Emergency Room or call 911 as soon as possible.      Micah Mckeon M.D, on 7/31/2023. at 10:12 AM.

## 2023-08-01 ENCOUNTER — OFFICE VISIT (OUTPATIENT)
Dept: HEMATOLOGY/ONCOLOGY | Facility: CLINIC | Age: 83
DRG: 392 | End: 2023-08-01
Payer: MEDICARE

## 2023-08-01 ENCOUNTER — DOCUMENT SCAN (OUTPATIENT)
Dept: HOME HEALTH SERVICES | Facility: HOSPITAL | Age: 83
End: 2023-08-01
Payer: MEDICARE

## 2023-08-01 VITALS
HEART RATE: 83 BPM | OXYGEN SATURATION: 97 % | BODY MASS INDEX: 23.71 KG/M2 | SYSTOLIC BLOOD PRESSURE: 133 MMHG | WEIGHT: 133.81 LBS | HEIGHT: 63 IN | TEMPERATURE: 99 F | DIASTOLIC BLOOD PRESSURE: 77 MMHG

## 2023-08-01 DIAGNOSIS — D69.6 THROMBOCYTOPENIA: ICD-10-CM

## 2023-08-01 DIAGNOSIS — C25.9 MALIGNANT NEOPLASM OF PANCREAS, UNSPECIFIED LOCATION OF MALIGNANCY: Primary | ICD-10-CM

## 2023-08-01 DIAGNOSIS — Z51.11 CHEMOTHERAPY MANAGEMENT, ENCOUNTER FOR: ICD-10-CM

## 2023-08-01 DIAGNOSIS — R11.2 INTRACTABLE NAUSEA AND VOMITING: ICD-10-CM

## 2023-08-01 PROCEDURE — 99215 OFFICE O/P EST HI 40 MIN: CPT | Mod: S$PBB,,, | Performed by: NURSE PRACTITIONER

## 2023-08-01 PROCEDURE — 99999 PR PBB SHADOW E&M-EST. PATIENT-LVL III: ICD-10-PCS | Mod: PBBFAC,,, | Performed by: NURSE PRACTITIONER

## 2023-08-01 PROCEDURE — 99215 PR OFFICE/OUTPT VISIT, EST, LEVL V, 40-54 MIN: ICD-10-PCS | Mod: S$PBB,,, | Performed by: NURSE PRACTITIONER

## 2023-08-01 PROCEDURE — 99213 OFFICE O/P EST LOW 20 MIN: CPT | Mod: PBBFAC | Performed by: NURSE PRACTITIONER

## 2023-08-01 PROCEDURE — 99999 PR PBB SHADOW E&M-EST. PATIENT-LVL III: CPT | Mod: PBBFAC,,, | Performed by: NURSE PRACTITIONER

## 2023-08-01 RX ORDER — ONDANSETRON HYDROCHLORIDE 8 MG/1
8 TABLET, FILM COATED ORAL EVERY 12 HOURS PRN
Qty: 30 TABLET | Refills: 2 | Status: SHIPPED | OUTPATIENT
Start: 2023-08-01 | End: 2024-07-31

## 2023-08-01 RX ORDER — ONDANSETRON HYDROCHLORIDE 8 MG/1
8 TABLET, FILM COATED ORAL EVERY 8 HOURS PRN
Qty: 30 TABLET | Refills: 2 | Status: SHIPPED | OUTPATIENT
Start: 2023-08-01 | End: 2023-08-01

## 2023-08-01 RX ORDER — PANTOPRAZOLE SODIUM 40 MG/1
40 TABLET, DELAYED RELEASE ORAL EVERY MORNING
Qty: 30 TABLET | Refills: 0 | Status: SHIPPED | OUTPATIENT
Start: 2023-08-01 | End: 2023-08-31

## 2023-08-01 NOTE — PLAN OF CARE
SW was notified by pt's nurse, Kizzy of patient's interest in outpatient palliative care services. SW sent referral to [facility] Phone: [phone] via [employer].

## 2023-08-02 ENCOUNTER — TELEPHONE (OUTPATIENT)
Dept: HEMATOLOGY/ONCOLOGY | Facility: CLINIC | Age: 83
End: 2023-08-02
Payer: MEDICARE

## 2023-08-02 NOTE — TELEPHONE ENCOUNTER
Oncology Nutrition Assessment for Medical Nutrition Therapy      Evelyn Proctor   1940    Oncology Provider:   Ronan Burns MD     Reason for Visit:  Nutrition f/u    Oncology/Hematology Diagnosis:   Pancreatic cancer    Treatment Plan:  GEMCITABINE     Nutrition Recommendations:  1. Small frequent meals as tolerated  2. Continue Creon as rx  3. Antiemetics as rx  4. RD to continue monitoring    Nutrition Assessment    7/20/23: This is a 82 y.o.female with a medical diagnosis of pancreatic CA. She was hospitalized several times in the past couple months. 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 She reports loose stools especially after meals as well as significant wt loss ~25# in 6 months or so. Since her celiac block she gained back 6#. She was also started on Creon recently.    8/2/23: pt had appt scheduled for today but was canceled 2/2 her feeling poorly. I called to check on her and she said yesterday she felt bad, had nausea and epigastric pain. She lost 5# in the past couple weeks.     Nutrition Factors Affecting Intake  abdominal pain and decreased appetite    PMHx: MI, HLD, pancreatitis, madai, Crohn's s/p partial colectomy x 3    Allergies: Morpholine analogues    Current Medications:    Current Outpatient Medications:     docusate sodium (COLACE) 100 MG capsule, Take 1 capsule (100 mg total) by mouth 2 (two) times daily. (Patient not taking: Reported on 8/1/2023), Disp: , Rfl: 0    lipase-protease-amylase (CREON) 36,000-114,000- 180,000 unit CpDR, Take 1 capsule by mouth 3 (three) times daily with meals. (Patient not taking: Reported on 8/1/2023), Disp: 90 capsule, Rfl: 1    morphine (MS CONTIN) 15 MG 12 hr tablet, Take 1 tablet (15 mg total) by mouth every 12 (twelve) hours., Disp: 60 tablet, Rfl: 0    ondansetron (ZOFRAN) 8 MG tablet, Take 1 tablet (8 mg total) by mouth every 12 (twelve) hours as needed for Nausea., Disp: 30 tablet, Rfl: 2     "oxyCODONE (ROXICODONE) 30 MG Tab, Take 5 mg by mouth every 6 (six) hours as needed., Disp: , Rfl:     pantoprazole (PROTONIX) 40 MG tablet, Take 1 tablet (40 mg total) by mouth every morning., Disp: 30 tablet, Rfl: 0    polyethylene glycol (GLYCOLAX) 17 gram PwPk, Take 17 g by mouth once daily. (Patient not taking: Reported on 8/1/2023), Disp: , Rfl: 0    prochlorperazine (COMPAZINE) 10 MG tablet, Take 1 tablet (10 mg total) by mouth every 6 (six) hours as needed., Disp: 30 tablet, Rfl: 1    traZODone (DESYREL) 50 MG tablet, Take 0.5 tablets (25 mg total) by mouth nightly as needed for Insomnia or Depression. (Patient not taking: Reported on 8/1/2023), Disp: 15 tablet, Rfl: 1    Labs: 8/1 K+ 3.2 (L), AST 38 (H)    Anthropometrics    Height:   Ht Readings from Last 1 Encounters:   08/01/23 5' 3" (1.6 m)      Weight:   Wt Readings from Last 5 Encounters:   08/01/23 60.7 kg (133 lb 13.1 oz)   07/28/23 60.3 kg (132 lb 15 oz)   07/26/23 60.6 kg (133 lb 9.6 oz)   07/24/23 60.8 kg (134 lb)   07/20/23 62.6 kg (138 lb)        Usual Body Weight: 70.4 kg (155 lb)  % Weight Change: -14.8% in 6 months to lowest wt 132#    BMI: 23.5 (normal)    Ideal Weight: 52.2 kg (115 lb)  % Ideal Weight: 116%    Malnutrition in the context of chronic illness  Degree of Malnutrition: severe malnutrition  Energy Intake: < 75% of estimated energy requirement for >/= 1 month  Interpretation of Weight Loss: >10% in 6 months  Body Fat: mild depletion  Area of Body Fat Loss: orbital region , upper arm region - triceps / biceps, and thoracic and lumbar region - ribs, lower back, midaxillary line  Muscle Mass Loss: mild depletion  Area of Muscle Mass Loss: temple region - temporalis muscle, clavicle bone region - pectoralis major, deltoid, trapezius muscles, clavicle and acromion bone region - deltoid muscle, scapular bone region - trapezius, supraspinus, infraspinus muscles, dorsal hand - interosseous musle, patellar region - quadricep muscle, " anterior thigh region - quadriceps muscles, and posterior calf region - gastrocnemius muscle  Fluid Accumulation: does not meet criteria  Edema: no edema present  Reduced  Strength: unable to obtain  A minimum of two characteristics is recommended for diagnosis of either severe or non-severe malnutrition.    Estimated Needs (using CBW 60.7 kg)  1718 kcal/day   [place] [address]. Jeor x 1.1 activity factor x 1.5 stress factor  91 g protein/day 1.5 g/kg CBW  1718 mL fluid/day 1 mL/kcal    Nutrition Diagnosis    PES: Malnutrition related to chronic illness as evidenced by <75% intake est needs >1 month, >10% wt loss 6 months, mild fat/muscle wasting. (continues)     Nutrition Risk  moderate    Nutrition Intervention    Interventions(treatment strategy):  modified composition of meals/snacks, prescription medication, and collaboration with other providers      Nutrition Monitoring and Evaluation    Monitor: food and beverage intake, weight change, and electrolyte/renal panel    Follow up at next provider visit.        Tamar Morrell, MS, RD, , LDN

## 2023-08-03 LAB
BACTERIA BLD CULT: NORMAL
BACTERIA BLD CULT: NORMAL

## 2023-08-04 ENCOUNTER — TELEPHONE (OUTPATIENT)
Dept: HEMATOLOGY/ONCOLOGY | Facility: CLINIC | Age: 83
End: 2023-08-04
Payer: MEDICARE

## 2023-08-04 NOTE — TELEPHONE ENCOUNTER
Returned a call to Christiano ([email or web address]?) with [facility] regarding referral and inability to make pt contact.  Also advised that they cannot prescribe RX if they cannot meet with pt.    [facility]  8/1/23 SW was notified by pt's nurse, Kizzy of patient's interest in outpatient palliative care services. TELLY sent referral to [facility] Phone: [phone] via [employer].  Also discharged with RX for pain management.    [facility] advised they have a home visit scheduled 8/8/23 and trying to reconfirm appt with pt but having trouble making contact.  Advised pts next appt with office (Dr. Burns) is 8/9/2023.

## 2023-08-08 ENCOUNTER — DOCUMENT SCAN (OUTPATIENT)
Dept: HOME HEALTH SERVICES | Facility: HOSPITAL | Age: 83
End: 2023-08-08
Payer: MEDICARE

## 2023-08-08 PROBLEM — C25.9 MALIGNANT NEOPLASM OF PANCREAS: Status: RESOLVED | Noted: 2023-07-11 | Resolved: 2023-08-08

## 2023-08-08 NOTE — PROGRESS NOTES
Subjective:        PATIENT: Evelyn Proctor  MRN: [id number]  DATE: 8/9/2023  Chief Complaint: Follow-up (1 week f/u)    Current Therapy: ENTYVIO GI q6w   by GI  Current Treatment: OP PANC GEMCITABINE QW   Oncology History Overview Note   This is a 82 y.o. female known to Dr. Pierson with PMH of recently diagnosed pancreatic ductal adenocarcinoma, hyperlipidemia, coronary artery disease, Crohn's disease on Entyvio every 6 weeks status post right hemicolectomy, hepatic steatosis, rheumatoid arthritis, osteopenia and cervical cancer. She presented to the [facility] ED 06/09/23 with worsening generalized abd pain, n/v/d that she reports is c/w Crohn's.     Upon arrival, workup included: CT abd pel with interval development of some colonic wall thickening in left colon especially at splenic flexure and proximal descending colon concerning for developing colitis, mild peripancreatic edema and inflammatory changes seen mainly in the body and tail c/w pancreatitis, persistent pancreatic duct dilation in the body and tail; labs revealed WBC 5.97, H&H 12.6/37.1, plt 252, INR 0.99, K 2.8, Cl 94, Tbili 0.7, AST 18, ALT 14, lipase 13. IVF initiated and electrolytes replenished. GI panel pending. GI consulted for Crohn's flare/pancreatic adenocarcinoma.     Of note, patient was recently admitted 06/02/23 - 06/04/23 for acute pancreatitis and was noted to have a high grade pancreatic duct stricture at pancreatic neck with upstream pancreatic duct dilation. Dr. Max performed EUS 06/08/23 that revealed a mass in the pancreatic neck that was determined to be ductal adenocarcinoma staged T3N0Mx by endosonographic criteria. He recommended oncology referral and PET scan. Patient and family requested to see Dr. Nuñez and Dr. Philip for further care.     Pancreatic cancer   6/8/2023 Initial Diagnosis    Pancreatic cancer  FINAL DIAGNOSIS       PANCREATIC NECK MASS, FINE NEEDLE ASPIRATION AND CELL BLOCK:       PANCREATIC DUCTAL  ADENOCARCINOMA.          6/8/2023 Procedure    EUS Impression:            - Normal esophagus.                          - Normal stomach.                          - Normal examined duodenum.                          - There was no sign of significant pathology in                          the ampulla.                          - There was no sign of significant pathology in                          the common bile duct.                          - There was no evidence of significant pathology                          in the left lobe of the liver.                          - A mass was identified in the pancreatic neck.                          Tissue was obtained from this exam and is of                          adenocarcinoma. This was staged T3 N0 Mx by                          endosonographic criteria. Fine needle biopsy                          performed.      6/9/2023 Imaging Significant Findings    Impression:     Interval development of some colonic wall thickening in the left colon especially at the splenic flexure and proximal descending colon concerning for developing colitis     Mild peripancreatic edema and inflammatory changes seen mainly in the body and tail the pancreas consistent with pancreatitis     Persistent pancreatic duct dilatation in the body and tail which was seen and described on multiple previous examinations     6/10/2023 Cancer Staged    Staging form: Exocrine Pancreas, AJCC 8th Edition  - Clinical stage from 6/10/2023: Stage IIA (cT3, cN0, cM0)     6/20/2023 Imaging Significant Findings    PET:Impression: Focal activity at the junction of the body and head of the pancreas concerning for neoplasm. There is no definite discrete mass on the low-dose CT scan. There is dilatation of the distal pancreatic duct.       Hospital Admission    Most Recent Admission Details  Admit Date: 6/21/23  Admitted for:    Clostridium difficile infection  -Pancreatic Cancer  -Epigastric pain     Discharge  date:6/29/23  Discharged from: [facility] 9W MEDICAL TELEMETRY at [facility]  Discharge disposition: Home[facility]Health [facility]       7/26/2023 -  Chemotherapy    Treatment Summary   Plan Name: OP PANC GEMCITABINE QW  Treatment Goal: Control  Status: Active  Start Date: 7/26/2023  End Date: 9/20/2023 (Planned)  Provider: Ronan Burns MD  Chemotherapy: gemcitabine (GEMZAR) 1,141 mg in sodium chloride 0.9% SolP 315 mL chemo infusion, 675 mg/m2 = 1,141 mg (100 % of original dose 675 mg/m2), Intravenous, Clinic/HOD 1 time, 1 of 2 cycles  Dose modification: 675 mg/m2 (original dose 675 mg/m2, Cycle 1, Reason: MD Discretion)  Administration: 1,141 mg (7/26/2023)      Pathology Significant Finding    Caris testing, testing sample submitted did not yield any biomarker results with clinical significance, to provide in association with a particular therapy,  PMS2 stains weekly absent, and control can not be accurately interpreted    Positives:   KRAS pathological variant,   MSI stable  , mismatch repair status indeterminate  , NTRK not detected  , tumor mutational burden low,  P 53 variant, AID variant, PDL1 -,   BRCA 2 not detected, BRCA1, not detected,  A TM not detected,    Clinical tries, ERK inhibitors, MEK inhibitors, PARP inhibitors with ARID 2,         08/09/2023  Patient presents for weekly follow up to recheck symptoms after starting PPI.      She started her first cycle of gemcitabine on 7/26/23. She started to have nausea and vomiting and increased BP 200s/100s. These symptoms predate her chemotherapy. She has had multiple hospital admissions. That day she was sent to the ED as well. She was discharged 7/31/23. She started to have increasing abdominal pain and was started on LA MS Contin and oxycodone for BTP.   Today she follows up after discharge. She in much better today.   Past Medical History:   Diagnosis Date    Acute pancreatitis, unspecified complication status, unspecified  pancreatitis type     Arthritis     Carpal tunnel syndrome, bilateral     Cervical cancer     Cervical radiculopathy     Crohn disease     Heart attack     HLD (hyperlipidemia)     Low back pain     Pancreatic cancer     Sleep apnea, unspecified     Spinal stenosis of lumbar region with neurogenic claudication       .  Past Surgical History:   Procedure Laterality Date    bilateral L4-5, L5-S1 decompression, repair L5-S1 dural tear  10/01/2021    Dr. Marin    CARPAL TUNNEL RELEASE Right 09/06/2019    Dr. Marin    CARPAL TUNNEL RELEASE Left 12/2/2022    Procedure: RELEASE, CARPAL TUNNEL;  Surgeon: Jesse Marin MD;  Location: [facility] OR;  Service: Neurosurgery;  Laterality: Left;    CHOLECYSTECTOMY      COLECTOMY  07/2011    partial x3    ESOPHAGOGASTRODUODENOSCOPY N/A 6/8/2023    Procedure: EGD (ESOPHAGOGASTRODUODENOSCOPY);  Surgeon: Jose Alberto Max MD;  Location: [facility] OR;  Service: Gastroenterology;  Laterality: N/A;    ESOPHAGOGASTRODUODENOSCOPY N/A 6/29/2023    Procedure: EGD (ESOPHAGOGASTRODUODENOSCOPY);  Surgeon: Jose Alberto Max MD;  Location: [facility] OR;  Service: Gastroenterology;  Laterality: N/A;    HYSTERECTOMY      total    REPAIR OF EYELID Bilateral 11/21/2022    Procedure: BILATERAL ECTROPION REPAIR;  Surgeon: Justin Flores MD;  Location: [facility] OR;  Service: ENT;  Laterality: Bilateral;    ULTRASOUND, ENDOSCOPIC, WITH FINE NEEDLE ASPIRATION N/A 6/8/2023    Procedure: UPPER EUS  W/  FNA;  Surgeon: Jose Alberto Max MD;  Location: [facility] OR;  Service: Gastroenterology;  Laterality: N/A;  Dangelo, pt w/ CD on entyvio recently admitted to [facility] w/ acute pancreatitis. Possibly drug induced from entyvio? Althoughshe does have dilated PD and a poss focal stricture in the HOP.CA 19-9 nml    ULTRASOUND, UPPER GI TRACT, ENDOSCOPIC, WITH CELIAC PLEXUS BLOCK N/A 6/29/2023    Procedure: UPPER EUS W/ POSS FNA W/ CELIAC BLOCK;  Surgeon: Jose Alberto Max MD;  Location: [facility] OR;  Service: Gastroenterology;  Laterality: N/A;   Celiac Plexus injection planned.Pt should receive 1L NS bolus upon arrival to Outpt Surgery and 1L NS during the procedure. Pt will need to be in PACU for 30 min post procedure to assess for any potential hypotension. NO LABS      .  Family History   Problem Relation Age of Onset    Diabetes Mother     Hypertension Father     Hyperlipidemia Father     Cancer Father     Hyperlipidemia Sister     Hyperlipidemia Brother     Hyperlipidemia Daughter       Social History     Socioeconomic History    Marital status:    Tobacco Use    Smoking status: Former     Current packs/day: 0.00     Types: Cigarettes    Smokeless tobacco: Never   Substance and Sexual Activity    Alcohol use: Not Currently     Comment: rarely    Drug use: Never    Sexual activity: Not Currently     Social Determinants of Health     Social Connections: Unknown (6/22/2023)    Social Connection and Isolation Panel [NHANES]     Attends Hinduism Services: More than 4 times per year     Active Member of Clubs or Organizations: Yes     Attends Club or Organization Meetings: More than 4 times per year     Marital Status:       .  Review of patient's allergies indicates:   Allergen Reactions    Morpholine analogues Itching          Current Outpatient Medications:     morphine (MS CONTIN) 15 MG 12 hr tablet, Take 1 tablet (15 mg total) by mouth every 12 (twelve) hours., Disp: 60 tablet, Rfl: 0    ondansetron (ZOFRAN) 8 MG tablet, Take 1 tablet (8 mg total) by mouth every 12 (twelve) hours as needed for Nausea., Disp: 30 tablet, Rfl: 2    oxyCODONE (ROXICODONE) 30 MG Tab, Take 5 mg by mouth every 6 (six) hours as needed., Disp: , Rfl:     pantoprazole (PROTONIX) 40 MG tablet, Take 1 tablet (40 mg total) by mouth every morning., Disp: 30 tablet, Rfl: 0    traZODone (DESYREL) 100 MG tablet, Take 100 mg by mouth every evening., Disp: , Rfl:     docusate sodium (COLACE) 100 MG capsule, Take 1 capsule (100 mg total) by mouth 2 (two) times daily.  (Patient not taking: Reported on 8/1/2023), Disp: , Rfl: 0    lipase-protease-amylase (CREON) 36,000-114,000- 180,000 unit CpDR, Take 1 capsule by mouth 3 (three) times daily with meals. (Patient not taking: Reported on 8/1/2023), Disp: 90 capsule, Rfl: 1    polyethylene glycol (GLYCOLAX) 17 gram PwPk, Take 17 g by mouth once daily. (Patient not taking: Reported on 8/1/2023), Disp: , Rfl: 0    prochlorperazine (COMPAZINE) 10 MG tablet, Take 1 tablet (10 mg total) by mouth every 6 (six) hours as needed. (Patient not taking: Reported on 8/9/2023), Disp: 30 tablet, Rfl: 1   Review of Systems   Constitutional:  Negative for activity change, appetite change, fever and unexpected weight change.   Respiratory:  Negative for cough and shortness of breath.    Cardiovascular:  Negative for leg swelling.   Gastrointestinal:  Negative for nausea and vomiting.   Endocrine: Negative.    Genitourinary: Negative.    Musculoskeletal: Negative.    Skin: Negative.    Neurological:  Positive for numbness. Negative for tremors and headaches.   Hematological: Negative.    Psychiatric/Behavioral: Negative.            Objective:     Vitals:    08/09/23 0917   BP: 130/71   Pulse: 79   Temp: 99.1 °F (37.3 °C)          Physical Exam  Vitals reviewed.   Constitutional:       General: She is awake.   HENT:      Head: Normocephalic and atraumatic.      Mouth/Throat:      Mouth: Mucous membranes are moist.      Pharynx: Oropharynx is clear.   Eyes:      Extraocular Movements: Extraocular movements intact.      Conjunctiva/sclera: Conjunctivae normal.      Pupils: Pupils are equal, round, and reactive to light.   Cardiovascular:      Rate and Rhythm: Normal rate and regular rhythm.      Pulses: Normal pulses.      Heart sounds: Normal heart sounds.   Pulmonary:      Effort: Pulmonary effort is normal.      Breath sounds: Normal breath sounds and air entry.   Chest:      Comments: Left-sided Port-A-Cath, right-sided dilated chest wall  veins  Abdominal:      General: Abdomen is flat. Bowel sounds are normal.      Palpations: Abdomen is soft.      Tenderness: There is abdominal tenderness in the epigastric area. There is no guarding or rebound.   Musculoskeletal:      Cervical back: Normal range of motion.      Right lower leg: No edema.      Left lower leg: No edema.   Lymphadenopathy:      Cervical: No cervical adenopathy.      Upper Body:      Right upper body: No axillary adenopathy.      Left upper body: No axillary adenopathy.      Lower Body: No right inguinal adenopathy. No left inguinal adenopathy.   Skin:     General: Skin is warm and dry.   Neurological:      General: No focal deficit present.      Mental Status: She is alert and oriented to person, place, and time. Mental status is at baseline.   Psychiatric:         Attention and Perception: Attention normal.         Mood and Affect: Mood and affect normal.         Behavior: Behavior is cooperative.       ECOG SCORE           Lab Review:  CBC:   Recent Labs     08/09/23  0900 08/01/23  1417 07/31/23  0502   WBC 6.06 2.58* 4.53   RBC 3.24* 3.47* 3.31*   HGB 9.9* 10.7* 10.3*   HCT 31.2* 33.5* 29.9*    211 215     CMP:   Recent Labs     08/01/23  1417 07/31/23  0502 07/28/23  0435 07/27/23  0440 07/26/23  1717 07/24/23  1503 07/24/23  0636    139 139   < > 140   < > 139   K 3.2* 3.5 3.2*   < > 2.9*   < > 3.1*   CO2 27 27 25   < > 22*   < > 25   BUN 7.4* 5.0* 18.2   < > 7.6*   < > 8.4*   CREATININE 0.79 0.76 1.01   < > 0.80   < > 0.74   CALCIUM 9.3 8.7 8.8   < > 9.9   < > 9.0   ALBUMIN 3.5  --   --   --  3.9  --  3.4   BILITOT 0.4  --   --   --  0.6  --  0.8   ALKPHOS 113  --   --   --  117  --  105   AST 38*  --   --   --  20  --  21   ALT 38  --   --   --  21  --  25    < > = values in this interval not displayed.         Assessment/Plan:   Stage IIA adenocarcinoma of pancreas  Recent C diff colitis   History inflammatory bowel disease  Weight loss  Concern for pancreatic  insufficiency  Mild Neutropenia, no fever        PLAN:  Gemzar 675 mg per m2 weekly   Eight weeks total, followed by repeat imaging. She received 80% of week 1 on 7/26/23 and started having hypertensive crisis, intractable nausea and vomiting requiring admission  Restart protonix with GI symptoms.  MS Contin 15mg BID with Oxycodone 5mg prn BTP  Genetic counseling   Genetics on tumor  Continue Creon with meals and snack  She is much better now after she started on her pain medicine.    We discussed a retrial of therapy, and potentially moving to every other week versus weekly treatment.    We will proceed with the dose today, I would not dose escalate her to 800, But we would consider changing her to every other week    Weekly CBC CMP standing orders   Second dose of Gemzar today   One week CBC CMP and 3rd dose of Gemzar if able      Follow up in about 2 weeks (around 8/23/2023) for Labs, TD with Katy and same day infusion.               REVA Dsouza

## 2023-08-09 ENCOUNTER — OFFICE VISIT (OUTPATIENT)
Dept: HEMATOLOGY/ONCOLOGY | Facility: CLINIC | Age: 83
End: 2023-08-09
Payer: MEDICARE

## 2023-08-09 ENCOUNTER — INFUSION (OUTPATIENT)
Dept: INFUSION THERAPY | Facility: HOSPITAL | Age: 83
End: 2023-08-09
Attending: INTERNAL MEDICINE
Payer: MEDICARE

## 2023-08-09 VITALS
DIASTOLIC BLOOD PRESSURE: 71 MMHG | OXYGEN SATURATION: 98 % | HEART RATE: 79 BPM | BODY MASS INDEX: 23.79 KG/M2 | HEIGHT: 63 IN | TEMPERATURE: 99 F | WEIGHT: 134.25 LBS | SYSTOLIC BLOOD PRESSURE: 130 MMHG

## 2023-08-09 DIAGNOSIS — R11.2 INTRACTABLE NAUSEA AND VOMITING: Primary | ICD-10-CM

## 2023-08-09 DIAGNOSIS — C25.8 OVERLAPPING MALIGNANT NEOPLASM OF PANCREAS: Primary | ICD-10-CM

## 2023-08-09 DIAGNOSIS — Z51.11 CHEMOTHERAPY MANAGEMENT, ENCOUNTER FOR: ICD-10-CM

## 2023-08-09 DIAGNOSIS — C25.8 OVERLAPPING MALIGNANT NEOPLASM OF PANCREAS: ICD-10-CM

## 2023-08-09 PROCEDURE — 99214 OFFICE O/P EST MOD 30 MIN: CPT | Mod: S$PBB,,, | Performed by: INTERNAL MEDICINE

## 2023-08-09 PROCEDURE — 96413 CHEMO IV INFUSION 1 HR: CPT

## 2023-08-09 PROCEDURE — 99999 PR PBB SHADOW E&M-EST. PATIENT-LVL III: CPT | Mod: PBBFAC,,, | Performed by: INTERNAL MEDICINE

## 2023-08-09 PROCEDURE — 96375 TX/PRO/DX INJ NEW DRUG ADDON: CPT

## 2023-08-09 PROCEDURE — 99999 PR PBB SHADOW E&M-EST. PATIENT-LVL III: ICD-10-PCS | Mod: PBBFAC,,, | Performed by: INTERNAL MEDICINE

## 2023-08-09 PROCEDURE — 63600175 PHARM REV CODE 636 W HCPCS: Performed by: INTERNAL MEDICINE

## 2023-08-09 PROCEDURE — 25000003 PHARM REV CODE 250: Performed by: INTERNAL MEDICINE

## 2023-08-09 PROCEDURE — 99213 OFFICE O/P EST LOW 20 MIN: CPT | Mod: PBBFAC | Performed by: INTERNAL MEDICINE

## 2023-08-09 PROCEDURE — 99214 PR OFFICE/OUTPT VISIT, EST, LEVL IV, 30-39 MIN: ICD-10-PCS | Mod: S$PBB,,, | Performed by: INTERNAL MEDICINE

## 2023-08-09 RX ORDER — HEPARIN 100 UNIT/ML
500 SYRINGE INTRAVENOUS
Status: DISCONTINUED | OUTPATIENT
Start: 2023-08-09 | End: 2023-08-09 | Stop reason: HOSPADM

## 2023-08-09 RX ORDER — SODIUM CHLORIDE 0.9 % (FLUSH) 0.9 %
10 SYRINGE (ML) INJECTION
Status: CANCELLED | OUTPATIENT
Start: 2023-08-16

## 2023-08-09 RX ORDER — SODIUM CHLORIDE 0.9 % (FLUSH) 0.9 %
10 SYRINGE (ML) INJECTION
Status: CANCELLED | OUTPATIENT
Start: 2023-08-09

## 2023-08-09 RX ORDER — ONDANSETRON 2 MG/ML
8 INJECTION INTRAMUSCULAR; INTRAVENOUS
Status: CANCELLED
Start: 2023-08-16

## 2023-08-09 RX ORDER — HEPARIN 100 UNIT/ML
500 SYRINGE INTRAVENOUS
Status: CANCELLED | OUTPATIENT
Start: 2023-08-16

## 2023-08-09 RX ORDER — TRAZODONE HYDROCHLORIDE 100 MG/1
100 TABLET ORAL NIGHTLY
COMMUNITY
Start: 2023-08-08

## 2023-08-09 RX ORDER — SODIUM CHLORIDE 0.9 % (FLUSH) 0.9 %
10 SYRINGE (ML) INJECTION
Status: DISCONTINUED | OUTPATIENT
Start: 2023-08-09 | End: 2023-08-09 | Stop reason: HOSPADM

## 2023-08-09 RX ORDER — HEPARIN 100 UNIT/ML
500 SYRINGE INTRAVENOUS
Status: CANCELLED | OUTPATIENT
Start: 2023-08-09

## 2023-08-09 RX ORDER — ONDANSETRON 2 MG/ML
8 INJECTION INTRAMUSCULAR; INTRAVENOUS
Status: COMPLETED | OUTPATIENT
Start: 2023-08-09 | End: 2023-08-09

## 2023-08-09 RX ADMIN — ONDANSETRON 8 MG: 2 INJECTION INTRAMUSCULAR; INTRAVENOUS at 10:08

## 2023-08-09 RX ADMIN — GEMCITABINE HYDROCHLORIDE 1140 MG: 1 INJECTION, SOLUTION INTRAVENOUS at 10:08

## 2023-08-09 RX ADMIN — DEXAMETHASONE SODIUM PHOSPHATE 10 MG: 4 INJECTION INTRA-ARTICULAR; INTRALESIONAL; INTRAMUSCULAR; INTRAVENOUS; SOFT TISSUE at 10:08

## 2023-08-09 RX ADMIN — HEPARIN 500 UNITS: 100 SYRINGE at 11:08

## 2023-08-16 ENCOUNTER — INFUSION (OUTPATIENT)
Dept: INFUSION THERAPY | Facility: HOSPITAL | Age: 83
End: 2023-08-16
Attending: INTERNAL MEDICINE
Payer: MEDICARE

## 2023-08-16 VITALS
TEMPERATURE: 99 F | WEIGHT: 135.13 LBS | SYSTOLIC BLOOD PRESSURE: 160 MMHG | DIASTOLIC BLOOD PRESSURE: 75 MMHG | HEART RATE: 77 BPM | BODY MASS INDEX: 23.93 KG/M2

## 2023-08-16 DIAGNOSIS — E87.6 HYPOKALEMIA: Primary | ICD-10-CM

## 2023-08-16 DIAGNOSIS — C25.8 OVERLAPPING MALIGNANT NEOPLASM OF PANCREAS: ICD-10-CM

## 2023-08-16 DIAGNOSIS — C25.8 OVERLAPPING MALIGNANT NEOPLASM OF PANCREAS: Primary | ICD-10-CM

## 2023-08-16 DIAGNOSIS — E87.6 HYPOKALEMIA: ICD-10-CM

## 2023-08-16 PROCEDURE — 63600175 PHARM REV CODE 636 W HCPCS: Performed by: INTERNAL MEDICINE

## 2023-08-16 PROCEDURE — 96375 TX/PRO/DX INJ NEW DRUG ADDON: CPT

## 2023-08-16 PROCEDURE — 96413 CHEMO IV INFUSION 1 HR: CPT

## 2023-08-16 PROCEDURE — 25000003 PHARM REV CODE 250: Performed by: INTERNAL MEDICINE

## 2023-08-16 PROCEDURE — 96367 TX/PROPH/DG ADDL SEQ IV INF: CPT

## 2023-08-16 PROCEDURE — A4216 STERILE WATER/SALINE, 10 ML: HCPCS | Performed by: INTERNAL MEDICINE

## 2023-08-16 RX ORDER — POTASSIUM CHLORIDE 20 MEQ/1
20 TABLET, EXTENDED RELEASE ORAL 2 TIMES DAILY
Qty: 10 TABLET | Refills: 0 | Status: ON HOLD | OUTPATIENT
Start: 2023-08-16 | End: 2023-08-21 | Stop reason: HOSPADM

## 2023-08-16 RX ORDER — SODIUM CHLORIDE 0.9 % (FLUSH) 0.9 %
10 SYRINGE (ML) INJECTION
Status: DISCONTINUED | OUTPATIENT
Start: 2023-08-16 | End: 2023-08-16 | Stop reason: HOSPADM

## 2023-08-16 RX ORDER — POTASSIUM CHLORIDE 20 MEQ/1
20 TABLET, EXTENDED RELEASE ORAL 2 TIMES DAILY
Qty: 10 TABLET | Refills: 0 | Status: SHIPPED | OUTPATIENT
Start: 2023-08-16 | End: 2023-08-16

## 2023-08-16 RX ORDER — ONDANSETRON 2 MG/ML
8 INJECTION INTRAMUSCULAR; INTRAVENOUS
Status: COMPLETED | OUTPATIENT
Start: 2023-08-16 | End: 2023-08-16

## 2023-08-16 RX ORDER — HEPARIN 100 UNIT/ML
500 SYRINGE INTRAVENOUS
Status: DISCONTINUED | OUTPATIENT
Start: 2023-08-16 | End: 2023-08-16 | Stop reason: HOSPADM

## 2023-08-16 RX ADMIN — ONDANSETRON 8 MG: 2 INJECTION INTRAMUSCULAR; INTRAVENOUS at 02:08

## 2023-08-16 RX ADMIN — SODIUM CHLORIDE: 9 INJECTION, SOLUTION INTRAVENOUS at 01:08

## 2023-08-16 RX ADMIN — GEMCITABINE HYDROCHLORIDE 1140 MG: 1 INJECTION, SOLUTION INTRAVENOUS at 02:08

## 2023-08-16 RX ADMIN — Medication 10 ML: at 03:08

## 2023-08-16 RX ADMIN — DEXAMETHASONE SODIUM PHOSPHATE 10 MG: 4 INJECTION INTRA-ARTICULAR; INTRALESIONAL; INTRAMUSCULAR; INTRAVENOUS; SOFT TISSUE at 01:08

## 2023-08-16 RX ADMIN — HEPARIN 500 UNITS: 100 SYRINGE at 03:08

## 2023-08-19 ENCOUNTER — HOSPITAL ENCOUNTER (INPATIENT)
Facility: HOSPITAL | Age: 83
LOS: 2 days | Discharge: HOME | DRG: 391 | End: 2023-08-21
Attending: STUDENT IN AN ORGANIZED HEALTH CARE EDUCATION/TRAINING PROGRAM | Admitting: INTERNAL MEDICINE
Payer: MEDICARE

## 2023-08-19 DIAGNOSIS — K92.0 HEMATEMESIS WITH NAUSEA: Primary | ICD-10-CM

## 2023-08-19 DIAGNOSIS — R10.9 ABDOMINAL PAIN: ICD-10-CM

## 2023-08-19 DIAGNOSIS — R10.9 INTRACTABLE ABDOMINAL PAIN: ICD-10-CM

## 2023-08-19 LAB
ALBUMIN SERPL-MCNC: 3.6 G/DL (ref 3.4–4.8)
ALBUMIN/GLOB SERPL: 1 RATIO (ref 1.1–2)
ALP SERPL-CCNC: 94 UNIT/L (ref 40–150)
ALT SERPL-CCNC: 21 UNIT/L (ref 0–55)
APPEARANCE UR: CLEAR
AST SERPL-CCNC: 46 UNIT/L (ref 5–34)
BACTERIA #/AREA URNS AUTO: NORMAL /HPF
BASOPHILS # BLD AUTO: 0.01 X10(3)/MCL
BASOPHILS NFR BLD AUTO: 0.1 %
BILIRUB SERPL-MCNC: 1.4 MG/DL
BILIRUB UR QL STRIP.AUTO: NEGATIVE
BNP BLD-MCNC: 84.5 PG/ML
BUN SERPL-MCNC: 7.9 MG/DL (ref 9.8–20.1)
CALCIUM SERPL-MCNC: 9.2 MG/DL (ref 8.4–10.2)
CHLORIDE SERPL-SCNC: 99 MMOL/L (ref 98–107)
CO2 SERPL-SCNC: 26 MMOL/L (ref 23–31)
COLOR UR: YELLOW
CREAT SERPL-MCNC: 0.72 MG/DL (ref 0.55–1.02)
EOSINOPHIL # BLD AUTO: 0 X10(3)/MCL (ref 0–0.9)
EOSINOPHIL NFR BLD AUTO: 0 %
ERYTHROCYTE [DISTWIDTH] IN BLOOD BY AUTOMATED COUNT: 14.6 % (ref 11.5–17)
GFR SERPLBLD CREATININE-BSD FMLA CKD-EPI: >60 MLS/MIN/1.73/M2
GLOBULIN SER-MCNC: 3.5 GM/DL (ref 2.4–3.5)
GLUCOSE SERPL-MCNC: 134 MG/DL (ref 82–115)
GLUCOSE UR QL STRIP.AUTO: NEGATIVE
HCT VFR BLD AUTO: 28.6 % (ref 37–47)
HGB BLD-MCNC: 10 G/DL (ref 12–16)
IMM GRANULOCYTES # BLD AUTO: 0.03 X10(3)/MCL (ref 0–0.04)
IMM GRANULOCYTES NFR BLD AUTO: 0.4 %
KETONES UR QL STRIP.AUTO: NEGATIVE
LACTATE SERPL-SCNC: 0.8 MMOL/L (ref 0.5–2.2)
LEUKOCYTE ESTERASE UR QL STRIP.AUTO: NEGATIVE
LIPASE SERPL-CCNC: 10 U/L
LYMPHOCYTES # BLD AUTO: 0.47 X10(3)/MCL (ref 0.6–4.6)
LYMPHOCYTES NFR BLD AUTO: 6.6 %
MCH RBC QN AUTO: 30.8 PG (ref 27–31)
MCHC RBC AUTO-ENTMCNC: 35 G/DL (ref 33–36)
MCV RBC AUTO: 88 FL (ref 80–94)
MONOCYTES # BLD AUTO: 0.04 X10(3)/MCL (ref 0.1–1.3)
MONOCYTES NFR BLD AUTO: 0.6 %
NEUTROPHILS # BLD AUTO: 6.62 X10(3)/MCL (ref 2.1–9.2)
NEUTROPHILS NFR BLD AUTO: 92.3 %
NITRITE UR QL STRIP.AUTO: NEGATIVE
NRBC BLD AUTO-RTO: 0 %
PH UR STRIP.AUTO: 7 [PH]
PLATELET # BLD AUTO: 169 X10(3)/MCL (ref 130–400)
PMV BLD AUTO: 9.6 FL (ref 7.4–10.4)
POTASSIUM SERPL-SCNC: 3 MMOL/L (ref 3.5–5.1)
PROT SERPL-MCNC: 7.1 GM/DL (ref 5.8–7.6)
PROT UR QL STRIP.AUTO: NEGATIVE
RBC # BLD AUTO: 3.25 X10(6)/MCL (ref 4.2–5.4)
RBC #/AREA URNS AUTO: NORMAL /HPF
RBC UR QL AUTO: NEGATIVE
SODIUM SERPL-SCNC: 137 MMOL/L (ref 136–145)
SP GR UR STRIP.AUTO: >=1.04 (ref 1–1.03)
SQUAMOUS #/AREA URNS AUTO: NORMAL /HPF
TROPONIN I SERPL-MCNC: 0.03 NG/ML (ref 0–0.04)
UROBILINOGEN UR STRIP-ACNC: 0.2
WBC # SPEC AUTO: 7.17 X10(3)/MCL (ref 4.5–11.5)
WBC #/AREA URNS AUTO: NORMAL /HPF

## 2023-08-19 PROCEDURE — 83880 ASSAY OF NATRIURETIC PEPTIDE: CPT | Performed by: STUDENT IN AN ORGANIZED HEALTH CARE EDUCATION/TRAINING PROGRAM

## 2023-08-19 PROCEDURE — 83605 ASSAY OF LACTIC ACID: CPT | Performed by: STUDENT IN AN ORGANIZED HEALTH CARE EDUCATION/TRAINING PROGRAM

## 2023-08-19 PROCEDURE — 96361 HYDRATE IV INFUSION ADD-ON: CPT

## 2023-08-19 PROCEDURE — 25000003 PHARM REV CODE 250: Performed by: INTERNAL MEDICINE

## 2023-08-19 PROCEDURE — 11000001 HC ACUTE MED/SURG PRIVATE ROOM

## 2023-08-19 PROCEDURE — 25500020 PHARM REV CODE 255: Performed by: STUDENT IN AN ORGANIZED HEALTH CARE EDUCATION/TRAINING PROGRAM

## 2023-08-19 PROCEDURE — 25000003 PHARM REV CODE 250: Performed by: STUDENT IN AN ORGANIZED HEALTH CARE EDUCATION/TRAINING PROGRAM

## 2023-08-19 PROCEDURE — 63600175 PHARM REV CODE 636 W HCPCS: Performed by: INTERNAL MEDICINE

## 2023-08-19 PROCEDURE — 99285 EMERGENCY DEPT VISIT HI MDM: CPT | Mod: 25

## 2023-08-19 PROCEDURE — 96374 THER/PROPH/DIAG INJ IV PUSH: CPT

## 2023-08-19 PROCEDURE — 83690 ASSAY OF LIPASE: CPT | Performed by: STUDENT IN AN ORGANIZED HEALTH CARE EDUCATION/TRAINING PROGRAM

## 2023-08-19 PROCEDURE — 81003 URINALYSIS AUTO W/O SCOPE: CPT | Performed by: STUDENT IN AN ORGANIZED HEALTH CARE EDUCATION/TRAINING PROGRAM

## 2023-08-19 PROCEDURE — 63600175 PHARM REV CODE 636 W HCPCS: Performed by: STUDENT IN AN ORGANIZED HEALTH CARE EDUCATION/TRAINING PROGRAM

## 2023-08-19 PROCEDURE — 93005 ELECTROCARDIOGRAM TRACING: CPT

## 2023-08-19 PROCEDURE — 21400001 HC TELEMETRY ROOM

## 2023-08-19 PROCEDURE — 96375 TX/PRO/DX INJ NEW DRUG ADDON: CPT

## 2023-08-19 PROCEDURE — 80053 COMPREHEN METABOLIC PANEL: CPT | Performed by: STUDENT IN AN ORGANIZED HEALTH CARE EDUCATION/TRAINING PROGRAM

## 2023-08-19 PROCEDURE — 93010 ELECTROCARDIOGRAM REPORT: CPT | Mod: ,,, | Performed by: INTERNAL MEDICINE

## 2023-08-19 PROCEDURE — 93010 EKG 12-LEAD: ICD-10-PCS | Mod: ,,, | Performed by: INTERNAL MEDICINE

## 2023-08-19 PROCEDURE — 96376 TX/PRO/DX INJ SAME DRUG ADON: CPT

## 2023-08-19 PROCEDURE — 84484 ASSAY OF TROPONIN QUANT: CPT | Performed by: STUDENT IN AN ORGANIZED HEALTH CARE EDUCATION/TRAINING PROGRAM

## 2023-08-19 PROCEDURE — 85025 COMPLETE CBC W/AUTO DIFF WBC: CPT | Performed by: STUDENT IN AN ORGANIZED HEALTH CARE EDUCATION/TRAINING PROGRAM

## 2023-08-19 RX ORDER — MORPHINE SULFATE 15 MG/1
15 TABLET, FILM COATED, EXTENDED RELEASE ORAL EVERY 12 HOURS
Status: DISCONTINUED | OUTPATIENT
Start: 2023-08-20 | End: 2023-08-21 | Stop reason: HOSPADM

## 2023-08-19 RX ORDER — ONDANSETRON 2 MG/ML
4 INJECTION INTRAMUSCULAR; INTRAVENOUS
Status: COMPLETED | OUTPATIENT
Start: 2023-08-19 | End: 2023-08-19

## 2023-08-19 RX ORDER — TALC
6 POWDER (GRAM) TOPICAL NIGHTLY PRN
Status: DISCONTINUED | OUTPATIENT
Start: 2023-08-19 | End: 2023-08-21 | Stop reason: HOSPADM

## 2023-08-19 RX ORDER — OXYCODONE AND ACETAMINOPHEN 10; 325 MG/1; MG/1
1 TABLET ORAL EVERY 4 HOURS PRN
Status: DISCONTINUED | OUTPATIENT
Start: 2023-08-19 | End: 2023-08-21 | Stop reason: HOSPADM

## 2023-08-19 RX ORDER — ENOXAPARIN SODIUM 100 MG/ML
1 INJECTION SUBCUTANEOUS EVERY 12 HOURS
Status: DISCONTINUED | OUTPATIENT
Start: 2023-08-19 | End: 2023-08-21 | Stop reason: HOSPADM

## 2023-08-19 RX ORDER — MUPIROCIN 20 MG/G
OINTMENT TOPICAL 2 TIMES DAILY
Status: DISCONTINUED | OUTPATIENT
Start: 2023-08-19 | End: 2023-08-21 | Stop reason: HOSPADM

## 2023-08-19 RX ORDER — FAMOTIDINE 10 MG/ML
20 INJECTION INTRAVENOUS
Status: COMPLETED | OUTPATIENT
Start: 2023-08-19 | End: 2023-08-19

## 2023-08-19 RX ORDER — HYDROMORPHONE HYDROCHLORIDE 2 MG/ML
1 INJECTION, SOLUTION INTRAMUSCULAR; INTRAVENOUS; SUBCUTANEOUS
Status: COMPLETED | OUTPATIENT
Start: 2023-08-19 | End: 2023-08-19

## 2023-08-19 RX ORDER — POLYETHYLENE GLYCOL 3350 17 G/17G
17 POWDER, FOR SOLUTION ORAL DAILY
Status: DISCONTINUED | OUTPATIENT
Start: 2023-08-20 | End: 2023-08-21 | Stop reason: HOSPADM

## 2023-08-19 RX ORDER — ONDANSETRON 2 MG/ML
4 INJECTION INTRAMUSCULAR; INTRAVENOUS EVERY 4 HOURS PRN
Status: DISCONTINUED | OUTPATIENT
Start: 2023-08-19 | End: 2023-08-20

## 2023-08-19 RX ORDER — SODIUM CHLORIDE 0.9 % (FLUSH) 0.9 %
10 SYRINGE (ML) INJECTION
Status: DISCONTINUED | OUTPATIENT
Start: 2023-08-19 | End: 2023-08-21 | Stop reason: HOSPADM

## 2023-08-19 RX ORDER — ONDANSETRON 4 MG/1
8 TABLET, ORALLY DISINTEGRATING ORAL EVERY 8 HOURS PRN
Status: DISCONTINUED | OUTPATIENT
Start: 2023-08-19 | End: 2023-08-20

## 2023-08-19 RX ORDER — DOCUSATE SODIUM 100 MG/1
100 CAPSULE, LIQUID FILLED ORAL 2 TIMES DAILY
Status: DISCONTINUED | OUTPATIENT
Start: 2023-08-19 | End: 2023-08-20

## 2023-08-19 RX ORDER — PANTOPRAZOLE SODIUM 40 MG/1
40 TABLET, DELAYED RELEASE ORAL DAILY
Status: DISCONTINUED | OUTPATIENT
Start: 2023-08-20 | End: 2023-08-21 | Stop reason: HOSPADM

## 2023-08-19 RX ORDER — MORPHINE SULFATE 4 MG/ML
4 INJECTION, SOLUTION INTRAMUSCULAR; INTRAVENOUS
Status: DISCONTINUED | OUTPATIENT
Start: 2023-08-19 | End: 2023-08-19

## 2023-08-19 RX ADMIN — MUPIROCIN: 20 OINTMENT TOPICAL at 09:08

## 2023-08-19 RX ADMIN — FAMOTIDINE 20 MG: 10 INJECTION, SOLUTION INTRAVENOUS at 04:08

## 2023-08-19 RX ADMIN — ONDANSETRON 4 MG: 2 INJECTION INTRAMUSCULAR; INTRAVENOUS at 01:08

## 2023-08-19 RX ADMIN — IOPAMIDOL 100 ML: 755 INJECTION, SOLUTION INTRAVENOUS at 02:08

## 2023-08-19 RX ADMIN — OXYCODONE AND ACETAMINOPHEN 1 TABLET: 10; 325 TABLET ORAL at 09:08

## 2023-08-19 RX ADMIN — ONDANSETRON 4 MG: 2 INJECTION INTRAMUSCULAR; INTRAVENOUS at 09:08

## 2023-08-19 RX ADMIN — HYDROMORPHONE HYDROCHLORIDE 1 MG: 2 INJECTION INTRAMUSCULAR; INTRAVENOUS; SUBCUTANEOUS at 02:08

## 2023-08-19 RX ADMIN — SODIUM CHLORIDE, POTASSIUM CHLORIDE, SODIUM LACTATE AND CALCIUM CHLORIDE 1000 ML: 600; 310; 30; 20 INJECTION, SOLUTION INTRAVENOUS at 01:08

## 2023-08-19 RX ADMIN — ENOXAPARIN SODIUM 60 MG: 80 INJECTION SUBCUTANEOUS at 09:08

## 2023-08-19 NOTE — FIRST PROVIDER EVALUATION
"Medical screening examination initiated.  I have conducted a focused provider triage encounter, findings are as follows:    Brief history of present illness:  82y/o F presents to the ED with abdominal pain. Received chemo one month ago for pancreatic CA.     Vitals:    08/19/23 1249   BP: (!) 188/92   Pulse: 102   Resp: 16   Temp: 97.7 °F (36.5 °C)   TempSrc: Temporal   SpO2: 99%   Weight: 61.2 kg (135 lb)   Height: 5' 3" (1.6 m)       Pertinent physical exam:  Awake and Alert    Brief workup plan:  Labs    Preliminary workup initiated; this workup will be continued and followed by the physician or advanced practice provider that is assigned to the patient when roomed.  "

## 2023-08-19 NOTE — ED PROVIDER NOTES
Encounter Date: 8/19/2023    SCRIBE #1 NOTE: I, Fabienne Angeles, am scribing for, and in the presence of,  Nam Salinas MD. I have scribed the following portions of the note - Other sections scribed: HPI, ROS, PE, MDM.       History     Chief Complaint   Patient presents with    Abdominal Pain     Hx of crohn's, pancreatic ca. Last chemo - 8/16. C/o N/V/abd pain x 1 week. Symptoms worsened after chemo this week. Denies fever     83 Y.O. female with a history of metastatic pancreatic CA, Crohn's disease, MI, and HLD presents to the ED for diffuse abdominal pain onset 3 days ago. Pt states that her symptoms began after her most recent round of chemotherapy (8/16/23) and also include nausea/vomiting. Pt's oncologist is Ronan Burns MD. Pt's PCP is Marina Morales MD.     The history is provided by the patient.     Review of patient's allergies indicates:   Allergen Reactions    Morpholine analogues Itching     Past Medical History:   Diagnosis Date    Acute pancreatitis, unspecified complication status, unspecified pancreatitis type     Arthritis     Carpal tunnel syndrome, bilateral     Cervical cancer     Cervical radiculopathy     Crohn disease     Heart attack     HLD (hyperlipidemia)     Low back pain     Pancreatic cancer     Sleep apnea, unspecified     Spinal stenosis of lumbar region with neurogenic claudication      Past Surgical History:   Procedure Laterality Date    bilateral L4-5, L5-S1 decompression, repair L5-S1 dural tear  10/01/2021    Dr. Marin    CARPAL TUNNEL RELEASE Right 09/06/2019    Dr. Marin    CARPAL TUNNEL RELEASE Left 12/2/2022    Procedure: RELEASE, CARPAL TUNNEL;  Surgeon: Jesse Marin MD;  Location: [facility];  Service: Neurosurgery;  Laterality: Left;    CHOLECYSTECTOMY      COLECTOMY  07/2011    partial x3    ESOPHAGOGASTRODUODENOSCOPY N/A 6/8/2023    Procedure: EGD (ESOPHAGOGASTRODUODENOSCOPY);  Surgeon: Jose Alberto Max MD;  Location: [facility];  Service: Gastroenterology;   Laterality: N/A;    ESOPHAGOGASTRODUODENOSCOPY N/A 6/29/2023    Procedure: EGD (ESOPHAGOGASTRODUODENOSCOPY);  Surgeon: Jose Alberto Max MD;  Location: [facility] OR;  Service: Gastroenterology;  Laterality: N/A;    HYSTERECTOMY      total    REPAIR OF EYELID Bilateral 11/21/2022    Procedure: BILATERAL ECTROPION REPAIR;  Surgeon: Justin Flores MD;  Location: [facility] OR;  Service: ENT;  Laterality: Bilateral;    ULTRASOUND, ENDOSCOPIC, WITH FINE NEEDLE ASPIRATION N/A 6/8/2023    Procedure: UPPER EUS  W/  FNA;  Surgeon: Jose Alberto Max MD;  Location: [facility] OR;  Service: Gastroenterology;  Laterality: N/A;  Dangelo, pt w/ CD on entyvio recently admitted to [facility] w/ acute pancreatitis. Possibly drug induced from entyvio? Althoughshe does have dilated PD and a poss focal stricture in the HOP.CA 19-9 nml    ULTRASOUND, UPPER GI TRACT, ENDOSCOPIC, WITH CELIAC PLEXUS BLOCK N/A 6/29/2023    Procedure: UPPER EUS W/ POSS FNA W/ CELIAC BLOCK;  Surgeon: Jose Alberto Max MD;  Location: [facility] OR;  Service: Gastroenterology;  Laterality: N/A;  Celiac Plexus injection planned.Pt should receive 1L NS bolus upon arrival to Outpt Surgery and 1L NS during the procedure. Pt will need to be in PACU for 30 min post procedure to assess for any potential hypotension. NO LABS     Family History   Problem Relation Age of Onset    Diabetes Mother     Hypertension Father     Hyperlipidemia Father     Cancer Father     Hyperlipidemia Sister     Hyperlipidemia Brother     Hyperlipidemia Daughter      Social History     Tobacco Use    Smoking status: Former     Current packs/day: 0.00     Types: Cigarettes    Smokeless tobacco: Never   Substance Use Topics    Alcohol use: Not Currently     Comment: rarely    Drug use: Never     Review of Systems   Constitutional:  Negative for chills and fever.   HENT:  Negative for congestion, drooling and sore throat.    Eyes:  Negative for pain and visual disturbance.   Respiratory:  Negative for chest tightness,  shortness of breath and wheezing.    Cardiovascular:  Negative for chest pain, palpitations and leg swelling.   Gastrointestinal:  Positive for abdominal pain, nausea and vomiting.   Genitourinary:  Negative for dysuria and hematuria.   Musculoskeletal:  Negative for back pain, neck pain and neck stiffness.   Skin:  Negative for pallor and rash.   Neurological:  Negative for weakness and numbness.   Hematological:  Does not bruise/bleed easily.       Physical Exam     Initial Vitals [08/19/23 1249]   BP Pulse Resp Temp SpO2   (!) 188/92 102 16 97.7 °F (36.5 °C) 99 %      MAP       --         Physical Exam    Nursing note and vitals reviewed.  Constitutional: She appears well-developed and well-nourished. She is not diaphoretic.   Uncomfortable appearing.    HENT:   Head: Normocephalic and atraumatic.   Nose: Nose normal.   Mouth/Throat: Oropharynx is clear and moist. Mucous membranes are dry.   Eyes: EOM are normal. Pupils are equal, round, and reactive to light.   Neck: Neck supple.   Normal range of motion.  Cardiovascular:  Normal rate and regular rhythm.           No murmur heard.  Pulmonary/Chest: Breath sounds normal. No respiratory distress. She has no wheezes. She has no rales.   Abdominal: Abdomen is soft. She exhibits no distension. A surgical scar is present. There is generalized abdominal tenderness.   Well-healed abdominal surgical incision.  There is guarding. There is no rebound.   Musculoskeletal:      Cervical back: Normal range of motion and neck supple.     Neurological: She is alert and oriented to person, place, and time. She has normal strength. No cranial nerve deficit or sensory deficit.   Skin: Skin is warm. Capillary refill takes less than 2 seconds. No rash noted.         ED Course   Procedures  Labs Reviewed   COMPREHENSIVE METABOLIC PANEL - Abnormal; Notable for the following components:       Result Value    Potassium Level 3.0 (*)     Glucose Level 134 (*)     Blood Urea Nitrogen 7.9  (*)     Albumin/Globulin Ratio 1.0 (*)     Aspartate Aminotransferase 46 (*)     All other components within normal limits   URINALYSIS, REFLEX TO URINE CULTURE - Abnormal; Notable for the following components:    Specific Gravity, UA >=1.040 (*)     All other components within normal limits   CBC WITH DIFFERENTIAL - Abnormal; Notable for the following components:    RBC 3.25 (*)     Hgb 10.0 (*)     Hct 28.6 (*)     Lymph # 0.47 (*)     Mono # 0.04 (*)     All other components within normal limits   LIPASE - Normal   TROPONIN I - Normal   B-TYPE NATRIURETIC PEPTIDE - Normal   LACTIC ACID, PLASMA - Normal   URINALYSIS, MICROSCOPIC - Normal   CBC W/ AUTO DIFFERENTIAL    Narrative:     The following orders were created for panel order CBC auto differential.  Procedure                               Abnormality         Status                     ---------                               -----------         ------                     CBC with Differential[[id number]]        Abnormal            Final result                 Please view results for these tests on the individual orders.        ECG Results              EKG 12-lead (Final result)  Result time 08/21/23 11:15:39      Final result by Interface, Lab In [facility] (08/21/23 11:15:39)                   Narrative:    Test Reason : R10.9,    Vent. Rate : 083 BPM     Atrial Rate : 083 BPM     P-R Int : 152 ms          QRS Dur : 090 ms      QT Int : 382 ms       P-R-T Axes : 077 050 079 degrees     QTc Int : 448 ms    Normal sinus rhythm  Minimal voltage criteria for LVH, may be normal variant ( Mauricio product )  Borderline Abnormal ECG  Confirmed by Jeffrey Fuentes MD ([id number]) on 8/19/2023 6:37:49 PM    Referred By: [email or web address]   SELF           Confirmed By:Jeffrey Fuentes MD                                  Imaging Results               CT Abdomen Pelvis With Contrast (Final result)  Result time 08/19/23 14:52:28      Final result by Ash Gonzales MD (08/19/23 14:52:28)                    Narrative:    EXAMINATION  CT ABDOMEN PELVIS WITH CONTRAST    CLINICAL HISTORY  Nausea/vomiting;Abdominal pain, acute, nonlocalized;hx pancreatic cancer;    TECHNIQUE  Post-contrast helical-acquisition CT images were obtained and multiplanar reformats accomplished by a CT technologist at a separate workstation, pushed to PACS for physician review.    CONTRAST  *IV: ISOVUE-370, 100 mL  *Enteric: none    COMPARISON  26 July 2023    FINDINGS  Images were reviewed in soft tissue, lung, and bone windows.    Exam quality: adequate for evaluation    Lines/tubes: none visualized    Chest: Stable heart chamber size. No pericardial effusion. No interval change of the visualized vasculature. No airspace consolidation or suspicious lung lesion. No worsening pleural effusion or evidence for loculation.    Hepatobiliary/Pancreas: There are similar right upper quadrant postoperative changes with persistent dilatation of the intrahepatic biliary ducts.  No evidence of worsening extrahepatic biliary dilatation is identified.  Air attenuation in the region of the gallbladder fossa is similar to the prior study and may represent adjacent bowel loop, otherwise poorly delineated secondary to persistent streak artifact produced by metallic hardware.  There is no enlarging focal lesion or evidence of acute process involving the liver.  Extensive heterogeneous appearance of the pancreas and diffuse ductal dilatation unchanged in comparison.  Can not exclude nonobstructing portal vein thrombosis given presence of intraluminal hypodensity with surrounding contrast (series 2, image 41; series 4, image 41); of note, this finding is not appreciated on review of the 23 July 2023 CT.    Spleen: No interval change.    Adrenal/: No new, enlarging, or otherwise suspicious adrenal or renal lesion. No interval development of findings to suggest obstructive uropathy. The urinary bladder is moderately distended with no focal mural  irregularity identified.  Similar changes of hysterectomy.  No suspicious adnexal lesions.    Esophagus/GI tract: The included lower esophagus is unremarkable. There is circumferential edematous thickening of the distal gastric wall and antrum, with no evidence of gross outlet obstruction.  Mid abdominal enterocolic anastomosis is unchanged.  There is no evidence of high-grade mechanical small bowel obstruction.  The rectosigmoid colon is nondistended but there is notable thickened appearance of the distal large bowel wall.  Areas of descending colon mural thickening also present with widespread mucosal enhancement.    Musculoskeletal: No interval change.    Other findings: No free fluid or air. No drainable collections. Aortoiliac vascular structures are similar in comparison. No interval pathologic vasyl enlargement or development of necrotic adenopathy.    IMPRESSION  1. Interval development of near occlusive portal vein thrombosis.  2. Mural thickening of the distal gastric wall/antrum may be secondary to contiguous inflammation from adjacent right upper quadrant pathology, with possibility of acute gastritis not excluded.  3. Distal large bowel wall thickening suggestive of proctocolitis.  4. Intrahepatic biliary dilatation and additional above discussed nonacute abnormalities are without significant interval change from the comparison CT exams  ==========    This report was flagged in Epic as abnormal.    RADIATION DOSE  Automated tube current modulation, weight-based exposure dosing, and/or iterative reconstruction technique utilized to reach lowest reasonably achievable exposure rate.    DLP: 526 mGy*cm      Electronically signed by: Ash Gonzales  Date:    08/19/2023  Time:    14:52                                     X-Ray Chest AP Portable (Final result)  Result time 08/19/23 14:10:54      Final result by Ash Gonzales MD (08/19/23 14:10:54)                   Narrative:    EXAMINATION  XR CHEST AP  PORTABLE    CLINICAL HISTORY  Unspecified abdominal pain    TECHNIQUE  A total of 1 frontal image(s) submitted of the chest.    COMPARISON  27 July 2023    FINDINGS  Lines/tubes/devices: Left chest wall subcutaneous port is in similar position.  ECG leads overlie the chest.    The cardiac silhouette and central vascular structures are unchanged.  The trachea is midline. No new or worsening consolidation is developed in the interval.  There is no large pleural effusion or convincing pneumothorax.    Regional osseous structures and extrathoracic soft tissues are similar.    IMPRESSION  No acute process or other adverse interval change.      Electronically signed by: Ash Gonzales  Date:    08/19/2023  Time:    14:10                                     Medications   lactated ringers bolus 1,000 mL (0 mLs Intravenous Stopped 8/19/23 1451)   ondansetron injection 4 mg (4 mg Intravenous Given 8/19/23 1351)   HYDROmorphone (PF) injection 1 mg (1 mg Intravenous Given 8/19/23 1406)   iopamidoL (ISOVUE-370) injection 100 mL (100 mLs Intravenous Given 8/19/23 1433)   famotidine (PF) injection 20 mg (20 mg Intravenous Given 8/19/23 1614)   potassium chloride SA CR tablet 40 mEq (40 mEq Oral Given 8/20/23 0819)     Medical Decision Making  Amount and/or Complexity of Data Reviewed  Labs: ordered.  Radiology: ordered.  ECG/medicine tests:  Decision-making details documented in ED Course.    Risk  Prescription drug management.  Decision regarding hospitalization.        Differential diagnosis (includes but is not limited to):   Pancreatitis, SBO, ileus, progression of patient's known malignancy, intractable abdominal pain, electrolyte abnormalities, dehydration, kidney injury    MDM Narrative  83-year-old female with pancreatic cancer currently on chemo with last dose 8/16 presents with abdominal pain associated with nausea and vomiting over the past several days.  She states her symptoms have significantly worsened after her dose  of chemotherapy.  She denies any fevers.  EKG reviewed.  Labs are pending.  Chest x-ray pending.  CT scan of her abdomen and pelvis pending to rule out acute intra-abdominal pathology given it has been several weeks since her previous scan.  Narcotic pain control as needed.  IV fluid rehydration ordered.    Update:  Labs reviewed and are fairly stable overall, hypokalemia noted which will be replaced in the emergency department.  Urinalysis without evidence of infection.  Lactic acid is normal.  CT scan does show progression of patient's disease as well as some worsening portal venous thrombosis.  Patient is requiring multiple rounds of pain medications without significant improvement in her symptoms.  Will proceed with hospital admission for pain and nausea control.  Patient agrees with plan for admission and has no questions at this time.  Hospital Medicine has been consulted and has accepted the patient.    Dispo:  Admit    My independent radiology interpretation:  As above  Point of care US (independently performed and interpreted):   Decision rules/clinical scoring:     Sepsis Perfusion Assessment:     Amount and/or Complexity of Data Reviewed  Independent historian: daughter    Summary of history:  History corroborated by the patient's family members who are present at the bedside  External data reviewed:  Prior notes reviewed in the electronic medical record  Summary of data reviewed:  As above  Risk and benefits of testing: discuss ed   Labs: ordered and reviewed  Radiology: ordered and independent interpretation performed (see above or ED course)  ECG/medicine tests: ordered and independent interpretation performed (see above or ED course)  Discussion of management or test interpretation with external provider(s): discussed with hospitalist physician   Summary of discussion:  As above    Risk  Parenteral controlled substances   Drug therapy requiring intense monitoring for toxicity   Decision regarding  hospitalization  Shared decision making     Critical Care  none    Data Reviewed/Counseling: I have personally reviewed the patient's vital signs, nursing notes, and other relevant tests, information, and imaging. I had a detailed discussion regarding the historical points, exam findings, and any diagnostic results supporting the discharge diagnosis. I personally performed the history, PE, MDM and procedures as documented above and agree with the scribe's documentation.    Portions of this note were dictated using voice recognition software. Although it was reviewed for accuracy, some inherent voice recognition errors may have occurred and may be present in this document.         Scribe Attestation:   Scribe #1: I performed the above scribed service and the documentation accurately describes the services I performed. I attest to the accuracy of the note.    Attending Attestation:           Physician Attestation for Scribe:  Physician Attestation Statement for Scribe #1: I, Nam Salinas MD, reviewed documentation, as scribed by Fabienne Angeles in my presence, and it is both accurate and complete.             ED Course as of 08/24/23 1016   Sat Aug 19, 2023   1350 EKG 12-lead  EKG independently interpreted by me.  EKG: NSR @ 83, LVH, QTc 448 [MC]   1350 X-Ray Chest AP Portable  Independently visualized/reviewed by me during the ED visit.  - No lobar consolidation or PTX []   1702 Hospitalist paged. [AS]      ED Course User Index  [AS] Osiris Angeles  [MC] Nam Salinas MD                    Clinical Impression:   Final diagnoses:  [R10.9] Abdominal pain  [R10.9] Intractable abdominal pain        ED Disposition Condition    Admit Stable                Nam Salinas MD  08/24/23 1016

## 2023-08-20 LAB
ALBUMIN SERPL-MCNC: 2.9 G/DL (ref 3.4–4.8)
ALBUMIN/GLOB SERPL: 0.9 RATIO (ref 1.1–2)
ALP SERPL-CCNC: 76 UNIT/L (ref 40–150)
ALT SERPL-CCNC: 19 UNIT/L (ref 0–55)
AST SERPL-CCNC: 36 UNIT/L (ref 5–34)
BASOPHILS # BLD AUTO: 0.01 X10(3)/MCL
BASOPHILS NFR BLD AUTO: 0.2 %
BILIRUB SERPL-MCNC: 1.1 MG/DL
BUN SERPL-MCNC: 10.1 MG/DL (ref 9.8–20.1)
CALCIUM SERPL-MCNC: 8.9 MG/DL (ref 8.4–10.2)
CHLORIDE SERPL-SCNC: 101 MMOL/L (ref 98–107)
CO2 SERPL-SCNC: 24 MMOL/L (ref 23–31)
CREAT SERPL-MCNC: 0.81 MG/DL (ref 0.55–1.02)
EOSINOPHIL # BLD AUTO: 0.06 X10(3)/MCL (ref 0–0.9)
EOSINOPHIL NFR BLD AUTO: 1.1 %
ERYTHROCYTE [DISTWIDTH] IN BLOOD BY AUTOMATED COUNT: 15 % (ref 11.5–17)
GFR SERPLBLD CREATININE-BSD FMLA CKD-EPI: >60 MLS/MIN/1.73/M2
GLOBULIN SER-MCNC: 3.3 GM/DL (ref 2.4–3.5)
GLUCOSE SERPL-MCNC: 85 MG/DL (ref 82–115)
HCT VFR BLD AUTO: 27.9 % (ref 37–47)
HGB BLD-MCNC: 9.6 G/DL (ref 12–16)
IMM GRANULOCYTES # BLD AUTO: 0.03 X10(3)/MCL (ref 0–0.04)
IMM GRANULOCYTES NFR BLD AUTO: 0.6 %
LYMPHOCYTES # BLD AUTO: 0.83 X10(3)/MCL (ref 0.6–4.6)
LYMPHOCYTES NFR BLD AUTO: 15.2 %
MCH RBC QN AUTO: 31.3 PG (ref 27–31)
MCHC RBC AUTO-ENTMCNC: 34.4 G/DL (ref 33–36)
MCV RBC AUTO: 90.9 FL (ref 80–94)
MONOCYTES # BLD AUTO: 0.08 X10(3)/MCL (ref 0.1–1.3)
MONOCYTES NFR BLD AUTO: 1.5 %
NEUTROPHILS # BLD AUTO: 4.44 X10(3)/MCL (ref 2.1–9.2)
NEUTROPHILS NFR BLD AUTO: 81.4 %
NRBC BLD AUTO-RTO: 0 %
PLATELET # BLD AUTO: 172 X10(3)/MCL (ref 130–400)
PMV BLD AUTO: 10.5 FL (ref 7.4–10.4)
POTASSIUM SERPL-SCNC: 3.2 MMOL/L (ref 3.5–5.1)
PROT SERPL-MCNC: 6.2 GM/DL (ref 5.8–7.6)
RBC # BLD AUTO: 3.07 X10(6)/MCL (ref 4.2–5.4)
SODIUM SERPL-SCNC: 138 MMOL/L (ref 136–145)
WBC # SPEC AUTO: 5.45 X10(3)/MCL (ref 4.5–11.5)

## 2023-08-20 PROCEDURE — 21400001 HC TELEMETRY ROOM

## 2023-08-20 PROCEDURE — 85025 COMPLETE CBC W/AUTO DIFF WBC: CPT | Performed by: INTERNAL MEDICINE

## 2023-08-20 PROCEDURE — 80053 COMPREHEN METABOLIC PANEL: CPT | Performed by: INTERNAL MEDICINE

## 2023-08-20 PROCEDURE — 63600175 PHARM REV CODE 636 W HCPCS: Performed by: INTERNAL MEDICINE

## 2023-08-20 PROCEDURE — 25000003 PHARM REV CODE 250: Performed by: INTERNAL MEDICINE

## 2023-08-20 RX ORDER — ONDANSETRON 4 MG/1
8 TABLET, ORALLY DISINTEGRATING ORAL EVERY 6 HOURS PRN
Status: DISCONTINUED | OUTPATIENT
Start: 2023-08-20 | End: 2023-08-21 | Stop reason: HOSPADM

## 2023-08-20 RX ORDER — AMOXICILLIN 250 MG
2 CAPSULE ORAL 2 TIMES DAILY
Status: DISCONTINUED | OUTPATIENT
Start: 2023-08-20 | End: 2023-08-21 | Stop reason: HOSPADM

## 2023-08-20 RX ORDER — POTASSIUM CHLORIDE 20 MEQ/1
40 TABLET, EXTENDED RELEASE ORAL ONCE
Status: COMPLETED | OUTPATIENT
Start: 2023-08-20 | End: 2023-08-20

## 2023-08-20 RX ORDER — PROCHLORPERAZINE MALEATE 10 MG
10 TABLET ORAL EVERY 6 HOURS PRN
Status: DISCONTINUED | OUTPATIENT
Start: 2023-08-20 | End: 2023-08-21 | Stop reason: HOSPADM

## 2023-08-20 RX ADMIN — MORPHINE SULFATE 15 MG: 15 TABLET, EXTENDED RELEASE ORAL at 11:08

## 2023-08-20 RX ADMIN — SENNOSIDES AND DOCUSATE SODIUM 2 TABLET: 50; 8.6 TABLET ORAL at 08:08

## 2023-08-20 RX ADMIN — OXYCODONE AND ACETAMINOPHEN 1 TABLET: 10; 325 TABLET ORAL at 05:08

## 2023-08-20 RX ADMIN — PANTOPRAZOLE SODIUM 40 MG: 40 TABLET, DELAYED RELEASE ORAL at 08:08

## 2023-08-20 RX ADMIN — MORPHINE SULFATE 15 MG: 15 TABLET, EXTENDED RELEASE ORAL at 12:08

## 2023-08-20 RX ADMIN — Medication 6 MG: at 10:08

## 2023-08-20 RX ADMIN — ENOXAPARIN SODIUM 60 MG: 80 INJECTION SUBCUTANEOUS at 08:08

## 2023-08-20 RX ADMIN — MUPIROCIN: 20 OINTMENT TOPICAL at 12:08

## 2023-08-20 RX ADMIN — POLYETHYLENE GLYCOL 3350 17 G: 17 POWDER, FOR SOLUTION ORAL at 08:08

## 2023-08-20 RX ADMIN — PROCHLORPERAZINE MALEATE 10 MG: 10 TABLET ORAL at 09:08

## 2023-08-20 RX ADMIN — POTASSIUM CHLORIDE 40 MEQ: 1500 TABLET, EXTENDED RELEASE ORAL at 08:08

## 2023-08-20 RX ADMIN — MUPIROCIN: 20 OINTMENT TOPICAL at 08:08

## 2023-08-20 RX ADMIN — OXYCODONE AND ACETAMINOPHEN 1 TABLET: 10; 325 TABLET ORAL at 10:08

## 2023-08-20 NOTE — H&P
[facility] Hospital Medicine History & Physical Examination       Patient Name: Evelyn Proctor  MRN: [id number]  Patient Class: IP- Inpatient   Admission Date: 8/19/2023 12:53 PM  Length of Stay: 0  Admitting Service: Hospital Medicine   Attending Physician: Antonio Wood MD   Primary Care Provider: Marina Morales MD  History source: EMR, patient and/or patient's family    CHIEF COMPLAINT   Abdominal Pain (Hx of crohn's, pancreatic ca. Last chemo - 8/16. C/o N/V/abd pain x 1 week. Symptoms worsened after chemo this week. Denies fever)    HISTORY OF PRESENT ILLNESS:   Patient is an 83-year-old female with a history of HD pancreatic cancer currently on chemotherapy, Crohn's disease and additional past medical history as below with frequent admissions from nausea, vomiting and abdominal pain due to underlying cancer.  She reports her last chemotherapy was 08/16/2023.  She presents tonight with abdominal pain nausea and vomiting over the last several days.  She was afebrile hemodynamically stable on arrival maintaining normal sats on room air.  CT of the abdomen and pelvis shows near occlusive portal vein thrombosis, possible gastritis, proctocolitis and biliary dilatation unchanged from prior CT.    PAST MEDICAL HISTORY:     Past Medical History:   Diagnosis Date    Acute pancreatitis, unspecified complication status, unspecified pancreatitis type     Arthritis     Carpal tunnel syndrome, bilateral     Cervical cancer     Cervical radiculopathy     Crohn disease     Heart attack     HLD (hyperlipidemia)     Low back pain     Pancreatic cancer     Sleep apnea, unspecified     Spinal stenosis of lumbar region with neurogenic claudication        PAST SURGICAL HISTORY:     Past Surgical History:   Procedure Laterality Date    bilateral L4-5, L5-S1 decompression, repair L5-S1 dural tear  10/01/2021    Dr. Marin    CARPAL TUNNEL RELEASE Right 09/06/2019    Dr. Tomas HAMMOND  TUNNEL RELEASE Left 12/2/2022    Procedure: RELEASE, CARPAL TUNNEL;  Surgeon: Jesse Marin MD;  Location: [facility] OR;  Service: Neurosurgery;  Laterality: Left;    CHOLECYSTECTOMY      COLECTOMY  07/2011    partial x3    ESOPHAGOGASTRODUODENOSCOPY N/A 6/8/2023    Procedure: EGD (ESOPHAGOGASTRODUODENOSCOPY);  Surgeon: Jose Alberto Max MD;  Location: [facility] OR;  Service: Gastroenterology;  Laterality: N/A;    ESOPHAGOGASTRODUODENOSCOPY N/A 6/29/2023    Procedure: EGD (ESOPHAGOGASTRODUODENOSCOPY);  Surgeon: Jose Alberto Max MD;  Location: [facility] OR;  Service: Gastroenterology;  Laterality: N/A;    HYSTERECTOMY      total    REPAIR OF EYELID Bilateral 11/21/2022    Procedure: BILATERAL ECTROPION REPAIR;  Surgeon: Justin Flores MD;  Location: [facility] OR;  Service: ENT;  Laterality: Bilateral;    ULTRASOUND, ENDOSCOPIC, WITH FINE NEEDLE ASPIRATION N/A 6/8/2023    Procedure: UPPER EUS  W/  FNA;  Surgeon: Jose Alberto Max MD;  Location: [facility] OR;  Service: Gastroenterology;  Laterality: N/A;  Dangelo, pt w/ CD on entyvio recently admitted to [facility] w/ acute pancreatitis. Possibly drug induced from entyvio? Jaylone does have dilated PD and a poss focal stricture in the HOP.CA 19-9 nml    ULTRASOUND, UPPER GI TRACT, ENDOSCOPIC, WITH CELIAC PLEXUS BLOCK N/A 6/29/2023    Procedure: UPPER EUS W/ POSS FNA W/ CELIAC BLOCK;  Surgeon: Jose Alberto Max MD;  Location: [facility] OR;  Service: Gastroenterology;  Laterality: N/A;  Celiac Plexus injection planned.Pt should receive 1L NS bolus upon arrival to Outpt Surgery and 1L NS during the procedure. Pt will need to be in PACU for 30 min post procedure to assess for any potential hypotension. NO LABS       ALLERGIES:   Morpholine analogues    FAMILY HISTORY:   Reviewed and non-contributory     SOCIAL HISTORY:     Social History     Tobacco Use    Smoking status: Former     Current packs/day: 0.00     Types: Cigarettes    Smokeless tobacco: Never   Substance Use Topics    Alcohol use: Not Currently      Comment: rarely        HOME MEDICATIONS:     Prior to Admission medications    Medication Sig Start Date End Date Taking? Authorizing Provider   docusate sodium (COLACE) 100 MG capsule Take 1 capsule (100 mg total) by mouth 2 (two) times daily.  Patient not taking: Reported on 8/1/2023 7/31/23   Micah Olivo MD   lipase-protease-amylase (CREON) 36,000-114,000- 180,000 unit CpDR Take 1 capsule by mouth 3 (three) times daily with meals.  Patient not taking: Reported on 8/1/2023 7/11/23   Ronan Burns MD   morphine (MS CONTIN) 15 MG 12 hr tablet Take 1 tablet (15 mg total) by mouth every 12 (twelve) hours. 7/31/23 8/30/23  Micah Olvio MD   ondansetron (ZOFRAN) 8 MG tablet Take 1 tablet (8 mg total) by mouth every 12 (twelve) hours as needed for Nausea. 8/1/23 7/31/24  Radha Jeffers FNP   oxyCODONE (ROXICODONE) 30 MG Tab Take 5 mg by mouth every 6 (six) hours as needed.    Provider, Historical   pantoprazole (PROTONIX) 40 MG tablet Take 1 tablet (40 mg total) by mouth every morning. 8/1/23 8/31/23  Radha Jeffers FNP   polyethylene glycol (GLYCOLAX) 17 gram PwPk Take 17 g by mouth once daily.  Patient not taking: Reported on 8/1/2023 8/1/23   Micah Olivo MD   potassium chloride SA (K-DUR,KLOR-CON) 20 MEQ tablet Take 1 tablet (20 mEq total) by mouth 2 (two) times daily. for 5 days 8/16/23 8/21/23  Ronan Burns MD   prochlorperazine (COMPAZINE) 10 MG tablet Take 1 tablet (10 mg total) by mouth every 6 (six) hours as needed.  Patient not taking: Reported on 8/9/2023 7/20/23 7/19/24  Anais Wood FNP   traZODone (DESYREL) 100 MG tablet Take 100 mg by mouth every evening. 8/8/23   Provider, Historical       REVIEW OF SYSTEMS:   Except as documented, all other systems reviewed and negative     PHYSICAL EXAM:   T 97.7 °F (36.5 °C)   /70   P 75   RR 18   O2 97 %  GENERAL: awake, alert, oriented and in no acute distress, non-toxic appearing   HEENT: normocephalic  "atraumatic   NECK: supple   LUNGS: Clear bilaterally, no wheezing or rales, no accessory muscle use   CVS: Regular rate and rhythm, normal peripheral perfusion  ABD: Soft, diffuse tenderness, non-distended, bowel sounds present  EXTREMITIES: no clubbing or cyanosis  SKIN: Warm, dry.   NEURO: alert and oriented, grossly without focal deficits   PSYCHIATRIC: Cooperative    LABS AND IMAGING:     Recent Labs     08/19/23  1324   WBC 7.17   RBC 3.25*   HGB 10.0*   HCT 28.6*   MCV 88.0   MCH 30.8   MCHC 35.0   RDW 14.6        Recent Labs     08/19/23  1751   LACTIC 0.8     No results for input(s): "INR", "APTT", "D-DIMER" in the last 72 hours.  No results for input(s): "HGBA1C", "CHOL", "TRIG", "LDL", "VLDL", "HDL" in the last 72 hours.   Recent Labs     08/19/23  1324      K 3.0*   CHLORIDE 99   CO2 26   BUN 7.9*   CREATININE 0.72   GLUCOSE 134*   CALCIUM 9.2   ALBUMIN 3.6   GLOBULIN 3.5   ALKPHOS 94   ALT 21   AST 46*   BILITOT 1.4   LIPASE 10     Recent Labs     08/19/23  1324   BNP 84.5   TROPONINI 0.033          CT Abdomen Pelvis With Contrast  EXAMINATION  CT ABDOMEN PELVIS WITH CONTRAST    CLINICAL HISTORY  Nausea/vomiting;Abdominal pain, acute, nonlocalized;hx pancreatic cancer;    TECHNIQUE  Post-contrast helical-acquisition CT images were obtained and multiplanar reformats accomplished by a CT technologist at a separate workstation, pushed to PACS for physician review.    CONTRAST  *IV: ISOVUE-370, 100 mL  *Enteric: none    COMPARISON  26 July 2023    FINDINGS  Images were reviewed in soft tissue, lung, and bone windows.    Exam quality: adequate for evaluation    Lines/tubes: none visualized    Chest: Stable heart chamber size. No pericardial effusion. No interval change of the visualized vasculature. No airspace consolidation or suspicious lung lesion. No worsening pleural effusion or evidence for loculation.    Hepatobiliary/Pancreas: There are similar right upper quadrant postoperative changes " with persistent dilatation of the intrahepatic biliary ducts.  No evidence of worsening extrahepatic biliary dilatation is identified.  Air attenuation in the region of the gallbladder fossa is similar to the prior study and may represent adjacent bowel loop, otherwise poorly delineated secondary to persistent streak artifact produced by metallic hardware.  There is no enlarging focal lesion or evidence of acute process involving the liver.  Extensive heterogeneous appearance of the pancreas and diffuse ductal dilatation unchanged in comparison.  Can not exclude nonobstructing portal vein thrombosis given presence of intraluminal hypodensity with surrounding contrast (series 2, image 41; series 4, image 41); of note, this finding is not appreciated on review of the 23 July 2023 CT.    Spleen: No interval change.    Adrenal/: No new, enlarging, or otherwise suspicious adrenal or renal lesion. No interval development of findings to suggest obstructive uropathy. The urinary bladder is moderately distended with no focal mural irregularity identified.  Similar changes of hysterectomy.  No suspicious adnexal lesions.    Esophagus/GI tract: The included lower esophagus is unremarkable. There is circumferential edematous thickening of the distal gastric wall and antrum, with no evidence of gross outlet obstruction.  Mid abdominal enterocolic anastomosis is unchanged.  There is no evidence of high-grade mechanical small bowel obstruction.  The rectosigmoid colon is nondistended but there is notable thickened appearance of the distal large bowel wall.  Areas of descending colon mural thickening also present with widespread mucosal enhancement.    Musculoskeletal: No interval change.    Other findings: No free fluid or air. No drainable collections. Aortoiliac vascular structures are similar in comparison. No interval pathologic vasyl enlargement or development of necrotic adenopathy.    IMPRESSION  1. Interval development  of near occlusive portal vein thrombosis.  2. Mural thickening of the distal gastric wall/antrum may be secondary to contiguous inflammation from adjacent right upper quadrant pathology, with possibility of acute gastritis not excluded.  3. Distal large bowel wall thickening suggestive of proctocolitis.  4. Intrahepatic biliary dilatation and additional above discussed nonacute abnormalities are without significant interval change from the comparison CT exams  ==========    This report was flagged in Epic as abnormal.    RADIATION DOSE  Automated tube current modulation, weight-based exposure dosing, and/or iterative reconstruction technique utilized to reach lowest reasonably achievable exposure rate.    DLP: 526 mGy*cm    Electronically signed by: Ash Gonzales  Date:    08/19/2023  Time:    14:52  X-Ray Chest AP Portable  EXAMINATION  XR CHEST AP PORTABLE    CLINICAL HISTORY  Unspecified abdominal pain    TECHNIQUE  A total of 1 frontal image(s) submitted of the chest.    COMPARISON  27 July 2023    FINDINGS  Lines/tubes/devices: Left chest wall subcutaneous port is in similar position.  ECG leads overlie the chest.    The cardiac silhouette and central vascular structures are unchanged.  The trachea is midline. No new or worsening consolidation is developed in the interval.  There is no large pleural effusion or convincing pneumothorax.    Regional osseous structures and extrathoracic soft tissues are similar.    IMPRESSION  No acute process or other adverse interval change.    Electronically signed by: Ash Gonzales  Date:    08/19/2023  Time:    14:10      ASSESSMENT & PLAN:   Intractable nausea, vomiting and diffuse abdominal pain from pancreatic cancer   New portal vein thrombosis  Stage III pancreatic cancer last chemotherapy 08/16/2023    - symptomatic management with analgesics and antiemetics as needed  - full-dose Lovenox, transitioned to OAC at discharge for PVT  - resume home medications as  appropriate  - if still here Monday recommend palliative care for goal of care/[place] discussion     DVT prophylaxis: full dose lovenox  Code status: full     If patient was admitted under observational status it is with my approval/permission.     At least 55 min was spent on this history and physical.  Time seen: 11PM 8/19/23  Antonio Wood MD  to be

## 2023-08-20 NOTE — PLAN OF CARE
Problem: Adult Inpatient Plan of Care  Goal: Plan of Care Review  Outcome: Ongoing, Progressing  Goal: Patient-Specific Goal (Individualized)  Outcome: Ongoing, Progressing  Goal: Absence of Hospital-Acquired Illness or Injury  Outcome: Ongoing, Progressing  Goal: Optimal Comfort and Wellbeing  Outcome: Ongoing, Progressing  Goal: Readiness for Transition of Care  Outcome: Ongoing, Progressing     Problem: Fluid and Electrolyte Imbalance (Acute Kidney Injury/Impairment)  Goal: Fluid and Electrolyte Balance  Outcome: Ongoing, Progressing     Problem: Oral Intake Inadequate (Acute Kidney Injury/Impairment)  Goal: Optimal Nutrition Intake  Outcome: Ongoing, Progressing     Problem: Renal Function Impairment (Acute Kidney Injury/Impairment)  Goal: Effective Renal Function  Outcome: Ongoing, Progressing     Problem: Pain Acute  Goal: Acceptable Pain Control and Functional Ability  Outcome: Ongoing, Progressing     Problem: Coping Ineffective  Goal: Effective Coping  Outcome: Ongoing, Progressing

## 2023-08-20 NOTE — PROGRESS NOTES
[facility] MEDICINE ~ PROGRESS NOTE        CHIEF COMPLAINT   Hospital follow up    HOSPITAL COURSE   83-year-old female with a history of HD pancreatic cancer currently on chemotherapy, Crohn's disease and additional past medical history as below with frequent admissions from nausea, vomiting and abdominal pain due to underlying cancer.  She reports her last chemotherapy was 08/16/2023.  She presents tonight with abdominal pain nausea and vomiting over the last several days.  She was afebrile hemodynamically stable on arrival maintaining normal sats on room air.  CT of the abdomen and pelvis shows near occlusive portal vein thrombosis, possible gastritis, proctocolitis and biliary dilatation unchanged from prior CT.    Today  Seen examined this morning.  She has some nausea and vomiting last night required IV Zofran.  She states that the Zofran makes her nauseated, better with Compazine.  Will try that today and see how she does.        OBJECTIVE/PHYSICAL EXAM     VITAL SIGNS (MOST RECENT):  Temp: 99 °F (37.2 °C) (08/20/23 1228)  Pulse: 79 (08/20/23 1228)  Resp: 18 (08/20/23 1111)  BP: (!) 146/53 (08/20/23 1228)  SpO2: 96 % (08/20/23 1228) VITAL SIGNS (24 HOUR RANGE):  Temp:  [97.4 °F (36.3 °C)-99 °F (37.2 °C)] 99 °F (37.2 °C)  Pulse:  [68-89] 79  Resp:  [11-19] 18  SpO2:  [95 %-99 %] 96 %  BP: (100-185)/(53-91) 146/53   GENERAL: In no acute distress, afebrile  HEENT:  CHEST: Clear to auscultation bilaterally  HEART: S1, S2, no appreciable murmur  ABDOMEN: Soft, mild tenderness diffusely, BS +  MSK: Warm, no lower extremity edema, no clubbing or cyanosis  NEUROLOGIC: Alert and oriented x4, moving all extremities with good strength   INTEGUMENTARY:  PSYCHIATRY:        ASSESSMENT/PLAN   Chemotherapy-induced nausea vomiting   Near occlusive portal vein thrombosis    History of:  Stage III pancreatic cancer last chemotherapy August 16      Try Compazine with meals and see how  she does today   Nausea could be secondary to chemotherapy or the portal vein thrombosis  Continue full-dose anticoagulation and transition to Eliquis upon discharge  Continue Protonix 40 daily  Of note patient states that she has a living will which states that she would like no resuscitation in the event of cardiopulmonary arrest.  She stated that she is a DNR.  I will put that order in to the chart.    DVT prophylaxis:     Anticipated discharge and disposition:  Plan for possible discharge in the morning  __________________________________________________________________________    LABS/MICRO/MEDS/DIAGNOSTICS       LABS  Recent Labs     08/20/23  0456      K 3.2*   CHLORIDE 101   CO2 24   BUN 10.1   CREATININE 0.81   GLUCOSE 85   CALCIUM 8.9   ALKPHOS 76   AST 36*   ALT 19   ALBUMIN 2.9*     Recent Labs     08/20/23  0937   WBC 5.45   RBC 3.07*   HCT 27.9*   MCV 90.9          MICROBIOLOGY  Microbiology Results (last 7 days)       ** No results found for the last 168 hours. **               MEDICATIONS   enoxparin  1 mg/kg Subcutaneous Q12H (prophylaxis, 0900/2100)    morphine  15 mg Oral Q12H    mupirocin   Nasal BID    pantoprazole  40 mg Oral Daily    polyethylene glycol  17 g Oral Daily    senna-docusate 8.6-50 mg  2 tablet Oral BID         INFUSIONS         DIAGNOSTIC TESTS  CT Abdomen Pelvis With Contrast   Final Result      X-Ray Chest AP Portable   Final Result           EF   Date Value Ref Range Status   07/28/2023 58 % Final          NUTRITION STATUS  Patient meets ASPEN criteria for   malnutrition of   per RD assessment as evidenced by:                       A minimum of two characteristics is recommended for diagnosis of either severe or non-severe malnutrition.       Case related differential diagnoses have been reviewed; assessment and plan has been documented. I have personally reviewed the labs and test results that are currently available; I have reviewed the patients medication list.  I have reviewed the consulting providers recommendations. I have reviewed or attempted to review medical records based upon their availability.  All of the patient's and/or family's questions have been addressed and answered to the best of my ability.  I will continue to monitor closely and make adjustments to medical management as needed.  This document was created using Mink[employer]*PLDT Fluency Direct.  Transcription errors may have been made.  Please contact me if any questions may rise regarding documentation to clarify transcription.        Sacha Fairbanks MD   Internal Medicine  Department of Hospital Medicine [facility]

## 2023-08-20 NOTE — PLAN OF CARE
Problem: Adult Inpatient Plan of Care  Goal: Plan of Care Review  Outcome: Ongoing, Progressing  Flowsheets (Taken 8/20/2023 0252)  Plan of Care Reviewed With: patient  Goal: Patient-Specific Goal (Individualized)  Outcome: Ongoing, Progressing  Goal: Absence of Hospital-Acquired Illness or Injury  Outcome: Ongoing, Progressing  Intervention: Identify and Manage Fall Risk  Flowsheets (Taken 8/20/2023 0252)  Safety Promotion/Fall Prevention:   assistive device/personal item within reach   Fall Risk reviewed with patient/family   Fall Risk signage in place   nonskid shoes/socks when out of bed   medications reviewed  Intervention: Prevent Skin Injury  Flowsheets (Taken 8/20/2023 0252)  Body Position: position changed independently  Skin Protection: adhesive use limited  Intervention: Prevent and Manage VTE (Venous Thromboembolism) Risk  Flowsheets (Taken 8/20/2023 0252)  Activity Management:   Sitting at edge of bed - L2   Standing - L3  VTE Prevention/Management:   ambulation promoted   bleeding risk assessed   ROM (active) performed   ROM (passive) performed   fluids promoted  Range of Motion:   active ROM (range of motion) encouraged   ROM (range of motion) performed  Intervention: Prevent Infection  Flowsheets (Taken 8/20/2023 0252)  Infection Prevention:   rest/sleep promoted   hand hygiene promoted   single patient room provided  Goal: Optimal Comfort and Wellbeing  Outcome: Ongoing, Progressing  Intervention: Monitor Pain and Promote Comfort  Flowsheets (Taken 8/20/2023 0252)  Pain Management Interventions:   medication offered   pain management plan reviewed with patient/caregiver   care clustered  Intervention: Provide Person-Centered Care  Flowsheets (Taken 8/20/2023 0252)  Trust Relationship/Rapport:   care explained   choices provided   emotional support provided   empathic listening provided   questions answered   questions encouraged   reassurance provided   thoughts/feelings acknowledged  Goal:  Readiness for Transition of Care  Outcome: Ongoing, Progressing

## 2023-08-20 NOTE — NURSING
Nurses Note -- 4 Eyes      8/20/2023   1:36 AM      Skin assessed during: Admit      [x] No Altered Skin Integrity Present    []Prevention Measures Documented      [] Yes- Altered Skin Integrity Present or Discovered   [] LDA Added if Not in Epic (Describe Wound)   [] New Altered Skin Integrity was Present on Admit and Documented in LDA   [] Wound Image Taken    Wound Care Consulted? No    Attending Nurse:  Marylin Cochran LPN    Second RN/Staff Member:   Fiona Cook RN

## 2023-08-21 VITALS
TEMPERATURE: 99 F | SYSTOLIC BLOOD PRESSURE: 108 MMHG | RESPIRATION RATE: 18 BRPM | DIASTOLIC BLOOD PRESSURE: 63 MMHG | OXYGEN SATURATION: 98 % | WEIGHT: 135 LBS | HEIGHT: 63 IN | HEART RATE: 73 BPM | BODY MASS INDEX: 23.92 KG/M2

## 2023-08-21 PROCEDURE — 94761 N-INVAS EAR/PLS OXIMETRY MLT: CPT

## 2023-08-21 PROCEDURE — 25000003 PHARM REV CODE 250: Performed by: INTERNAL MEDICINE

## 2023-08-21 PROCEDURE — 63600175 PHARM REV CODE 636 W HCPCS: Performed by: INTERNAL MEDICINE

## 2023-08-21 RX ADMIN — MUPIROCIN: 20 OINTMENT TOPICAL at 09:08

## 2023-08-21 RX ADMIN — OXYCODONE AND ACETAMINOPHEN 1 TABLET: 10; 325 TABLET ORAL at 09:08

## 2023-08-21 RX ADMIN — MORPHINE SULFATE 15 MG: 15 TABLET, EXTENDED RELEASE ORAL at 12:08

## 2023-08-21 RX ADMIN — SENNOSIDES AND DOCUSATE SODIUM 2 TABLET: 50; 8.6 TABLET ORAL at 09:08

## 2023-08-21 RX ADMIN — PANTOPRAZOLE SODIUM 40 MG: 40 TABLET, DELAYED RELEASE ORAL at 09:08

## 2023-08-21 RX ADMIN — ENOXAPARIN SODIUM 60 MG: 80 INJECTION SUBCUTANEOUS at 09:08

## 2023-08-21 NOTE — DISCHARGE SUMMARY
[facility]  HOSPITAL MEDICINE - DISCHARGE SUMMARY    Patient Name: Evelyn Proctor  MRN: [id number]  Admission Date: 8/19/2023  Discharge Date: 08/21/2023  Hospital Length of Stay: 2 days  Discharge Provider: Sacha Fairbanks MD  Primary Care Provider: Marina Morales MD      HOSPITAL COURSE   83-year-old female with a history of HD pancreatic cancer currently on chemotherapy, Crohn's disease and additional past medical history as below with frequent admissions from nausea, vomiting and abdominal pain due to underlying cancer.  She reports her last chemotherapy was 08/16/2023.  She presents tonight with abdominal pain nausea and vomiting over the last several days.  She was afebrile hemodynamically stable on arrival maintaining normal sats on room air.  CT of the abdomen and pelvis shows near occlusive portal vein thrombosis, possible gastritis, proctocolitis and biliary dilatation unchanged from prior CT.    She was started on anticoagulation with full-dose Lovenox while in the hospital and has tolerated this without any evidence of bleeding.  Otherwise for her nausea and vomiting she seems to have greatly improved and has only required 1 oral dose of Compazine which she has at home yesterday morning and since then has been doing well and tolerating diet.  Will plan for discharge home today and I will prescribe her Xarelto upon discharge for her portal vein thrombosis.  She states that she has a follow-up with Dr. Burns her oncologist on Wednesday.        PHYSICAL EXAM     Most Recent Vital Signs:  Temp: 98.9 °F (37.2 °C) (08/21/23 1046)  Pulse: 73 (08/21/23 1046)  Resp: 16 (08/21/23 0905)  BP: 108/63 (08/21/23 1046)  SpO2: 98 % (08/21/23 1046)   GENERAL: In no acute distress, afebrile  HEENT:  CHEST: Clear to auscultation bilaterally  HEART: S1, S2, no appreciable murmur  ABDOMEN: Soft, nontender, BS +  MSK: Warm, no lower extremity edema, no clubbing or cyanosis  NEUROLOGIC:  Alert and oriented x4, moving all extremities with good strength   INTEGUMENTARY:  PSYCHIATRY:          DISCHARGE DIAGNOSIS   Chemotherapy-induced nausea vomiting   Near occlusive portal vein thrombosis  Do not resuscitate     History of:  Stage III pancreatic cancer last chemotherapy August 16    _____________________________________________________________________________      DISCHARGE MED REC     Current Discharge Medication List        START taking these medications    Details   rivaroxaban (XARELTO) 20 mg Tab Take 1 tablet (20 mg total) by mouth daily with dinner or evening meal.  Qty: 30 tablet, Refills: 0           CONTINUE these medications which have NOT CHANGED    Details   morphine (MS CONTIN) 15 MG 12 hr tablet Take 1 tablet (15 mg total) by mouth every 12 (twelve) hours.  Qty: 60 tablet, Refills: 0    Comments: Quantity prescribed more than 7 day supply? Yes, quantity medically necessary      ondansetron (ZOFRAN) 8 MG tablet Take 1 tablet (8 mg total) by mouth every 12 (twelve) hours as needed for Nausea.  Qty: 30 tablet, Refills: 2    Associated Diagnoses: Malignant neoplasm of pancreas, unspecified location of malignancy      oxyCODONE (ROXICODONE) 30 MG Tab Take 5 mg by mouth every 6 (six) hours as needed.      pantoprazole (PROTONIX) 40 MG tablet Take 1 tablet (40 mg total) by mouth every morning.  Qty: 30 tablet, Refills: 0    Associated Diagnoses: Malignant neoplasm of pancreas, unspecified location of malignancy      prochlorperazine (COMPAZINE) 10 MG tablet Take 1 tablet (10 mg total) by mouth every 6 (six) hours as needed.  Qty: 30 tablet, Refills: 1    Associated Diagnoses: Malignant neoplasm of pancreas, unspecified location of malignancy      traZODone (DESYREL) 100 MG tablet Take 100 mg by mouth every evening.           STOP taking these medications       docusate sodium (COLACE) 100 MG capsule Comments:   Reason for Stopping:         lipase-protease-amylase (CREON) 36,000-114,000- 180,000  unit CpDR Comments:   Reason for Stopping:         polyethylene glycol (GLYCOLAX) 17 gram PwPk Comments:   Reason for Stopping:         potassium chloride SA (K-DUR,KLOR-CON) 20 MEQ tablet Comments:   Reason for Stopping:                  CONSULTS         FOLLOW UP      Follow-up Information       Marina Morales MD Follow up in 1 week(s).    Specialty: Family Medicine  Contact information:  [address]  [address]  [facility] [address]  [phone]               Ronan Burns MD Follow up.    Specialties: Oncology, Hematology and Oncology  Contact information:  [address]  [address]  [facility] [address]  [phone]                                 DISCHARGE INSTRUCTIONS     Explained in detail to the patient about the discharge plan, medications, and follow-up visits. Pt understands and agrees with the treatment plan.  Discharged Condition: stable  Diet as tolerated  Activities as tolerated  Discharge to: Home or Self Care    TIME SPENT ON DISCHARGE   35 minutes        Sacha Fairbanks MD  Internal Medicine  Department of Hospital Medicine [facility] - Oncology Acute      This document was created using electronic dictation services.  Please excuse any errors that may have been made.  Contact me if any questions regarding documentation to clarify verbiage.

## 2023-08-21 NOTE — PLAN OF CARE
Problem: Adult Inpatient Plan of Care  Goal: Plan of Care Review  Outcome: Ongoing, Progressing  Flowsheets (Taken 8/20/2023 2158)  Plan of Care Reviewed With:   patient   family  Goal: Patient-Specific Goal (Individualized)  Outcome: Ongoing, Progressing  Flowsheets (Taken 8/20/2023 2158)  Anxieties, Fears or Concerns: anxious over diagnosis, does not want family to know the progression of cancer  Individualized Care Needs: monitor pain, provide emotional support  Goal: Absence of Hospital-Acquired Illness or Injury  Outcome: Ongoing, Progressing  Intervention: Identify and Manage Fall Risk  Flowsheets (Taken 8/20/2023 2158)  Safety Promotion/Fall Prevention:   side rails raised x 2   lighting adjusted   family to remain at bedside   assistive device/personal item within reach   Fall Risk reviewed with patient/family   Fall Risk signage in place  Intervention: Prevent Skin Injury  Flowsheets (Taken 8/20/2023 2158)  Body Position:   position changed independently   weight shifting  Skin Protection:   adhesive use limited   tubing/devices free from skin contact   transparent dressing maintained  Intervention: Prevent and Manage VTE (Venous Thromboembolism) Risk  Flowsheets (Taken 8/20/2023 2158)  Activity Management:   Ambulated -L4   Ambulated to bathroom - L4   Ambulated in room - L4   Standing - L3   Sitting at edge of bed - L2  VTE Prevention/Management:   fluids promoted   ROM (passive) performed   ROM (active) performed   dorsiflexion/plantar flexion performed  Range of Motion:   active ROM (range of motion) encouraged   ROM (range of motion) performed  Intervention: Prevent Infection  Flowsheets (Taken 8/20/2023 2158)  Infection Prevention:   equipment surfaces disinfected   hand hygiene promoted   rest/sleep promoted   single patient room provided  Goal: Optimal Comfort and Wellbeing  Outcome: Ongoing, Progressing  Intervention: Monitor Pain and Promote Comfort  Flowsheets (Taken 8/20/2023 2158)  Pain  Management Interventions:   care clustered   pillow support provided   quiet environment facilitated   position adjusted   relaxation techniques promoted  Intervention: Provide Person-Centered Care  Flowsheets (Taken 8/20/2023 2158)  Trust Relationship/Rapport:   care explained   choices provided   emotional support provided   empathic listening provided   questions answered   questions encouraged   reassurance provided   thoughts/feelings acknowledged   other (see comments)  Goal: Readiness for Transition of Care  Outcome: Ongoing, Progressing     Problem: Fluid and Electrolyte Imbalance (Acute Kidney Injury/Impairment)  Goal: Fluid and Electrolyte Balance  Outcome: Ongoing, Progressing  Intervention: Monitor and Manage Fluid and Electrolyte Balance  Flowsheets (Taken 8/20/2023 2158)  Fluid/Electrolyte Management: fluids provided     Problem: Oral Intake Inadequate (Acute Kidney Injury/Impairment)  Goal: Optimal Nutrition Intake  Outcome: Ongoing, Progressing  Intervention: Promote and Optimize Nutrition  Flowsheets (Taken 8/20/2023 2158)  Oral Nutrition Promotion:   social interaction promoted   nutritional therapy counseling provided     Problem: Renal Function Impairment (Acute Kidney Injury/Impairment)  Goal: Effective Renal Function  Outcome: Ongoing, Progressing  Intervention: Monitor and Support Renal Function  Flowsheets (Taken 8/20/2023 2158)  Medication Review/Management: medications reviewed     Problem: Pain Acute  Goal: Acceptable Pain Control and Functional Ability  Outcome: Ongoing, Progressing  Intervention: Develop Pain Management Plan  Flowsheets (Taken 8/20/2023 2158)  Pain Management Interventions:   care clustered   pillow support provided   quiet environment facilitated   position adjusted   relaxation techniques promoted  Intervention: Prevent or Manage Pain  Flowsheets (Taken 8/20/2023 2158)  Sleep/Rest Enhancement:   awakenings minimized   room darkened   noise level reduced  Sensory  Stimulation Regulation:   care clustered   lighting decreased   quiet environment promoted   television on  Bowel Elimination Promotion:   adequate fluid intake promoted   ambulation promoted   privacy promoted  Medication Review/Management: medications reviewed  Intervention: Optimize Psychosocial Wellbeing  Flowsheets (Taken 8/20/2023 2207)  Spiritual Activities Assistance:   hope instilled   prayer provided   spiritual support provided  Supportive Measures:   active listening utilized   positive reinforcement provided   relaxation techniques promoted   self-care encouraged   decision-making supported  Diversional Activities:   smartphone   television     Problem: Coping Ineffective  Goal: Effective Coping  Outcome: Ongoing, Progressing  Intervention: Support and Enhance Coping Strategies  Flowsheets (Taken 8/20/2023 2207)  Supportive Measures:   active listening utilized   positive reinforcement provided   relaxation techniques promoted   self-care encouraged   decision-making supported  Family/Support System Care:   caregiver stress acknowledged   involvement promoted   self-care encouraged  Environmental Support:   calm environment promoted   caregiver consistency promoted   distractions minimized   environmental consistency promoted   personal routine supported

## 2023-08-21 NOTE — PLAN OF CARE
08/21/23 0917   Discharge Assessment   Assessment Type Discharge Planning Assessment   Confirmed/corrected address, phone number and insurance Yes   Confirmed Demographics Correct on Facesheet   Source of Information patient;family   When was your last doctors appointment?   (Patient reports within the last month.)   Communicated RADHA with patient/caregiver Yes   Reason For Admission Intractable Abd Pain   People in Home grandchild(esme);spouse   Do you expect to return to your current living situation? Yes   Do you have help at home or someone to help you manage your care at home? Yes   Who are your caregiver(s) and their phone number(s)? Daughter: Campbell Talavera: [phone]   Prior to hospitilization cognitive status: Unable to Assess   Current cognitive status: Alert/Oriented   Walking or Climbing Stairs ambulation difficulty, requires equipment   Home Accessibility wheelchair accessible   Home Layout Able to live on 1st floor   Equipment Currently Used at Home rollator   Readmission within 30 days? Yes   Patient currently being followed by outpatient case management? No   Do you currently have service(s) that help you manage your care at home? No   Do you take prescription medications? Yes   Do you have prescription coverage? Yes   Coverage Medicare Part A & B   Do you have any problems affording any of your prescribed medications? No   Is the patient taking medications as prescribed? yes   Who is going to help you get home at discharge? Patient reports her sister, Rachel at bedside.   How do you get to doctors appointments? family or friend will provide   Are you on dialysis? No   Do you take coumadin? No   DME Needed Upon Discharge  none   Discharge Plan discussed with: Sibling;Patient   Name(s) and Number(s) Sister: Rachel   Transition of Care Barriers None   Discharge Plan A Home with family   Discharge Plan B Home   OTHER   Name(s) of People in Home Patient reports she goes between [place] and  Josh.       Patient's PCP is Marina Morales.  Patient was discharged from [facility] on 8/11/23.  Patient reports she is active with Palliative Care. TELLY followed up with Khoi who confirmed patient is active. [facility]  No barriers to discharge at this time.

## 2023-08-21 NOTE — PLAN OF CARE
Problem: Adult Inpatient Plan of Care  Goal: Plan of Care Review  Outcome: Met  Goal: Patient-Specific Goal (Individualized)  Outcome: Met  Goal: Absence of Hospital-Acquired Illness or Injury  Outcome: Met  Goal: Optimal Comfort and Wellbeing  Outcome: Met  Goal: Readiness for Transition of Care  Outcome: Met     Problem: Fluid and Electrolyte Imbalance (Acute Kidney Injury/Impairment)  Goal: Fluid and Electrolyte Balance  Outcome: Met     Problem: Oral Intake Inadequate (Acute Kidney Injury/Impairment)  Goal: Optimal Nutrition Intake  Outcome: Met     Problem: Renal Function Impairment (Acute Kidney Injury/Impairment)  Goal: Effective Renal Function  Outcome: Met     Problem: Pain Acute  Goal: Acceptable Pain Control and Functional Ability  Outcome: Met     Problem: Coping Ineffective  Goal: Effective Coping  Outcome: Met

## 2023-08-22 ENCOUNTER — PATIENT OUTREACH (OUTPATIENT)
Dept: ADMINISTRATIVE | Facility: CLINIC | Age: 83
End: 2023-08-22
Payer: MEDICARE

## 2023-08-22 NOTE — PROGRESS NOTES
Subjective:        PATIENT: Evelyn Proctor  MRN: [id number]  DATE: 8/23/2023  Chief Complaint: Follow-up (2 week f/u. Is now on Xarelto for blood clots. Wants to know if she can get on Megace for appetite )    Current Therapy: ENTYVIO GI q6w   by GI  Current Treatment: OP PANC GEMCITABINE QW   Oncology History Overview Note   This is a 82 y.o. female known to Dr. Pierson with PMH of recently diagnosed pancreatic ductal adenocarcinoma, hyperlipidemia, coronary artery disease, Crohn's disease on Entyvio every 6 weeks status post right hemicolectomy, hepatic steatosis, rheumatoid arthritis, osteopenia and cervical cancer. She presented to the [facility] ED 06/09/23 with worsening generalized abd pain, n/v/d that she reports is c/w Crohn's.     Upon arrival, workup included: CT abd pel with interval development of some colonic wall thickening in left colon especially at splenic flexure and proximal descending colon concerning for developing colitis, mild peripancreatic edema and inflammatory changes seen mainly in the body and tail c/w pancreatitis, persistent pancreatic duct dilation in the body and tail; labs revealed WBC 5.97, H&H 12.6/37.1, plt 252, INR 0.99, K 2.8, Cl 94, Tbili 0.7, AST 18, ALT 14, lipase 13. IVF initiated and electrolytes replenished. GI panel pending. GI consulted for Crohn's flare/pancreatic adenocarcinoma.     Of note, patient was recently admitted 06/02/23 - 06/04/23 for acute pancreatitis and was noted to have a high grade pancreatic duct stricture at pancreatic neck with upstream pancreatic duct dilation. Dr. Max performed EUS 06/08/23 that revealed a mass in the pancreatic neck that was determined to be ductal adenocarcinoma staged T3N0Mx by endosonographic criteria. He recommended oncology referral and PET scan. Patient and family requested to see Dr. Nuñez and Dr. Philip for further care.     Pancreatic cancer   6/8/2023 Initial Diagnosis    Pancreatic cancer  FINAL DIAGNOSIS        PANCREATIC NECK MASS, FINE NEEDLE ASPIRATION AND CELL BLOCK:       PANCREATIC DUCTAL ADENOCARCINOMA.          6/8/2023 Procedure    EUS Impression:            - Normal esophagus.                          - Normal stomach.                          - Normal examined duodenum.                          - There was no sign of significant pathology in                          the ampulla.                          - There was no sign of significant pathology in                          the common bile duct.                          - There was no evidence of significant pathology                          in the left lobe of the liver.                          - A mass was identified in the pancreatic neck.                          Tissue was obtained from this exam and is of                          adenocarcinoma. This was staged T3 N0 Mx by                          endosonographic criteria. Fine needle biopsy                          performed.      6/9/2023 Imaging Significant Findings    Impression:     Interval development of some colonic wall thickening in the left colon especially at the splenic flexure and proximal descending colon concerning for developing colitis     Mild peripancreatic edema and inflammatory changes seen mainly in the body and tail the pancreas consistent with pancreatitis     Persistent pancreatic duct dilatation in the body and tail which was seen and described on multiple previous examinations     6/10/2023 Cancer Staged    Staging form: Exocrine Pancreas, AJCC 8th Edition  - Clinical stage from 6/10/2023: Stage IIA (cT3, cN0, cM0)     6/20/2023 Imaging Significant Findings    PET:Impression: Focal activity at the junction of the body and head of the pancreas concerning for neoplasm. There is no definite discrete mass on the low-dose CT scan. There is dilatation of the distal pancreatic duct.       Hospital Admission    Most Recent Admission Details  Admit Date: 6/21/23  Admitted for:     Clostridium difficile infection  -Pancreatic Cancer  -Epigastric pain     Discharge date:6/29/23  Discharged from: [facility] MEDICAL TELEMETRY at [facility]  Discharge disposition: Home-Health Care [facility]       7/26/2023 -  Chemotherapy    Treatment Summary   Plan Name: OP PANC GEMCITABINE QW  Treatment Goal: Control  Status: Active  Start Date: 7/26/2023  End Date: 9/20/2023 (Planned)  Provider: Ronan Burns MD  Chemotherapy: gemcitabine (GEMZAR) 1,141 mg in sodium chloride 0.9% SolP 315 mL chemo infusion, 675 mg/m2 = 1,141 mg (100 % of original dose 675 mg/m2), Intravenous, Clinic/HOD 1 time, 1 of 2 cycles  Dose modification: 675 mg/m2 (original dose 675 mg/m2, Cycle 1, Reason: MD Discretion)  Administration: 1,141 mg (7/26/2023), 1,140 mg (8/9/2023)      Pathology Significant Finding    Caris testing, testing sample submitted did not yield any biomarker results with clinical significance, to provide in association with a particular therapy,  PMS2 stains weekly absent, and control can not be accurately interpreted    Positives:   KRAS pathological variant,   MSI stable  , mismatch repair status indeterminate  , NTRK not detected  , tumor mutational burden low,  P 53 variant, AID variant, PDL1 -,   BRCA 2 not detected, BRCA1, not detected,  A TM not detected,    Clinical tries, ERK inhibitors, MEK inhibitors, PARP inhibitors with ARID 2,         08/23/2023  Patient presents for weekly follow up to recheck symptoms after starting PPI.      She started her first cycle of gemcitabine on 7/26/23.     She was admitted to the hospital on 08/19/2021: Was diagnosed with a portal vein thrombus, started on anticoagulation and then discharge.  She is currently on Xarelto.    After chemotherapy see continue to feel bad  for several days  No Bleeding  Pain around the clock and medication not hold pain    Past Medical History:   Diagnosis Date    Acute pancreatitis, unspecified  complication status, unspecified pancreatitis type     Arthritis     Carpal tunnel syndrome, bilateral     Cervical cancer     Cervical radiculopathy     Crohn disease     Heart attack     HLD (hyperlipidemia)     Low back pain     Pancreatic cancer     Sleep apnea, unspecified     Spinal stenosis of lumbar region with neurogenic claudication       .  Past Surgical History:   Procedure Laterality Date    bilateral L4-5, L5-S1 decompression, repair L5-S1 dural tear  10/01/2021    Dr. Marin    CARPAL TUNNEL RELEASE Right 09/06/2019    Dr. Marin    CARPAL TUNNEL RELEASE Left 12/2/2022    Procedure: RELEASE, CARPAL TUNNEL;  Surgeon: Jesse Marin MD;  Location: [facility] OR;  Service: Neurosurgery;  Laterality: Left;    CHOLECYSTECTOMY      COLECTOMY  07/2011    partial x3    ESOPHAGOGASTRODUODENOSCOPY N/A 6/8/2023    Procedure: EGD (ESOPHAGOGASTRODUODENOSCOPY);  Surgeon: Jose Alberto Max MD;  Location: [facility] OR;  Service: Gastroenterology;  Laterality: N/A;    ESOPHAGOGASTRODUODENOSCOPY N/A 6/29/2023    Procedure: EGD (ESOPHAGOGASTRODUODENOSCOPY);  Surgeon: Jose Alberto Max MD;  Location: [facility] OR;  Service: Gastroenterology;  Laterality: N/A;    HYSTERECTOMY      total    REPAIR OF EYELID Bilateral 11/21/2022    Procedure: BILATERAL ECTROPION REPAIR;  Surgeon: Justin Flores MD;  Location: [facility] OR;  Service: ENT;  Laterality: Bilateral;    ULTRASOUND, ENDOSCOPIC, WITH FINE NEEDLE ASPIRATION N/A 6/8/2023    Procedure: UPPER EUS  W/  FNA;  Surgeon: Jose Alberto Max MD;  Location: [facility] OR;  Service: Gastroenterology;  Laterality: N/A;  Dangelo, pt w/ CD on entyvio recently admitted to [facility] w/ acute pancreatitis. Possibly drug induced from entyvio? Althoughshe does have dilated PD and a poss focal stricture in the HOP.CA 19-9 nml    ULTRASOUND, UPPER GI TRACT, ENDOSCOPIC, WITH CELIAC PLEXUS BLOCK N/A 6/29/2023    Procedure: UPPER EUS W/ POSS FNA W/ CELIAC BLOCK;  Surgeon: Jose Alberto Max MD;  Location: [facility] OR;  Service:  Gastroenterology;  Laterality: N/A;  Celiac Plexus injection planned.Pt should receive 1L NS bolus upon arrival to Outpt Surgery and 1L NS during the procedure. Pt will need to be in PACU for 30 min post procedure to assess for any potential hypotension. NO LABS      .  Family History   Problem Relation Age of Onset    Diabetes Mother     Hypertension Father     Hyperlipidemia Father     Cancer Father     Hyperlipidemia Sister     Hyperlipidemia Brother     Hyperlipidemia Daughter       Social History     Socioeconomic History    Marital status:    Tobacco Use    Smoking status: Former     Current packs/day: 0.00     Types: Cigarettes    Smokeless tobacco: Never   Substance and Sexual Activity    Alcohol use: Not Currently     Comment: rarely    Drug use: Never    Sexual activity: Not Currently     Social Determinants of Health     Social Connections: Unknown (6/22/2023)    Social Connection and Isolation Panel [NHANES]     Attends [employer] Services: More than 4 times per year     Active Member of Clubs or Organizations: Yes     Attends Club or Organization Meetings: More than 4 times per year     Marital Status:       .  Review of patient's allergies indicates:   Allergen Reactions    Morpholine analogues Itching          Current Outpatient Medications:     morphine (MS CONTIN) 15 MG 12 hr tablet, Take 1 tablet (15 mg total) by mouth every 12 (twelve) hours., Disp: 60 tablet, Rfl: 0    ondansetron (ZOFRAN) 8 MG tablet, Take 1 tablet (8 mg total) by mouth every 12 (twelve) hours as needed for Nausea., Disp: 30 tablet, Rfl: 2    oxyCODONE (ROXICODONE) 10 mg Tab immediate release tablet, Take 10 mg by mouth 4 (four) times daily as needed., Disp: , Rfl:     pantoprazole (PROTONIX) 40 MG tablet, Take 1 tablet (40 mg total) by mouth every morning., Disp: 30 tablet, Rfl: 0    prochlorperazine (COMPAZINE) 10 MG tablet, Take 1 tablet (10 mg total) by mouth every 6 (six) hours as needed., Disp: 30 tablet, Rfl:  1    rivaroxaban (XARELTO) 20 mg Tab, Take 1 tablet (20 mg total) by mouth daily with dinner or evening meal., Disp: 30 tablet, Rfl: 0    traZODone (DESYREL) 100 MG tablet, Take 100 mg by mouth every evening., Disp: , Rfl:    Review of Systems   Constitutional:  Negative for activity change, appetite change, fever and unexpected weight change.   Respiratory:  Negative for cough and shortness of breath.    Cardiovascular:  Negative for leg swelling.   Gastrointestinal:  Negative for nausea and vomiting.   Endocrine: Negative.    Genitourinary: Negative.    Musculoskeletal: Negative.    Skin: Negative.    Neurological:  Positive for numbness. Negative for tremors and headaches.   Hematological: Negative.    Psychiatric/Behavioral: Negative.            Objective:     Vitals:    08/23/23 1007   BP: 113/60   Pulse: 86   Temp: 98.7 °F (37.1 °C)            Physical Exam  Vitals reviewed.   Constitutional:       General: She is awake.   HENT:      Head: Normocephalic and atraumatic.      Mouth/Throat:      Mouth: Mucous membranes are moist.      Pharynx: Oropharynx is clear.   Eyes:      Extraocular Movements: Extraocular movements intact.      Conjunctiva/sclera: Conjunctivae normal.      Pupils: Pupils are equal, round, and reactive to light.   Cardiovascular:      Rate and Rhythm: Normal rate and regular rhythm.      Pulses: Normal pulses.      Heart sounds: Normal heart sounds.   Pulmonary:      Effort: Pulmonary effort is normal.      Breath sounds: Normal breath sounds and air entry.   Chest:      Comments: Left-sided Port-A-Cath, right-sided dilated chest wall veins  Abdominal:      General: Abdomen is flat. Bowel sounds are normal.      Palpations: Abdomen is soft.      Tenderness: There is abdominal tenderness in the epigastric area. There is no guarding or rebound.   Musculoskeletal:      Cervical back: Normal range of motion.      Right lower leg: No edema.      Left lower leg: No edema.   Lymphadenopathy:       Cervical: No cervical adenopathy.      Upper Body:      Right upper body: No axillary adenopathy.      Left upper body: No axillary adenopathy.      Lower Body: No right inguinal adenopathy. No left inguinal adenopathy.   Skin:     General: Skin is warm and dry.   Neurological:      General: No focal deficit present.      Mental Status: She is alert and oriented to person, place, and time. Mental status is at baseline.   Psychiatric:         Attention and Perception: Attention normal.         Mood and Affect: Mood and affect normal.         Behavior: Behavior is cooperative.         ECOG SCORE           Lab Review:  CBC:   Recent Labs     08/23/23  0946 08/20/23  0937 08/19/23  1324   WBC 3.14* 5.45 7.17   RBC 3.42* 3.07* 3.25*   HGB 10.5* 9.6* 10.0*   HCT 32.6* 27.9* 28.6*   * 172 169     CMP:   Recent Labs     08/20/23  0456 08/19/23  1324 08/16/23  1303    137 139   K 3.2* 3.0* 3.0*   CO2 24 26 28   BUN 10.1 7.9* 7.8*   CREATININE 0.81 0.72 0.91   CALCIUM 8.9 9.2 9.3   ALBUMIN 2.9* 3.6 3.5   BILITOT 1.1 1.4 0.5   ALKPHOS 76 94 102   AST 36* 46* 20   ALT 19 21 15         Assessment/Plan:   Stage IIA adenocarcinoma of pancreas  Recent C diff colitis   History inflammatory bowel disease  Weight loss  pancreatic insufficiency  Mild Neutropenia, no fever  7.   Portal vein clot    Gemzar 675 mg per m2 weekly   Had planned Eight weeks total, followed by repeat imaging. She received 80% of week 1 on 7/26/23 and started having hypertensive crisis, intractable nausea and vomiting requiring admission--now with several more doses and now  with readmission to hospital and now portal vein clot on treatment.      Discussed her options.  Change to 5 FU or Xeloda, hospice and supportive care,    We would a long discussion regarding quality of life in this 83-year-old patient.    She feels like every time she gets chemotherapy she gets worse.    We discussed would be the same potential options with 5 FU or Xeloda.     I made my decision and I am comfortable with it.  I just need my family to understand   Patient and I discussed transition to hospice and supportive care alone.      PLAN:  Continue protonix with GI symptoms.  MS Contin 15mg BID--increase to 30mg BID   with Oxycodone 10  mg prn BTP  Continue Creon with meals and snack  Port flush at home with Q 8 weeks    Stop labs  And move to Palliative Care Hospice  [facility] and hospice--- will move to Hospice    Discharge to hospice care         Follow up for Discharged to hospice care.               REVA Dsouza

## 2023-08-23 ENCOUNTER — OFFICE VISIT (OUTPATIENT)
Dept: HEMATOLOGY/ONCOLOGY | Facility: CLINIC | Age: 83
End: 2023-08-23
Payer: MEDICARE

## 2023-08-23 VITALS
WEIGHT: 131.63 LBS | HEART RATE: 86 BPM | TEMPERATURE: 99 F | SYSTOLIC BLOOD PRESSURE: 113 MMHG | DIASTOLIC BLOOD PRESSURE: 60 MMHG | BODY MASS INDEX: 23.32 KG/M2 | HEIGHT: 63 IN | OXYGEN SATURATION: 99 %

## 2023-08-23 DIAGNOSIS — C25.7 MALIGNANT NEOPLASM OF OTHER PARTS OF PANCREAS: Primary | ICD-10-CM

## 2023-08-23 PROCEDURE — 99213 OFFICE O/P EST LOW 20 MIN: CPT | Mod: PBBFAC | Performed by: INTERNAL MEDICINE

## 2023-08-23 PROCEDURE — 99999 PR PBB SHADOW E&M-EST. PATIENT-LVL III: CPT | Mod: PBBFAC,,, | Performed by: INTERNAL MEDICINE

## 2023-08-23 PROCEDURE — 99999 PR PBB SHADOW E&M-EST. PATIENT-LVL III: ICD-10-PCS | Mod: PBBFAC,,, | Performed by: INTERNAL MEDICINE

## 2023-08-23 PROCEDURE — 99215 PR OFFICE/OUTPT VISIT, EST, LEVL V, 40-54 MIN: ICD-10-PCS | Mod: S$PBB,,, | Performed by: INTERNAL MEDICINE

## 2023-08-23 PROCEDURE — 99215 OFFICE O/P EST HI 40 MIN: CPT | Mod: S$PBB,,, | Performed by: INTERNAL MEDICINE

## 2023-08-23 RX ORDER — OXYCODONE HYDROCHLORIDE 10 MG/1
10 TABLET ORAL 4 TIMES DAILY PRN
COMMUNITY
Start: 2023-08-21

## 2023-08-24 ENCOUNTER — TELEPHONE (OUTPATIENT)
Dept: HEMATOLOGY/ONCOLOGY | Facility: CLINIC | Age: 83
End: 2023-08-24
Payer: MEDICARE

## 2023-08-28 ENCOUNTER — TELEPHONE (OUTPATIENT)
Dept: FAMILY MEDICINE | Facility: CLINIC | Age: 83
End: 2023-08-28
Payer: MEDICARE

## 2023-08-28 NOTE — TELEPHONE ENCOUNTER
----- Message from Mira Valladares sent at 8/28/2023  8:28 AM CDT -----  Regarding: Hospice  .Type:  Needs Medical Advice    Who Called: Brody Pisano [facility]  Symptoms (please be specific):    How long has patient had these symptoms:    Pharmacy name and phone #:    Would the patient rather a call back or a response via [facility]? Call back  Best Call Back Number: [phone]  Additional Information: Wants to know if Dr Morales is willing to follow pt while on hospice. Nurse is currently at pt's home.

## 2023-08-28 NOTE — TELEPHONE ENCOUNTER
Aliya asked if PCP would be ok staying on as provider or would you prefer turning over care to the director of Hospice. Please advise. Thanks

## 2023-09-20 ENCOUNTER — TELEPHONE (OUTPATIENT)
Dept: HEMATOLOGY/ONCOLOGY | Facility: CLINIC | Age: 83
End: 2023-09-20
Payer: MEDICARE

## 2023-09-20 NOTE — TELEPHONE ENCOUNTER
Patient's daughter requesting referral to [facility]. States that patient got into an argument with the nurse from [facility]. They discharged her from their services.

## 2023-09-25 PROBLEM — N17.9 ACUTE KIDNEY INJURY: Status: RESOLVED | Noted: 2023-06-21 | Resolved: 2023-09-25

## 2023-09-27 ENCOUNTER — HOSPITAL ENCOUNTER (INPATIENT)
Facility: HOSPITAL | Age: 83
LOS: 1 days | Discharge: HOSPICE | DRG: 435 | End: 2023-09-27
Attending: EMERGENCY MEDICINE | Admitting: STUDENT IN AN ORGANIZED HEALTH CARE EDUCATION/TRAINING PROGRAM
Payer: MEDICARE

## 2023-09-27 VITALS
TEMPERATURE: 98 F | HEART RATE: 78 BPM | RESPIRATION RATE: 24 BRPM | HEIGHT: 64 IN | OXYGEN SATURATION: 100 % | BODY MASS INDEX: 23.9 KG/M2 | SYSTOLIC BLOOD PRESSURE: 154 MMHG | DIASTOLIC BLOOD PRESSURE: 95 MMHG | WEIGHT: 140 LBS

## 2023-09-27 DIAGNOSIS — R10.84 DIFFUSE ABDOMINAL PAIN: ICD-10-CM

## 2023-09-27 DIAGNOSIS — R10.9 INTRACTABLE ABDOMINAL PAIN: Primary | ICD-10-CM

## 2023-09-27 DIAGNOSIS — I10 HYPERTENSION, UNSPECIFIED TYPE: ICD-10-CM

## 2023-09-27 DIAGNOSIS — C25.8 OVERLAPPING MALIGNANT NEOPLASM OF PANCREAS: ICD-10-CM

## 2023-09-27 DIAGNOSIS — C25.9 MALIGNANT NEOPLASM OF PANCREAS, UNSPECIFIED LOCATION OF MALIGNANCY: ICD-10-CM

## 2023-09-27 DIAGNOSIS — E87.6 HYPOKALEMIA: ICD-10-CM

## 2023-09-27 LAB
ALBUMIN SERPL-MCNC: 3.4 G/DL (ref 3.4–4.8)
ALBUMIN/GLOB SERPL: 0.8 RATIO (ref 1.1–2)
ALP SERPL-CCNC: 84 UNIT/L (ref 40–150)
ALT SERPL-CCNC: 17 UNIT/L (ref 0–55)
AST SERPL-CCNC: 25 UNIT/L (ref 5–34)
BASOPHILS # BLD AUTO: 0.01 X10(3)/MCL
BASOPHILS NFR BLD AUTO: 0.1 %
BILIRUB SERPL-MCNC: 0.9 MG/DL
BUN SERPL-MCNC: 4.6 MG/DL (ref 9.8–20.1)
CALCIUM SERPL-MCNC: 8.9 MG/DL (ref 8.4–10.2)
CHLORIDE SERPL-SCNC: 101 MMOL/L (ref 98–107)
CO2 SERPL-SCNC: 24 MMOL/L (ref 23–31)
CREAT SERPL-MCNC: 0.72 MG/DL (ref 0.55–1.02)
EOSINOPHIL # BLD AUTO: 0 X10(3)/MCL (ref 0–0.9)
EOSINOPHIL NFR BLD AUTO: 0 %
ERYTHROCYTE [DISTWIDTH] IN BLOOD BY AUTOMATED COUNT: 16 % (ref 11.5–17)
GFR SERPLBLD CREATININE-BSD FMLA CKD-EPI: >60 MLS/MIN/1.73/M2
GLOBULIN SER-MCNC: 4.2 GM/DL (ref 2.4–3.5)
GLUCOSE SERPL-MCNC: 155 MG/DL (ref 82–115)
HCT VFR BLD AUTO: 29.7 % (ref 37–47)
HGB BLD-MCNC: 9.8 G/DL (ref 12–16)
IMM GRANULOCYTES # BLD AUTO: 0.04 X10(3)/MCL (ref 0–0.04)
IMM GRANULOCYTES NFR BLD AUTO: 0.4 %
LACTATE SERPL-SCNC: 1.1 MMOL/L (ref 0.5–2.2)
LIPASE SERPL-CCNC: 8 U/L
LYMPHOCYTES # BLD AUTO: 0.75 X10(3)/MCL (ref 0.6–4.6)
LYMPHOCYTES NFR BLD AUTO: 8.1 %
MAGNESIUM SERPL-MCNC: 1.1 MG/DL (ref 1.6–2.6)
MCH RBC QN AUTO: 30.1 PG (ref 27–31)
MCHC RBC AUTO-ENTMCNC: 33 G/DL (ref 33–36)
MCV RBC AUTO: 91.1 FL (ref 80–94)
MONOCYTES # BLD AUTO: 0.33 X10(3)/MCL (ref 0.1–1.3)
MONOCYTES NFR BLD AUTO: 3.5 %
NEUTROPHILS # BLD AUTO: 8.17 X10(3)/MCL (ref 2.1–9.2)
NEUTROPHILS NFR BLD AUTO: 87.9 %
NRBC BLD AUTO-RTO: 0 %
PLATELET # BLD AUTO: 323 X10(3)/MCL (ref 130–400)
PMV BLD AUTO: 9.9 FL (ref 7.4–10.4)
POTASSIUM SERPL-SCNC: 2.6 MMOL/L (ref 3.5–5.1)
PROT SERPL-MCNC: 7.6 GM/DL (ref 5.8–7.6)
RBC # BLD AUTO: 3.26 X10(6)/MCL (ref 4.2–5.4)
SODIUM SERPL-SCNC: 136 MMOL/L (ref 136–145)
WBC # SPEC AUTO: 9.3 X10(3)/MCL (ref 4.5–11.5)

## 2023-09-27 PROCEDURE — 96374 THER/PROPH/DIAG INJ IV PUSH: CPT

## 2023-09-27 PROCEDURE — 85025 COMPLETE CBC W/AUTO DIFF WBC: CPT | Performed by: EMERGENCY MEDICINE

## 2023-09-27 PROCEDURE — 83735 ASSAY OF MAGNESIUM: CPT | Performed by: EMERGENCY MEDICINE

## 2023-09-27 PROCEDURE — 96361 HYDRATE IV INFUSION ADD-ON: CPT

## 2023-09-27 PROCEDURE — 63600175 PHARM REV CODE 636 W HCPCS: Performed by: STUDENT IN AN ORGANIZED HEALTH CARE EDUCATION/TRAINING PROGRAM

## 2023-09-27 PROCEDURE — 96375 TX/PRO/DX INJ NEW DRUG ADDON: CPT

## 2023-09-27 PROCEDURE — 80053 COMPREHEN METABOLIC PANEL: CPT | Performed by: EMERGENCY MEDICINE

## 2023-09-27 PROCEDURE — 99222 PR INITIAL HOSPITAL CARE,LEVL II: ICD-10-PCS | Mod: GV,HPC,, | Performed by: INTERNAL MEDICINE

## 2023-09-27 PROCEDURE — 96366 THER/PROPH/DIAG IV INF ADDON: CPT

## 2023-09-27 PROCEDURE — 25000003 PHARM REV CODE 250: Performed by: STUDENT IN AN ORGANIZED HEALTH CARE EDUCATION/TRAINING PROGRAM

## 2023-09-27 PROCEDURE — 99285 EMERGENCY DEPT VISIT HI MDM: CPT

## 2023-09-27 PROCEDURE — 63600175 PHARM REV CODE 636 W HCPCS: Performed by: PHYSICIAN ASSISTANT

## 2023-09-27 PROCEDURE — 25000003 PHARM REV CODE 250: Performed by: EMERGENCY MEDICINE

## 2023-09-27 PROCEDURE — 63600175 PHARM REV CODE 636 W HCPCS: Performed by: EMERGENCY MEDICINE

## 2023-09-27 PROCEDURE — 99222 1ST HOSP IP/OBS MODERATE 55: CPT | Mod: GV,HPC,, | Performed by: INTERNAL MEDICINE

## 2023-09-27 PROCEDURE — 96365 THER/PROPH/DIAG IV INF INIT: CPT

## 2023-09-27 PROCEDURE — 83605 ASSAY OF LACTIC ACID: CPT | Performed by: EMERGENCY MEDICINE

## 2023-09-27 PROCEDURE — 83690 ASSAY OF LIPASE: CPT | Performed by: EMERGENCY MEDICINE

## 2023-09-27 PROCEDURE — 96376 TX/PRO/DX INJ SAME DRUG ADON: CPT

## 2023-09-27 PROCEDURE — 11000001 HC ACUTE MED/SURG PRIVATE ROOM

## 2023-09-27 RX ORDER — HEPARIN SODIUM 5000 [USP'U]/ML
5000 INJECTION, SOLUTION INTRAVENOUS; SUBCUTANEOUS
Status: DISCONTINUED | OUTPATIENT
Start: 2023-09-27 | End: 2023-09-27

## 2023-09-27 RX ORDER — ONDANSETRON HYDROCHLORIDE 4 MG/5ML
8 SOLUTION ORAL EVERY 6 HOURS PRN
Status: DISCONTINUED | OUTPATIENT
Start: 2023-09-27 | End: 2023-09-27

## 2023-09-27 RX ORDER — HYDRALAZINE HYDROCHLORIDE 20 MG/ML
20 INJECTION INTRAMUSCULAR; INTRAVENOUS
Status: COMPLETED | OUTPATIENT
Start: 2023-09-27 | End: 2023-09-27

## 2023-09-27 RX ORDER — ENOXAPARIN SODIUM 100 MG/ML
40 INJECTION SUBCUTANEOUS EVERY 24 HOURS
Status: DISCONTINUED | OUTPATIENT
Start: 2023-09-27 | End: 2023-09-27

## 2023-09-27 RX ORDER — PANTOPRAZOLE SODIUM 40 MG/1
40 TABLET, DELAYED RELEASE ORAL DAILY
Status: DISCONTINUED | OUTPATIENT
Start: 2023-09-27 | End: 2023-09-27 | Stop reason: HOSPADM

## 2023-09-27 RX ORDER — ACETAMINOPHEN 325 MG/1
650 TABLET ORAL EVERY 8 HOURS PRN
Status: DISCONTINUED | OUTPATIENT
Start: 2023-09-27 | End: 2023-09-27 | Stop reason: HOSPADM

## 2023-09-27 RX ORDER — ONDANSETRON 2 MG/ML
4 INJECTION INTRAMUSCULAR; INTRAVENOUS
Status: COMPLETED | OUTPATIENT
Start: 2023-09-27 | End: 2023-09-27

## 2023-09-27 RX ORDER — ONDANSETRON 2 MG/ML
4 INJECTION INTRAMUSCULAR; INTRAVENOUS EVERY 4 HOURS PRN
Status: DISCONTINUED | OUTPATIENT
Start: 2023-09-27 | End: 2023-09-27 | Stop reason: HOSPADM

## 2023-09-27 RX ORDER — HEPARIN SODIUM 1000 [USP'U]/ML
500 INJECTION, SOLUTION INTRAVENOUS; SUBCUTANEOUS ONCE
Status: COMPLETED | OUTPATIENT
Start: 2023-09-27 | End: 2023-09-27

## 2023-09-27 RX ORDER — IBUPROFEN 200 MG
16 TABLET ORAL
Status: DISCONTINUED | OUTPATIENT
Start: 2023-09-27 | End: 2023-09-27 | Stop reason: HOSPADM

## 2023-09-27 RX ORDER — HYDROMORPHONE HYDROCHLORIDE 2 MG/ML
2 INJECTION, SOLUTION INTRAMUSCULAR; INTRAVENOUS; SUBCUTANEOUS EVERY 4 HOURS PRN
Status: DISCONTINUED | OUTPATIENT
Start: 2023-09-27 | End: 2023-09-27 | Stop reason: HOSPADM

## 2023-09-27 RX ORDER — HYDROMORPHONE HYDROCHLORIDE 2 MG/ML
1 INJECTION, SOLUTION INTRAMUSCULAR; INTRAVENOUS; SUBCUTANEOUS
Status: COMPLETED | OUTPATIENT
Start: 2023-09-27 | End: 2023-09-27

## 2023-09-27 RX ORDER — MUPIROCIN 20 MG/G
OINTMENT TOPICAL 2 TIMES DAILY
Status: DISCONTINUED | OUTPATIENT
Start: 2023-09-27 | End: 2023-09-27 | Stop reason: HOSPADM

## 2023-09-27 RX ORDER — PROCHLORPERAZINE MALEATE 10 MG
10 TABLET ORAL 3 TIMES DAILY PRN
Status: DISCONTINUED | OUTPATIENT
Start: 2023-09-27 | End: 2023-09-27 | Stop reason: HOSPADM

## 2023-09-27 RX ORDER — SODIUM CHLORIDE 0.9 % (FLUSH) 0.9 %
10 SYRINGE (ML) INJECTION EVERY 12 HOURS PRN
Status: DISCONTINUED | OUTPATIENT
Start: 2023-09-27 | End: 2023-09-27 | Stop reason: HOSPADM

## 2023-09-27 RX ORDER — HEPARIN 100 UNIT/ML
10 SYRINGE INTRAVENOUS ONCE
Status: DISCONTINUED | OUTPATIENT
Start: 2023-09-27 | End: 2023-09-27

## 2023-09-27 RX ORDER — MAGNESIUM SULFATE HEPTAHYDRATE 40 MG/ML
2 INJECTION, SOLUTION INTRAVENOUS
Status: COMPLETED | OUTPATIENT
Start: 2023-09-27 | End: 2023-09-27

## 2023-09-27 RX ORDER — ACETAMINOPHEN 325 MG/1
650 TABLET ORAL EVERY 4 HOURS PRN
Status: DISCONTINUED | OUTPATIENT
Start: 2023-09-27 | End: 2023-09-27 | Stop reason: HOSPADM

## 2023-09-27 RX ORDER — ENOXAPARIN SODIUM 100 MG/ML
1 INJECTION SUBCUTANEOUS EVERY 12 HOURS
Status: DISCONTINUED | OUTPATIENT
Start: 2023-09-28 | End: 2023-09-27 | Stop reason: HOSPADM

## 2023-09-27 RX ORDER — FENTANYL CITRATE 50 UG/ML
50 INJECTION, SOLUTION INTRAMUSCULAR; INTRAVENOUS
Status: COMPLETED | OUTPATIENT
Start: 2023-09-27 | End: 2023-09-27

## 2023-09-27 RX ORDER — TRAZODONE HYDROCHLORIDE 100 MG/1
100 TABLET ORAL NIGHTLY
Status: DISCONTINUED | OUTPATIENT
Start: 2023-09-27 | End: 2023-09-27 | Stop reason: HOSPADM

## 2023-09-27 RX ORDER — MAGNESIUM SULFATE HEPTAHYDRATE 40 MG/ML
2 INJECTION, SOLUTION INTRAVENOUS
Status: DISCONTINUED | OUTPATIENT
Start: 2023-09-27 | End: 2023-09-27 | Stop reason: HOSPADM

## 2023-09-27 RX ORDER — IBUPROFEN 200 MG
24 TABLET ORAL
Status: DISCONTINUED | OUTPATIENT
Start: 2023-09-27 | End: 2023-09-27 | Stop reason: HOSPADM

## 2023-09-27 RX ORDER — POTASSIUM CHLORIDE 14.9 MG/ML
20 INJECTION INTRAVENOUS
Status: COMPLETED | OUTPATIENT
Start: 2023-09-27 | End: 2023-09-27

## 2023-09-27 RX ORDER — GLUCAGON 1 MG
1 KIT INJECTION
Status: DISCONTINUED | OUTPATIENT
Start: 2023-09-27 | End: 2023-09-27 | Stop reason: HOSPADM

## 2023-09-27 RX ADMIN — MAGNESIUM SULFATE HEPTAHYDRATE 2 G: 40 INJECTION, SOLUTION INTRAVENOUS at 04:09

## 2023-09-27 RX ADMIN — ONDANSETRON 4 MG: 2 INJECTION INTRAMUSCULAR; INTRAVENOUS at 06:09

## 2023-09-27 RX ADMIN — RIVAROXABAN 20 MG: 10 TABLET, FILM COATED ORAL at 04:09

## 2023-09-27 RX ADMIN — POTASSIUM BICARBONATE 50 MEQ: 977.5 TABLET, EFFERVESCENT ORAL at 04:09

## 2023-09-27 RX ADMIN — HYDROMORPHONE HYDROCHLORIDE 1 MG: 2 INJECTION INTRAMUSCULAR; INTRAVENOUS; SUBCUTANEOUS at 06:09

## 2023-09-27 RX ADMIN — HYDROMORPHONE HYDROCHLORIDE 2 MG: 2 INJECTION INTRAMUSCULAR; INTRAVENOUS; SUBCUTANEOUS at 01:09

## 2023-09-27 RX ADMIN — HYDROMORPHONE HYDROCHLORIDE 1 MG: 2 INJECTION INTRAMUSCULAR; INTRAVENOUS; SUBCUTANEOUS at 10:09

## 2023-09-27 RX ADMIN — SODIUM CHLORIDE 1000 ML: 9 INJECTION, SOLUTION INTRAVENOUS at 08:09

## 2023-09-27 RX ADMIN — MUPIROCIN: 20 OINTMENT TOPICAL at 01:09

## 2023-09-27 RX ADMIN — POTASSIUM CHLORIDE 20 MEQ: 14.9 INJECTION, SOLUTION INTRAVENOUS at 07:09

## 2023-09-27 RX ADMIN — HYDRALAZINE HYDROCHLORIDE 20 MG: 20 INJECTION INTRAMUSCULAR; INTRAVENOUS at 07:09

## 2023-09-27 RX ADMIN — HEPARIN SODIUM 500 UNITS: 1000 INJECTION, SOLUTION INTRAVENOUS; SUBCUTANEOUS at 06:09

## 2023-09-27 RX ADMIN — MAGNESIUM SULFATE HEPTAHYDRATE 2 G: 40 INJECTION, SOLUTION INTRAVENOUS at 09:09

## 2023-09-27 RX ADMIN — FENTANYL CITRATE 50 MCG: 50 INJECTION, SOLUTION INTRAMUSCULAR; INTRAVENOUS at 08:09

## 2023-09-27 RX ADMIN — HYDROMORPHONE HYDROCHLORIDE 1 MG: 2 INJECTION INTRAMUSCULAR; INTRAVENOUS; SUBCUTANEOUS at 07:09

## 2023-09-27 NOTE — ED NOTES
Pt family requesting to take pt home. Dr. Merritt messaged and Dr. Adkins notified. [facility]

## 2023-09-27 NOTE — H&P
[facility] Hospital Medicine History & Physical Examination       Patient Name: Evelyn Proctor  MRN: [id number]  Patient Class: IP- Inpatient   Admission Date: 9/27/2023   Admitting Physician: Garrison Merritt MD   Length of Stay: 0  Attending Physician: Garrison Merritt MD   Primary Care Provider: Marina Morales MD  Face-to-Face encounter date: 09/27/2023  Code Status: DNR  Chief Complaint: Abdominal Pain (Pt c/o ABD x 3 months with nausea and vomiting today. Also having generalized weakness and confusion.  PMH of pancreatic CA and Crohn's. Pt became hypertensive enroute EMS denies known hx of HTN.)        Patient information was obtained from patient, patient's family, past medical records and ER records.     HISTORY OF PRESENT ILLNESS:   Evelyn Proctor is a 83 y.o. Black or  female with a past medical history of hyperlipidemia, coronary artery disease, Crohn's disease status post right hemicolectomy, hepatic steatosis, rheumatoid arthritis, osteopenia, cervical cancer status post hysterectomy, stage III pancreatic cancer diagnosed in June 2023 was undergoing chemotherapy but discontinued as she was unable to tolerate treatment and currently on hospice with [facility]. She states her last session of chemotherapy was in July. Her oncologist is Dr. 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 The patient presented to [facility] on 9/27/2023 with a primary complaint of abdominal pain, nausea and vomiting.  Patient reports having umbilical abdominal pain which began on 09/25/2023 and vomiting which began yesterday (09/26/2023.  Patient reports having 3 episodes of vomiting since onset and has been unable to keep anything down.  She denies complaints of fever, chills, chest pain, shortness a breath and diarrhea.  Patient is on MS Contin 45 mg twice a day and as needed immediate release oxycodone 10 mg as needed.    Upon presentation to the ED, temperature 97.7° F,  heart rate 95, blood pressure 202/107, respiratory rate 18 and SpO2 99% on room air.  Labs with stable normocytic anemia, potassium 2.6, magnesium 1.10.  CT abdomen pelvis without contrast revealed bowel normal in caliber without appreciable obstruction and overall very similar appearance to previous exam.  In ED patient received fentanyl, Dilaudid, Zofran, 2 g of magnesium sulfate, 20 mEq of potassium chloride and IV fluid hydration.  She is admitted to hospital medicine services for further medical management.    PAST MEDICAL HISTORY:     Past Medical History:   Diagnosis Date    Acute pancreatitis, unspecified complication status, unspecified pancreatitis type     Arthritis     Carpal tunnel syndrome, bilateral     Cervical cancer     Cervical radiculopathy     Crohn disease     Heart attack     HLD (hyperlipidemia)     Low back pain     Pancreatic cancer     Sleep apnea, unspecified     Spinal stenosis of lumbar region with neurogenic claudication        PAST SURGICAL HISTORY:     Past Surgical History:   Procedure Laterality Date    bilateral L4-5, L5-S1 decompression, repair L5-S1 dural tear  10/01/2021    Dr. Marin    CARPAL TUNNEL RELEASE Right 09/06/2019    Dr. Marin    CARPAL TUNNEL RELEASE Left 12/2/2022    Procedure: RELEASE, CARPAL TUNNEL;  Surgeon: Jesse Marin MD;  Location: [facility];  Service: Neurosurgery;  Laterality: Left;    CHOLECYSTECTOMY      COLECTOMY  07/2011    partial x3    ESOPHAGOGASTRODUODENOSCOPY N/A 6/8/2023    Procedure: EGD (ESOPHAGOGASTRODUODENOSCOPY);  Surgeon: Jose Alberto Max MD;  Location: [facility];  Service: Gastroenterology;  Laterality: N/A;    ESOPHAGOGASTRODUODENOSCOPY N/A 6/29/2023    Procedure: EGD (ESOPHAGOGASTRODUODENOSCOPY);  Surgeon: Jose Alberto Max MD;  Location: [facility];  Service: Gastroenterology;  Laterality: N/A;    HYSTERECTOMY      total    REPAIR OF EYELID Bilateral 11/21/2022    Procedure: BILATERAL ECTROPION REPAIR;  Surgeon: Justin Flores MD;   Location: [facility] OR;  Service: ENT;  Laterality: Bilateral;    ULTRASOUND, ENDOSCOPIC, WITH FINE NEEDLE ASPIRATION N/A 6/8/2023    Procedure: UPPER EUS  W/  FNA;  Surgeon: Jose Alberto Max MD;  Location: [facility] OR;  Service: Gastroenterology;  Laterality: N/A;  Dangelo, pt w/ CD on entyvio recently admitted to [facility] w/ acute pancreatitis. Possibly drug induced from entyvio? Marisa does have dilated PD and a poss focal stricture in the HOP.CA 19-9 nml    ULTRASOUND, UPPER GI TRACT, ENDOSCOPIC, WITH CELIAC PLEXUS BLOCK N/A 6/29/2023    Procedure: UPPER EUS W/ POSS FNA W/ CELIAC BLOCK;  Surgeon: Jose Alberto Max MD;  Location: [facility] OR;  Service: Gastroenterology;  Laterality: N/A;  Celiac Plexus injection planned.Pt should receive 1L NS bolus upon arrival to Outpt Surgery and 1L NS during the procedure. Pt will need to be in PACU for 30 min post procedure to assess for any potential hypotension. NO LABS       ALLERGIES:   Morpholine analogues    FAMILY HISTORY:   Mother: Diabetes mellitus type 2, hypertension  Hypertension, coronary artery disease    SOCIAL HISTORY:   Former smoker   Denies alcohol or illicit drug use     HOME MEDICATIONS:     Prior to Admission medications    Medication Sig Start Date End Date Taking? Authorizing Provider   prochlorperazine (COMPAZINE) 10 MG tablet Take 1 tablet (10 mg total) by mouth every 6 (six) hours as needed. 7/20/23 7/19/24 Yes Anais Wood FNP   rivaroxaban (XARELTO) 20 mg Tab Take 1 tablet (20 mg total) by mouth daily with dinner or evening meal. 8/21/23  Yes Sacha Fairbanks MD   traZODone (DESYREL) 100 MG tablet Take 100 mg by mouth every evening. 8/8/23  Yes Provider, Historical   ondansetron (ZOFRAN) 8 MG tablet Take 1 tablet (8 mg total) by mouth every 12 (twelve) hours as needed for Nausea. 8/1/23 7/31/24  Radha Jeffers FNP   oxyCODONE (ROXICODONE) 10 mg Tab immediate release tablet Take 10 mg by mouth 4 (four) times daily as needed. 8/21/23   Provider, Historical    pantoprazole (PROTONIX) 40 MG tablet Take 1 tablet (40 mg total) by mouth every morning. 8/1/23 8/31/23  Radha Jeffers, SHEILA       REVIEW OF SYSTEMS:   Except as documented, all other systems reviewed and negative     PHYSICAL EXAM:     VITAL SIGNS: 24 HRS MIN & MAX LAST   Temp  Min: 97.7 °F (36.5 °C)  Max: 97.7 °F (36.5 °C) 97.7 °F (36.5 °C)   BP  Min: 109/72  Max: 202/107 109/72   Pulse  Min: 75  Max: 108  108   Resp  Min: 16  Max: 33 18   SpO2  Min: 99 %  Max: 100 % 100 %       General appearance: Well-developed, well-nourished, ill appearing female in pain. Daughter at bedside.  HEENT: Atraumatic head. Dry mucous membranes of oral cavity.  Lungs: Clear to auscultation bilaterally.   Heart: Regular rate and rhythm with murmur. Trace edema to BLE.  Abdomen: Soft, non-distended, generalized tenderness. Bowel sounds are normal.   Extremities: No cyanosis, clubbing. No deformities.  Skin: No Rash. Warm and dry.  Neuro: Awake, alert and oriented. Motor and sensory exams grossly intact.  Psych/mental status: Appropriate mood and affect. Cooperative. Responds appropriately to questions.       LABS AND IMAGING:     Recent Labs   Lab 09/27/23  0548   WBC 9.30   RBC 3.26*   HGB 9.8*   HCT 29.7*   MCV 91.1   MCH 30.1   MCHC 33.0   RDW 16.0      MPV 9.9       Recent Labs   Lab 09/27/23  0548      K 2.6*   CO2 24   BUN 4.6*   CREATININE 0.72   CALCIUM 8.9   MG 1.10*   ALBUMIN 3.4   ALKPHOS 84   ALT 17   AST 25   BILITOT 0.9       Microbiology Results (last 7 days)       ** No results found for the last 168 hours. **             CT Abdomen Pelvis  Without Contrast  Narrative: EXAMINATION:  CT ABDOMEN PELVIS WITHOUT CONTRAST    CLINICAL HISTORY:  Bowel obstruction suspected;  abdominal pain, nausea, vomiting    TECHNIQUE:  Helically acquired axial images, sagittal and coronal reformations were obtained from the lung bases to the pubic symphysis without the IV administration of contrast.    Automated tube  current modulation, weight-based exposure dosing, and/or iterative reconstruction technique utilized to reach lowest reasonably achievable exposure rate.    DLP: 419 mGy*cm    COMPARISON:  CT abdomen pelvis 08/19/2023    FINDINGS:  HEART: There are coronary artery calcifications.    LUNG BASES: Well aerated.    LIVER: Limited noncontrast evaluation.  Similar geographic hypoattenuation at segment 4.    BILIARY: Gallbladder is surgically absent.  Likely similar biliary ductal dilatation compared to previous.    PANCREAS: Limited noncontrast evaluation.  Similar appearance of the pancreas compared to previous exam taking into account differences in technique/absence of contrast on today's exam.  Hyperattenuating area at the head/neck of the pancreas corresponding with soft tissue attenuation and thrombosed portal vein on preceding exam which is similar in size on today's exam.  Similar fluid attenuation at the pancreatic bed and peripancreatic soft tissues.    SPLEEN: Normal in size    ADRENALS: No mass.    KIDNEYS/URETERS: No renal or ureteral calculus. No hydronephrosis.    GI TRACT/MESENTERY: Evaluation of the bowel is limited without contrast. Bowel is not dilated.  Postsurgical change of right hemicolectomy.    PERITONEUM: No free fluid.No free air.    LYMPH NODES: Limited evaluation    VASCULATURE: Aortoiliac atherosclerosis.  Known portosystemic collaterals, better characterized on previous exam.    BLADDER: Bladder is distended to above the pelvic brim.    REPRODUCTIVE ORGANS: Uterus is surgically absent.    ABDOMINAL WALL: Unremarkable.    BONES: Degenerative change at the lumbar spine.  Impression: 1. Bowel is normal in caliber without appreciable obstruction  2. Overall very similar appearance to the previous exam taking into account limitations on today's exam performed without contrast.    Electronically signed by: Carolina Duque  Date:    09/27/2023  Time:    07:18        ASSESSMENT & PLAN:    Assessment:  Intractable abdominal pain, nausea and vomiting secondary to pancreatic cancer   Stable normocytic anemia  Hypokalemia  Hypomagnesemia  History of hyperlipidemia, coronary artery disease, Crohn's disease on Evtyvio every 6 weeks status post right hemicolectomy, hepatic steatosis, rheumatoid arthritis, osteopenia, cervical cancer, stage III pancreatic cancer diagnosed in June 2023 undergoing chemotherapy followed by Dr. Burns and near occlusive portal vein thrombosis on Xarelto    Plan:  - IV Zofran as needed for nausea   - IV Dilaudid as needed for pain   - Clear liquid diet  - Resume appropriate home medications when deemed necessary   - Labs in AM    VTE Prophylaxis: will be placed on Loveonx for DVT prophylaxis and will be advised to be as mobile as possible and sit in a chair as tolerated      __________________________________________________________________________  INPATIENT LIST OF MEDICATIONS     Current Facility-Administered Medications:     acetaminophen tablet 650 mg, 650 mg, Oral, Q8H PRN, Kia Wood, PA-C    acetaminophen tablet 650 mg, 650 mg, Oral, Q4H PRN, Kia Wood, PA-AMANDEEP    dextrose 10% bolus 125 mL 125 mL, 12.5 g, Intravenous, PRN, Kia Wood, PA-C    dextrose 10% bolus 250 mL 250 mL, 25 g, Intravenous, PRN, Kia Wood, PA-AMANDEEP    enoxaparin injection 40 mg, 40 mg, Subcutaneous, Daily, Kia Wood, PA-AMANDEEP    glucagon (human recombinant) injection 1 mg, 1 mg, Intramuscular, PRN, Kia Wood, PA-AMANDEEP    glucose chewable tablet 16 g, 16 g, Oral, PRN, Kia Wood, PA-C    glucose chewable tablet 24 g, 24 g, Oral, PRN, Kia Wood, PA-AMANDEEP    magnesium sulfate 2g in water 50mL IVPB (premix), 2 g, Intravenous, ED 1 Time, Aliya Hummel MD, Last Rate: 25 mL/hr at 09/27/23 0937, 2 g at 09/27/23 0937    ondansetron injection 4 mg, 4 mg, Intravenous, Q4H PRN, Kia Wood PA-C    sodium chloride 0.9% flush 10 mL, 10 mL, Intravenous, Q12H  PRN, Kia Wood PA-C    Current Outpatient Medications:     prochlorperazine (COMPAZINE) 10 MG tablet, Take 1 tablet (10 mg total) by mouth every 6 (six) hours as needed., Disp: 30 tablet, Rfl: 1    rivaroxaban (XARELTO) 20 mg Tab, Take 1 tablet (20 mg total) by mouth daily with dinner or evening meal., Disp: 30 tablet, Rfl: 0    traZODone (DESYREL) 100 MG tablet, Take 100 mg by mouth every evening., Disp: , Rfl:     ondansetron (ZOFRAN) 8 MG tablet, Take 1 tablet (8 mg total) by mouth every 12 (twelve) hours as needed for Nausea., Disp: 30 tablet, Rfl: 2    oxyCODONE (ROXICODONE) 10 mg Tab immediate release tablet, Take 10 mg by mouth 4 (four) times daily as needed., Disp: , Rfl:     pantoprazole (PROTONIX) 40 MG tablet, Take 1 tablet (40 mg total) by mouth every morning., Disp: 30 tablet, Rfl: 0      Scheduled Meds:   enoxparin  40 mg Subcutaneous Daily    magnesium sulfate IVPB  2 g Intravenous ED 1 Time     Continuous Infusions:  PRN Meds:.acetaminophen, acetaminophen, dextrose 10%, dextrose 10%, glucagon (human recombinant), glucose, glucose, ondansetron, sodium chloride 0.9%      Discharge Planning and Disposition: Anticipated discharge to be determined.    IKia PA, have reviewed and discussed the case with Dr. Garrison Merritt MD    Please see the following addendum for further assessment and plan from there attending MD.    Kia Wood PA-C  09/27/2023    Seen and examined the patient.  Agree with above history physical exam and management.    Patient has stage IIA of pancreatic adenocarcinoma per oncology note from 08/23/2023.    Patient's care was changed to hospice since she could not tolerate the chemotherapy.  She is doing fine with the hospice however recently hospice was revoked on the thoughts of reinstate in the chemotherapy by the patient and her daughter.  She came to the hospital with abdominal pain and poor pain management.  At this time she only has home health.  Will  rearrange her pain medications and consult Oncology for reviewing the patient.    If she still qualifies will most likely discharge home with hospice.    Discussed with the patient that she will like to be DNR.  She stated that she does not want to be resuscitated in case of cardiac arrest or breathing problems.    Garrison Merritt M.D.  [facility]/Hospital Medicine Department  [facility]

## 2023-09-27 NOTE — ED PROVIDER NOTES
Encounter Date: 9/27/2023       History     Chief Complaint   Patient presents with    Abdominal Pain     Pt c/o ABD x 3 months with nausea and vomiting today. Also having generalized weakness and confusion.  PMH of pancreatic CA and Crohn's. Pt became hypertensive enroute EMS denies known hx of HTN.     81-year-old female with a history of pancreatic cancer presents to the emergency department for evaluation of worsening abdominal pain, nausea and vomiting, reports he does not know when she had her last bowel movement.  Patient not forthcoming with significant details at this time.  States that would need to talk to her daughter to find out what pain medication she is on.    The history is provided by the patient and the EMS personnel.   Abdominal Pain  The problem has been gradually worsening. The abdominal pain is generalized. The other symptoms of the illness include nausea and vomiting. The other symptoms of the illness do not include fever, melena, shortness of breath or diarrhea.   Additional symptoms associated with the illness include constipation.     Review of patient's allergies indicates:   Allergen Reactions    Morpholine analogues Itching     Past Medical History:   Diagnosis Date    Acute pancreatitis, unspecified complication status, unspecified pancreatitis type     Arthritis     Carpal tunnel syndrome, bilateral     Cervical cancer     Cervical radiculopathy     Crohn disease     Heart attack     HLD (hyperlipidemia)     Low back pain     Pancreatic cancer     Sleep apnea, unspecified     Spinal stenosis of lumbar region with neurogenic claudication      Past Surgical History:   Procedure Laterality Date    bilateral L4-5, L5-S1 decompression, repair L5-S1 dural tear  10/01/2021    Dr. Marin    CARPAL TUNNEL RELEASE Right 09/06/2019    Dr. Marin    CARPAL TUNNEL RELEASE Left 12/2/2022    Procedure: RELEASE, CARPAL TUNNEL;  Surgeon: Jesse Marin MD;  Location: [facility];  Service: Neurosurgery;   Laterality: Left;    CHOLECYSTECTOMY      COLECTOMY  07/2011    partial x3    ESOPHAGOGASTRODUODENOSCOPY N/A 6/8/2023    Procedure: EGD (ESOPHAGOGASTRODUODENOSCOPY);  Surgeon: Jose Alberto Max MD;  Location: [facility] OR;  Service: Gastroenterology;  Laterality: N/A;    ESOPHAGOGASTRODUODENOSCOPY N/A 6/29/2023    Procedure: EGD (ESOPHAGOGASTRODUODENOSCOPY);  Surgeon: Jose Alberto Max MD;  Location: [facility] OR;  Service: Gastroenterology;  Laterality: N/A;    HYSTERECTOMY      total    REPAIR OF EYELID Bilateral 11/21/2022    Procedure: BILATERAL ECTROPION REPAIR;  Surgeon: Justin Flores MD;  Location: [facility] OR;  Service: ENT;  Laterality: Bilateral;    ULTRASOUND, ENDOSCOPIC, WITH FINE NEEDLE ASPIRATION N/A 6/8/2023    Procedure: UPPER EUS  W/  FNA;  Surgeon: Jose Alberto Max MD;  Location: [facility] OR;  Service: Gastroenterology;  Laterality: N/A;  Dangelo, pt w/ CD on entyvio recently admitted to [facility] w/ acute pancreatitis. Possibly drug induced from entyvio? Althoughshe does have dilated PD and a poss focal stricture in the HOP.CA 19-9 nml    ULTRASOUND, UPPER GI TRACT, ENDOSCOPIC, WITH CELIAC PLEXUS BLOCK N/A 6/29/2023    Procedure: UPPER EUS W/ POSS FNA W/ CELIAC BLOCK;  Surgeon: Jose Alberto Max MD;  Location: [facility] OR;  Service: Gastroenterology;  Laterality: N/A;  Celiac Plexus injection planned.Pt should receive 1L NS bolus upon arrival to Outpt Surgery and 1L NS during the procedure. Pt will need to be in PACU for 30 min post procedure to assess for any potential hypotension. NO LABS     Family History   Problem Relation Age of Onset    Diabetes Mother     Hypertension Father     Hyperlipidemia Father     Cancer Father     Hyperlipidemia Sister     Hyperlipidemia Brother     Hyperlipidemia Daughter      Social History     Tobacco Use    Smoking status: Former     Types: Cigarettes    Smokeless tobacco: Never   Substance Use Topics    Alcohol use: Not Currently     Comment: rarely    Drug use: Never     Review of Systems    Constitutional:  Negative for fever.   Respiratory:  Negative for chest tightness and shortness of breath.    Cardiovascular:  Negative for chest pain.   Gastrointestinal:  Positive for abdominal pain, constipation, nausea and vomiting. Negative for diarrhea and melena.       Physical Exam     Initial Vitals [09/27/23 0442]   BP Pulse Resp Temp SpO2   (!) 202/107 95 18 97.7 °F (36.5 °C) 99 %      MAP       --         Physical Exam    Nursing note and vitals reviewed.  Constitutional: She appears well-developed and well-nourished. She appears distressed (Due to pain).   HENT:   Head: Normocephalic and atraumatic.   Mouth/Throat: Oropharynx is clear and moist.   Eyes: Conjunctivae are normal. No scleral icterus.   Neck: Neck supple.   Cardiovascular:  Normal rate and intact distal pulses.     Exam reveals gallop.       Pulmonary/Chest: Breath sounds normal. No respiratory distress.   Abdominal: Abdomen is soft. She exhibits distension. There is abdominal tenderness.   Quiet bowel sounds   Musculoskeletal:      Cervical back: Neck supple.     Neurological: She is alert and oriented to person, place, and time. GCS score is 15. GCS eye subscore is 4. GCS verbal subscore is 5. GCS motor subscore is 6.         ED Course   Procedures  Labs Reviewed   COMPREHENSIVE METABOLIC PANEL - Abnormal; Notable for the following components:       Result Value    Potassium Level 2.6 (*)     Glucose Level 155 (*)     Blood Urea Nitrogen 4.6 (*)     Globulin 4.2 (*)     Albumin/Globulin Ratio 0.8 (*)     All other components within normal limits   CBC WITH DIFFERENTIAL - Abnormal; Notable for the following components:    RBC 3.26 (*)     Hgb 9.8 (*)     Hct 29.7 (*)     All other components within normal limits   MAGNESIUM - Abnormal; Notable for the following components:    Magnesium Level 1.10 (*)     All other components within normal limits   LIPASE - Normal   LACTIC ACID, PLASMA - Normal   CBC W/ AUTO DIFFERENTIAL    Narrative:      The following orders were created for panel order CBC auto differential.  Procedure                               Abnormality         Status                     ---------                               -----------         ------                     CBC with Differential[[id number]]        Abnormal            Final result                 Please view results for these tests on the individual orders.          Imaging Results              CT Abdomen Pelvis  Without Contrast (Final result)  Result time 09/27/23 07:18:59      Final result by Carolina Duque MD (09/27/23 07:18:59)                   Impression:      1. Bowel is normal in caliber without appreciable obstruction  2. Overall very similar appearance to the previous exam taking into account limitations on today's exam performed without contrast.      Electronically signed by: Carolina Duque  Date:    09/27/2023  Time:    07:18               Narrative:    EXAMINATION:  CT ABDOMEN PELVIS WITHOUT CONTRAST    CLINICAL HISTORY:  Bowel obstruction suspected;  abdominal pain, nausea, vomiting    TECHNIQUE:  Helically acquired axial images, sagittal and coronal reformations were obtained from the lung bases to the pubic symphysis without the IV administration of contrast.    Automated tube current modulation, weight-based exposure dosing, and/or iterative reconstruction technique utilized to reach lowest reasonably achievable exposure rate.    DLP: 419 mGy*cm    COMPARISON:  CT abdomen pelvis 08/19/2023    FINDINGS:  HEART: There are coronary artery calcifications.    LUNG BASES: Well aerated.    LIVER: Limited noncontrast evaluation.  Similar geographic hypoattenuation at segment 4.    BILIARY: Gallbladder is surgically absent.  Likely similar biliary ductal dilatation compared to previous.    PANCREAS: Limited noncontrast evaluation.  Similar appearance of the pancreas compared to previous exam taking into account differences in technique/absence of contrast on  today's exam.  Hyperattenuating area at the head/neck of the pancreas corresponding with soft tissue attenuation and thrombosed portal vein on preceding exam which is similar in size on today's exam.  Similar fluid attenuation at the pancreatic bed and peripancreatic soft tissues.    SPLEEN: Normal in size    ADRENALS: No mass.    KIDNEYS/URETERS: No renal or ureteral calculus. No hydronephrosis.    GI TRACT/MESENTERY: Evaluation of the bowel is limited without contrast. Bowel is not dilated.  Postsurgical change of right hemicolectomy.    PERITONEUM: No free fluid.No free air.    LYMPH NODES: Limited evaluation    VASCULATURE: Aortoiliac atherosclerosis.  Known portosystemic collaterals, better characterized on previous exam.    BLADDER: Bladder is distended to above the pelvic brim.    REPRODUCTIVE ORGANS: Uterus is surgically absent.    ABDOMINAL WALL: Unremarkable.    BONES: Degenerative change at the lumbar spine.                                       Medications   magnesium sulfate 2g in water 50mL IVPB (premix) (has no administration in time range)   fentaNYL injection 50 mcg (has no administration in time range)   sodium chloride 0.9% bolus 1,000 mL 1,000 mL (has no administration in time range)   HYDROmorphone (PF) injection 1 mg (1 mg Intravenous Given 9/27/23 0617)   ondansetron injection 4 mg (4 mg Intravenous Given 9/27/23 0617)   potassium chloride 20 mEq in 100 mL IVPB (FOR CENTRAL LINE ADMINISTRATION ONLY) (0 mEq Intravenous Stopped 9/27/23 0717)   hydrALAZINE injection 20 mg (20 mg Intravenous Given 9/27/23 0733)   HYDROmorphone (PF) injection 1 mg (1 mg Intravenous Given 9/27/23 0738)     Medical Decision Making  Amount and/or Complexity of Data Reviewed  Labs: ordered.  Radiology: ordered.    Risk  Prescription drug management.  Decision regarding hospitalization.      ED assessment:    Ms. Talavera presented for evaluation of worsening abdominal pain, constipation, nausea and vomiting in the  setting of pancreatic cancer.  Tender, distended on arrival.  Markedly hypertensive as well.    Differential diagnosis (including but not limited to):   Malignancy related abdominal pain, peritoneal carcinomatosis, bowel obstruction, electrolyte derangements, acute kidney    ED management:   See ED course below    My independent radiology interpretation:   CT abdomen pelvis:  No dilated or fluid-filled loops of bowel, no free air, comprehensive interpretation as per Radiology above    Amount and/or Complexity of Data Reviewed  Independent historian: none   Summary of history:   External data reviewed: notes from previous admissions, notes from previous ED visits, and prescription medications   Summary of data reviewed:  Most recently admitted last month for similar complaint, nausea vomiting, intractable abdominal pain, on Xarelto anticoagulation, chronic oxycodone, Compazine and Zofran  Risk and benefits of testing: discussed   Labs: ordered and reviewed  Radiology: ordered and independent interpretation performed (see above or ED course)    Discussion of management or test interpretation with external provider(s): discussed with hospitalist physician   Summary of discussion:  As below    Risk  Parenteral controlled substances   Decision regarding hospitalization  Shared decision making     Critical Care  none    I, Aliya Hummel MD personally performed the history, PE, MDM, and procedures as documented above and agree with the scribe's documentation.                ED Course as of 09/27/23 0822   Wed Sep 27, 2023   0643 After multiple attempts at peripheral IV access, unsuccessful in placing secure line.  We did access the patient's port for a medication administration and lab draw.  Evidently, it is not a PowerPort can not be used for IV contrast per Radiology.  As the concerns that she may have a bowel obstruction, will obtain CT without IV contrast. [KS]   0730 Hypomagnesemia, hypokalemia noted.  Repletion has  been initiated.  She still is having abdominal pain, restless.  CT scan demonstrates no clinically significant acute abnormalities.  She remains quite hypertensive, will give antihypertensives, plan for admission for intractable pain [KS]   0808 Patient is still writhing in pain.  Additional analgesics have been ordered.  Awaiting hospital medicine call back for admission [KS]   0818 Discussed with Cliff Lanza NP with the hospitalist service who accepts for admission.  [KS]      ED Course User Index  [KS] Aliya Hummel MD                    Clinical Impression:   Final diagnoses:  [R10.84] Diffuse abdominal pain  [C25.9] Malignant neoplasm of pancreas, unspecified location of malignancy  [E87.6] Hypokalemia  [I10] Hypertension, unspecified type  [R10.9] Intractable abdominal pain (Primary)        ED Disposition Condition    Admit Stable                Aliya Hummel MD  09/27/23 0898

## 2023-09-27 NOTE — Clinical Note
Diagnosis: Intractable abdominal pain [749805]   Admitting Provider:: MICHELLE MORFIN [[id number]]   Future Attending Provider: MICHELLE MORFIN [[id number]]   Reason for IP Medical Treatment  (Clinical interventions that can only be accomplished in the IP setting? ) :: pain control   I certify that Inpatient services for greater than or equal to 2 midnights are medically necessary:: Yes   Plans for Post-Acute care--if anticipated (pick the single best option):: A. No post acute care anticipated at this time

## 2023-09-27 NOTE — ED NOTES
Bed: 35  Expected date:   Expected time:   Means of arrival:   Comments:  83y F abd pain since June

## 2023-09-27 NOTE — CONSULTS
[facility] - Oncology Acute  Hematology/Oncology  Consult Note    Patient Name: Evelyn Proctor  MRN: [id number]  Admission Date: 9/27/2023  Hospital Length of Stay: 0 days  Attending Provider: Garrison Merritt MD  Consulting Provider: Ronan Burns MD  Principal Problem:<principal problem not specified>    Inpatient consult to Hematology  Consult performed by: Ronan Burns MD  Consult ordered by: Garrison Merritt MD        Subjective:     HPI:  This is a 83-year-old female who was last seen by me on August 23rd.  She carries a history of a portal vein thrombosis and was discharged on Xarelto.  We elected to discontinue her chemotherapy due to poor tolerance and transition to hospice.    Patient family in the room.  They report that she was on hospice.  She had a argument with her  and had a restraining order.    They then worked out their differences.  However hospice heavy instructions not to go into the house when he was there.  [facility] then came and saw the patient the  was present and she declined to see the patient.    They then revoked and fired hospice and she is with the [facility]      She is been having a lot of stress and anxiety she is been having intractable abdominal pain her pain was relieved by the medicine in the ER she is unsure as to what medicine she is getting at home          Oncology Treatment Plan:  Last note transition to hospice 08/23/2023   [No matching plan found]    Oncology History Overview Note   This is a 82 y.o. female known to Dr. Pierson with PMH of recently diagnosed pancreatic ductal adenocarcinoma, hyperlipidemia, coronary artery disease, Crohn's disease on Entyvio every 6 weeks status post right hemicolectomy, hepatic steatosis, rheumatoid arthritis, osteopenia and cervical cancer. She presented to the [facility] ED 06/09/23 with worsening generalized abd pain, n/v/d that she reports is c/w Crohn's.     Upon arrival, workup included: CT abd  pel with interval development of some colonic wall thickening in left colon especially at splenic flexure and proximal descending colon concerning for developing colitis, mild peripancreatic edema and inflammatory changes seen mainly in the body and tail c/w pancreatitis, persistent pancreatic duct dilation in the body and tail; labs revealed WBC 5.97, H&H 12.6/37.1, plt 252, INR 0.99, K 2.8, Cl 94, Tbili 0.7, AST 18, ALT 14, lipase 13. IVF initiated and electrolytes replenished. GI panel pending. GI consulted for Crohn's flare/pancreatic adenocarcinoma.     Of note, patient was recently admitted 06/02/23 - 06/04/23 for acute pancreatitis and was noted to have a high grade pancreatic duct stricture at pancreatic neck with upstream pancreatic duct dilation. Dr. Max performed EUS 06/08/23 that revealed a mass in the pancreatic neck that was determined to be ductal adenocarcinoma staged T3N0Mx by endosonographic criteria. He recommended oncology referral and PET scan. Patient and family requested to see Dr. Nuñez and Dr. Pihlip for further care.     Pancreatic cancer   6/8/2023 Initial Diagnosis    Pancreatic cancer  FINAL DIAGNOSIS       PANCREATIC NECK MASS, FINE NEEDLE ASPIRATION AND CELL BLOCK:       PANCREATIC DUCTAL ADENOCARCINOMA.          6/8/2023 Procedure    EUS Impression:            - Normal esophagus.                          - Normal stomach.                          - Normal examined duodenum.                          - There was no sign of significant pathology in                          the ampulla.                          - There was no sign of significant pathology in                          the common bile duct.                          - There was no evidence of significant pathology                          in the left lobe of the liver.                          - A mass was identified in the pancreatic neck.                          Tissue was obtained from this exam and is of                           adenocarcinoma. This was staged T3 N0 Mx by                          endosonographic criteria. Fine needle biopsy                          performed.      6/9/2023 Imaging Significant Findings    Impression:     Interval development of some colonic wall thickening in the left colon especially at the splenic flexure and proximal descending colon concerning for developing colitis     Mild peripancreatic edema and inflammatory changes seen mainly in the body and tail the pancreas consistent with pancreatitis     Persistent pancreatic duct dilatation in the body and tail which was seen and described on multiple previous examinations     6/10/2023 Cancer Staged    Staging form: Exocrine Pancreas, AJCC 8th Edition  - Clinical stage from 6/10/2023: Stage IIA (cT3, cN0, cM0)     6/20/2023 Imaging Significant Findings    PET:Impression: Focal activity at the junction of the body and head of the pancreas concerning for neoplasm. There is no definite discrete mass on the low-dose CT scan. There is dilatation of the distal pancreatic duct.       Hospital Admission    Most Recent Admission Details  Admit Date: 6/21/23  Admitted for:    Clostridium difficile infection  -Pancreatic Cancer  -Epigastric pain     Discharge date:6/29/23  Discharged from: [facility] 9W MEDICAL TELEMETRY at [facility]  Discharge disposition: [place]Health Care [facility] [place]       7/26/2023 - 8/16/2023 Chemotherapy    Treatment Summary   Plan Name: OP PANC GEMCITABINE QW  Treatment Goal: Control  Status: Inactive  Start Date: 7/26/2023  End Date: 8/16/2023  Provider: Ronan Burns MD  Chemotherapy: gemcitabine (GEMZAR) 1,141 mg in sodium chloride 0.9% SolP 315 mL chemo infusion, 675 mg/m2 = 1,141 mg (100 % of original dose 675 mg/m2), Intravenous, Clinic/HOD 1 time, 1 of 2 cycles  Dose modification: 675 mg/m2 (original dose 675 mg/m2, Cycle 1, Reason: MD Discretion)  Administration: 1,141 mg (7/26/2023), 1,140 mg  (8/9/2023), 1,140 mg (8/16/2023)      Pathology Significant Finding    Caris testing, testing sample submitted did not yield any biomarker results with clinical significance, to provide in association with a particular therapy,  PMS2 stains weekly absent, and control can not be accurately interpreted    Positives:   KRAS pathological variant,   MSI stable  , mismatch repair status indeterminate  , NTRK not detected  , tumor mutational burden low,  P 53 variant, AID variant, PDL1 -,   BRCA 2 not detected, BRCA1, not detected,  A TM not detected,    Clinical tries, ERK inhibitors, MEK inhibitors, PARP inhibitors with ARID 2,     8/19/2023 Imaging Significant Findings    IMPRESSION  1. Interval development of near occlusive portal vein thrombosis.  2. Mural thickening of the distal gastric wall/antrum may be secondary to contiguous inflammation from adjacent right upper quadrant pathology, with possibility of acute gastritis not excluded.  3. Distal large bowel wall thickening suggestive of proctocolitis.  4. Intrahepatic biliary dilatation and additional above discussed nonacute abnormalities are without significant interval change from the comparison CT exams          Medications:  Continuous Infusions:    Scheduled Meds:   magnesium sulfate IVPB  2 g Intravenous Q2H    mupirocin   Nasal BID    potassium bicarbonate  50 mEq Oral Q6H    rivaroxaban  20 mg Oral Daily with dinner    traZODone  100 mg Oral QHS     PRN Meds:acetaminophen, acetaminophen, dextrose 10%, dextrose 10%, glucagon (human recombinant), glucose, glucose, HYDROmorphone, ondansetron, oxyCODONE, prochlorperazine, sodium chloride 0.9%     Review of patient's allergies indicates:   Allergen Reactions    Morpholine analogues Itching        Past Medical History:   Diagnosis Date    Acute pancreatitis, unspecified complication status, unspecified pancreatitis type     Arthritis     Carpal tunnel syndrome, bilateral     Cervical cancer     Cervical  radiculopathy     Crohn disease     Heart attack     HLD (hyperlipidemia)     Low back pain     Pancreatic cancer     Sleep apnea, unspecified     Spinal stenosis of lumbar region with neurogenic claudication      Past Surgical History:   Procedure Laterality Date    bilateral L4-5, L5-S1 decompression, repair L5-S1 dural tear  10/01/2021    Dr. Marin    CARPAL TUNNEL RELEASE Right 09/06/2019    Dr. Marin    CARPAL TUNNEL RELEASE Left 12/2/2022    Procedure: RELEASE, CARPAL TUNNEL;  Surgeon: Jesse aMrin MD;  Location: [facility] OR;  Service: Neurosurgery;  Laterality: Left;    CHOLECYSTECTOMY      COLECTOMY  07/2011    partial x3    ESOPHAGOGASTRODUODENOSCOPY N/A 6/8/2023    Procedure: EGD (ESOPHAGOGASTRODUODENOSCOPY);  Surgeon: Jose Alberto Max MD;  Location: [facility] OR;  Service: Gastroenterology;  Laterality: N/A;    ESOPHAGOGASTRODUODENOSCOPY N/A 6/29/2023    Procedure: EGD (ESOPHAGOGASTRODUODENOSCOPY);  Surgeon: Jose Alberto Max MD;  Location: [facility] OR;  Service: Gastroenterology;  Laterality: N/A;    HYSTERECTOMY      total    REPAIR OF EYELID Bilateral 11/21/2022    Procedure: BILATERAL ECTROPION REPAIR;  Surgeon: Justin Flores MD;  Location: [facility] OR;  Service: ENT;  Laterality: Bilateral;    ULTRASOUND, ENDOSCOPIC, WITH FINE NEEDLE ASPIRATION N/A 6/8/2023    Procedure: UPPER EUS  W/  FNA;  Surgeon: Jose Alberto Max MD;  Location: [facility] OR;  Service: Gastroenterology;  Laterality: N/A;  Dangelo, pt w/ CD on entyvio recently admitted to [facility] w/ acute pancreatitis. Possibly drug induced from entyvio? Althoughshe does have dilated PD and a poss focal stricture in the HOP.CA 19-9 nml    ULTRASOUND, UPPER GI TRACT, ENDOSCOPIC, WITH CELIAC PLEXUS BLOCK N/A 6/29/2023    Procedure: UPPER EUS W/ POSS FNA W/ CELIAC BLOCK;  Surgeon: Jose Alberto Max MD;  Location: [facility] OR;  Service: Gastroenterology;  Laterality: N/A;  Celiac Plexus injection planned.Pt should receive 1L NS bolus upon arrival to Outpt Surgery and 1L NS  during the procedure. Pt will need to be in PACU for 30 min post procedure to assess for any potential hypotension. NO LABS     Family History       Problem Relation (Age of Onset)    Cancer Father    Diabetes Mother    Hyperlipidemia Father, Sister, Brother, Daughter    Hypertension Father          Tobacco Use    Smoking status: Former     Types: Cigarettes    Smokeless tobacco: Never   Substance and Sexual Activity    Alcohol use: Not Currently     Comment: rarely    Drug use: Never    Sexual activity: Not Currently       Review of Systems   Constitutional:  Positive for activity change, appetite change, fatigue and unexpected weight change.   HENT:  Negative for mouth sores.    Eyes: Negative.    Respiratory:  Negative for shortness of breath.    Cardiovascular:  Positive for leg swelling. Negative for chest pain and palpitations.   Gastrointestinal:  Positive for abdominal pain, constipation, nausea and vomiting. Negative for blood in stool and diarrhea.   Genitourinary:  Positive for decreased urine volume. Negative for hematuria, pelvic pain and vaginal bleeding.   Musculoskeletal:  Positive for back pain.   Skin: Negative.    Neurological:  Negative for dizziness, tremors, syncope, speech difficulty, numbness and headaches.   Hematological: Negative.    Psychiatric/Behavioral:  Positive for agitation and sleep disturbance. The patient is nervous/anxious.      Objective:     Vital Signs (Most Recent):  Temp: 97.7 °F (36.5 °C) (09/27/23 0442)  Pulse: 100 (09/27/23 1302)  Resp: 16 (09/27/23 1322)  BP: (!) 171/80 (09/27/23 1302)  SpO2: 100 % (09/27/23 1302) Vital Signs (24h Range):  Temp:  [97.7 °F (36.5 °C)] 97.7 °F (36.5 °C)  Pulse:  [] 100  Resp:  [16-33] 16  SpO2:  [99 %-100 %] 100 %  BP: (109-202)/() 171/80     Weight: 63.5 kg (140 lb)  Body mass index is 24.03 kg/m².  Body surface area is 1.69 meters squared.    No intake or output data in the 24 hours ending 09/27/23 1621    Physical  Exam  Vitals reviewed.   Constitutional:       General: She is awake.      Appearance: She is underweight. She is ill-appearing.   HENT:      Head: Normocephalic.      Mouth/Throat:      Mouth: Mucous membranes are moist.      Pharynx: Oropharynx is clear.   Eyes:      Extraocular Movements: Extraocular movements intact.      Conjunctiva/sclera: Conjunctivae normal.      Pupils: Pupils are equal, round, and reactive to light.   Cardiovascular:      Rate and Rhythm: Normal rate and regular rhythm.      Pulses: Normal pulses.      Heart sounds: Normal heart sounds.   Pulmonary:      Effort: Pulmonary effort is normal.      Breath sounds: Normal breath sounds and air entry.   Chest:   Breasts:     Right: Normal.      Left: Normal.      Comments: Port-A-Cath in place  Abdominal:      General: Abdomen is flat. Bowel sounds are normal. There is no distension.      Tenderness: There is generalized abdominal tenderness. There is no guarding or rebound.   Musculoskeletal:      Cervical back: Normal range of motion.      Right lower leg: No edema.      Left lower leg: No edema.   Lymphadenopathy:      Cervical: No cervical adenopathy.      Upper Body:      Right upper body: No axillary adenopathy.      Left upper body: No axillary adenopathy.      Lower Body: No right inguinal adenopathy. No left inguinal adenopathy.   Skin:     General: Skin is warm and dry.   Neurological:      General: No focal deficit present.      Mental Status: She is alert and oriented to person, place, and time. Mental status is at baseline.   Psychiatric:         Attention and Perception: Attention normal.         Mood and Affect: Mood is depressed. Affect is tearful.         Speech: Speech normal.         Behavior: Behavior is cooperative.         Significant Labs:   CBC:   Recent Labs   Lab 09/27/23  0548   WBC 9.30   HGB 9.8*   HCT 29.7*      , CMP:   Recent Labs   Lab 09/27/23  0548      K 2.6*   CO2 24   BUN 4.6*   CREATININE 0.72  "  CALCIUM 8.9   ALBUMIN 3.4   BILITOT 0.9   ALKPHOS 84   AST 25   ALT 17   , and Tumor Markers: No results for input(s): "PSA", "CEA", "", "AFPTM", "[id number]", "" in the last 48 hours.    Invalid input(s): "ALGTM"    Diagnostic Results:  I have reviewed all pertinent imaging results/findings within the past 24 hours.    Impression:     1. Bowel is normal in caliber without appreciable obstruction  2. Overall very similar appearance to the previous exam taking into account limitations on today's exam performed without contrast    Assessment/Plan:   History of stage II adenocarcinoma of the pancreas   History of inflammatory bowel disease   Pancreatic sufficiency   Portal vein clot   Hypokalemia   Intractable abdominal pain   Anemia   Depression stress        The patient has no clear etiology for pancreatic pain her abdominal pain on this most recent CT.    Her electrolytes replaced.  We discussed holding off of her anti coagulation oral with her decreased p.o. intake and change into injections.    Discussed palliative care consult and Gastroenterology.    It is quite possible she may have peritoneal disease.  But it is also possible some of this may be stress induced given her history.        She has  already had IV narcotics in the emergency room, so drug screen will not help us at this point to see she is getting her medication appropriately at home    Recommendations:  Palliative care consultation   Continue Creon q.a.c. and with snacks   Hold Xarelto with decreased p.o. intake nausea ,vomiting, abdominal pain  Changed to Lovenox 1 milligram/kilogram   Continue Protonix with GI symptoms   Okay to use port   GI for intractable pain   See if patient would benefit from a celiac plexus nerve block if they think that is the cause of her pain     Consider antidepressant if able to take p.o. medications  Consider low-dose steroids  Watch hemoglobin    Agree with Hospice for d/c            Thank you for your " consult.     Ronan Burns MD  Hematology/Oncology  [facility] - Oncology [facility]

## 2023-09-28 NOTE — UM SECONDARY REVIEW
83-year-old -American woman admitted and discharged on 9/27/2023 after being evaluated for abdominal pain, nausea, vomiting.  Apparently, she was already on hospice but secondary to some issues, hospice was revoked.  She was again set up for hospice in the ED and discharged.  Essentially, care could have been provided in a less intensive setting as it was not expected that her severity of illness and intensity of services would be appropriate for inpatient level of care.  There was no hemodynamic instability, encephalopathy, food/medication intolerance, bleeding, arrhythmia of immediate concern, infection, small bowel obstruction, ileus         Jake Piedra MD  Utilization Management  Physician Advisor  hospitals Medicine  09/28/2023

## 2023-10-04 ENCOUNTER — HOSPITAL ENCOUNTER (EMERGENCY)
Facility: HOSPITAL | Age: 83
Discharge: HOME | End: 2023-10-04
Attending: STUDENT IN AN ORGANIZED HEALTH CARE EDUCATION/TRAINING PROGRAM
Payer: MEDICARE

## 2023-10-04 VITALS
SYSTOLIC BLOOD PRESSURE: 142 MMHG | BODY MASS INDEX: 25.76 KG/M2 | RESPIRATION RATE: 14 BRPM | DIASTOLIC BLOOD PRESSURE: 74 MMHG | OXYGEN SATURATION: 95 % | WEIGHT: 140 LBS | HEART RATE: 92 BPM | HEIGHT: 62 IN | TEMPERATURE: 98 F

## 2023-10-04 DIAGNOSIS — I10 HYPERTENSION, UNSPECIFIED TYPE: ICD-10-CM

## 2023-10-04 DIAGNOSIS — C25.9 MALIGNANT NEOPLASM OF PANCREAS, UNSPECIFIED LOCATION OF MALIGNANCY: ICD-10-CM

## 2023-10-04 DIAGNOSIS — R10.33 PERIUMBILICAL ABDOMINAL PAIN: ICD-10-CM

## 2023-10-04 DIAGNOSIS — R10.9 ABDOMINAL PAIN, UNSPECIFIED ABDOMINAL LOCATION: Primary | ICD-10-CM

## 2023-10-04 LAB
ALBUMIN SERPL-MCNC: 3.6 G/DL (ref 3.4–4.8)
ALBUMIN/GLOB SERPL: 1 RATIO (ref 1.1–2)
ALP SERPL-CCNC: 88 UNIT/L (ref 40–150)
ALT SERPL-CCNC: 16 UNIT/L (ref 0–55)
AST SERPL-CCNC: 26 UNIT/L (ref 5–34)
BASOPHILS # BLD AUTO: 0.02 X10(3)/MCL
BASOPHILS NFR BLD AUTO: 0.3 %
BILIRUB SERPL-MCNC: 1 MG/DL
BUN SERPL-MCNC: 12.3 MG/DL (ref 9.8–20.1)
CALCIUM SERPL-MCNC: 9.3 MG/DL (ref 8.4–10.2)
CHLORIDE SERPL-SCNC: 101 MMOL/L (ref 98–107)
CO2 SERPL-SCNC: 26 MMOL/L (ref 23–31)
CREAT SERPL-MCNC: 0.97 MG/DL (ref 0.55–1.02)
EOSINOPHIL # BLD AUTO: 0 X10(3)/MCL (ref 0–0.9)
EOSINOPHIL NFR BLD AUTO: 0 %
ERYTHROCYTE [DISTWIDTH] IN BLOOD BY AUTOMATED COUNT: 15.5 % (ref 11.5–17)
GFR SERPLBLD CREATININE-BSD FMLA CKD-EPI: 58 MLS/MIN/1.73/M2
GLOBULIN SER-MCNC: 3.7 GM/DL (ref 2.4–3.5)
GLUCOSE SERPL-MCNC: 140 MG/DL (ref 82–115)
HCT VFR BLD AUTO: 31.4 % (ref 37–47)
HGB BLD-MCNC: 10.7 G/DL (ref 12–16)
IMM GRANULOCYTES # BLD AUTO: 0.01 X10(3)/MCL (ref 0–0.04)
IMM GRANULOCYTES NFR BLD AUTO: 0.2 %
LACTATE SERPL-SCNC: 1.2 MMOL/L (ref 0.5–2.2)
LIPASE SERPL-CCNC: 9 U/L
LYMPHOCYTES # BLD AUTO: 0.5 X10(3)/MCL (ref 0.6–4.6)
LYMPHOCYTES NFR BLD AUTO: 7.7 %
MCH RBC QN AUTO: 30.8 PG (ref 27–31)
MCHC RBC AUTO-ENTMCNC: 34.1 G/DL (ref 33–36)
MCV RBC AUTO: 90.5 FL (ref 80–94)
MONOCYTES # BLD AUTO: 0.3 X10(3)/MCL (ref 0.1–1.3)
MONOCYTES NFR BLD AUTO: 4.6 %
NEUTROPHILS # BLD AUTO: 5.63 X10(3)/MCL (ref 2.1–9.2)
NEUTROPHILS NFR BLD AUTO: 87.2 %
NRBC BLD AUTO-RTO: 0 %
PLATELET # BLD AUTO: 329 X10(3)/MCL (ref 130–400)
PMV BLD AUTO: 9.1 FL (ref 7.4–10.4)
POTASSIUM SERPL-SCNC: 3.4 MMOL/L (ref 3.5–5.1)
PROT SERPL-MCNC: 7.3 GM/DL (ref 5.8–7.6)
RBC # BLD AUTO: 3.47 X10(6)/MCL (ref 4.2–5.4)
SODIUM SERPL-SCNC: 137 MMOL/L (ref 136–145)
WBC # SPEC AUTO: 6.46 X10(3)/MCL (ref 4.5–11.5)

## 2023-10-04 PROCEDURE — 99284 EMERGENCY DEPT VISIT MOD MDM: CPT | Mod: 25

## 2023-10-04 PROCEDURE — 80053 COMPREHEN METABOLIC PANEL: CPT | Performed by: STUDENT IN AN ORGANIZED HEALTH CARE EDUCATION/TRAINING PROGRAM

## 2023-10-04 PROCEDURE — 83690 ASSAY OF LIPASE: CPT | Performed by: STUDENT IN AN ORGANIZED HEALTH CARE EDUCATION/TRAINING PROGRAM

## 2023-10-04 PROCEDURE — 63600175 PHARM REV CODE 636 W HCPCS: Performed by: STUDENT IN AN ORGANIZED HEALTH CARE EDUCATION/TRAINING PROGRAM

## 2023-10-04 PROCEDURE — 96374 THER/PROPH/DIAG INJ IV PUSH: CPT | Mod: 59

## 2023-10-04 PROCEDURE — 85025 COMPLETE CBC W/AUTO DIFF WBC: CPT | Performed by: STUDENT IN AN ORGANIZED HEALTH CARE EDUCATION/TRAINING PROGRAM

## 2023-10-04 PROCEDURE — 96375 TX/PRO/DX INJ NEW DRUG ADDON: CPT

## 2023-10-04 PROCEDURE — 96372 THER/PROPH/DIAG INJ SC/IM: CPT | Performed by: STUDENT IN AN ORGANIZED HEALTH CARE EDUCATION/TRAINING PROGRAM

## 2023-10-04 PROCEDURE — 83605 ASSAY OF LACTIC ACID: CPT | Performed by: STUDENT IN AN ORGANIZED HEALTH CARE EDUCATION/TRAINING PROGRAM

## 2023-10-04 RX ORDER — ONDANSETRON 2 MG/ML
4 INJECTION INTRAMUSCULAR; INTRAVENOUS
Status: COMPLETED | OUTPATIENT
Start: 2023-10-04 | End: 2023-10-04

## 2023-10-04 RX ORDER — HYDROMORPHONE HYDROCHLORIDE 2 MG/ML
0.5 INJECTION, SOLUTION INTRAMUSCULAR; INTRAVENOUS; SUBCUTANEOUS
Status: COMPLETED | OUTPATIENT
Start: 2023-10-04 | End: 2023-10-04

## 2023-10-04 RX ORDER — HYDROMORPHONE HYDROCHLORIDE 2 MG/ML
1 INJECTION, SOLUTION INTRAMUSCULAR; INTRAVENOUS; SUBCUTANEOUS
Status: COMPLETED | OUTPATIENT
Start: 2023-10-04 | End: 2023-10-04

## 2023-10-04 RX ORDER — PROMETHAZINE HYDROCHLORIDE 25 MG/ML
12.5 INJECTION, SOLUTION INTRAMUSCULAR; INTRAVENOUS
Status: COMPLETED | OUTPATIENT
Start: 2023-10-04 | End: 2023-10-04

## 2023-10-04 RX ORDER — HYDRALAZINE HYDROCHLORIDE 20 MG/ML
10 INJECTION INTRAMUSCULAR; INTRAVENOUS
Status: COMPLETED | OUTPATIENT
Start: 2023-10-04 | End: 2023-10-04

## 2023-10-04 RX ADMIN — ONDANSETRON 4 MG: 2 INJECTION INTRAMUSCULAR; INTRAVENOUS at 06:10

## 2023-10-04 RX ADMIN — HYDROMORPHONE HYDROCHLORIDE 1 MG: 2 INJECTION INTRAMUSCULAR; INTRAVENOUS; SUBCUTANEOUS at 06:10

## 2023-10-04 RX ADMIN — HYDRALAZINE HYDROCHLORIDE 10 MG: 20 INJECTION INTRAMUSCULAR; INTRAVENOUS at 06:10

## 2023-10-04 RX ADMIN — HYDROMORPHONE HYDROCHLORIDE 0.5 MG: 2 INJECTION INTRAMUSCULAR; INTRAVENOUS; SUBCUTANEOUS at 07:10

## 2023-10-04 RX ADMIN — PROMETHAZINE HYDROCHLORIDE 12.5 MG: 25 INJECTION INTRAMUSCULAR; INTRAVENOUS at 08:10

## 2023-10-04 NOTE — ED PROVIDER NOTES
Encounter Date: 10/4/2023    SCRIBE #1 NOTE: I, Angelique Vi, am scribing for, and in the presence of,  Afshin Fairbanks MD. I have scribed the following portions of the note - Other sections scribed: HPI, ROS, PE.       History     Chief Complaint   Patient presents with    Nausea     Hospice patient. Pancreatic cancer. C/o nausea and abd pain x 1 hr. No relief with normal meds.      83 year old female with a history of Crohn disease, pancreatic cancer, HLD, and MI presents to ED for diffused abdominal pain with associated nausea since a few hours ago. Daughter gave morphine and zofran with no relief.  Patient was on hospice for her metastatic disease.  She is currently taking oxycodone and Percocet at home but they have been trying to wean.     Collateral history from daughter.  Patient takes MS Contin 45 mg b.i.d. they have been trying to wean it.  She was taking 15 mg b.i.d. of MS Contin but is prescribed 45 mg b.i.d..  Also takes 10 mg of short-acting oxycodone t.i.d.    The history is provided by a caregiver. No  was used.   Abdominal Pain  The current episode started 2 to 3 hours ago. The onset of the illness was abrupt. The problem has not changed since onset.The abdominal pain is generalized. The abdominal pain does not radiate. The abdominal pain is relieved by nothing. The other symptoms of the illness include nausea. The other symptoms of the illness do not include fever, shortness of breath or dysuria.   Nausea began today.   The patient states that she believes she is currently not pregnant. The patient has not had a change in bowel habit. Risk factors for an acute abdominal problem include being elderly.     Review of patient's allergies indicates:   Allergen Reactions    Morpholine analogues Itching     Past Medical History:   Diagnosis Date    Acute pancreatitis, unspecified complication status, unspecified pancreatitis type     Arthritis     Carpal tunnel syndrome, bilateral      Cervical cancer     Cervical radiculopathy     Crohn disease     Heart attack     HLD (hyperlipidemia)     Low back pain     Pancreatic cancer     Sleep apnea, unspecified     Spinal stenosis of lumbar region with neurogenic claudication      Past Surgical History:   Procedure Laterality Date    bilateral L4-5, L5-S1 decompression, repair L5-S1 dural tear  10/01/2021    Dr. Marin    CARPAL TUNNEL RELEASE Right 09/06/2019    Dr. Marin    CARPAL TUNNEL RELEASE Left 12/2/2022    Procedure: RELEASE, CARPAL TUNNEL;  Surgeon: Jesse Marin MD;  Location: [facility] OR;  Service: Neurosurgery;  Laterality: Left;    CHOLECYSTECTOMY      COLECTOMY  07/2011    partial x3    ESOPHAGOGASTRODUODENOSCOPY N/A 6/8/2023    Procedure: EGD (ESOPHAGOGASTRODUODENOSCOPY);  Surgeon: Jose Alberto Max MD;  Location: [facility] OR;  Service: Gastroenterology;  Laterality: N/A;    ESOPHAGOGASTRODUODENOSCOPY N/A 6/29/2023    Procedure: EGD (ESOPHAGOGASTRODUODENOSCOPY);  Surgeon: Jose Alberto Max MD;  Location: [facility] OR;  Service: Gastroenterology;  Laterality: N/A;    HYSTERECTOMY      total    REPAIR OF EYELID Bilateral 11/21/2022    Procedure: BILATERAL ECTROPION REPAIR;  Surgeon: Justin Flores MD;  [facility]: [facility] OR;  Service: ENT;  Laterality: Bilateral;    ULTRASOUND, ENDOSCOPIC, WITH FINE NEEDLE ASPIRATION N/A 6/8/2023    Procedure: UPPER EUS  W/  FNA;  Surgeon: Jose Alberto Max MD;  Location: [facility] OR;  Service: Gastroenterology;  Laterality: N/A;  Dangelo, pt w/ CD on entyvio recently admitted to [facility] w/ acute pancreatitis. Possibly drug induced from entyvio? Althoughshe does have dilated PD and a poss focal stricture in the HOP.CA 19-9 nml    ULTRASOUND, UPPER GI TRACT, ENDOSCOPIC, WITH CELIAC PLEXUS BLOCK N/A 6/29/2023    Procedure: UPPER EUS W/ POSS FNA W/ CELIAC BLOCK;  Surgeon: Jose Alberto Max MD;  Location: [facility] OR;  Service: Gastroenterology;  Laterality: N/A;  Celiac Plexus injection planned.Pt should receive 1L NS bolus upon arrival to  Outpt Surgery and 1L NS during the procedure. Pt will need to be in PACU for 30 min post procedure to assess for any potential hypotension. NO LABS     Family History   Problem Relation Age of Onset    Diabetes Mother     Hypertension Father     Hyperlipidemia Father     Cancer Father     Hyperlipidemia Sister     Hyperlipidemia Brother     Hyperlipidemia Daughter      Social History     Tobacco Use    Smoking status: Former     Types: Cigarettes    Smokeless tobacco: Never   Substance Use Topics    Alcohol use: Not Currently     Comment: rarely    Drug use: Never     Review of Systems   Constitutional:  Negative for fever.   HENT:  Negative for sore throat.    Eyes:  Negative for visual disturbance.   Respiratory:  Negative for shortness of breath.    Cardiovascular:  Negative for chest pain.   Gastrointestinal:  Positive for abdominal pain and nausea.   Genitourinary:  Negative for dysuria.   Musculoskeletal:  Negative for joint swelling.   Skin:  Negative for rash.   Neurological:  Negative for weakness.   Psychiatric/Behavioral:  Negative for confusion.        Physical Exam     Initial Vitals [10/04/23 1632]   BP Pulse Resp Temp SpO2   (!) 198/99 81 18 97.8 °F (36.6 °C) 97 %      MAP       --         Physical Exam    Nursing note and vitals reviewed.  Constitutional: She appears well-developed and well-nourished.   HENT:   Head: Normocephalic and atraumatic.   Eyes: EOM are normal. Pupils are equal, round, and reactive to light.   Neck:   Normal range of motion.  Cardiovascular:  Normal rate, regular rhythm, normal heart sounds and intact distal pulses.           No murmur heard.  Pulmonary/Chest: Breath sounds normal. No respiratory distress. She has no wheezes. She has no rales.   Abdominal: Abdomen is soft. She exhibits no distension. There is abdominal tenderness.   Diffuse abdominal tenderness to palpation There is no rebound.   Musculoskeletal:         General: No tenderness or edema. Normal range of  motion.      Cervical back: Normal range of motion.     Neurological: She is alert. She has normal strength. No cranial nerve deficit. GCS score is 15. GCS eye subscore is 4. GCS verbal subscore is 5. GCS motor subscore is 6.   Skin: Skin is warm and dry. Capillary refill takes less than 2 seconds. No rash noted. No erythema.   Psychiatric: She has a normal mood and affect.         ED Course   Procedures  Labs Reviewed   COMPREHENSIVE METABOLIC PANEL - Abnormal; Notable for the following components:       Result Value    Potassium Level 3.4 (*)     Glucose Level 140 (*)     Globulin 3.7 (*)     Albumin/Globulin Ratio 1.0 (*)     All other components within normal limits   CBC WITH DIFFERENTIAL - Abnormal; Notable for the following components:    RBC 3.47 (*)     Hgb 10.7 (*)     Hct 31.4 (*)     Lymph # 0.50 (*)     All other components within normal limits   LIPASE - Normal   LACTIC ACID, PLASMA - Normal   CBC W/ AUTO DIFFERENTIAL    Narrative:     The following orders were created for panel order CBC auto differential.  Procedure                               Abnormality         Status                     ---------                               -----------         ------                     CBC with Differential[[id number]]       Abnormal            Final result                 Please view results for these tests on the individual orders.          Imaging Results    None          Medications   HYDROmorphone (PF) injection 1 mg (1 mg Intravenous Given 10/4/23 1800)   ondansetron injection 4 mg (4 mg Intravenous Given 10/4/23 1801)   hydrALAZINE injection 10 mg (10 mg Intravenous Given 10/4/23 1840)   HYDROmorphone (PF) injection 0.5 mg (0.5 mg Intravenous Given 10/4/23 1919)   promethazine injection 12.5 mg (12.5 mg Intramuscular Given 10/4/23 2012)     Medical Decision Making  Problems Addressed:  Abdominal pain, unspecified abdominal location: acute illness or injury that poses a threat to life or bodily  functions  Hypertension, unspecified type: acute illness or injury that poses a threat to life or bodily functions  Malignant neoplasm of pancreas, unspecified location of malignancy: acute illness or injury that poses a threat to life or bodily functions  Periumbilical abdominal pain: acute illness or injury that poses a threat to life or bodily functions    Amount and/or Complexity of Data Reviewed  Independent Historian: caregiver and EMS  External Data Reviewed: labs, radiology and notes.     Details: Reviewed CT scan from last week.  Review previous records.  Labs: ordered.    Risk  Prescription drug management.  Parenteral controlled substances.  Decision regarding hospitalization.  Decision not to resuscitate or to de-escalate care because of poor prognosis.  Diagnosis or treatment significantly limited by social determinants of health.            Scribe Attestation:   Scribe #1: I performed the above scribed service and the documentation accurately describes the services I performed. I attest to the accuracy of the note.    Attending Attestation:           Physician Attestation for Scribe:  Physician Attestation Statement for Scribe #1: I, Afshin Fairbanks MD, reviewed documentation, as scribed by Angelique Ramos in my presence, and it is both accurate and complete.             ED Course as of 10/10/23 0911   Wed Oct 04, 2023   1743 ED management   Patient is a 83-year-old female who presents to the emergency department with past medical history of pancreatic cancer presenting to the emergency department for intractable pain. [RP]   1814 Recent CT      COMPARISON:  CT abdomen pelvis 08/19/2023     FINDINGS:  HEART: There are coronary artery calcifications.     LUNG BASES: Well aerated.     LIVER: Limited noncontrast evaluation.  Similar geographic hypoattenuation at segment 4.     BILIARY: Gallbladder is surgically absent.  Likely similar biliary ductal dilatation compared to previous.     PANCREAS: Limited  noncontrast evaluation.  Similar appearance of the pancreas compared to previous exam taking into account differences in technique/absence of contrast on today's exam.  Hyperattenuating area at the head/neck of the pancreas corresponding with soft tissue attenuation and thrombosed portal vein on preceding exam which is similar in size on today's exam.  Similar fluid attenuation at the pancreatic bed and peripancreatic soft tissues.     SPLEEN: Normal in size     ADRENALS: No mass.     KIDNEYS/URETERS: No renal or ureteral calculus. No hydronephrosis.     GI TRACT/MESENTERY: Evaluation of the bowel is limited without contrast. Bowel is not dilated.  Postsurgical change of right hemicolectomy.     PERITONEUM: No free fluid.No free air.     LYMPH NODES: Limited evaluation     VASCULATURE: Aortoiliac atherosclerosis.  Known portosystemic collaterals, better characterized on previous exam.     BLADDER: Bladder is distended to above the pelvic brim.     REPRODUCTIVE ORGANS: Uterus is surgically absent.     ABDOMINAL WALL: Unremarkable.     BONES: Degenerative change at the lumbar spine.     Impression:     1. Bowel is normal in caliber without appreciable obstruction  2. Overall very similar appearance to the previous exam taking into account limitations on today's exam performed without contrast.        Electronically signed by: Carolina Duque  Date:                                            09/27/2023  Time:                                           07:18 [RP]   1936 Spoke with the daughter.  Patient takes MS Contin 45 mg b.i.d. they have been trying to wean it.  Also takes 10 mg of short-acting oxycodone t.i.d. [RP]      ED Course User Index  [RP] Afshin Fairbanks MD               Medical Decision Making:   History:   I obtained history from: someone other than patient.       <> Summary of History: Collateral from daughter.  Old Medical Records: I decided to obtain old medical records.  Old Records Summarized:  records from clinic visits, records from previous admission(s) and records from another hospital.       <> Summary of Records: Reviewed old records  Initial Assessment:   Abdominal pain  Differential Diagnosis:   Judging by the patient's chief complaint and pertinent history, the patient has the following possible differential diagnoses, including but not limited to the following.  Some of these are deemed to be lower likelihood and some more likely based on my physical exam and history combined with possible lab work and/or imaging studies.   Please see the pertinent studies, and refer to the HPI.  Some of these diagnoses will take further evaluation to fully rule out, perhaps as an outpatient and the patient was encouraged to follow up when discharged for more comprehensive evaluation.    appendicitis, biliary disease, diverticulitis,  mesenteric ischemia, intraabdominal abscess, retroperitoneal abscess, gastritis, gastroenteritis, hepatitis, hernia, pancreatitis, inflammatory bowel disease, PUD, SBP, nephrolithiasis, constipation, GERD, IBS    Clinical Tests:   Lab Tests: Reviewed and Ordered  ED Management:    Patient is 83-year-old female with past medical history of metastatic pancreatic cancer presents to emergency department for abdominal pain.  Collateral history obtained from the family.  Patient unfortunately is on hospice given her underlying disease.  She is on MS Contin b.i.d. 45 mg as well as oxycodone 10 mg t.i.d. for breakthrough pain.  They have recently attempted to wean this down to 15 mg b.i.d. per daughter.  Unfortunately patient had significant pain with weaning, and presented to the emergency department.  She was given IV pain medication with improvement in her pain.  She was hypertensive likely due to the discomfort, it blood pressure improved on reassessment.  Lab work as noted.  Offered repeat imaging however daughter reports that she had a CT scan last week.  This was reviewed.  Shared  decision making used to determine disposition and further workup.  Given the patient is on hospice, and poor prognosis will defer CT imaging at this time.  Will continue current pain control and discharge back home where hospice is currently following.  All results discussed with the patient and daughter.  Answered all questions at this time.  Patient daughter feel comfortable with this plan.  Discussed all results.  Discussed return precautions.  Answered all questions at this time.  Hemodynamically stable for continued outpatient management strict return precautions.  Patient and daughter verbalized understanding agreed to plan.      Clinical Impression:   Final diagnoses:  [R10.9] Abdominal pain, unspecified abdominal location (Primary)  [C25.9] Malignant neoplasm of pancreas, unspecified location of malignancy  [R10.33] Periumbilical abdominal pain  [I10] Hypertension, unspecified type        ED Disposition Condition    Discharge Stable          ED Prescriptions    None       Follow-up Information       Follow up With Specialties Details Why Contact Info    Marina Morales MD Family Medicine   [address]  [address]  [facility] [address]  [phone]      Your primary care physician.                 Afshin Fairbanks MD  10/10/23 0982

## 2023-10-05 NOTE — DISCHARGE INSTRUCTIONS
Continue taking your pain medication as prescribed.      Follow-up with your hospice team.    Return to emergency department for any new or worsening symptoms.

## 2023-10-30 PROBLEM — K92.0 HEMATEMESIS WITH NAUSEA: Status: RESOLVED | Noted: 2023-07-27 | Resolved: 2023-10-30

## 2023-11-02 NOTE — DISCHARGE SUMMARY
[facility]  Hospital Medicine Discharge Summary    Admit Date: 9/27/2023  Discharge Date and Time: 9/27/2023  Discharging Physician: Garrison Merritt MD.  Consults: Hematology/Oncology    Discharge Diagnoses:    Hospital Course:   Evelyn Proctor is a 83 y.o. Black or  female with a past medical history of hyperlipidemia, coronary artery disease, Crohn's disease status post right hemicolectomy, hepatic steatosis, rheumatoid arthritis, osteopenia, cervical cancer status post hysterectomy, stage III pancreatic cancer diagnosed in June 2023 was undergoing chemotherapy but discontinued as she was unable to tolerate treatment and currently on hospice with [facility]. She states her last session of chemotherapy was in July. Her oncologist is Dr. 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 The patient presented to [facility] on 9/27/2023 with a primary complaint of abdominal pain, nausea and vomiting.  Patient reports having umbilical abdominal pain which began on 09/25/2023 and vomiting which began yesterday (09/26/2023.  Patient reports having 3 episodes of vomiting since onset and has been unable to keep anything down.  She denies complaints of fever, chills, chest pain, shortness a breath and diarrhea.  Patient is on MS Contin 45 mg twice a day and as needed immediate release oxycodone 10 mg as needed.     Upon presentation to the ED, temperature 97.7° F, heart rate 95, blood pressure 202/107, respiratory rate 18 and SpO2 99% on room air.  Labs with stable normocytic anemia, potassium 2.6, magnesium 1.10.  CT abdomen pelvis without contrast revealed bowel normal in caliber without appreciable obstruction and overall very similar appearance to previous exam.  In ED patient received fentanyl, Dilaudid, Zofran, 2 g of magnesium sulfate, 20 mEq of potassium chloride and IV fluid hydration.  She is admitted to hospital medicine services for further medical  "management.    Patient has stage IIA of pancreatic adenocarcinoma per oncology note from 08/23/2023.    Patient's care was changed to hospice since she could not tolerate the chemotherapy.  She is doing fine with the hospice however recently hospice was revoked on the thoughts of reinstate in the chemotherapy by the patient and her daughter.    Oncology evaluated the pt and recommended hospice.  Patient and the family is in agreement in reinstating hospice.  Will discharge patient to hospice.       General appearance: Well-developed, well-nourished, ill appearing female in pain. Daughter at bedside.  HEENT: Atraumatic head. Dry mucous membranes of oral cavity.  Lungs: Clear to auscultation bilaterally.   Heart: Regular rate and rhythm with murmur. Trace edema to BLE.  Abdomen: Soft, non-distended, generalized tenderness. Bowel sounds are normal.   Extremities: No cyanosis, clubbing. No deformities.  Skin: No Rash. Warm and dry.  Neuro: Awake, alert and oriented. Motor and sensory exams grossly intact.      A/P     Intractable abdominal pain, nausea and vomiting secondary to pancreatic cancer   Stable normocytic anemia  Hypokalemia  Hypomagnesemia  History of hyperlipidemia, coronary artery disease, Crohn's disease on Evtyvio every 6 weeks status post right hemicolectomy, hepatic steatosis, rheumatoid arthritis, osteopenia, cervical cancer, stage III pancreatic cancer diagnosed in June 2023 undergoing chemotherapy followed by Dr. Burns and near occlusive portal vein thrombosis on Xarelto       Vitals:  VITAL SIGNS: 24 HRS MIN & MAX LAST   No data recorded 97.7 °F (36.5 °C)   No data recorded (!) 154/95   No data recorded  78   No data recorded (!) 24   No data recorded 100 %         Procedures Performed: No admission procedures for hospital encounter.     Significant Diagnostic Studies: See Full reports for all details    No results for input(s): "WBC", "RBC", "HGB", "HCT", "MCV", "MCH", "MCHC", "RDW", "PLT", " ""MPV", "GRAN", "LYMPH", "MONO", "BASO", "NRBC" in the last 168 hours.    No results for input(s): "NA", "K", "CL", "CO2", "ANIONGAP", "BUN", "CREATININE", "GLU", "CALCIUM", "PH", "MG", "ALBUMIN", "PROT", "ALKPHOS", "ALT", "AST", "BILITOT" in the last 168 hours.     Microbiology Results (last 7 days)       ** No results found for the last 168 hours. **             CT Abdomen Pelvis  Without Contrast  Narrative: EXAMINATION:  CT ABDOMEN PELVIS WITHOUT CONTRAST    CLINICAL HISTORY:  Bowel obstruction suspected;  abdominal pain, nausea, vomiting    TECHNIQUE:  Helically acquired axial images, sagittal and coronal reformations were obtained from the lung bases to the pubic symphysis without the IV administration of contrast.    Automated tube current modulation, weight-based exposure dosing, and/or iterative reconstruction technique utilized to reach lowest reasonably achievable exposure rate.    DLP: 419 mGy*cm    COMPARISON:  CT abdomen pelvis 08/19/2023    FINDINGS:  HEART: There are coronary artery calcifications.    LUNG BASES: Well aerated.    LIVER: Limited noncontrast evaluation.  Similar geographic hypoattenuation at segment 4.    BILIARY: Gallbladder is surgically absent.  Likely similar biliary ductal dilatation compared to previous.    PANCREAS: Limited noncontrast evaluation.  Similar appearance of the pancreas compared to previous exam taking into account differences in technique/absence of contrast on today's exam.  Hyperattenuating area at the head/neck of the pancreas corresponding with soft tissue attenuation and thrombosed portal vein on preceding exam which is similar in size on today's exam.  Similar fluid attenuation at the pancreatic bed and peripancreatic soft tissues.    SPLEEN: Normal in size    ADRENALS: No mass.    KIDNEYS/URETERS: No renal or ureteral calculus. No hydronephrosis.    GI TRACT/MESENTERY: Evaluation of the bowel is limited without contrast. Bowel is not dilated.  Postsurgical " change of right hemicolectomy.    PERITONEUM: No free fluid.No free air.    LYMPH NODES: Limited evaluation    VASCULATURE: Aortoiliac atherosclerosis.  Known portosystemic collaterals, better characterized on previous exam.    BLADDER: Bladder is distended to above the pelvic brim.    REPRODUCTIVE ORGANS: Uterus is surgically absent.    ABDOMINAL WALL: Unremarkable.    BONES: Degenerative change at the lumbar spine.  Impression: 1. Bowel is normal in caliber without appreciable obstruction  2. Overall very similar appearance to the previous exam taking into account limitations on today's exam performed without contrast.    Electronically signed by: Carolina Duque  Date:    09/27/2023  Time:    07:18         Medication List        CONTINUE taking these medications      ondansetron 8 MG tablet  Commonly known as: ZOFRAN  Take 1 tablet (8 mg total) by mouth every 12 (twelve) hours as needed for Nausea.     oxyCODONE 10 mg Tab immediate release tablet  Commonly known as: ROXICODONE     pantoprazole 40 MG tablet  Commonly known as: PROTONIX  Take 1 tablet (40 mg total) by mouth every morning.     prochlorperazine 10 MG tablet  Commonly known as: COMPAZINE  Take 1 tablet (10 mg total) by mouth every 6 (six) hours as needed.     rivaroxaban 20 mg Tab  Commonly known as: XARELTO  Take 1 tablet (20 mg total) by mouth daily with dinner or evening meal.     traZODone 100 MG tablet  Commonly known as: DESYREL               Explained in detail to the patient about the discharge plan, medications, and follow-up visits. Pt understands and agrees with the treatment plan  Discharge Disposition: Home or Self Care   Discharged Condition: stable  Diet-    Medications Per [place] med rec  Activities as tolerated   Follow-up Information       Marina Morales MD Follow up today.    Specialty: Family Medicine  Contact information:  [address]  [address]  [facility] [address] [phone]                           For  further questions contact hospitalist office    Discharge time 33 minutes    For worsening symptoms, chest pain, shortness of breath, increased abdominal pain, high grade fever, stroke or stroke like symptoms, immediately go to the nearest Emergency Room or call 911 as soon as possible.      Garrison Mitchell M.D, on 11/1/2023. at 7:53 PM.

## (undated) DEVICE — GLOVE PROTEXIS BLUE LATEX 8

## (undated) DEVICE — CATH CUFF BLLN US RADIAL FLEX

## (undated) DEVICE — GAUZE DERMACEA LOW PLY 2X4YRDS

## (undated) DEVICE — DRESSING TELFA PAD N ADH 2X3

## (undated) DEVICE — GLOVE PROTEXIS PI SYN SURG 6.5

## (undated) DEVICE — NDL HYPO 22GX1 1/2 SYR 10ML LL

## (undated) DEVICE — GLOVE PROTEXIS PI SYN SURG 6.0

## (undated) DEVICE — CORD BIPOLAR 12 FOOT

## (undated) DEVICE — BITE BLOCK ADULT LATEX FREE

## (undated) DEVICE — SHEET XLGE DRAPE

## (undated) DEVICE — SOL IRRI STRL WATER 1000ML

## (undated) DEVICE — COLLECTION SPECIMEN NEPTUNE

## (undated) DEVICE — KIT SURGICAL TURNOVER

## (undated) DEVICE — KIT SURGICAL COLON .25 1.1OZ

## (undated) DEVICE — PAD PREP 50/CA

## (undated) DEVICE — GLOVE PROTEXIS BLUE LATEX 7

## (undated) DEVICE — SOL NACL IRR 1000ML BTL

## (undated) DEVICE — TIP SUCTION YANKAUER

## (undated) DEVICE — BLADE SURG STAINLESS STEEL #15

## (undated) DEVICE — KIT CANIST SUCTION 1200CC

## (undated) DEVICE — Device

## (undated) DEVICE — ELECTRODE PATIENT RETURN DISP

## (undated) DEVICE — TUBING O2 FEMALE CONN 13FT

## (undated) DEVICE — SUT ETHILON 3-0 PS2 18 BLK

## (undated) DEVICE — GLOVE PROTEXIS BLUE LATEX 6.5

## (undated) DEVICE — STOCKINET 4INX48

## (undated) DEVICE — GLOVE PROTEXIS PI SYN SURG 7.5

## (undated) DEVICE — DURAPREP SURG SCRUB 26ML

## (undated) DEVICE — DRAPE HAND STERILE

## (undated) DEVICE — SLING ARM LARGE

## (undated) DEVICE — GAUZE SPONGE 4X4 12PLY

## (undated) DEVICE — UNDERPAD DISPOSABLE 30X30IN

## (undated) DEVICE — BLLN ULTRASONIC LINEAR FLEX